# Patient Record
Sex: FEMALE | Race: WHITE | NOT HISPANIC OR LATINO | Employment: OTHER | URBAN - METROPOLITAN AREA
[De-identification: names, ages, dates, MRNs, and addresses within clinical notes are randomized per-mention and may not be internally consistent; named-entity substitution may affect disease eponyms.]

---

## 2021-01-13 ENCOUNTER — HOSPITAL ENCOUNTER (OUTPATIENT)
Dept: GENERAL RADIOLOGY | Facility: HOSPITAL | Age: 61
Discharge: HOME | End: 2021-01-13
Admitting: NURSE PRACTITIONER

## 2021-01-13 ENCOUNTER — TRANSCRIBE ORDERS (OUTPATIENT)
Dept: ADMINISTRATIVE | Facility: HOSPITAL | Age: 61
End: 2021-01-13

## 2021-01-13 DIAGNOSIS — M25.561 RIGHT KNEE PAIN, UNSPECIFIED CHRONICITY: ICD-10-CM

## 2021-01-13 DIAGNOSIS — G89.29 CHRONIC LOW BACK PAIN WITH SCIATICA, SCIATICA LATERALITY UNSPECIFIED, UNSPECIFIED BACK PAIN LATERALITY: ICD-10-CM

## 2021-01-13 DIAGNOSIS — M25.551 HIP PAIN, ACUTE, RIGHT: Primary | ICD-10-CM

## 2021-01-13 DIAGNOSIS — M54.40 CHRONIC LOW BACK PAIN WITH SCIATICA, SCIATICA LATERALITY UNSPECIFIED, UNSPECIFIED BACK PAIN LATERALITY: ICD-10-CM

## 2021-01-13 PROCEDURE — 72114 X-RAY EXAM L-S SPINE BENDING: CPT

## 2021-01-13 PROCEDURE — 73562 X-RAY EXAM OF KNEE 3: CPT

## 2021-01-13 PROCEDURE — 73502 X-RAY EXAM HIP UNI 2-3 VIEWS: CPT

## 2021-01-26 ENCOUNTER — TRANSCRIBE ORDERS (OUTPATIENT)
Dept: ADMINISTRATIVE | Facility: HOSPITAL | Age: 61
End: 2021-01-26

## 2021-01-26 DIAGNOSIS — Z87.891 PERSONAL HISTORY OF TOBACCO USE, PRESENTING HAZARDS TO HEALTH: Primary | ICD-10-CM

## 2021-02-01 ENCOUNTER — TRANSCRIBE ORDERS (OUTPATIENT)
Dept: ADMINISTRATIVE | Facility: HOSPITAL | Age: 61
End: 2021-02-01

## 2021-02-01 DIAGNOSIS — Z12.31 VISIT FOR SCREENING MAMMOGRAM: Primary | ICD-10-CM

## 2021-02-05 ENCOUNTER — APPOINTMENT (OUTPATIENT)
Dept: CT IMAGING | Facility: HOSPITAL | Age: 61
End: 2021-02-05

## 2021-02-17 ENCOUNTER — APPOINTMENT (OUTPATIENT)
Dept: CT IMAGING | Facility: HOSPITAL | Age: 61
End: 2021-02-17

## 2021-02-19 DIAGNOSIS — Z01.812 BLOOD TESTS PRIOR TO TREATMENT OR PROCEDURE: Primary | ICD-10-CM

## 2021-02-22 ENCOUNTER — LAB (OUTPATIENT)
Dept: PULMONOLOGY | Facility: CLINIC | Age: 61
End: 2021-02-22

## 2021-02-22 DIAGNOSIS — Z01.812 BLOOD TESTS PRIOR TO TREATMENT OR PROCEDURE: ICD-10-CM

## 2021-02-22 PROCEDURE — U0004 COV-19 TEST NON-CDC HGH THRU: HCPCS | Performed by: INTERNAL MEDICINE

## 2021-02-22 PROCEDURE — 99211 OFF/OP EST MAY X REQ PHY/QHP: CPT | Performed by: INTERNAL MEDICINE

## 2021-02-23 LAB — SARS-COV-2 RNA RESP QL NAA+PROBE: NOT DETECTED

## 2021-02-24 ENCOUNTER — OFFICE VISIT (OUTPATIENT)
Dept: PULMONOLOGY | Facility: CLINIC | Age: 61
End: 2021-02-24

## 2021-02-24 VITALS
OXYGEN SATURATION: 98 % | HEART RATE: 94 BPM | WEIGHT: 171.6 LBS | HEIGHT: 67 IN | BODY MASS INDEX: 26.93 KG/M2 | SYSTOLIC BLOOD PRESSURE: 120 MMHG | DIASTOLIC BLOOD PRESSURE: 80 MMHG | TEMPERATURE: 97.5 F

## 2021-02-24 DIAGNOSIS — G47.33 OBSTRUCTIVE SLEEP APNEA: ICD-10-CM

## 2021-02-24 DIAGNOSIS — Z72.0 TOBACCO ABUSE: ICD-10-CM

## 2021-02-24 DIAGNOSIS — G47.34 NOCTURNAL HYPOXEMIA: ICD-10-CM

## 2021-02-24 DIAGNOSIS — J44.9 CHRONIC OBSTRUCTIVE PULMONARY DISEASE, UNSPECIFIED COPD TYPE (HCC): Primary | ICD-10-CM

## 2021-02-24 DIAGNOSIS — Z79.899 POLYPHARMACY: ICD-10-CM

## 2021-02-24 PROCEDURE — 99204 OFFICE O/P NEW MOD 45 MIN: CPT | Performed by: INTERNAL MEDICINE

## 2021-02-24 PROCEDURE — 94375 RESPIRATORY FLOW VOLUME LOOP: CPT | Performed by: INTERNAL MEDICINE

## 2021-02-24 PROCEDURE — 94726 PLETHYSMOGRAPHY LUNG VOLUMES: CPT | Performed by: INTERNAL MEDICINE

## 2021-02-24 PROCEDURE — 94618 PULMONARY STRESS TESTING: CPT | Performed by: INTERNAL MEDICINE

## 2021-02-24 PROCEDURE — 94729 DIFFUSING CAPACITY: CPT | Performed by: INTERNAL MEDICINE

## 2021-02-24 RX ORDER — BUDESONIDE AND FORMOTEROL FUMARATE DIHYDRATE 160; 4.5 UG/1; UG/1
2 AEROSOL RESPIRATORY (INHALATION)
COMMUNITY
Start: 2021-01-11 | End: 2021-02-24 | Stop reason: SDUPTHER

## 2021-02-24 RX ORDER — ONDANSETRON 8 MG/1
8 TABLET, ORALLY DISINTEGRATING ORAL EVERY 8 HOURS PRN
COMMUNITY
End: 2021-04-20

## 2021-02-24 RX ORDER — DOXYCYCLINE HYCLATE 100 MG/1
CAPSULE ORAL
COMMUNITY
Start: 2021-01-25 | End: 2021-04-12

## 2021-02-24 RX ORDER — CYCLOBENZAPRINE HCL 10 MG
10 TABLET ORAL 2 TIMES DAILY
COMMUNITY
Start: 2021-01-11 | End: 2022-06-06

## 2021-02-24 RX ORDER — GLIPIZIDE 5 MG/1
5 TABLET, FILM COATED, EXTENDED RELEASE ORAL DAILY
COMMUNITY
Start: 2021-01-12 | End: 2021-04-20

## 2021-02-24 RX ORDER — ALBUTEROL SULFATE 90 UG/1
AEROSOL, METERED RESPIRATORY (INHALATION)
COMMUNITY
Start: 2021-01-11 | End: 2021-04-12 | Stop reason: SDUPTHER

## 2021-02-24 NOTE — PROGRESS NOTES
PULMONARY  NOTE    Chief Complaint     COPD, obstructive sleep apnea, ongoing tobacco abuse    History of Present Illness     60-year-old female referred for evaluation of chronic lung disease    She has a history of tobacco abuse consisting of 1 or more packs of cigarettes per day.  She is currently using Chantix and would like to stop smoking although has not set a smoking cessation date, yet    She has a history of COPD.  Previously followed by pulmonary physicians in [place].  She does not think that she is ever had a screening CT scan of the chest.  She now has an LD CT scheduled for later on this week    She indicates that she has been prescribed Breo, Advair, and Symbicort to be used for her COPD.  She has been using all 3 of them and indicates that her pharmacy has been filling all 3 of them.  She also has albuterol    She has been prescribed supplemental oxygen in the past and was told to use it 24 hours a day.  She has not established a relationship with the DME company in [place], yet    She also has a history of obstructive sleep apnea and and has been prescribed CPAP.  She negates that her equipment is broken and she does not have any functioning CPAP equipment at this time    Patient Active Problem List   Diagnosis   • Stage III, severe, COPD   • Tobacco abuse   • Obstructive sleep apnea   • Nocturnal hypoxemia   • Polypharmacy     Allergies   Allergen Reactions   • Bee Venom Anaphylaxis   • Ciprofloxacin Shortness Of Breath       Current Outpatient Medications:   •  albuterol sulfate HFA (Ventolin HFA) 108 (90 Base) MCG/ACT inhaler, Ventolin HFA 90 mcg/actuation aerosol inhaler  qid two inhalations, Disp: , Rfl:   •  ASPIRIN 81 PO, Take  by mouth Daily., Disp: , Rfl:   •  budesonide-formoterol (Symbicort) 160-4.5 MCG/ACT inhaler, Inhale 2 puffs., Disp: , Rfl:   •  Chantix Starting Month Jeevan 0.5 MG X 11 & 1 MG X 42 tablet, , Disp: , Rfl:   •  cyclobenzaprine (FLEXERIL) 10 MG tablet, Take  by mouth.,  "Disp: , Rfl:   •  doxycycline (VIBRAMYCIN) 100 MG capsule, , Disp: , Rfl:   •  Fluticasone Furoate-Vilanterol (Breo Ellipta) 100-25 MCG/INH inhaler, Inhale 1 puff Daily., Disp: , Rfl:   •  fluticasone-salmeterol (Advair Diskus) 500-50 MCG/DOSE DISKUS, Inhale 1 puff., Disp: , Rfl:   •  glipizide (GLUCOTROL XL) 5 MG ER tablet, , Disp: , Rfl:   •  metFORMIN (GLUCOPHAGE) 500 MG tablet, , Disp: , Rfl:   •  ondansetron ODT (ZOFRAN-ODT) 8 MG disintegrating tablet, Place 8 mg on the tongue Every 8 (Eight) Hours As Needed for Nausea or Vomiting., Disp: , Rfl:   MEDICATION LIST AND ALLERGIES REVIEWED.    No family history on file.  Social History     Tobacco Use   • Smoking status: Current Every Day Smoker     Packs/day: 2.50     Years: 50.00     Pack years: 125.00     Types: Cigarettes   • Smokeless tobacco: Never Used   Substance Use Topics   • Alcohol use: Yes     Frequency: Never     Comment: rarely   • Drug use: Never     Social History     Social History Narrative        Has moved to [place] from [place]    Continues to smoke up to 1 pack of cigarettes per day    Denies regular alcohol use     FAMILY AND SOCIAL HISTORY REVIEWED.    Review of Systems  ALSO REFER TO SCANNED ROS SHEET FROM SAME DATE.    /80   Pulse 94   Temp 97.5 °F (36.4 °C)   Ht 170.2 cm (67\")   Wt 77.8 kg (171 lb 9.6 oz)   LMP  (LMP Unknown)   SpO2 98% Comment: resting, room air  Breastfeeding No   BMI 26.88 kg/m²   Physical Exam  Vitals signs and nursing note reviewed.   Constitutional:       General: She is not in acute distress.     Appearance: She is well-developed. She is not diaphoretic.   HENT:      Head: Normocephalic and atraumatic.   Neck:      Thyroid: No thyromegaly.   Cardiovascular:      Rate and Rhythm: Normal rate and regular rhythm.      Heart sounds: Normal heart sounds. No murmur.   Pulmonary:      Effort: Pulmonary effort is normal.      Breath sounds: No stridor.      Comments: Bilateral expiratory " wheezes  Abdominal:      General: Bowel sounds are normal.      Palpations: Abdomen is soft.   Musculoskeletal: Normal range of motion.      Right lower leg: No edema.      Left lower leg: No edema.   Lymphadenopathy:      Cervical: No cervical adenopathy.      Upper Body:      Right upper body: No supraclavicular or epitrochlear adenopathy.      Left upper body: No supraclavicular or epitrochlear adenopathy.   Skin:     General: Skin is warm and dry.   Neurological:      Mental Status: She is alert and oriented to person, place, and time.   Psychiatric:         Behavior: Behavior normal.         Results     PFTs reveal severe airway obstruction, no restriction, air trapping and a reduced diffusion capacity    Exercise pulse oximetry failed to show exercise-induced desaturation after walking 900 feet on a level surface on room air    Problem List       ICD-10-CM ICD-9-CM   1. Stage III, severe, COPD  J44.9 496   2. Tobacco abuse  Z72.0 305.1   3. Nocturnal hypoxemia  G47.34 327.24   4. Obstructive sleep apnea  G47.33 327.23   5. Polypharmacy  Z79.899 V58.69       Discussion     We reviewed her test results  She has evidence of stage III, severe, chronic obstructive pulmonary disease.  This is most likely COPD related to her long history of tobacco abuse.  We will get an alpha-1 antitrypsin screen    We discussed the need for total smoking abstinence and we discussed smoking cessation strategies.  So far she appears to be tolerating Chantix    I have encouraged her to keep her appointment for her low-dose CT scan of the chest that is scheduled for later on this week    We will schedule a titration sleep study given her history of obstructive sleep apnea and need for new equipment    Oxygen prescription will be dependent upon documentation of need for supplemental oxygen.  She did not qualify based on her exercise study in the office today    We discussed her medical regimen and how she is triple dosing on her  LABA.  I have asked her to discontinue Breo, Advair, and Symbicort.  I gave her samples of Breztri with written instructions, discussion of potential side effects and a spacer.  She will also have albuterol to use on an as-needed basis    I will plan to see her back after the above    Level of service justified based on 45 minutes spent in patient care on this date of service including, but not limited to: preparing to see the patient, obtaining and/or reviewing history, performing medically appropriate examination, ordering tests/medicine/procedures, independently interpreting results, documenting clinical information in EHR, and counseling/education of patient/family/caregiver. (Level 4 45-59 minutes; Level 5 60-74 minutes)    Silas Thakur MD  Note electronically signed    CC: Kanika, January, APRN

## 2021-02-25 DIAGNOSIS — G47.33 OBSTRUCTIVE SLEEP APNEA: Primary | ICD-10-CM

## 2021-02-26 ENCOUNTER — HOSPITAL ENCOUNTER (OUTPATIENT)
Dept: CT IMAGING | Facility: HOSPITAL | Age: 61
Discharge: HOME | End: 2021-02-26
Admitting: NURSE PRACTITIONER

## 2021-02-26 DIAGNOSIS — Z87.891 PERSONAL HISTORY OF TOBACCO USE, PRESENTING HAZARDS TO HEALTH: ICD-10-CM

## 2021-02-26 PROCEDURE — 71271 CT THORAX LUNG CANCER SCR C-: CPT

## 2021-03-05 DIAGNOSIS — G47.33 OSA (OBSTRUCTIVE SLEEP APNEA): Primary | ICD-10-CM

## 2021-04-12 ENCOUNTER — OFFICE VISIT (OUTPATIENT)
Dept: PULMONOLOGY | Facility: CLINIC | Age: 61
End: 2021-04-12

## 2021-04-12 VITALS
HEIGHT: 67 IN | BODY MASS INDEX: 27.35 KG/M2 | DIASTOLIC BLOOD PRESSURE: 80 MMHG | HEART RATE: 116 BPM | TEMPERATURE: 98.2 F | SYSTOLIC BLOOD PRESSURE: 120 MMHG | WEIGHT: 174.25 LBS | RESPIRATION RATE: 22 BRPM | OXYGEN SATURATION: 96 %

## 2021-04-12 DIAGNOSIS — J44.9 COPD MIXED TYPE (HCC): ICD-10-CM

## 2021-04-12 DIAGNOSIS — G47.34 NOCTURNAL HYPOXEMIA: ICD-10-CM

## 2021-04-12 DIAGNOSIS — F17.210 CIGARETTE NICOTINE DEPENDENCE WITHOUT COMPLICATION: ICD-10-CM

## 2021-04-12 DIAGNOSIS — J30.9 ALLERGIC RHINITIS, UNSPECIFIED SEASONALITY, UNSPECIFIED TRIGGER: ICD-10-CM

## 2021-04-12 DIAGNOSIS — G47.33 OBSTRUCTIVE SLEEP APNEA: ICD-10-CM

## 2021-04-12 DIAGNOSIS — Z72.0 TOBACCO ABUSE: Primary | ICD-10-CM

## 2021-04-12 PROCEDURE — G0296 VISIT TO DETERM LDCT ELIG: HCPCS | Performed by: NURSE PRACTITIONER

## 2021-04-12 PROCEDURE — 99214 OFFICE O/P EST MOD 30 MIN: CPT | Performed by: NURSE PRACTITIONER

## 2021-04-12 PROCEDURE — 99406 BEHAV CHNG SMOKING 3-10 MIN: CPT | Performed by: NURSE PRACTITIONER

## 2021-04-12 RX ORDER — FEXOFENADINE HCL 180 MG/1
180 TABLET ORAL DAILY
Qty: 90 TABLET | Refills: 3 | Status: SHIPPED | OUTPATIENT
Start: 2021-04-12 | End: 2021-07-14 | Stop reason: SDUPTHER

## 2021-04-12 RX ORDER — IPRATROPIUM BROMIDE AND ALBUTEROL SULFATE 2.5; .5 MG/3ML; MG/3ML
3 SOLUTION RESPIRATORY (INHALATION)
COMMUNITY
Start: 2021-03-06 | End: 2021-04-12 | Stop reason: SDUPTHER

## 2021-04-12 RX ORDER — IPRATROPIUM BROMIDE AND ALBUTEROL SULFATE 2.5; .5 MG/3ML; MG/3ML
3 SOLUTION RESPIRATORY (INHALATION)
Qty: 360 ML | Refills: 4 | Status: SHIPPED | OUTPATIENT
Start: 2021-04-12 | End: 2021-10-22

## 2021-04-12 RX ORDER — FLUTICASONE PROPIONATE 50 MCG
2 SPRAY, SUSPENSION (ML) NASAL DAILY
Qty: 15.8 ML | Refills: 6 | Status: SHIPPED | OUTPATIENT
Start: 2021-04-12 | End: 2021-07-14 | Stop reason: SDUPTHER

## 2021-04-12 RX ORDER — MELOXICAM 15 MG/1
15 TABLET ORAL DAILY
COMMUNITY
Start: 2021-03-22

## 2021-04-12 RX ORDER — INSULIN GLARGINE 100 [IU]/ML
10 INJECTION, SOLUTION SUBCUTANEOUS NIGHTLY
COMMUNITY
End: 2021-04-20

## 2021-04-12 RX ORDER — ALBUTEROL SULFATE 90 UG/1
2 AEROSOL, METERED RESPIRATORY (INHALATION) EVERY 4 HOURS PRN
Qty: 8 G | Refills: 6 | Status: SHIPPED | OUTPATIENT
Start: 2021-04-12 | End: 2021-07-14 | Stop reason: SDUPTHER

## 2021-04-12 NOTE — PROGRESS NOTES
[facility] Pulmonary Follow up    CHIEF COMPLAINT    COPD    HISTORY OF PRESENT ILLNESS    Kathy Taylor is a 61 y.o.female here today for follow-up of her COPD.  She was last seen in the office in February by Dr. Thakur.    She is currently using Breztri inhaler twice a day.  She denies any difficulties with this inhaler.  She does have her albuterol rescue inhaler to use as needed for shortness of breath.  She also has nebulizers to use as needed.    She does have shortness of breath with any exertion.  She does recover fairly quickly at rest.  She has been on daytime oxygen in the past but at her last appointment she did a 6-minute walk test and did not meet qualifications for oxygen with activity.    She has had a low-dose CT screening since her last appointment and is here today for the results.  She continues to smoke 2 packs a day.  She had been on Chantix and had cut back to 4 to 8 cigarettes a day but ran out of Chantix and did not know who to call for a refill.    She denies fever, chills, sputum production, hemoptysis, night sweats, weight loss, chest pain or palpitations.  She has chronic lower extremity edema but states that this is her baseline.  She denies any calf tenderness.  She does have sinus and allergy symptoms but does not take anything currently.  She denies reflux symptoms.    She has a history of SABRINA and her CPAP is currently broken.  A sleep study was ordered but she never had this scheduled.  She does have daytime somnolence, fatigue and nonrestorative sleep.    She is up-to-date on her current vaccinations.    Patient Active Problem List   Diagnosis   • Stage III, severe, COPD   • Tobacco abuse   • Obstructive sleep apnea   • Nocturnal hypoxemia   • Polypharmacy       Allergies   Allergen Reactions   • Bee Venom Anaphylaxis   • Ciprofloxacin Shortness Of Breath       Current Outpatient Medications:   •  albuterol sulfate HFA (Ventolin HFA) 108 (90 Base) MCG/ACT inhaler, Inhale 2 puffs  Every 4 (Four) Hours As Needed for Wheezing., Disp: 8 g, Rfl: 6  •  ASPIRIN 81 PO, Take  by mouth Daily., Disp: , Rfl:   •  Budeson-Glycopyrrol-Formoterol (Breztri Aerosphere) 160-9-4.8 MCG/ACT aerosol inhaler, Inhale 2 puffs 2 (Two) Times a Day., Disp: 1 each, Rfl: 11  •  cyclobenzaprine (FLEXERIL) 10 MG tablet, Take  by mouth., Disp: , Rfl:   •  glipizide (GLUCOTROL XL) 5 MG ER tablet, Take 5 mg by mouth Daily., Disp: , Rfl:   •  insulin glargine (Lantus) 100 UNIT/ML injection, Inject 10 Units under the skin into the appropriate area as directed Every Night., Disp: , Rfl:   •  ipratropium-albuterol (DUO-NEB) 0.5-2.5 mg/3 ml nebulizer, Take 3 mL by nebulization 4 (Four) Times a Day., Disp: 360 mL, Rfl: 4  •  meloxicam (MOBIC) 15 MG tablet, Take 15 mg by mouth Daily., Disp: , Rfl:   •  metFORMIN (GLUCOPHAGE) 500 MG tablet, Take 500 mg by mouth Daily With Breakfast., Disp: , Rfl:   •  ondansetron ODT (ZOFRAN-ODT) 8 MG disintegrating tablet, Place 8 mg on the tongue Every 8 (Eight) Hours As Needed for Nausea or Vomiting., Disp: , Rfl:   •  fexofenadine (Allegra Allergy) 180 MG tablet, Take 1 tablet by mouth Daily., Disp: 90 tablet, Rfl: 3  •  fluticasone (Flonase) 50 MCG/ACT nasal spray, 2 sprays into the nostril(s) as directed by provider Daily., Disp: 15.8 mL, Rfl: 6  •  varenicline (CHANTIX MILADIS) 0.5 MG X 11 & 1 MG X 42 tablet, Use as directed on package instructions, try to quit smoking after 1 week, Disp: 53 tablet, Rfl: 0  MEDICATION LIST AND ALLERGIES REVIEWED.    Social History     Tobacco Use   • Smoking status: Current Every Day Smoker     Packs/day: 2.50     Years: 50.00     Pack years: 125.00     Types: Cigarettes   • Smokeless tobacco: Never Used   Substance Use Topics   • Alcohol use: Yes     Comment: rarely   • Drug use: Never       FAMILY AND SOCIAL HISTORY REVIEWED.    Review of Systems   Constitutional: Negative for activity change, appetite change, fatigue, fever and unexpected weight change.   HENT:  "Negative for congestion, postnasal drip, rhinorrhea, sinus pressure, sore throat and voice change.    Eyes: Negative for visual disturbance.   Respiratory: Positive for shortness of breath. Negative for cough, chest tightness and wheezing.    Cardiovascular: Negative for chest pain, palpitations and leg swelling.   Gastrointestinal: Negative for abdominal distention, abdominal pain, nausea and vomiting.   Endocrine: Negative for cold intolerance and heat intolerance.   Genitourinary: Negative for difficulty urinating and urgency.   Musculoskeletal: Negative for arthralgias, back pain and neck pain.   Skin: Negative for color change and pallor.   Allergic/Immunologic: Negative for environmental allergies and food allergies.   Neurological: Negative for dizziness, syncope, weakness and light-headedness.   Hematological: Negative for adenopathy. Does not bruise/bleed easily.   Psychiatric/Behavioral: Negative for agitation and behavioral problems.   .    /80 (BP Location: Right arm, Patient Position: Sitting, Cuff Size: Adult)   Pulse 116   Temp 98.2 °F (36.8 °C)   Resp 22   Ht 170.2 cm (67\")   Wt 79 kg (174 lb 4 oz)   LMP  (LMP Unknown)   SpO2 96% Comment: room air at rest  BMI 27.29 kg/m²     Immunization History   Administered Date(s) Administered   • Flu Vaccine Quad PF >18YRS 10/13/2016   • Flu Vaccine Quad PF >36MO 01/11/2021   • Pneumococcal Polysaccharide (PPSV23) 10/13/2016, 01/11/2021       Physical Exam  Vitals and nursing note reviewed.   Constitutional:       Appearance: She is well-developed. She is not diaphoretic.   HENT:      Head: Normocephalic and atraumatic.   Eyes:      Pupils: Pupils are equal, round, and reactive to light.   Neck:      Thyroid: No thyromegaly.   Cardiovascular:      Rate and Rhythm: Normal rate and regular rhythm.      Heart sounds: Normal heart sounds. No murmur heard.   No friction rub. No gallop.    Pulmonary:      Effort: Pulmonary effort is normal. No " respiratory distress.      Breath sounds: Normal breath sounds. No wheezing or rales.   Chest:      Chest wall: No tenderness.   Abdominal:      General: Bowel sounds are normal.      Palpations: Abdomen is soft.      Tenderness: There is no abdominal tenderness.   Musculoskeletal:         General: No swelling. Normal range of motion.      Cervical back: Normal range of motion and neck supple.   Lymphadenopathy:      Cervical: No cervical adenopathy.   Skin:     General: Skin is warm and dry.      Capillary Refill: Capillary refill takes less than 2 seconds.   Neurological:      Mental Status: She is alert and oriented to person, place, and time.   Psychiatric:         Mood and Affect: Mood normal.         Behavior: Behavior normal.           RESULTS    CT Chest Low Dose Cancer Screening WO    Result Date: 2/26/2021  There is no CT evidence of acute intrathoracic abnormality. Mild bronchiectatic changes seen within the central aspect of the lung fields bilaterally. Old healed granulomatous disease seen within the chest with minimal coronary artery calcification identified. There is no pulmonary mass or nodule.  Lung RADS category 1-continue annual low dose CT screening.  D:  02/26/2021 E:  02/26/2021  This report was finalized on 2/26/2021 6:08 PM by Dr. Lida Tadeo MD.      PROBLEM LIST    Problem List Items Addressed This Visit        Pulmonary and Pneumonias    Stage III, severe, COPD    Relevant Medications    fexofenadine (Allegra Allergy) 180 MG tablet    fluticasone (Flonase) 50 MCG/ACT nasal spray    albuterol sulfate HFA (Ventolin HFA) 108 (90 Base) MCG/ACT inhaler    ipratropium-albuterol (DUO-NEB) 0.5-2.5 mg/3 ml nebulizer       Sleep    Obstructive sleep apnea    Nocturnal hypoxemia       Tobacco    Tobacco abuse - Primary    Relevant Medications    varenicline (CHANTIX MILADIS) 0.5 MG X 11 & 1 MG X 42 tablet      Other Visit Diagnoses     Allergic rhinitis, unspecified seasonality, unspecified  trigger        Relevant Medications    meloxicam (MOBIC) 15 MG tablet    fexofenadine (Allegra Allergy) 180 MG tablet    fluticasone (Flonase) 50 MCG/ACT nasal spray    Cigarette nicotine dependence without complication        Relevant Medications    varenicline (CHANTIX MILADIS) 0.5 MG X 11 & 1 MG X 42 tablet    Other Relevant Orders    CT chest low dose wo            DISCUSSION    Ms. Taylor was here for follow-up of her COPD.  She will continue Breztri twice a day for her COPD.  Did encourage her to use her albuterol or DuoNeb's as needed for shortness of breath.    I did give her the number to the sleep center to call and get her sleep study set up.  She does have daytime somnolence, fatigue and nonrestorative sleep.    I will send a refill of Chantix for her.  I did encourage her to completely quit smoking and if she needed refill to call me and I will send an additional refill.  We did discuss smoking cessation in the office for 5 minutes today.    We did review her CT scan that was performed and this revealed no nodule or mass concerning for malignancy.  Her next CT scan is due in February 2022.    I will start her on Allegra and Flonase for her allergic rhinitis.  I did encourage her to do these daily.    She will follow-up in 3 to 4 months with Dr. Thakur or sooner if her symptoms worsen.  She will call with any additional concerns or questions.    Level of service justified based on 35 minutes spent in patient care on this date of service including, but not limited to: preparing to see the patient, obtaining and/or reviewing history, performing medically appropriate examination, ordering tests/medicine/procedures, independently interpreting results, documenting clinical information in EHR, and counseling/education of patient/family/caregiver. (Level 4 30-39 minutes; Level 5 40-54 minutes)      Leidy Callaway, KALYAN  04/12/202115:49 EDT  Electronically signed     Please note that portions of this note were  completed with a voice recognition program. Efforts were made to edit the dictations, but occasionally words are mistranscribed.      CC: Kanika, January, APRN

## 2021-04-20 ENCOUNTER — OFFICE VISIT (OUTPATIENT)
Dept: ENDOCRINOLOGY | Facility: CLINIC | Age: 61
End: 2021-04-20

## 2021-04-20 VITALS
HEIGHT: 67 IN | SYSTOLIC BLOOD PRESSURE: 132 MMHG | WEIGHT: 173.4 LBS | OXYGEN SATURATION: 96 % | BODY MASS INDEX: 27.21 KG/M2 | DIASTOLIC BLOOD PRESSURE: 70 MMHG | HEART RATE: 110 BPM

## 2021-04-20 DIAGNOSIS — Z79.4 TYPE 2 DIABETES MELLITUS WITH HYPERGLYCEMIA, WITH LONG-TERM CURRENT USE OF INSULIN (HCC): Primary | ICD-10-CM

## 2021-04-20 DIAGNOSIS — E11.65 TYPE 2 DIABETES MELLITUS WITH HYPERGLYCEMIA, WITH LONG-TERM CURRENT USE OF INSULIN (HCC): Primary | ICD-10-CM

## 2021-04-20 LAB
EXPIRATION DATE: ABNORMAL
EXPIRATION DATE: ABNORMAL
EXPIRATION DATE: NORMAL
GLUCOSE BLDC GLUCOMTR-MCNC: 419 MG/DL (ref 70–130)
GLUCOSE BLDC GLUCOMTR-MCNC: 465 MG/DL (ref 70–130)
HBA1C MFR BLD: 12.8 %
Lab: ABNORMAL
Lab: ABNORMAL
Lab: NORMAL

## 2021-04-20 PROCEDURE — 99204 OFFICE O/P NEW MOD 45 MIN: CPT | Performed by: INTERNAL MEDICINE

## 2021-04-20 PROCEDURE — 82947 ASSAY GLUCOSE BLOOD QUANT: CPT | Performed by: INTERNAL MEDICINE

## 2021-04-20 PROCEDURE — 83036 HEMOGLOBIN GLYCOSYLATED A1C: CPT | Performed by: INTERNAL MEDICINE

## 2021-04-20 RX ORDER — METFORMIN HYDROCHLORIDE 500 MG/1
500 TABLET, EXTENDED RELEASE ORAL
Qty: 30 TABLET | Refills: 3 | Status: SHIPPED | OUTPATIENT
Start: 2021-04-20 | End: 2021-07-19

## 2021-04-20 RX ORDER — LANCETS 30 GAUGE
EACH MISCELLANEOUS
Qty: 60 EACH | Refills: 3 | Status: SHIPPED | OUTPATIENT
Start: 2021-04-20 | End: 2021-12-08

## 2021-04-20 RX ORDER — BLOOD-GLUCOSE METER
1 KIT MISCELLANEOUS ONCE
Qty: 1 EACH | Refills: 0 | Status: SHIPPED | OUTPATIENT
Start: 2021-04-20 | End: 2021-04-20

## 2021-04-20 NOTE — PROGRESS NOTES
Chief Complaint   Patient presents with   • Establish Care   • Diabetes        New patient who is being seen in consultation regarding diabetes at the request of Kankia, January, APRN     RUDY   Kathy Taylor is a 61 y.o. female who presents for evaluation of diabetes.    Patient has a history of type 2 diabetes.  This was initially diagnosed 10-15 years ago after patient presented for routine labs.  Patient was initially started on metformin and glipizde and lantus. She reports that lantus was later discontinued after improvement in BG. Insulin was restarted 2-3 years ago. Patient is currently taking Lantus 10 units daily, glipizide 15 mg XL daily, Metformin 500 mg twice daily.  Patient believes that glycemic control is poor.  Patient reports most recent A1c was high. Patient reports poor adherence to medications.  Patient reports that she has not taken Lantus in approximately 1 week and as she frequently misses doses of both glipizide and Metformin as well.  She does report some GI upset with Metformin and is unsure if she is taking the extended release or immediate release formulation.    Patient denies any recent hypoglycemia or symptoms concerning for hypoglycemia  patient does not monitor blood glucose  Patient does not follow a diabetic diet. Patient reports that she drinks coffee with sweetened creamer throughout the day, she also drinks regular soda and reports a diet high in starches, specifically potatoes.  Patient does not exercise regularly.     Patient's last dilated eye exam was within the past year.  Patient denies acute visual changes.  Patient denies foot related concerns such as numbness, tingling, or pain.  History of dyslipidemia: No  History of renal dysfunction: No  History of Hypertension: No    Patient noted to be profoundly hyperglycemic at start of visit.  She denies any nausea, vomiting, dizziness or lightheadedness and reports that she feels she is at her baseline health.  She has not  taken any medication for diabetes today.  She had not checked blood glucose today prior to arrival.  Past Medical History:   Diagnosis Date   • Arthritis    • Back ache    • Bronchitis    • Fibromyalgia    • Migraines    • Pneumonia    • Rheumatic fever    • Tooth infection    • Type 2 diabetes mellitus (CMS/HCC)    • Vulvar carcinoma (CMS/HCC)      Past Surgical History:   Procedure Laterality Date   • BREAST LUMPECTOMY     • TONSILLECTOMY     • VULVA BIOPSY     • VULVA SURGERY     • WISDOM TOOTH EXTRACTION        Family History   Problem Relation Age of Onset   • Arthritis Mother    • Diabetes Mother    • Heart disease Mother    • Thyroid disease Mother    • Uterine cancer Mother    • Heart disease Brother    • Diabetes Brother       Social History     Socioeconomic History   • Marital status:      Spouse name: Not on file   • Number of children: Not on file   • Years of education: Not on file   • Highest education level: Not on file   Tobacco Use   • Smoking status: Current Every Day Smoker     Packs/day: 1.00     Years: 50.00     Pack years: 50.00     Types: Cigarettes   • Smokeless tobacco: Never Used   Vaping Use   • Vaping Use: Former   • Substances: Nicotine   • Devices: Pre-filled or refillable cartridge   Substance and Sexual Activity   • Alcohol use: Yes     Comment: rarely   • Drug use: Never   • Sexual activity: Defer      Allergies   Allergen Reactions   • Bee Venom Anaphylaxis   • Ciprofloxacin Shortness Of Breath      Current Outpatient Medications on File Prior to Visit   Medication Sig Dispense Refill   • albuterol sulfate HFA (Ventolin HFA) 108 (90 Base) MCG/ACT inhaler Inhale 2 puffs Every 4 (Four) Hours As Needed for Wheezing. (Patient taking differently: Inhale 2 puffs As Needed for Wheezing.) 8 g 6   • ASPIRIN 81 PO Take 1 tablet by mouth Daily.     • Budeson-Glycopyrrol-Formoterol (Breztri Aerosphere) 160-9-4.8 MCG/ACT aerosol inhaler Inhale 2 puffs 2 (Two) Times a Day. 1 each 11   •  cyclobenzaprine (FLEXERIL) 10 MG tablet Take 10 mg by mouth Daily.     • fexofenadine (Allegra Allergy) 180 MG tablet Take 1 tablet by mouth Daily. 90 tablet 3   • fluticasone (Flonase) 50 MCG/ACT nasal spray 2 sprays into the nostril(s) as directed by provider Daily. 15.8 mL 6   • GLIPIZIDE PO Take 15 mg by mouth Daily.     • ipratropium-albuterol (DUO-NEB) 0.5-2.5 mg/3 ml nebulizer Take 3 mL by nebulization 4 (Four) Times a Day. 360 mL 4   • meloxicam (MOBIC) 15 MG tablet Take 15 mg by mouth Daily.     • varenicline (CHANTIX MILADIS) 0.5 MG X 11 & 1 MG X 42 tablet Use as directed on package instructions, try to quit smoking after 1 week 53 tablet 0   • [DISCONTINUED] Insulin Glargine (LANTUS SOLOSTAR) 100 UNIT/ML injection pen Inject 10 Units under the skin into the appropriate area as directed Every Night.     • [DISCONTINUED] metFORMIN (GLUCOPHAGE) 500 MG tablet Take 500 mg by mouth 2 (Two) Times a Day With Meals.     • [DISCONTINUED] glipizide (GLUCOTROL XL) 5 MG ER tablet Take 5 mg by mouth Daily.     • [DISCONTINUED] insulin glargine (Lantus) 100 UNIT/ML injection Inject 10 Units under the skin into the appropriate area as directed Every Night.     • [DISCONTINUED] ondansetron ODT (ZOFRAN-ODT) 8 MG disintegrating tablet Place 8 mg on the tongue Every 8 (Eight) Hours As Needed for Nausea or Vomiting.       No current facility-administered medications on file prior to visit.        Review of Systems   Constitutional: Negative for fatigue, unexpected weight gain and unexpected weight loss.   HENT: Positive for dental problem. Negative for trouble swallowing and voice change.    Eyes: Negative for pain and visual disturbance.   Respiratory: Positive for cough and shortness of breath.    Cardiovascular: Positive for leg swelling. Negative for palpitations.   Gastrointestinal: Positive for diarrhea. Negative for constipation.   Endocrine: Positive for polydipsia and polyuria.   Musculoskeletal: Positive for  "arthralgias, back pain, myalgias and neck pain.   Skin: Negative for dry skin and rash.   Neurological: Positive for numbness and headache.   Psychiatric/Behavioral: Positive for sleep disturbance. The patient is nervous/anxious.         Vitals:    04/20/21 1431   BP: 132/70   Pulse: 110   SpO2: 96%   Weight: 78.7 kg (173 lb 6.4 oz)   Height: 170.2 cm (67\")   Body mass index is 27.16 kg/m².     Physical Exam  Vitals reviewed.   Constitutional:       General: She is not in acute distress.     Appearance: She is overweight.   HENT:      Head: Normocephalic and atraumatic.      Right Ear: Hearing normal.      Left Ear: Hearing normal.      Nose: Nose normal.   Eyes:      General: Lids are normal.      Conjunctiva/sclera: Conjunctivae normal.   Neck:      Thyroid: No thyromegaly or thyroid tenderness.   Cardiovascular:      Rate and Rhythm: Normal rate and regular rhythm.      Heart sounds: No murmur heard.     Pulmonary:      Effort: Pulmonary effort is normal.      Breath sounds: Normal breath sounds and air entry.   Abdominal:      General: Bowel sounds are normal.      Palpations: Abdomen is soft.      Tenderness: There is no abdominal tenderness.   Lymphadenopathy:      Head:      Right side of head: No submandibular adenopathy.      Left side of head: No submandibular adenopathy.      Cervical: No cervical adenopathy.   Skin:     General: Skin is warm and dry.      Findings: No rash.   Neurological:      General: No focal deficit present.      Mental Status: She is alert.      Deep Tendon Reflexes: Reflexes are normal and symmetric.   Psychiatric:         Mood and Affect: Mood and affect normal.         Behavior: Behavior is cooperative.        Labs/Imaging  Results for SOTERO GIORDANO (MRN [id number]) as of 4/20/2021 17:27   Ref. Range 4/20/2021 14:58 4/20/2021 14:58 4/20/2021 15:49   Glucose Latest Ref Range: 70 - 130 mg/dL 465 (A)  419 (A)   Hemoglobin A1C Latest Units: %  12.8      Assessment and " Plan    Diagnoses and all orders for this visit:    1. Type 2 diabetes mellitus with hyperglycemia, with long-term current use of insulin (CMS/[facility]) (Primary)  -Uncontrolled with hemoglobin A1c 12.8%, patient profoundly hyperglycemic in office  -Patient denied symptoms related to hyperglycemia and is well-appearing, she was administered 15 units of short acting insulin in the office and glucose was rechecked and downtrending prior to departure.  She was instructed to recheck blood glucose upon arrival at home.  If hyperglycemia fails to correct or if she develops nausea, vomiting, dizziness, lightheadedness she is instructed to go to the ER immediately due to possible need for IV hydration and IV insulin.  Discussed risks of profound hyperglycemia, up to including death.  Patient voiced understanding.  Discussed role of hydration and its importance blood glucose is high.  -Lab glycemic control due in part to medication nonadherence  -Patient does report some GI upset with Metformin.  We will plan to change to extended release and reduce dose to 500 mg daily  -Patient to continue glipizide extended release 15 mg daily, discussed that she should take this with food.  Discussed risk of hypoglycemia.  -Discussed that given degree of hyperglycemia, is unlikely to be able to achieve adequate glycemic control without insulin.  Patient to increase Lantus to 15 units daily and was given instructions for titration  -Patient advised to check glucose every morning before eating. If glucose consistently above 200, patient instructed to increase by 2 units every 3 days until morning sugar (before eating) is less than 200. Patient advised not to exceed 40 units daily. If patient experiencing hypoglycemia (glucose <70), patient should stop titration and contact clinic.  -Patient was advised to monitor blood sugar 2-3 times daily.  Patient was instructed to bring glucometer to all future appointments. Patient should contact the  clinic between appointments with hypoglycemia or persistent hyperglycemia.  Discussed signs and symptoms of hypoglycemia as well as hypoglycemia management via the rule of 15's.  Discussed potential for long-term complications with uncontrolled diabetes including nephropathy, neuropathy, retinopathy, increased risk for cardiac disease.  Discussed the role of diet and exercise in the management of diabetes.  Request recent screening labs from patient's PCP, update as needed at next visit  -     POC Glucose, Blood  -     POC Glycosylated Hemoglobin (Hb A1C)  -     glucose monitor monitoring kit; 1 each 1 (One) Time for 1 dose.  Dispense: 1 each; Refill: 0  -     glucose blood test strip; Use as instructed 2-3 times a day  Dispense: 100 each; Refill: 3  -     Lancets misc; For use with glucose monitoring 2-3 times daily  Dispense: 60 each; Refill: 3  -     POC Glucose, Blood  -     Insulin Glargine (LANTUS SOLOSTAR) 100 UNIT/ML injection pen; Start 15 units at bedtime and titrate per instructions, MDD 40 units  Dispense: 12 mL; Refill: 5  -     metFORMIN ER (GLUCOPHAGE-XR) 500 MG 24 hr tablet; Take 1 tablet by mouth Daily With Breakfast.  Dispense: 30 tablet; Refill: 3    Addendum dated 4/26/2021  Received results of prior labs from PCP dated 1/25/2020   Alkaline phosphatase 76  ALT 28  AST 20  eGFR greater than 60  Free T4 1.05  TSH 1.32  Total cholesterol 164  Triglycerides 349  Direct LDL 85.4  Hemoglobin A1c 11.5%  Vitamin D 43.5  Urine albumin 10  Urine creatinine 50 mg/dL  Albumin to creatinine ratio <30  Return in about 3 months (around 7/20/2021) for Next scheduled follow up. The patient was instructed to contact the clinic with any interval questions or concerns.    Sayra Witt MD     Please note that portions of this document were completed using a voice recognition program. Efforts were made to edit the dictations, but occasionally words are mis-transcribed.

## 2021-04-21 ENCOUNTER — TELEPHONE (OUTPATIENT)
Dept: ENDOCRINOLOGY | Facility: CLINIC | Age: 61
End: 2021-04-21

## 2021-04-21 NOTE — TELEPHONE ENCOUNTER
----- Message from Sayra Witt MD sent at 4/20/2021  5:23 PM EDT -----  Could you please request recent screening labs from patient's PCP? Lipids, CMP, urine micro

## 2021-04-21 NOTE — TELEPHONE ENCOUNTER
Fax request for labs, Lipids, CMP and Urine Micro.  Kanika, January, APRN.  Fax:  [phone], Phone:  [phone]

## 2021-04-22 ENCOUNTER — TELEPHONE (OUTPATIENT)
Dept: ENDOCRINOLOGY | Facility: CLINIC | Age: 61
End: 2021-04-22

## 2021-04-22 DIAGNOSIS — E11.65 TYPE 2 DIABETES MELLITUS WITH HYPERGLYCEMIA, WITH LONG-TERM CURRENT USE OF INSULIN (HCC): Primary | ICD-10-CM

## 2021-04-22 DIAGNOSIS — Z79.4 TYPE 2 DIABETES MELLITUS WITH HYPERGLYCEMIA, WITH LONG-TERM CURRENT USE OF INSULIN (HCC): Primary | ICD-10-CM

## 2021-04-22 RX ORDER — GLIPIZIDE 10 MG/1
15 TABLET ORAL DAILY
OUTPATIENT
Start: 2021-04-22

## 2021-04-22 RX ORDER — GLIPIZIDE 5 MG/1
15 TABLET, FILM COATED, EXTENDED RELEASE ORAL DAILY
Qty: 90 TABLET | Refills: 3 | Status: SHIPPED | OUTPATIENT
Start: 2021-04-22 | End: 2021-08-16

## 2021-04-22 NOTE — TELEPHONE ENCOUNTER
PT CALLED REQUESTING A REFILL OF GLIPIZIDE TO BE SENT IN TO [facility] IN [facility] IN [place], [place].

## 2021-04-27 ENCOUNTER — APPOINTMENT (OUTPATIENT)
Dept: MAMMOGRAPHY | Facility: HOSPITAL | Age: 61
End: 2021-04-27

## 2021-05-21 ENCOUNTER — OFFICE VISIT (OUTPATIENT)
Dept: ENDOCRINOLOGY | Facility: CLINIC | Age: 61
End: 2021-05-21

## 2021-05-21 VITALS
SYSTOLIC BLOOD PRESSURE: 134 MMHG | BODY MASS INDEX: 28.16 KG/M2 | HEART RATE: 115 BPM | WEIGHT: 179.4 LBS | HEIGHT: 67 IN | OXYGEN SATURATION: 94 % | DIASTOLIC BLOOD PRESSURE: 68 MMHG

## 2021-05-21 DIAGNOSIS — E11.65 TYPE 2 DIABETES MELLITUS WITH HYPERGLYCEMIA, WITH LONG-TERM CURRENT USE OF INSULIN (HCC): Primary | ICD-10-CM

## 2021-05-21 DIAGNOSIS — Z79.4 TYPE 2 DIABETES MELLITUS WITH HYPERGLYCEMIA, WITH LONG-TERM CURRENT USE OF INSULIN (HCC): Primary | ICD-10-CM

## 2021-05-21 LAB
EXPIRATION DATE: ABNORMAL
EXPIRATION DATE: ABNORMAL
GLUCOSE BLDC GLUCOMTR-MCNC: 445 MG/DL (ref 70–130)
GLUCOSE BLDC GLUCOMTR-MCNC: 557 MG/DL (ref 70–130)
Lab: ABNORMAL
Lab: ABNORMAL

## 2021-05-21 PROCEDURE — 99214 OFFICE O/P EST MOD 30 MIN: CPT | Performed by: INTERNAL MEDICINE

## 2021-05-21 PROCEDURE — 82947 ASSAY GLUCOSE BLOOD QUANT: CPT | Performed by: INTERNAL MEDICINE

## 2021-05-21 RX ORDER — INSULIN ASPART INJECTION 100 [IU]/ML
INJECTION, SOLUTION SUBCUTANEOUS
Qty: 6 ML | Refills: 3 | Status: SHIPPED | OUTPATIENT
Start: 2021-05-21 | End: 2021-08-18 | Stop reason: SDUPTHER

## 2021-05-21 NOTE — PROGRESS NOTES
"Chief Complaint   Patient presents with   • Follow-up   • Diabetes     no meter, pt took last insulin dose 2 days ago.        HPI   Kathy Taylor is a 61 y.o. female had concerns including Follow-up and Diabetes (no meter, pt took last insulin dose 2 days ago.).      Patient profoundly hyperglycemic in office. She reports she feels well, denies nausea, lightheadedness. She reports glucose was 250 this AM and she then had carnation instant breakfast. She reports she is tolerating lower dose of metformin without incident. She has not taken insulin in 3 days. She denies any particular reason and states she just forgot. She does report increased thirst and increased urination.    Current prescribed medications include:  Metformin extended release 500 mg daily  Glipizide extended release 15 mg daily  Lantus 15 units daily    Patient denies missing doses of glipizide or metformin.    The following portions of the patient's history were reviewed and updated as appropriate: allergies, current medications and past social history.    Review of Systems   Gastrointestinal: Negative for abdominal pain, nausea and vomiting.   Endocrine: Positive for polydipsia and polyuria.   Neurological: Negative for light-headedness.      /68   Pulse 115   Ht 170.2 cm (67\")   Wt 81.4 kg (179 lb 6.4 oz)   LMP  (LMP Unknown)   SpO2 94%   BMI 28.10 kg/m²      Physical Exam      Constitutional:  well developed; well nourished  no acute distress  appears older than stated age   ENT/Thyroid: not examined   Eyes: Conjunctiva: clear   Respiratory:  breathing is unlabored  clear to auscultation bilaterally   Cardiovascular:  Tachycardic with regular rhythm   Chest:  Not performed.   Abdomen: soft, non-tender; no masses   : Not performed.   Musculoskeletal: Not performed   Skin: dry and warm   Neuro: mental status, speech normal   Psych: mood and affect are within normal limits       Labs/Imaging  Received results of prior labs from PCP " dated 1/25/2020   Alkaline phosphatase 76  ALT 28  AST 20  eGFR greater than 60  Free T4 1.05  TSH 1.32  Total cholesterol 164  Triglycerides 349  Direct LDL 85.4  Hemoglobin A1c 11.5%  Vitamin D 43.5  Urine albumin 10  Urine creatinine 50 mg/dL  Albumin to creatinine ratio <30    Results for SOTERO GIORDANO (MRN [id number]) as of 5/21/2021 14:27   Ref. Range 5/21/2021 14:24   Glucose Latest Ref Range: 70 - 130 mg/dL 557 (A)   Results for SOTERO GIORDANO (MRN [id number]) as of 5/21/2021 18:02   Ref. Range 5/21/2021 16:55   Glucose Latest Ref Range: 70 - 130 mg/dL 445 (A)       Diagnoses and all orders for this visit:    1. Type 2 diabetes mellitus with hyperglycemia, with long-term current use of insulin (CMS/[facility]) (Primary)  - diabetes is poorly controlled, patient with increased thirst, increased urination  - patient not taking medications as prescribed and eating high carbohydrate diet (has ice cream every day)  - discussed dangers of profound hyperglycemia, up to and including death. Patient given 15 units of short acting insulin in office and glucose was downtrending prior to departure. Patient given correctional scale to use and sample of fiasp. Discussed that should glucose fail to improve with continued correction or if she develops nausea or vomiting, she must go to the ER immediately.  - increase glargine to 20 units daily  - start fiasp 1:50 >200 correction, given written instructions for use  - continue glipizide and metformin  - discussed importance of routine glucose monitoring  -Patient was advised to monitor blood sugar 3 times daily.  Patient was instructed to bring glucometer to all future appointments. Patient should contact the clinic between appointments with hypoglycemia or persistent hyperglycemia.  Discussed signs and symptoms of hypoglycemia as well as hypoglycemia management via the rule of 15's.  Discussed potential for long-term complications with uncontrolled diabetes including  nephropathy, neuropathy, retinopathy, increased risk for cardiac disease.  Discussed the role of diet and exercise in the management of diabetes.        POC Glucose, Blood  -     POC Glucose, Blood    2. Hypertriglyceridemia  - discussed this is likely due to poorly controlled diabetes, working to improve diabetes control, as above.    Return for approximately 8 weeks. The patient was instructed to contact the clinic with any interval questions or concerns.    Sayra Witt MD   Endocrinologist    Please note that portions of this document were completed with a voice recognition program. Efforts were made to edit the dictations, but occasionally words are mis-transcribed.

## 2021-06-03 DIAGNOSIS — J44.9 COPD MIXED TYPE (HCC): Primary | ICD-10-CM

## 2021-06-03 DIAGNOSIS — Z72.0 TOBACCO ABUSE: ICD-10-CM

## 2021-06-04 RX ORDER — VARENICLINE TARTRATE 1 MG/1
1 TABLET, FILM COATED ORAL 2 TIMES DAILY
Qty: 60 TABLET | Refills: 1 | Status: SHIPPED | OUTPATIENT
Start: 2021-06-04 | End: 2021-10-25 | Stop reason: ALTCHOICE

## 2021-06-28 ENCOUNTER — HOSPITAL ENCOUNTER (OUTPATIENT)
Dept: MAMMOGRAPHY | Facility: HOSPITAL | Age: 61
Discharge: HOME | End: 2021-06-28
Admitting: NURSE PRACTITIONER

## 2021-06-28 DIAGNOSIS — Z12.31 VISIT FOR SCREENING MAMMOGRAM: ICD-10-CM

## 2021-06-28 PROCEDURE — 77067 SCR MAMMO BI INCL CAD: CPT | Performed by: RADIOLOGY

## 2021-06-28 PROCEDURE — 77063 BREAST TOMOSYNTHESIS BI: CPT

## 2021-06-28 PROCEDURE — 77067 SCR MAMMO BI INCL CAD: CPT

## 2021-06-28 PROCEDURE — 77063 BREAST TOMOSYNTHESIS BI: CPT | Performed by: RADIOLOGY

## 2021-07-14 ENCOUNTER — OFFICE VISIT (OUTPATIENT)
Dept: PULMONOLOGY | Facility: CLINIC | Age: 61
End: 2021-07-14

## 2021-07-14 VITALS
BODY MASS INDEX: 27.91 KG/M2 | HEART RATE: 118 BPM | SYSTOLIC BLOOD PRESSURE: 142 MMHG | OXYGEN SATURATION: 96 % | HEIGHT: 67 IN | TEMPERATURE: 96.9 F | WEIGHT: 177.8 LBS | DIASTOLIC BLOOD PRESSURE: 80 MMHG

## 2021-07-14 DIAGNOSIS — G47.34 NOCTURNAL HYPOXEMIA: Primary | ICD-10-CM

## 2021-07-14 DIAGNOSIS — J44.9 COPD MIXED TYPE (HCC): ICD-10-CM

## 2021-07-14 DIAGNOSIS — G47.33 OBSTRUCTIVE SLEEP APNEA: ICD-10-CM

## 2021-07-14 DIAGNOSIS — J30.9 ALLERGIC RHINITIS, UNSPECIFIED SEASONALITY, UNSPECIFIED TRIGGER: ICD-10-CM

## 2021-07-14 DIAGNOSIS — Z72.0 TOBACCO ABUSE: ICD-10-CM

## 2021-07-14 PROCEDURE — 99214 OFFICE O/P EST MOD 30 MIN: CPT | Performed by: INTERNAL MEDICINE

## 2021-07-14 RX ORDER — FEXOFENADINE HCL 180 MG/1
180 TABLET ORAL DAILY
Qty: 90 TABLET | Refills: 3 | Status: SHIPPED | OUTPATIENT
Start: 2021-07-14 | End: 2022-12-04

## 2021-07-14 RX ORDER — ALBUTEROL SULFATE 90 UG/1
2 AEROSOL, METERED RESPIRATORY (INHALATION) EVERY 4 HOURS PRN
Qty: 8 G | Refills: 6 | Status: SHIPPED | OUTPATIENT
Start: 2021-07-14 | End: 2022-02-14 | Stop reason: SDUPTHER

## 2021-07-14 RX ORDER — FLUTICASONE PROPIONATE 50 MCG
2 SPRAY, SUSPENSION (ML) NASAL DAILY
Qty: 15.8 ML | Refills: 6 | Status: SHIPPED | OUTPATIENT
Start: 2021-07-14 | End: 2022-12-04

## 2021-07-14 RX ORDER — CEFUROXIME AXETIL 500 MG/1
500 TABLET ORAL 2 TIMES DAILY
Qty: 14 TABLET | Refills: 0 | Status: ON HOLD | OUTPATIENT
Start: 2021-07-14 | End: 2022-01-08

## 2021-07-14 RX ORDER — VARENICLINE TARTRATE 1 MG/1
1 TABLET, FILM COATED ORAL 2 TIMES DAILY
Qty: 60 TABLET | Refills: 4 | Status: SHIPPED | OUTPATIENT
Start: 2021-07-14 | End: 2021-10-25 | Stop reason: ALTCHOICE

## 2021-07-14 NOTE — PROGRESS NOTES
PULMONARY  NOTE    Chief Complaint     Severe COPD, tobacco abuse, SABRINA, nocturnal hypoxemia, perennial rhinitis    History of Present Illness     61-year-old female returns today for follow-up  She was last seen by KALYAN Mendoza on 4/12/2021    She has a long history of tobacco abuse which is ongoing  She is down to 3-8 cigarettes a day and using Chantix  She would like continuation of the Chantix maintenance therapy    Her last LDCT was in February 2020 10 revealed no suspicious lesions    She has had no acute exacerbation of bronchitis since I last saw her    She has a history of SABRINA and her CPAP is broken.  To get new equipment she has to have a new sleep study and we have been try to get that arranged.  Indicates that she has not called to schedule her sleep study because of issues with dog care    Patient Active Problem List   Diagnosis   • Stage III, severe, COPD   • Tobacco abuse   • Obstructive sleep apnea   • Nocturnal hypoxemia   • Polypharmacy     Allergies   Allergen Reactions   • Bee Venom Anaphylaxis   • Ciprofloxacin Shortness Of Breath       Current Outpatient Medications:   •  albuterol sulfate HFA (Ventolin HFA) 108 (90 Base) MCG/ACT inhaler, Inhale 2 puffs Every 4 (Four) Hours As Needed for Wheezing., Disp: 8 g, Rfl: 6  •  ASPIRIN 81 PO, Take 1 tablet by mouth Daily., Disp: , Rfl:   •  Budeson-Glycopyrrol-Formoterol (Breztri Aerosphere) 160-9-4.8 MCG/ACT aerosol inhaler, Inhale 2 puffs 2 (Two) Times a Day., Disp: 1 each, Rfl: 11  •  cyclobenzaprine (FLEXERIL) 10 MG tablet, Take 10 mg by mouth Daily., Disp: , Rfl:   •  fluticasone (Flonase) 50 MCG/ACT nasal spray, 2 sprays into the nostril(s) as directed by provider Daily., Disp: 15.8 mL, Rfl: 6  •  glipizide (GLUCOTROL XL) 5 MG ER tablet, Take 3 tablets by mouth Daily., Disp: 90 tablet, Rfl: 3  •  glucose blood test strip, Use as instructed 2-3 times a day, Disp: 100 each, Rfl: 3  •  Insulin aspart (FIASP FLEXTOUCH) 100 UNIT/ML solution  pen-injector injection pen, For use via correctional scale, MDD 15 units, Disp: 6 mL, Rfl: 3  •  Insulin Glargine (LANTUS SOLOSTAR) 100 UNIT/ML injection pen, 20 units at bedtime and titrate per instructions, MDD 40 units, Disp: 12 mL, Rfl: 5  •  ipratropium-albuterol (DUO-NEB) 0.5-2.5 mg/3 ml nebulizer, Take 3 mL by nebulization 4 (Four) Times a Day., Disp: 360 mL, Rfl: 4  •  Lancets misc, For use with glucose monitoring 2-3 times daily, Disp: 60 each, Rfl: 3  •  meloxicam (MOBIC) 15 MG tablet, Take 15 mg by mouth Daily., Disp: , Rfl:   •  metFORMIN ER (GLUCOPHAGE-XR) 500 MG 24 hr tablet, Take 1 tablet by mouth Daily With Breakfast., Disp: 30 tablet, Rfl: 3  •  varenicline (CHANTIX) 1 MG tablet, Take 1 tablet by mouth 2 (Two) Times a Day., Disp: 60 tablet, Rfl: 1  •  cefuroxime (CEFTIN) 500 MG tablet, Take 1 tablet by mouth 2 (Two) Times a Day., Disp: 14 tablet, Rfl: 0  •  fexofenadine (Allegra Allergy) 180 MG tablet, Take 1 tablet by mouth Daily., Disp: 90 tablet, Rfl: 3  •  varenicline (CHANTIX) 1 MG tablet, Take 1 tablet by mouth 2 (Two) Times a Day., Disp: 60 tablet, Rfl: 4  MEDICATION LIST AND ALLERGIES REVIEWED.    Family History   Problem Relation Age of Onset   • Arthritis Mother    • Diabetes Mother    • Heart disease Mother    • Thyroid disease Mother    • Uterine cancer Mother    • Heart disease Brother    • Diabetes Brother    • Breast cancer Other         MATERNAL GREAT AUNT   • Ovarian cancer Neg Hx      Social History     Tobacco Use   • Smoking status: Current Every Day Smoker     Packs/day: 0.25     Years: 50.00     Pack years: 12.50     Types: Cigarettes   • Smokeless tobacco: Never Used   Vaping Use   • Vaping Use: Former   • Substances: Nicotine   • Devices: Pre-filled or refillable cartridge   Substance Use Topics   • Alcohol use: Yes     Comment: rarely   • Drug use: Never     Social History     Social History Narrative        Has moved to [place] from [place]    Continues to smoke up  "to 1 pack of cigarettes per day    Denies regular alcohol use     FAMILY AND SOCIAL HISTORY REVIEWED.    Review of Systems  ALSO REFER TO SCANNED ROS SHEET FROM SAME DATE.    /80   Pulse 118   Temp 96.9 °F (36.1 °C)   Ht 170.2 cm (67\")   Wt 80.6 kg (177 lb 12.8 oz)   LMP  (LMP Unknown)   SpO2 96% Comment: resting atroom air  BMI 27.85 kg/m²   Physical Exam  Vitals and nursing note reviewed.   Constitutional:       General: She is not in acute distress.     Appearance: She is well-developed. She is not diaphoretic.   HENT:      Head: Normocephalic and atraumatic.   Neck:      Thyroid: No thyromegaly.   Cardiovascular:      Rate and Rhythm: Normal rate and regular rhythm.      Heart sounds: Normal heart sounds. No murmur heard.     Pulmonary:      Effort: Pulmonary effort is normal.      Breath sounds: No stridor.      Comments: Decreased breath sounds bilaterally with scattered late expiratory wheezes  Abdominal:      General: Bowel sounds are normal.      Palpations: Abdomen is soft.   Musculoskeletal:         General: Normal range of motion.   Lymphadenopathy:      Cervical: No cervical adenopathy.      Upper Body:      Right upper body: No supraclavicular or epitrochlear adenopathy.      Left upper body: No supraclavicular or epitrochlear adenopathy.   Skin:     General: Skin is warm and dry.   Neurological:      Mental Status: She is alert and oriented to person, place, and time.   Psychiatric:         Behavior: Behavior normal.         Results     CT scan of the chest from February 2021 reviewed on PACS  No suspicious intrathoracic lesions    Immunization History   Administered Date(s) Administered   • COVID-19 (MODERNA) 04/09/2021   • Flu Vaccine Quad PF >18YRS 10/13/2016   • Flu Vaccine Quad PF >36MO 01/11/2021   • Pneumococcal Polysaccharide (PPSV23) 10/13/2016, 01/11/2021     Problem List       ICD-10-CM ICD-9-CM   1. Nocturnal hypoxemia  G47.34 327.24   2. Obstructive sleep apnea  G47.33 327.23 "   3. Stage III, severe, COPD  J44.9 496   4. Tobacco abuse  Z72.0 305.1   5. Allergic rhinitis, unspecified seasonality, unspecified trigger  J30.9 477.9       Discussion     Unfortunately, she continues to smoke.  We discussed again the need for total smoking abstinence and we discussed smoking cessation strategies  I am going to refill her Chantix maintenance pack    I have encouraged her to schedule her sleep study.    I prescribed a course of Ceftin for acute bronchitis to have on hand    She will remain on Breo is treated with albuterol on an as-needed basis    We will schedule her for an LD CT in February 2022    Moderate level of Medical Decision Making complexity based on 2 or more chronic stable illnesses and prescription drug management.    Silas Thakur MD  Note electronically signed    CC: Kanika January, APRN

## 2021-07-15 ENCOUNTER — TELEPHONE (OUTPATIENT)
Dept: PULMONOLOGY | Facility: CLINIC | Age: 61
End: 2021-07-15

## 2021-07-15 NOTE — TELEPHONE ENCOUNTER
Called patient behave of her Allergy medication it was not covered by her insurance anymore LVM pt did not answer

## 2021-07-16 ENCOUNTER — OFFICE VISIT (OUTPATIENT)
Dept: ENDOCRINOLOGY | Facility: CLINIC | Age: 61
End: 2021-07-16

## 2021-07-16 VITALS
BODY MASS INDEX: 28.66 KG/M2 | WEIGHT: 182.6 LBS | DIASTOLIC BLOOD PRESSURE: 74 MMHG | SYSTOLIC BLOOD PRESSURE: 132 MMHG | HEIGHT: 67 IN | HEART RATE: 114 BPM | OXYGEN SATURATION: 95 %

## 2021-07-16 DIAGNOSIS — E11.65 TYPE 2 DIABETES MELLITUS WITH HYPERGLYCEMIA, WITH LONG-TERM CURRENT USE OF INSULIN (HCC): Primary | ICD-10-CM

## 2021-07-16 DIAGNOSIS — Z79.4 TYPE 2 DIABETES MELLITUS WITH HYPERGLYCEMIA, WITH LONG-TERM CURRENT USE OF INSULIN (HCC): Primary | ICD-10-CM

## 2021-07-16 LAB
EXPIRATION DATE: NORMAL
HBA1C MFR BLD: 11 %
Lab: NORMAL

## 2021-07-16 PROCEDURE — 99213 OFFICE O/P EST LOW 20 MIN: CPT | Performed by: INTERNAL MEDICINE

## 2021-07-16 PROCEDURE — 83036 HEMOGLOBIN GLYCOSYLATED A1C: CPT | Performed by: INTERNAL MEDICINE

## 2021-07-16 NOTE — TELEPHONE ENCOUNTER
Patient was called to let her know her allergy medication, her insurance will not cover OTC medication anymore.

## 2021-07-16 NOTE — PROGRESS NOTES
"Chief Complaint   Patient presents with   • Follow-up   • Diabetes        HPI   Kathy Taylor is a 61 y.o. female had concerns including Follow-up and Diabetes.     Patient denies significant health changes in the interim since her last visit.  She does report some frustration regarding continued hyperglycemia.  Most recent data from glucometer with glucose values ranging 255-503.  Patient does report increased thirst and increased urination which are slightly improved.  She did not titrate up Lantus in the interim between visits she is currently taking Lantus 20 units daily, Fiasp via correctional scale, generally at least 8 units daily, glipizide 15 mg extended release daily and Metformin extended release 500 mg daily.  She denies any GI upset on current dose of Metformin.    The following portions of the patient's history were reviewed and updated as appropriate: allergies, current medications and past social history.    Review of Systems   Gastrointestinal: Negative for abdominal pain, nausea and vomiting.   Endocrine: Positive for polyuria.      /74   Pulse 114   Ht 170.2 cm (67\")   Wt 82.8 kg (182 lb 9.6 oz)   LMP  (LMP Unknown)   SpO2 95%   BMI 28.60 kg/m²      Physical Exam      Constitutional:  well developed; well nourished  no acute distress   ENT/Thyroid: not examined   Eyes: Conjunctiva: clear   Respiratory:  breathing is unlabored  clear to auscultation bilaterally   Cardiovascular:   Tachycardic with regular rhythm   Chest:  Not performed.   Abdomen: soft, non-tender; no masses   : Not performed.   Musculoskeletal: Not performed   Skin: not performed.   Neuro: mental status, speech normal   Psych: mood and affect are within normal limits       Labs/Imaging  Results for KATHY TAYLOR (MRN [id number]) as of 7/16/2021 17:47   Ref. Range 7/16/2021 15:53   Hemoglobin A1C Latest Units: % 11.0       Diagnoses and all orders for this visit:    1. Type 2 diabetes mellitus with " hyperglycemia, with long-term current use of insulin (CMS/[facility]) (Primary)  -Uncontrolled with hemoglobin A1c 11%  -Hypoglycemia noted on glucose log  -Patient to increase Lantus to 25 units daily, she was given instructions to titrate up by 2 units weekly if fasting sugar remains greater than 150  -Patient to start Fiasp 4 units with meals, continue correction 1 for 50 greater than 150  -Patient to continue glipizide 15 mg extended release  -Patient to continue Metformin extended release 500 mg daily  -Patient was advised to monitor blood sugar 3 times daily.  Patient was instructed to bring glucometer to all future appointments. Patient should contact the clinic between appointments with hypoglycemia or persistent hyperglycemia.  Discussed signs and symptoms of hypoglycemia as well as hypoglycemia management via the rule of 15's.  Discussed potential for long-term complications with uncontrolled diabetes including nephropathy, neuropathy, retinopathy, increased risk for cardiac disease.  Discussed the role of diet and exercise in the management of diabetes.  Screening labs up-to-date from January 2021  eGFR greater than 60  Lipid panel up-to-date with LDL 85, triglycerides 349  Urine albumin to creatinine ratio less than 30  -     POC Glycosylated Hemoglobin (Hb A1C)      Return in about 8 weeks (around 9/10/2021). The patient was instructed to contact the clinic with any interval questions or concerns.    Sayra Witt MD   Endocrinologist    Please note that portions of this document were completed with a voice recognition program. Efforts were made to edit the dictations, but occasionally words are mis-transcribed.

## 2021-07-18 DIAGNOSIS — Z79.4 TYPE 2 DIABETES MELLITUS WITH HYPERGLYCEMIA, WITH LONG-TERM CURRENT USE OF INSULIN (HCC): ICD-10-CM

## 2021-07-18 DIAGNOSIS — E11.65 TYPE 2 DIABETES MELLITUS WITH HYPERGLYCEMIA, WITH LONG-TERM CURRENT USE OF INSULIN (HCC): ICD-10-CM

## 2021-07-19 DIAGNOSIS — Z79.4 TYPE 2 DIABETES MELLITUS WITH HYPERGLYCEMIA, WITH LONG-TERM CURRENT USE OF INSULIN (HCC): ICD-10-CM

## 2021-07-19 DIAGNOSIS — E11.65 TYPE 2 DIABETES MELLITUS WITH HYPERGLYCEMIA, WITH LONG-TERM CURRENT USE OF INSULIN (HCC): ICD-10-CM

## 2021-07-19 RX ORDER — METFORMIN HYDROCHLORIDE 500 MG/1
TABLET, EXTENDED RELEASE ORAL
Qty: 30 TABLET | Refills: 3 | Status: SHIPPED | OUTPATIENT
Start: 2021-07-19 | End: 2021-07-19 | Stop reason: SDUPTHER

## 2021-07-19 RX ORDER — METFORMIN HYDROCHLORIDE 500 MG/1
500 TABLET, EXTENDED RELEASE ORAL
Qty: 90 TABLET | Refills: 1 | Status: SHIPPED | OUTPATIENT
Start: 2021-07-19 | End: 2022-02-17 | Stop reason: SDUPTHER

## 2021-07-19 NOTE — TELEPHONE ENCOUNTER
-received a fax from [facility] pharmacy, requesting a 90 day supply of Metformin 500 mg.    LOV:  07/16/21  Future visit:  08/30/21    Rx pending.

## 2021-07-21 ENCOUNTER — TELEPHONE (OUTPATIENT)
Dept: ENDOCRINOLOGY | Facility: CLINIC | Age: 61
End: 2021-07-21

## 2021-07-21 NOTE — TELEPHONE ENCOUNTER
Adarsh called and made mention of statin due to Cardiovascular disease ask to be noted in patient chart.

## 2021-07-23 ENCOUNTER — HOSPITAL ENCOUNTER (OUTPATIENT)
Dept: MAMMOGRAPHY | Facility: HOSPITAL | Age: 61
Discharge: HOME | End: 2021-07-23
Admitting: RADIOLOGY

## 2021-07-23 ENCOUNTER — TRANSCRIBE ORDERS (OUTPATIENT)
Dept: MAMMOGRAPHY | Facility: HOSPITAL | Age: 61
End: 2021-07-23

## 2021-07-23 DIAGNOSIS — R92.8 ABNORMAL MAMMOGRAM: Primary | ICD-10-CM

## 2021-07-23 DIAGNOSIS — R92.8 ABNORMAL MAMMOGRAM: ICD-10-CM

## 2021-07-23 PROCEDURE — G0279 TOMOSYNTHESIS, MAMMO: HCPCS

## 2021-07-23 PROCEDURE — G0279 TOMOSYNTHESIS, MAMMO: HCPCS | Performed by: RADIOLOGY

## 2021-07-23 PROCEDURE — 77065 DX MAMMO INCL CAD UNI: CPT | Performed by: RADIOLOGY

## 2021-07-23 PROCEDURE — 77065 DX MAMMO INCL CAD UNI: CPT

## 2021-08-13 DIAGNOSIS — Z79.4 TYPE 2 DIABETES MELLITUS WITH HYPERGLYCEMIA, WITH LONG-TERM CURRENT USE OF INSULIN (HCC): ICD-10-CM

## 2021-08-13 DIAGNOSIS — E11.65 TYPE 2 DIABETES MELLITUS WITH HYPERGLYCEMIA, WITH LONG-TERM CURRENT USE OF INSULIN (HCC): ICD-10-CM

## 2021-08-16 RX ORDER — GLIPIZIDE 5 MG/1
TABLET, FILM COATED, EXTENDED RELEASE ORAL
Qty: 90 TABLET | Refills: 0 | Status: SHIPPED | OUTPATIENT
Start: 2021-08-16 | End: 2021-08-18 | Stop reason: SDUPTHER

## 2021-08-18 DIAGNOSIS — Z79.4 TYPE 2 DIABETES MELLITUS WITH HYPERGLYCEMIA, WITH LONG-TERM CURRENT USE OF INSULIN (HCC): ICD-10-CM

## 2021-08-18 DIAGNOSIS — E11.65 TYPE 2 DIABETES MELLITUS WITH HYPERGLYCEMIA, WITH LONG-TERM CURRENT USE OF INSULIN (HCC): ICD-10-CM

## 2021-08-19 RX ORDER — INSULIN ASPART INJECTION 100 [IU]/ML
INJECTION, SOLUTION SUBCUTANEOUS
Qty: 6 ML | Refills: 3 | Status: SHIPPED | OUTPATIENT
Start: 2021-08-19 | End: 2021-12-14 | Stop reason: SDUPTHER

## 2021-08-19 RX ORDER — GLIPIZIDE 5 MG/1
15 TABLET, FILM COATED, EXTENDED RELEASE ORAL DAILY
Qty: 90 TABLET | Refills: 3 | Status: SHIPPED | OUTPATIENT
Start: 2021-08-19 | End: 2022-07-05

## 2021-10-22 ENCOUNTER — TELEPHONE (OUTPATIENT)
Dept: PULMONOLOGY | Facility: CLINIC | Age: 61
End: 2021-10-22

## 2021-10-22 DIAGNOSIS — Z72.0 TOBACCO ABUSE: Primary | ICD-10-CM

## 2021-10-22 DIAGNOSIS — J44.9 COPD MIXED TYPE (HCC): ICD-10-CM

## 2021-10-22 RX ORDER — IPRATROPIUM BROMIDE AND ALBUTEROL SULFATE 2.5; .5 MG/3ML; MG/3ML
SOLUTION RESPIRATORY (INHALATION)
Qty: 1080 ML | Refills: 3 | Status: SHIPPED | OUTPATIENT
Start: 2021-10-22 | End: 2022-02-14 | Stop reason: SDUPTHER

## 2021-10-22 RX ORDER — NICOTINE 10 MG
1 CARTRIDGE (EA) INHALATION AS NEEDED
Qty: 168 EACH | Refills: 0 | Status: ON HOLD | OUTPATIENT
Start: 2021-10-22 | End: 2022-01-08

## 2021-10-22 NOTE — TELEPHONE ENCOUNTER
Patient called stating chantix was recalled and she's started smoking more again. She wants to quit and wants to know if there's anything you could recommend.

## 2021-10-25 DIAGNOSIS — Z72.0 TOBACCO ABUSE: ICD-10-CM

## 2021-11-03 RX ORDER — PEN NEEDLE, DIABETIC 32GX 5/32"
NEEDLE, DISPOSABLE MISCELLANEOUS
Qty: 150 EACH | Refills: 3 | Status: SHIPPED | OUTPATIENT
Start: 2021-11-03 | End: 2021-12-14 | Stop reason: SDUPTHER

## 2021-11-03 NOTE — TELEPHONE ENCOUNTER
[facility] NEEDS PRESCRIPTION FOR BD DEMETRIS PEN NEEDLES 4 MILLILITER BY [address] FOR PATIENT

## 2021-12-08 DIAGNOSIS — E11.65 TYPE 2 DIABETES MELLITUS WITH HYPERGLYCEMIA, WITH LONG-TERM CURRENT USE OF INSULIN (HCC): ICD-10-CM

## 2021-12-08 DIAGNOSIS — Z79.4 TYPE 2 DIABETES MELLITUS WITH HYPERGLYCEMIA, WITH LONG-TERM CURRENT USE OF INSULIN (HCC): ICD-10-CM

## 2021-12-09 RX ORDER — LANCETS
EACH MISCELLANEOUS
Qty: 100 EACH | Refills: 3 | Status: SHIPPED | OUTPATIENT
Start: 2021-12-09 | End: 2022-02-18 | Stop reason: SDUPTHER

## 2021-12-14 DIAGNOSIS — E11.65 TYPE 2 DIABETES MELLITUS WITH HYPERGLYCEMIA, WITH LONG-TERM CURRENT USE OF INSULIN (HCC): ICD-10-CM

## 2021-12-14 DIAGNOSIS — Z79.4 TYPE 2 DIABETES MELLITUS WITH HYPERGLYCEMIA, WITH LONG-TERM CURRENT USE OF INSULIN (HCC): ICD-10-CM

## 2021-12-14 RX ORDER — INSULIN ASPART INJECTION 100 [IU]/ML
INJECTION, SOLUTION SUBCUTANEOUS
Qty: 6 ML | Refills: 3 | Status: SHIPPED | OUTPATIENT
Start: 2021-12-14 | End: 2022-02-17

## 2021-12-14 RX ORDER — PEN NEEDLE, DIABETIC 32GX 5/32"
NEEDLE, DISPOSABLE MISCELLANEOUS
Qty: 150 EACH | Refills: 3 | Status: SHIPPED | OUTPATIENT
Start: 2021-12-14 | End: 2022-02-18 | Stop reason: SDUPTHER

## 2022-01-07 ENCOUNTER — APPOINTMENT (OUTPATIENT)
Dept: GENERAL RADIOLOGY | Facility: HOSPITAL | Age: 62
End: 2022-01-07

## 2022-01-07 ENCOUNTER — HOSPITAL ENCOUNTER (INPATIENT)
Facility: HOSPITAL | Age: 62
LOS: 3 days | Discharge: HOME | End: 2022-01-10
Attending: EMERGENCY MEDICINE | Admitting: INTERNAL MEDICINE

## 2022-01-07 DIAGNOSIS — Z91.89 CHRONIC CHEST PAIN WITH HIGH RISK FOR CAD: ICD-10-CM

## 2022-01-07 DIAGNOSIS — J44.1 COPD WITH ACUTE EXACERBATION: ICD-10-CM

## 2022-01-07 DIAGNOSIS — R73.9 ACUTE HYPERGLYCEMIA: ICD-10-CM

## 2022-01-07 DIAGNOSIS — G89.29 CHRONIC CHEST PAIN WITH HIGH RISK FOR CAD: ICD-10-CM

## 2022-01-07 DIAGNOSIS — R07.9 CHRONIC CHEST PAIN WITH HIGH RISK FOR CAD: ICD-10-CM

## 2022-01-07 DIAGNOSIS — U07.1 COVID-19: Primary | ICD-10-CM

## 2022-01-07 DIAGNOSIS — R09.02 HYPOXIA: ICD-10-CM

## 2022-01-07 PROBLEM — E11.9 DM2 (DIABETES MELLITUS, TYPE 2): Status: ACTIVE | Noted: 2022-01-07

## 2022-01-07 LAB
ALBUMIN SERPL-MCNC: 4.3 G/DL (ref 3.5–5.2)
ALBUMIN/GLOB SERPL: 1.3 G/DL
ALP SERPL-CCNC: 97 U/L (ref 39–117)
ALT SERPL W P-5'-P-CCNC: 72 U/L (ref 1–33)
ANION GAP SERPL CALCULATED.3IONS-SCNC: 11 MMOL/L (ref 5–15)
AST SERPL-CCNC: 65 U/L (ref 1–32)
BASOPHILS # BLD AUTO: 0.08 10*3/MM3 (ref 0–0.2)
BASOPHILS NFR BLD AUTO: 1.2 % (ref 0–1.5)
BILIRUB SERPL-MCNC: 0.8 MG/DL (ref 0–1.2)
BUN SERPL-MCNC: 13 MG/DL (ref 6–20)
BUN/CREAT SERPL: 14 (ref 7–25)
CALCIUM SPEC-SCNC: 9.3 MG/DL (ref 8.6–10.5)
CHLORIDE SERPL-SCNC: 93 MMOL/L (ref 98–107)
CO2 SERPL-SCNC: 26 MMOL/L (ref 22–29)
CREAT SERPL-MCNC: 0.93 MG/DL (ref 0.57–1)
CRP SERPL-MCNC: 1.15 MG/DL (ref 0–0.5)
DEPRECATED RDW RBC AUTO: 41.4 FL (ref 37–54)
EOSINOPHIL # BLD AUTO: 0.12 10*3/MM3 (ref 0–0.4)
EOSINOPHIL NFR BLD AUTO: 1.8 % (ref 0.3–6.2)
ERYTHROCYTE [DISTWIDTH] IN BLOOD BY AUTOMATED COUNT: 12.9 % (ref 12.3–15.4)
FLUAV RNA RESP QL NAA+PROBE: NOT DETECTED
FLUBV RNA RESP QL NAA+PROBE: NOT DETECTED
GFR SERPL CREATININE-BSD FRML MDRD: 62 ML/MIN/1.73
GLOBULIN UR ELPH-MCNC: 3.3 GM/DL
GLUCOSE BLDC GLUCOMTR-MCNC: 310 MG/DL (ref 70–130)
GLUCOSE BLDC GLUCOMTR-MCNC: 347 MG/DL (ref 70–130)
GLUCOSE SERPL-MCNC: 322 MG/DL (ref 65–99)
HCT VFR BLD AUTO: 45.1 % (ref 34–46.6)
HGB BLD-MCNC: 15.6 G/DL (ref 12–15.9)
HOLD SPECIMEN: NORMAL
IMM GRANULOCYTES # BLD AUTO: 0.07 10*3/MM3 (ref 0–0.05)
IMM GRANULOCYTES NFR BLD AUTO: 1.1 % (ref 0–0.5)
LYMPHOCYTES # BLD AUTO: 0.75 10*3/MM3 (ref 0.7–3.1)
LYMPHOCYTES NFR BLD AUTO: 11.3 % (ref 19.6–45.3)
MCH RBC QN AUTO: 30.4 PG (ref 26.6–33)
MCHC RBC AUTO-ENTMCNC: 34.6 G/DL (ref 31.5–35.7)
MCV RBC AUTO: 87.9 FL (ref 79–97)
MONOCYTES # BLD AUTO: 0.63 10*3/MM3 (ref 0.1–0.9)
MONOCYTES NFR BLD AUTO: 9.5 % (ref 5–12)
NEUTROPHILS NFR BLD AUTO: 5.01 10*3/MM3 (ref 1.7–7)
NEUTROPHILS NFR BLD AUTO: 75.1 % (ref 42.7–76)
NRBC BLD AUTO-RTO: 0 /100 WBC (ref 0–0.2)
NT-PROBNP SERPL-MCNC: 96.9 PG/ML (ref 0–900)
PLATELET # BLD AUTO: 162 10*3/MM3 (ref 140–450)
PMV BLD AUTO: 9.7 FL (ref 6–12)
POTASSIUM SERPL-SCNC: 4.2 MMOL/L (ref 3.5–5.2)
PROT SERPL-MCNC: 7.6 G/DL (ref 6–8.5)
QT INTERVAL: 356 MS
QTC INTERVAL: 503 MS
RBC # BLD AUTO: 5.13 10*6/MM3 (ref 3.77–5.28)
RSV RNA NPH QL NAA+NON-PROBE: NOT DETECTED
SARS-COV-2 RNA RESP QL NAA+PROBE: DETECTED
SODIUM SERPL-SCNC: 130 MMOL/L (ref 136–145)
TROPONIN T SERPL-MCNC: <0.01 NG/ML (ref 0–0.03)
WBC NRBC COR # BLD: 6.66 10*3/MM3 (ref 3.4–10.8)
WHOLE BLOOD HOLD SPECIMEN: NORMAL
WHOLE BLOOD HOLD SPECIMEN: NORMAL

## 2022-01-07 PROCEDURE — 84484 ASSAY OF TROPONIN QUANT: CPT | Performed by: EMERGENCY MEDICINE

## 2022-01-07 PROCEDURE — 36415 COLL VENOUS BLD VENIPUNCTURE: CPT

## 2022-01-07 PROCEDURE — 87637 SARSCOV2&INF A&B&RSV AMP PRB: CPT | Performed by: EMERGENCY MEDICINE

## 2022-01-07 PROCEDURE — 93005 ELECTROCARDIOGRAM TRACING: CPT

## 2022-01-07 PROCEDURE — 63710000001 INSULIN DETEMIR PER 5 UNITS: Performed by: INTERNAL MEDICINE

## 2022-01-07 PROCEDURE — 80053 COMPREHEN METABOLIC PANEL: CPT | Performed by: EMERGENCY MEDICINE

## 2022-01-07 PROCEDURE — 63710000001 DEXAMETHASONE PER 0.25 MG: Performed by: INTERNAL MEDICINE

## 2022-01-07 PROCEDURE — 94799 UNLISTED PULMONARY SVC/PX: CPT

## 2022-01-07 PROCEDURE — 71045 X-RAY EXAM CHEST 1 VIEW: CPT

## 2022-01-07 PROCEDURE — 82962 GLUCOSE BLOOD TEST: CPT

## 2022-01-07 PROCEDURE — 63710000001 INSULIN LISPRO (HUMAN) PER 5 UNITS: Performed by: INTERNAL MEDICINE

## 2022-01-07 PROCEDURE — 86140 C-REACTIVE PROTEIN: CPT | Performed by: INTERNAL MEDICINE

## 2022-01-07 PROCEDURE — 99223 1ST HOSP IP/OBS HIGH 75: CPT | Performed by: INTERNAL MEDICINE

## 2022-01-07 PROCEDURE — 85025 COMPLETE CBC W/AUTO DIFF WBC: CPT | Performed by: EMERGENCY MEDICINE

## 2022-01-07 PROCEDURE — 94640 AIRWAY INHALATION TREATMENT: CPT

## 2022-01-07 PROCEDURE — 83880 ASSAY OF NATRIURETIC PEPTIDE: CPT | Performed by: EMERGENCY MEDICINE

## 2022-01-07 PROCEDURE — C9803 HOPD COVID-19 SPEC COLLECT: HCPCS | Performed by: EMERGENCY MEDICINE

## 2022-01-07 PROCEDURE — 93005 ELECTROCARDIOGRAM TRACING: CPT | Performed by: EMERGENCY MEDICINE

## 2022-01-07 PROCEDURE — 99284 EMERGENCY DEPT VISIT MOD MDM: CPT

## 2022-01-07 PROCEDURE — 25010000002 ENOXAPARIN PER 10 MG: Performed by: INTERNAL MEDICINE

## 2022-01-07 RX ORDER — MELOXICAM 7.5 MG/1
15 TABLET ORAL DAILY
Status: DISCONTINUED | OUTPATIENT
Start: 2022-01-08 | End: 2022-01-10 | Stop reason: HOSPADM

## 2022-01-07 RX ORDER — NICOTINE POLACRILEX 4 MG
15 LOZENGE BUCCAL
Status: DISCONTINUED | OUTPATIENT
Start: 2022-01-07 | End: 2022-01-10 | Stop reason: HOSPADM

## 2022-01-07 RX ORDER — CETIRIZINE HYDROCHLORIDE 10 MG/1
10 TABLET ORAL DAILY
Status: DISCONTINUED | OUTPATIENT
Start: 2022-01-07 | End: 2022-01-10 | Stop reason: HOSPADM

## 2022-01-07 RX ORDER — DEXTROMETHORPHAN POLISTIREX 30 MG/5ML
60 SUSPENSION ORAL EVERY 12 HOURS SCHEDULED
Status: DISCONTINUED | OUTPATIENT
Start: 2022-01-07 | End: 2022-01-10 | Stop reason: HOSPADM

## 2022-01-07 RX ORDER — ACETAMINOPHEN 160 MG/5ML
650 SOLUTION ORAL EVERY 4 HOURS PRN
Status: DISCONTINUED | OUTPATIENT
Start: 2022-01-07 | End: 2022-01-10 | Stop reason: HOSPADM

## 2022-01-07 RX ORDER — GUAIFENESIN 600 MG/1
1200 TABLET, EXTENDED RELEASE ORAL EVERY 12 HOURS SCHEDULED
Status: DISCONTINUED | OUTPATIENT
Start: 2022-01-07 | End: 2022-01-10 | Stop reason: HOSPADM

## 2022-01-07 RX ORDER — ACETAMINOPHEN 325 MG/1
650 TABLET ORAL EVERY 4 HOURS PRN
Status: DISCONTINUED | OUTPATIENT
Start: 2022-01-07 | End: 2022-01-10 | Stop reason: HOSPADM

## 2022-01-07 RX ORDER — ASPIRIN 81 MG/1
81 TABLET, CHEWABLE ORAL DAILY
Status: DISCONTINUED | OUTPATIENT
Start: 2022-01-08 | End: 2022-01-10 | Stop reason: HOSPADM

## 2022-01-07 RX ORDER — ACETAMINOPHEN 650 MG/1
650 SUPPOSITORY RECTAL EVERY 4 HOURS PRN
Status: DISCONTINUED | OUTPATIENT
Start: 2022-01-07 | End: 2022-01-10 | Stop reason: HOSPADM

## 2022-01-07 RX ORDER — ALBUTEROL SULFATE 90 UG/1
2 AEROSOL, METERED RESPIRATORY (INHALATION)
Status: DISCONTINUED | OUTPATIENT
Start: 2022-01-07 | End: 2022-01-10 | Stop reason: HOSPADM

## 2022-01-07 RX ORDER — DEXAMETHASONE SODIUM PHOSPHATE 4 MG/ML
6 INJECTION, SOLUTION INTRA-ARTICULAR; INTRALESIONAL; INTRAMUSCULAR; INTRAVENOUS; SOFT TISSUE DAILY
Status: DISCONTINUED | OUTPATIENT
Start: 2022-01-07 | End: 2022-01-09

## 2022-01-07 RX ORDER — NICOTINE 21 MG/24HR
1 PATCH, TRANSDERMAL 24 HOURS TRANSDERMAL
Status: DISCONTINUED | OUTPATIENT
Start: 2022-01-07 | End: 2022-01-10 | Stop reason: HOSPADM

## 2022-01-07 RX ORDER — BENZONATATE 100 MG/1
100 CAPSULE ORAL 3 TIMES DAILY PRN
Status: DISCONTINUED | OUTPATIENT
Start: 2022-01-07 | End: 2022-01-10 | Stop reason: HOSPADM

## 2022-01-07 RX ORDER — ALBUTEROL SULFATE 90 UG/1
6 AEROSOL, METERED RESPIRATORY (INHALATION) ONCE
Status: COMPLETED | OUTPATIENT
Start: 2022-01-07 | End: 2022-01-07

## 2022-01-07 RX ORDER — SODIUM CHLORIDE 0.9 % (FLUSH) 0.9 %
10 SYRINGE (ML) INJECTION AS NEEDED
Status: DISCONTINUED | OUTPATIENT
Start: 2022-01-07 | End: 2022-01-10 | Stop reason: HOSPADM

## 2022-01-07 RX ORDER — FLUTICASONE PROPIONATE 50 MCG
2 SPRAY, SUSPENSION (ML) NASAL DAILY
Status: DISCONTINUED | OUTPATIENT
Start: 2022-01-07 | End: 2022-01-10 | Stop reason: HOSPADM

## 2022-01-07 RX ORDER — DEXTROSE MONOHYDRATE 25 G/50ML
25 INJECTION, SOLUTION INTRAVENOUS
Status: DISCONTINUED | OUTPATIENT
Start: 2022-01-07 | End: 2022-01-10 | Stop reason: HOSPADM

## 2022-01-07 RX ADMIN — FLUTICASONE PROPIONATE 2 SPRAY: 50 SPRAY, METERED NASAL at 20:15

## 2022-01-07 RX ADMIN — DEXAMETHASONE 6 MG: 2 TABLET ORAL at 17:47

## 2022-01-07 RX ADMIN — REMDESIVIR 200 MG: 100 INJECTION, POWDER, LYOPHILIZED, FOR SOLUTION INTRAVENOUS at 20:09

## 2022-01-07 RX ADMIN — ALBUTEROL SULFATE 2 PUFF: 90 AEROSOL, METERED RESPIRATORY (INHALATION) at 23:46

## 2022-01-07 RX ADMIN — ALBUTEROL SULFATE 2 PUFF: 90 AEROSOL, METERED RESPIRATORY (INHALATION) at 19:01

## 2022-01-07 RX ADMIN — GUAIFENESIN 1200 MG: 600 TABLET, EXTENDED RELEASE ORAL at 20:15

## 2022-01-07 RX ADMIN — INSULIN DETEMIR 30 UNITS: 100 INJECTION, SOLUTION SUBCUTANEOUS at 20:16

## 2022-01-07 RX ADMIN — ALBUTEROL SULFATE 6 PUFF: 90 AEROSOL, METERED RESPIRATORY (INHALATION) at 14:38

## 2022-01-07 RX ADMIN — INSULIN LISPRO 7 UNITS: 100 INJECTION, SOLUTION INTRAVENOUS; SUBCUTANEOUS at 17:46

## 2022-01-07 RX ADMIN — ACETAMINOPHEN 650 MG: 325 TABLET, FILM COATED ORAL at 20:18

## 2022-01-07 RX ADMIN — SODIUM CHLORIDE, PRESERVATIVE FREE 10 ML: 5 INJECTION INTRAVENOUS at 20:16

## 2022-01-07 RX ADMIN — SODIUM CHLORIDE, PRESERVATIVE FREE 10 ML: 5 INJECTION INTRAVENOUS at 17:48

## 2022-01-07 RX ADMIN — ENOXAPARIN SODIUM 40 MG: 40 INJECTION SUBCUTANEOUS at 17:47

## 2022-01-07 RX ADMIN — Medication 1 PATCH: at 17:53

## 2022-01-07 RX ADMIN — CETIRIZINE HYDROCHLORIDE 10 MG: 10 TABLET, FILM COATED ORAL at 17:47

## 2022-01-07 RX ADMIN — Medication 60 MG: at 20:15

## 2022-01-07 NOTE — ED PROVIDER NOTES
Subjective   61-year-old female who has known COPD who presents with complaint of cough and increased shortness of breath.  The patient reports that her daughter and grandson who both live with her recently were diagnosed positive for COVID-19.  Over the last 2 to 3 days the patient has had increased body aches, fever, chills, cough and increased shortness of breath.  She has a history of COPD secondary to smoking.  She does not wear oxygen at home.  She denies a previous diagnosis of COVID-19.  She has been vaccinated against COVID-19.  Chest abdominal pain.  No reported change in mentation.  No reported change in bowel or urinary function.  No other acute complaints.          Review of Systems   Constitutional: Positive for appetite change, chills, fatigue and fever.   HENT: Negative for congestion, ear pain, postnasal drip, sinus pressure and sore throat.    Eyes: Negative for pain, redness and visual disturbance.   Respiratory: Positive for cough, shortness of breath and wheezing. Negative for chest tightness.    Cardiovascular: Negative for chest pain, palpitations and leg swelling.   Gastrointestinal: Negative for abdominal pain, anal bleeding, blood in stool, diarrhea, nausea and vomiting.   Endocrine: Negative for polydipsia and polyuria.   Genitourinary: Negative for difficulty urinating, dysuria, frequency and urgency.   Musculoskeletal: Negative for arthralgias, back pain and neck pain.   Skin: Negative for pallor and rash.   Allergic/Immunologic: Negative for environmental allergies and immunocompromised state.   Neurological: Negative for dizziness, weakness and headaches.   Hematological: Negative for adenopathy.   Psychiatric/Behavioral: Negative for confusion, self-injury and suicidal ideas. The patient is not nervous/anxious.    All other systems reviewed and are negative.      Past Medical History:   Diagnosis Date   • Arthritis    • Back ache    • Bronchitis    • Fibromyalgia    • Migraines    •  Pneumonia    • Rheumatic fever    • Tooth infection    • Type 2 diabetes mellitus (CMS/HCC)    • Vulvar carcinoma (CMS/HCC)        Allergies   Allergen Reactions   • Bee Venom Anaphylaxis   • Ciprofloxacin Shortness Of Breath       Past Surgical History:   Procedure Laterality Date   • BREAST BIOPSY Left    • BREAST LUMPECTOMY Left 2004   • TONSILLECTOMY     • VULVA BIOPSY     • VULVA SURGERY     • WISDOM TOOTH EXTRACTION         Family History   Problem Relation Age of Onset   • Arthritis Mother    • Diabetes Mother    • Heart disease Mother    • Thyroid disease Mother    • Uterine cancer Mother    • Heart disease Brother    • Diabetes Brother    • Breast cancer Other         MATERNAL GREAT AUNT   • Ovarian cancer Neg Hx        Social History     Socioeconomic History   • Marital status:    Tobacco Use   • Smoking status: Current Every Day Smoker     Packs/day: 0.25     Years: 50.00     Pack years: 12.50     Types: Cigarettes   • Smokeless tobacco: Never Used   Vaping Use   • Vaping Use: Former   • Substances: Nicotine   • Devices: Pre-filled or refillable cartridge   Substance and Sexual Activity   • Alcohol use: Yes     Comment: rarely   • Drug use: Never   • Sexual activity: Defer           Objective   Physical Exam  Vitals and nursing note reviewed.   Constitutional:       General: She is not in acute distress.     Appearance: Normal appearance. She is well-developed. She is not toxic-appearing or diaphoretic.   HENT:      Head: Normocephalic and atraumatic.      Right Ear: External ear normal.      Left Ear: External ear normal.      Nose: Nose normal.   Eyes:      General: Lids are normal.      Pupils: Pupils are equal, round, and reactive to light.   Neck:      Trachea: No tracheal deviation.   Cardiovascular:      Rate and Rhythm: Regular rhythm. Tachycardia present.      Pulses: No decreased pulses.      Heart sounds: Normal heart sounds. No murmur heard.  No friction rub. No gallop.    Pulmonary:       Effort: Tachypnea and accessory muscle usage present. No respiratory distress.      Breath sounds: Examination of the right-middle field reveals wheezing. Examination of the left-middle field reveals wheezing. Examination of the right-lower field reveals wheezing. Examination of the left-lower field reveals wheezing. Wheezing present. No decreased breath sounds, rhonchi or rales.   Abdominal:      General: Bowel sounds are normal.      Palpations: Abdomen is soft.      Tenderness: There is no abdominal tenderness. There is no guarding or rebound.   Musculoskeletal:         General: No deformity. Normal range of motion.      Cervical back: Normal range of motion and neck supple.   Lymphadenopathy:      Cervical: No cervical adenopathy.   Skin:     General: Skin is warm and dry.      Findings: No rash.   Neurological:      Mental Status: She is alert and oriented to person, place, and time.      Cranial Nerves: No cranial nerve deficit.      Sensory: No sensory deficit.   Psychiatric:         Speech: Speech normal.         Behavior: Behavior normal.         Thought Content: Thought content normal.         Judgment: Judgment normal.         Procedures           ED Course                                                 MDM  Number of Diagnoses or Management Options  Acute hyperglycemia: new and requires workup  COPD with acute exacerbation (HCC): new and requires workup  COVID-19: new and requires workup  Hypoxia: new and requires workup  Diagnosis management comments: The patient's labs show elevated blood sugar.    The patient is positive for COVID-19.    Oxygen saturations observed on the 90% on room air.  No previous reported history of oxygen dependence.  The patient does have known COPD and has diffuse wheezing on auscultation.  A breathing treatment has been ordered.    I discussed the patient with the hospitalist, Dr. Payne.  The hospital service will consult on the patient and determine status of  admission.       Amount and/or Complexity of Data Reviewed  Clinical lab tests: ordered and reviewed  Tests in the radiology section of CPT®: ordered and reviewed  Review and summarize past medical records: yes  Independent visualization of images, tracings, or specimens: yes        Final diagnoses:   COVID-19   Hypoxia   COPD with acute exacerbation (HCC)   Acute hyperglycemia       ED Disposition  ED Disposition     ED Disposition Condition Comment    Decision to Admit  Level of Care: Telemetry [5]   Diagnosis: COVID-19 virus infection [[id number]]   Bed Request Comments: Needs COVID isolation   Certification: I Certify That Inpatient Hospital Services Are Medically Necessary For Greater Than 2 Midnights            No follow-up provider specified.       Medication List      No changes were made to your prescriptions during this visit.          Smitha Collado MD  01/07/22 6968

## 2022-01-08 LAB
ALBUMIN SERPL-MCNC: 3.9 G/DL (ref 3.5–5.2)
ALBUMIN/GLOB SERPL: 1.3 G/DL
ALP SERPL-CCNC: 90 U/L (ref 39–117)
ALT SERPL W P-5'-P-CCNC: 62 U/L (ref 1–33)
ANION GAP SERPL CALCULATED.3IONS-SCNC: 10 MMOL/L (ref 5–15)
AST SERPL-CCNC: 43 U/L (ref 1–32)
BASOPHILS # BLD AUTO: 0.01 10*3/MM3 (ref 0–0.2)
BASOPHILS NFR BLD AUTO: 0.2 % (ref 0–1.5)
BILIRUB SERPL-MCNC: 0.4 MG/DL (ref 0–1.2)
BUN SERPL-MCNC: 17 MG/DL (ref 6–20)
BUN/CREAT SERPL: 18.3 (ref 7–25)
CALCIUM SPEC-SCNC: 8.7 MG/DL (ref 8.6–10.5)
CHLORIDE SERPL-SCNC: 94 MMOL/L (ref 98–107)
CO2 SERPL-SCNC: 25 MMOL/L (ref 22–29)
CREAT SERPL-MCNC: 0.93 MG/DL (ref 0.57–1)
D DIMER PPP FEU-MCNC: 0.61 MCGFEU/ML (ref 0–0.56)
D DIMER PPP FEU-MCNC: 0.67 MCGFEU/ML (ref 0–0.56)
DEPRECATED RDW RBC AUTO: 40.4 FL (ref 37–54)
EOSINOPHIL # BLD AUTO: 0 10*3/MM3 (ref 0–0.4)
EOSINOPHIL NFR BLD AUTO: 0 % (ref 0.3–6.2)
ERYTHROCYTE [DISTWIDTH] IN BLOOD BY AUTOMATED COUNT: 12.9 % (ref 12.3–15.4)
GFR SERPL CREATININE-BSD FRML MDRD: 62 ML/MIN/1.73
GLOBULIN UR ELPH-MCNC: 3 GM/DL
GLUCOSE BLDC GLUCOMTR-MCNC: 382 MG/DL (ref 70–130)
GLUCOSE BLDC GLUCOMTR-MCNC: 384 MG/DL (ref 70–130)
GLUCOSE BLDC GLUCOMTR-MCNC: 433 MG/DL (ref 70–130)
GLUCOSE BLDC GLUCOMTR-MCNC: 463 MG/DL (ref 70–130)
GLUCOSE SERPL-MCNC: 420 MG/DL (ref 65–99)
HCT VFR BLD AUTO: 42.1 % (ref 34–46.6)
HGB BLD-MCNC: 14.6 G/DL (ref 12–15.9)
IMM GRANULOCYTES # BLD AUTO: 0.06 10*3/MM3 (ref 0–0.05)
IMM GRANULOCYTES NFR BLD AUTO: 1.4 % (ref 0–0.5)
LYMPHOCYTES # BLD AUTO: 0.73 10*3/MM3 (ref 0.7–3.1)
LYMPHOCYTES NFR BLD AUTO: 16.6 % (ref 19.6–45.3)
MCH RBC QN AUTO: 30.2 PG (ref 26.6–33)
MCHC RBC AUTO-ENTMCNC: 34.7 G/DL (ref 31.5–35.7)
MCV RBC AUTO: 87 FL (ref 79–97)
MONOCYTES # BLD AUTO: 0.28 10*3/MM3 (ref 0.1–0.9)
MONOCYTES NFR BLD AUTO: 6.3 % (ref 5–12)
NEUTROPHILS NFR BLD AUTO: 3.33 10*3/MM3 (ref 1.7–7)
NEUTROPHILS NFR BLD AUTO: 75.5 % (ref 42.7–76)
NRBC BLD AUTO-RTO: 0 /100 WBC (ref 0–0.2)
PLATELET # BLD AUTO: 174 10*3/MM3 (ref 140–450)
PMV BLD AUTO: 10 FL (ref 6–12)
POTASSIUM SERPL-SCNC: 4.6 MMOL/L (ref 3.5–5.2)
PROT SERPL-MCNC: 6.9 G/DL (ref 6–8.5)
RBC # BLD AUTO: 4.84 10*6/MM3 (ref 3.77–5.28)
SODIUM SERPL-SCNC: 129 MMOL/L (ref 136–145)
WBC NRBC COR # BLD: 4.41 10*3/MM3 (ref 3.4–10.8)

## 2022-01-08 PROCEDURE — 85379 FIBRIN DEGRADATION QUANT: CPT | Performed by: INTERNAL MEDICINE

## 2022-01-08 PROCEDURE — 80053 COMPREHEN METABOLIC PANEL: CPT | Performed by: INTERNAL MEDICINE

## 2022-01-08 PROCEDURE — 94799 UNLISTED PULMONARY SVC/PX: CPT

## 2022-01-08 PROCEDURE — 85025 COMPLETE CBC W/AUTO DIFF WBC: CPT | Performed by: INTERNAL MEDICINE

## 2022-01-08 PROCEDURE — 63710000001 INSULIN LISPRO (HUMAN) PER 5 UNITS: Performed by: INTERNAL MEDICINE

## 2022-01-08 PROCEDURE — 63710000001 INSULIN DETEMIR PER 5 UNITS: Performed by: INTERNAL MEDICINE

## 2022-01-08 PROCEDURE — 82962 GLUCOSE BLOOD TEST: CPT

## 2022-01-08 PROCEDURE — XW033E5 INTRODUCTION OF REMDESIVIR ANTI-INFECTIVE INTO PERIPHERAL VEIN, PERCUTANEOUS APPROACH, NEW TECHNOLOGY GROUP 5: ICD-10-PCS | Performed by: INTERNAL MEDICINE

## 2022-01-08 PROCEDURE — 99233 SBSQ HOSP IP/OBS HIGH 50: CPT | Performed by: INTERNAL MEDICINE

## 2022-01-08 PROCEDURE — 25010000002 ENOXAPARIN PER 10 MG: Performed by: INTERNAL MEDICINE

## 2022-01-08 PROCEDURE — 63710000001 INSULIN LISPRO (HUMAN) PER 5 UNITS: Performed by: NURSE PRACTITIONER

## 2022-01-08 PROCEDURE — 94761 N-INVAS EAR/PLS OXIMETRY MLT: CPT

## 2022-01-08 PROCEDURE — 63710000001 DEXAMETHASONE PER 0.25 MG: Performed by: INTERNAL MEDICINE

## 2022-01-08 RX ADMIN — REMDESIVIR 100 MG: 100 INJECTION, POWDER, LYOPHILIZED, FOR SOLUTION INTRAVENOUS at 17:47

## 2022-01-08 RX ADMIN — SODIUM CHLORIDE, PRESERVATIVE FREE 10 ML: 5 INJECTION INTRAVENOUS at 20:02

## 2022-01-08 RX ADMIN — Medication 60 MG: at 20:02

## 2022-01-08 RX ADMIN — INSULIN DETEMIR 30 UNITS: 100 INJECTION, SOLUTION SUBCUTANEOUS at 20:02

## 2022-01-08 RX ADMIN — ALBUTEROL SULFATE 2 PUFF: 90 AEROSOL, METERED RESPIRATORY (INHALATION) at 10:36

## 2022-01-08 RX ADMIN — INSULIN DETEMIR 20 UNITS: 100 INJECTION, SOLUTION SUBCUTANEOUS at 12:21

## 2022-01-08 RX ADMIN — INSULIN LISPRO 25 UNITS: 100 INJECTION, SOLUTION INTRAVENOUS; SUBCUTANEOUS at 17:49

## 2022-01-08 RX ADMIN — Medication 60 MG: at 09:35

## 2022-01-08 RX ADMIN — INSULIN LISPRO 7 UNITS: 100 INJECTION, SOLUTION INTRAVENOUS; SUBCUTANEOUS at 22:45

## 2022-01-08 RX ADMIN — INSULIN LISPRO 25 UNITS: 100 INJECTION, SOLUTION INTRAVENOUS; SUBCUTANEOUS at 12:21

## 2022-01-08 RX ADMIN — INSULIN LISPRO 8 UNITS: 100 INJECTION, SOLUTION INTRAVENOUS; SUBCUTANEOUS at 17:48

## 2022-01-08 RX ADMIN — ENOXAPARIN SODIUM 40 MG: 40 INJECTION SUBCUTANEOUS at 17:48

## 2022-01-08 RX ADMIN — MELOXICAM 15 MG: 7.5 TABLET ORAL at 09:33

## 2022-01-08 RX ADMIN — ALBUTEROL SULFATE 2 PUFF: 90 AEROSOL, METERED RESPIRATORY (INHALATION) at 06:28

## 2022-01-08 RX ADMIN — Medication 1 PATCH: at 09:36

## 2022-01-08 RX ADMIN — DEXAMETHASONE 6 MG: 2 TABLET ORAL at 09:34

## 2022-01-08 RX ADMIN — GUAIFENESIN 1200 MG: 600 TABLET, EXTENDED RELEASE ORAL at 20:02

## 2022-01-08 RX ADMIN — CETIRIZINE HYDROCHLORIDE 10 MG: 10 TABLET, FILM COATED ORAL at 09:34

## 2022-01-08 RX ADMIN — ALBUTEROL SULFATE 2 PUFF: 90 AEROSOL, METERED RESPIRATORY (INHALATION) at 19:52

## 2022-01-08 RX ADMIN — ASPIRIN 81 MG: 81 TABLET, CHEWABLE ORAL at 09:34

## 2022-01-08 RX ADMIN — INSULIN LISPRO 8 UNITS: 100 INJECTION, SOLUTION INTRAVENOUS; SUBCUTANEOUS at 12:21

## 2022-01-08 RX ADMIN — ALBUTEROL SULFATE 2 PUFF: 90 AEROSOL, METERED RESPIRATORY (INHALATION) at 14:22

## 2022-01-08 RX ADMIN — INSULIN LISPRO 9 UNITS: 100 INJECTION, SOLUTION INTRAVENOUS; SUBCUTANEOUS at 09:32

## 2022-01-08 RX ADMIN — FLUTICASONE PROPIONATE 2 SPRAY: 50 SPRAY, METERED NASAL at 09:36

## 2022-01-08 RX ADMIN — GUAIFENESIN 1200 MG: 600 TABLET, EXTENDED RELEASE ORAL at 09:34

## 2022-01-08 RX ADMIN — ALBUTEROL SULFATE 2 PUFF: 90 AEROSOL, METERED RESPIRATORY (INHALATION) at 23:41

## 2022-01-08 NOTE — PROGRESS NOTES
[facility] Medicine Services  PROGRESS NOTE    Patient Name: Kathy Taylor  : 1960  MRN: [id number]    Date of Admission: 2022  Primary Care Physician: Kanika     Subjective   Subjective     CC:  COVID    HPI:  Waxing/waning symptoms of chest tightness.  Currently on RA in chair and feels pretty good.  At home she uses inhaler/nebs routinely for COPD    ROS:  Gen- No fevers, chills  CV- No chest pain, palpitations  GI- No N/V, eating okay, has a little diarrhea        Objective   Objective     Vital Signs:   Temp:  [97.4 °F (36.3 °C)-100.4 °F (38 °C)] 97.4 °F (36.3 °C)  Heart Rate:  [] 89  Resp:  [16-18] 16  BP: (102-161)/(67-80) 102/79  Flow (L/min):  [2] 2     Physical Exam:  With patient's consent, physical exam was conducted via visual telemedicine encounter due to patient's current isolation requirements in the interest of PPE conservation.    Constitutional: No acute distress, awake, alert, nontoxic, up in chair and on RA  HENT: NCAT, MMM, no conjunctival injection  Respiratory: Good effort, nonlabored respirations   Cardiovascular:  tele with NSR  Musculoskeletal: No edema, normal muscle tone and mass for age  Psychiatric: Appropriate affect, good insight and judgement, cooperative  Neurologic: Oriented x 3, movements symmetric BUE and BLE, speech clear and fluent  Skin: No visible rashes, no jaundice seen on exposed skin through window    Results Reviewed:  LAB RESULTS:      Lab 22  0948 22  1225   WBC 4.41 6.66   HEMOGLOBIN 14.6 15.6   HEMATOCRIT 42.1 45.1   PLATELETS 174 162   NEUTROS ABS 3.33 5.01   IMMATURE GRANS (ABS) 0.06* 0.07*   LYMPHS ABS 0.73 0.75   MONOS ABS 0.28 0.63   EOS ABS 0.00 0.12   MCV 87.0 87.9   CRP  --  1.15*         Lab 22  1225   SODIUM 130*   POTASSIUM 4.2   CHLORIDE 93*   CO2 26.0   ANION GAP 11.0   BUN 13   CREATININE 0.93   GLUCOSE 322*   CALCIUM 9.3         Lab 22  1225   TOTAL PROTEIN 7.6    ALBUMIN 4.30   GLOBULIN 3.3   ALT (SGPT) 72*   AST (SGOT) 65*   BILIRUBIN 0.8   ALK PHOS 97         Lab 01/07/22  1225   PROBNP 96.9   TROPONIN T <0.010                 Brief Urine Lab Results     None          Microbiology Results Abnormal     None          XR Chest 1 View    Result Date: 1/8/2022  EXAMINATION: XR CHEST 1 VW- 01/07/2022  INDICATION: SOA triage protocol  COMPARISON: NONE  FINDINGS: Portable chest reveals cardiac and mediastinal silhouettes within normal limits. The lung fields are grossly clear. No focal parenchymal opacification present. No pleural effusion or pneumothorax. The bony structures are unremarkable. Pulmonary vascularity is within normal limits.        Impression: No acute cardiopulmonary disease.  D:  01/07/2022 E:  01/07/2022  This report was finalized on 1/8/2022 10:16 AM by Dr. Lida Tadeo MD.            I have reviewed the medications:  Scheduled Meds:albuterol sulfate HFA, 2 puff, Inhalation, Q4H - RT  aspirin, 81 mg, Oral, Daily  cetirizine, 10 mg, Oral, Daily  dexamethasone, 6 mg, Oral, Daily   Or  dexamethasone, 6 mg, Intravenous, Daily  dextromethorphan polistirex ER, 60 mg, Oral, Q12H  enoxaparin, 40 mg, Subcutaneous, Q24H  fluticasone, 2 spray, Nasal, Daily  guaiFENesin, 1,200 mg, Oral, Q12H  insulin detemir, 20 Units, Subcutaneous, Daily  insulin detemir, 30 Units, Subcutaneous, Nightly  insulin lispro, 0-9 Units, Subcutaneous, TID AC  insulin lispro, 25 Units, Subcutaneous, TID With Meals  meloxicam, 15 mg, Oral, Daily  nicotine, 1 patch, Transdermal, Q24H  remdesivir, 100 mg, Intravenous, Daily With Lunch      Continuous Infusions:Pharmacy Consult - Remdesivir,       PRN Meds:.•  acetaminophen **OR** acetaminophen **OR** acetaminophen  •  benzonatate  •  dextrose  •  dextrose  •  glucagon (human recombinant)  •  Pharmacy Consult - Remdesivir  •  sodium chloride    Assessment/Plan   Assessment & Plan     Active Hospital Problems    Diagnosis  POA   • **COVID-19  virus infection [U07.1]  Yes   • DM2 (diabetes mellitus, type 2) (HCC) [E11.9]  Yes   • COPD with acute exacerbation (HCC) [J44.1]  Yes   • Tobacco abuse [Z72.0]  Yes      Resolved Hospital Problems   No resolved problems to display.        Brief Hospital Course to date:  Kathy Taylor is a 61 y.o. female with COPD and ongoing tobacco use, admitted for COVID    All problemsa re new to me.  Chart reviwed and updated    COVID-19 infection with mild hypoxia  COPD with acute exacerbation  - CXR Wnl, pt on 2L to RA   - day 2 dexamethasone and remdesivir  - check d-dimer though pe suspicion low        DM2 with hyperglycemia severe  - home meds are insulins and orals  - increase basals and add bolus to SSI   -Hold oral medication  -Titrate up as needed with steroids     Tobacco abuse  -Trying to quit  -Nicotine patch     DVT prophylaxis:  Medical DVT prophylaxis orders are present.            Disposition: I expect the patient to be discharged tbd    CODE STATUS:   Code Status and Medical Interventions:   Ordered at: 01/07/22 1506     Level Of Support Discussed With:    Patient     Code Status (Patient has no pulse and is not breathing):    CPR (Attempt to Resuscitate)     Medical Interventions (Patient has pulse or is breathing):    Full Support       Shruti Franz MD  01/08/22

## 2022-01-08 NOTE — PLAN OF CARE
Goal Outcome Evaluation:  Plan of Care Reviewed With: patient        Progress: no change  Outcome Summary: Patient required 2L NC while sleeping this shift. VSS. Received 1st dose of Remdesivir. No acute overnight events.

## 2022-01-09 ENCOUNTER — APPOINTMENT (OUTPATIENT)
Dept: CT IMAGING | Facility: HOSPITAL | Age: 62
End: 2022-01-09

## 2022-01-09 ENCOUNTER — APPOINTMENT (OUTPATIENT)
Dept: GENERAL RADIOLOGY | Facility: HOSPITAL | Age: 62
End: 2022-01-09

## 2022-01-09 LAB
ALBUMIN SERPL-MCNC: 3.9 G/DL (ref 3.5–5.2)
ALBUMIN/GLOB SERPL: 1.3 G/DL
ALP SERPL-CCNC: 85 U/L (ref 39–117)
ALT SERPL W P-5'-P-CCNC: 49 U/L (ref 1–33)
ANION GAP SERPL CALCULATED.3IONS-SCNC: 10 MMOL/L (ref 5–15)
ANION GAP SERPL CALCULATED.3IONS-SCNC: 12 MMOL/L (ref 5–15)
APTT PPP: 30.2 SECONDS (ref 50–95)
AST SERPL-CCNC: 26 U/L (ref 1–32)
BASOPHILS # BLD AUTO: 0.01 10*3/MM3 (ref 0–0.2)
BASOPHILS # BLD AUTO: 0.02 10*3/MM3 (ref 0–0.2)
BASOPHILS NFR BLD AUTO: 0.1 % (ref 0–1.5)
BASOPHILS NFR BLD AUTO: 0.2 % (ref 0–1.5)
BILIRUB SERPL-MCNC: 0.3 MG/DL (ref 0–1.2)
BUN SERPL-MCNC: 20 MG/DL (ref 6–20)
BUN SERPL-MCNC: 22 MG/DL (ref 6–20)
BUN/CREAT SERPL: 22 (ref 7–25)
BUN/CREAT SERPL: 25.3 (ref 7–25)
CALCIUM SPEC-SCNC: 8.8 MG/DL (ref 8.6–10.5)
CALCIUM SPEC-SCNC: 9 MG/DL (ref 8.6–10.5)
CHLORIDE SERPL-SCNC: 95 MMOL/L (ref 98–107)
CHLORIDE SERPL-SCNC: 97 MMOL/L (ref 98–107)
CO2 SERPL-SCNC: 21 MMOL/L (ref 22–29)
CO2 SERPL-SCNC: 26 MMOL/L (ref 22–29)
CREAT SERPL-MCNC: 0.87 MG/DL (ref 0.57–1)
CREAT SERPL-MCNC: 0.91 MG/DL (ref 0.57–1)
CRP SERPL-MCNC: 0.84 MG/DL (ref 0–0.5)
DEPRECATED RDW RBC AUTO: 40.3 FL (ref 37–54)
DEPRECATED RDW RBC AUTO: 42.5 FL (ref 37–54)
EOSINOPHIL # BLD AUTO: 0 10*3/MM3 (ref 0–0.4)
EOSINOPHIL # BLD AUTO: 0 10*3/MM3 (ref 0–0.4)
EOSINOPHIL NFR BLD AUTO: 0 % (ref 0.3–6.2)
EOSINOPHIL NFR BLD AUTO: 0 % (ref 0.3–6.2)
ERYTHROCYTE [DISTWIDTH] IN BLOOD BY AUTOMATED COUNT: 12.9 % (ref 12.3–15.4)
ERYTHROCYTE [DISTWIDTH] IN BLOOD BY AUTOMATED COUNT: 13 % (ref 12.3–15.4)
GFR SERPL CREATININE-BSD FRML MDRD: 63 ML/MIN/1.73
GFR SERPL CREATININE-BSD FRML MDRD: 67 ML/MIN/1.73
GLOBULIN UR ELPH-MCNC: 2.9 GM/DL
GLUCOSE BLDC GLUCOMTR-MCNC: 141 MG/DL (ref 70–130)
GLUCOSE BLDC GLUCOMTR-MCNC: 155 MG/DL (ref 70–130)
GLUCOSE BLDC GLUCOMTR-MCNC: 282 MG/DL (ref 70–130)
GLUCOSE BLDC GLUCOMTR-MCNC: 322 MG/DL (ref 70–130)
GLUCOSE BLDC GLUCOMTR-MCNC: 361 MG/DL (ref 70–130)
GLUCOSE SERPL-MCNC: 188 MG/DL (ref 65–99)
GLUCOSE SERPL-MCNC: 364 MG/DL (ref 65–99)
HBV SURFACE AG SERPL QL IA: NORMAL
HCT VFR BLD AUTO: 40.7 % (ref 34–46.6)
HCT VFR BLD AUTO: 45.1 % (ref 34–46.6)
HCV AB SER DONR QL: NORMAL
HGB BLD-MCNC: 14.3 G/DL (ref 12–15.9)
HGB BLD-MCNC: 15.2 G/DL (ref 12–15.9)
HIV1 P24 AG SER QL: NORMAL
HIV1+2 AB SER QL: NORMAL
IMM GRANULOCYTES # BLD AUTO: 0.11 10*3/MM3 (ref 0–0.05)
IMM GRANULOCYTES # BLD AUTO: 0.13 10*3/MM3 (ref 0–0.05)
IMM GRANULOCYTES NFR BLD AUTO: 1.1 % (ref 0–0.5)
IMM GRANULOCYTES NFR BLD AUTO: 1.3 % (ref 0–0.5)
INR PPP: 0.99 (ref 0.85–1.16)
LYMPHOCYTES # BLD AUTO: 1.16 10*3/MM3 (ref 0.7–3.1)
LYMPHOCYTES # BLD AUTO: 1.71 10*3/MM3 (ref 0.7–3.1)
LYMPHOCYTES NFR BLD AUTO: 12 % (ref 19.6–45.3)
LYMPHOCYTES NFR BLD AUTO: 17.2 % (ref 19.6–45.3)
MAGNESIUM SERPL-MCNC: 1.9 MG/DL (ref 1.6–2.6)
MCH RBC QN AUTO: 30.2 PG (ref 26.6–33)
MCH RBC QN AUTO: 30.4 PG (ref 26.6–33)
MCHC RBC AUTO-ENTMCNC: 33.7 G/DL (ref 31.5–35.7)
MCHC RBC AUTO-ENTMCNC: 35.1 G/DL (ref 31.5–35.7)
MCV RBC AUTO: 86.6 FL (ref 79–97)
MCV RBC AUTO: 89.7 FL (ref 79–97)
MONOCYTES # BLD AUTO: 0.38 10*3/MM3 (ref 0.1–0.9)
MONOCYTES # BLD AUTO: 0.87 10*3/MM3 (ref 0.1–0.9)
MONOCYTES NFR BLD AUTO: 3.9 % (ref 5–12)
MONOCYTES NFR BLD AUTO: 8.8 % (ref 5–12)
NEUTROPHILS NFR BLD AUTO: 7.2 10*3/MM3 (ref 1.7–7)
NEUTROPHILS NFR BLD AUTO: 72.6 % (ref 42.7–76)
NEUTROPHILS NFR BLD AUTO: 8.03 10*3/MM3 (ref 1.7–7)
NEUTROPHILS NFR BLD AUTO: 82.8 % (ref 42.7–76)
NRBC BLD AUTO-RTO: 0 /100 WBC (ref 0–0.2)
NRBC BLD AUTO-RTO: 0 /100 WBC (ref 0–0.2)
NT-PROBNP SERPL-MCNC: 139 PG/ML (ref 0–900)
PLATELET # BLD AUTO: 176 10*3/MM3 (ref 140–450)
PLATELET # BLD AUTO: 180 10*3/MM3 (ref 140–450)
PMV BLD AUTO: 10.2 FL (ref 6–12)
PMV BLD AUTO: 10.3 FL (ref 6–12)
POTASSIUM SERPL-SCNC: 4.7 MMOL/L (ref 3.5–5.2)
POTASSIUM SERPL-SCNC: 4.8 MMOL/L (ref 3.5–5.2)
PROT SERPL-MCNC: 6.8 G/DL (ref 6–8.5)
PROTHROMBIN TIME: 12.8 SECONDS (ref 11.4–14.4)
RBC # BLD AUTO: 4.7 10*6/MM3 (ref 3.77–5.28)
RBC # BLD AUTO: 5.03 10*6/MM3 (ref 3.77–5.28)
SODIUM SERPL-SCNC: 128 MMOL/L (ref 136–145)
SODIUM SERPL-SCNC: 133 MMOL/L (ref 136–145)
TROPONIN T SERPL-MCNC: <0.01 NG/ML (ref 0–0.03)
TROPONIN T SERPL-MCNC: <0.01 NG/ML (ref 0–0.03)
UFH PPP CHRO-ACNC: 0.29 IU/ML (ref 0.3–0.7)
WBC NRBC COR # BLD: 9.7 10*3/MM3 (ref 3.4–10.8)
WBC NRBC COR # BLD: 9.92 10*3/MM3 (ref 3.4–10.8)

## 2022-01-09 PROCEDURE — 82962 GLUCOSE BLOOD TEST: CPT

## 2022-01-09 PROCEDURE — 93010 ELECTROCARDIOGRAM REPORT: CPT | Performed by: INTERNAL MEDICINE

## 2022-01-09 PROCEDURE — 86140 C-REACTIVE PROTEIN: CPT | Performed by: INTERNAL MEDICINE

## 2022-01-09 PROCEDURE — 87899 AGENT NOS ASSAY W/OPTIC: CPT | Performed by: INTERNAL MEDICINE

## 2022-01-09 PROCEDURE — 99232 SBSQ HOSP IP/OBS MODERATE 35: CPT | Performed by: NURSE PRACTITIONER

## 2022-01-09 PROCEDURE — 71275 CT ANGIOGRAPHY CHEST: CPT

## 2022-01-09 PROCEDURE — 63710000001 INSULIN LISPRO (HUMAN) PER 5 UNITS: Performed by: INTERNAL MEDICINE

## 2022-01-09 PROCEDURE — 87340 HEPATITIS B SURFACE AG IA: CPT | Performed by: INTERNAL MEDICINE

## 2022-01-09 PROCEDURE — 84484 ASSAY OF TROPONIN QUANT: CPT | Performed by: INTERNAL MEDICINE

## 2022-01-09 PROCEDURE — 0 IOPAMIDOL PER 1 ML: Performed by: INTERNAL MEDICINE

## 2022-01-09 PROCEDURE — 63710000001 INSULIN DETEMIR PER 5 UNITS: Performed by: INTERNAL MEDICINE

## 2022-01-09 PROCEDURE — 25010000002 HEPARIN (PORCINE) PER 1000 UNITS: Performed by: INTERNAL MEDICINE

## 2022-01-09 PROCEDURE — 25010000002 MORPHINE PER 10 MG: Performed by: INTERNAL MEDICINE

## 2022-01-09 PROCEDURE — 94761 N-INVAS EAR/PLS OXIMETRY MLT: CPT

## 2022-01-09 PROCEDURE — 63710000001 DEXAMETHASONE PER 0.25 MG: Performed by: INTERNAL MEDICINE

## 2022-01-09 PROCEDURE — 83880 ASSAY OF NATRIURETIC PEPTIDE: CPT | Performed by: INTERNAL MEDICINE

## 2022-01-09 PROCEDURE — 85610 PROTHROMBIN TIME: CPT | Performed by: INTERNAL MEDICINE

## 2022-01-09 PROCEDURE — 85025 COMPLETE CBC W/AUTO DIFF WBC: CPT | Performed by: INTERNAL MEDICINE

## 2022-01-09 PROCEDURE — 85520 HEPARIN ASSAY: CPT | Performed by: INTERNAL MEDICINE

## 2022-01-09 PROCEDURE — 25010000002 ENOXAPARIN PER 10 MG: Performed by: INTERNAL MEDICINE

## 2022-01-09 PROCEDURE — 71045 X-RAY EXAM CHEST 1 VIEW: CPT

## 2022-01-09 PROCEDURE — 25010000002 MAGNESIUM SULFATE 2 GM/50ML SOLUTION: Performed by: INTERNAL MEDICINE

## 2022-01-09 PROCEDURE — 93005 ELECTROCARDIOGRAM TRACING: CPT | Performed by: INTERNAL MEDICINE

## 2022-01-09 PROCEDURE — 86803 HEPATITIS C AB TEST: CPT | Performed by: INTERNAL MEDICINE

## 2022-01-09 PROCEDURE — 85730 THROMBOPLASTIN TIME PARTIAL: CPT | Performed by: INTERNAL MEDICINE

## 2022-01-09 PROCEDURE — 94799 UNLISTED PULMONARY SVC/PX: CPT

## 2022-01-09 PROCEDURE — 80053 COMPREHEN METABOLIC PANEL: CPT | Performed by: INTERNAL MEDICINE

## 2022-01-09 PROCEDURE — 83735 ASSAY OF MAGNESIUM: CPT | Performed by: INTERNAL MEDICINE

## 2022-01-09 PROCEDURE — G0432 EIA HIV-1/HIV-2 SCREEN: HCPCS | Performed by: INTERNAL MEDICINE

## 2022-01-09 RX ORDER — NITROGLYCERIN 20 MG/100ML
5-200 INJECTION INTRAVENOUS
Status: DISCONTINUED | OUTPATIENT
Start: 2022-01-09 | End: 2022-01-10

## 2022-01-09 RX ORDER — HEPARIN SODIUM 1000 [USP'U]/ML
30 INJECTION, SOLUTION INTRAVENOUS; SUBCUTANEOUS AS NEEDED
Status: DISCONTINUED | OUTPATIENT
Start: 2022-01-09 | End: 2022-01-09 | Stop reason: DRUGHIGH

## 2022-01-09 RX ORDER — MAGNESIUM SULFATE 1 G/100ML
1 INJECTION INTRAVENOUS AS NEEDED
Status: DISCONTINUED | OUTPATIENT
Start: 2022-01-09 | End: 2022-01-10 | Stop reason: HOSPADM

## 2022-01-09 RX ORDER — HEPARIN SOD,PORCINE/0.9 % NACL 25000/250
12 INTRAVENOUS SOLUTION INTRAVENOUS
Status: DISCONTINUED | OUTPATIENT
Start: 2022-01-09 | End: 2022-01-10

## 2022-01-09 RX ORDER — NITROGLYCERIN 0.4 MG/1
0.4 TABLET SUBLINGUAL
Status: DISCONTINUED | OUTPATIENT
Start: 2022-01-09 | End: 2022-01-10 | Stop reason: HOSPADM

## 2022-01-09 RX ORDER — MORPHINE SULFATE 2 MG/ML
2 INJECTION, SOLUTION INTRAMUSCULAR; INTRAVENOUS ONCE
Status: COMPLETED | OUTPATIENT
Start: 2022-01-09 | End: 2022-01-09

## 2022-01-09 RX ORDER — ASPIRIN 81 MG/1
324 TABLET, CHEWABLE ORAL ONCE
Status: COMPLETED | OUTPATIENT
Start: 2022-01-09 | End: 2022-01-09

## 2022-01-09 RX ORDER — MAGNESIUM SULFATE HEPTAHYDRATE 40 MG/ML
4 INJECTION, SOLUTION INTRAVENOUS AS NEEDED
Status: DISCONTINUED | OUTPATIENT
Start: 2022-01-09 | End: 2022-01-10 | Stop reason: HOSPADM

## 2022-01-09 RX ORDER — HEPARIN SODIUM 1000 [USP'U]/ML
60 INJECTION, SOLUTION INTRAVENOUS; SUBCUTANEOUS AS NEEDED
Status: DISCONTINUED | OUTPATIENT
Start: 2022-01-09 | End: 2022-01-09 | Stop reason: DRUGHIGH

## 2022-01-09 RX ORDER — MAGNESIUM SULFATE HEPTAHYDRATE 40 MG/ML
2 INJECTION, SOLUTION INTRAVENOUS AS NEEDED
Status: DISCONTINUED | OUTPATIENT
Start: 2022-01-09 | End: 2022-01-10 | Stop reason: HOSPADM

## 2022-01-09 RX ADMIN — INSULIN LISPRO 2 UNITS: 100 INJECTION, SOLUTION INTRAVENOUS; SUBCUTANEOUS at 12:10

## 2022-01-09 RX ADMIN — IOPAMIDOL 75 ML: 755 INJECTION, SOLUTION INTRAVENOUS at 21:20

## 2022-01-09 RX ADMIN — INSULIN LISPRO 25 UNITS: 100 INJECTION, SOLUTION INTRAVENOUS; SUBCUTANEOUS at 12:11

## 2022-01-09 RX ADMIN — MORPHINE SULFATE 2 MG: 2 INJECTION, SOLUTION INTRAMUSCULAR; INTRAVENOUS at 20:53

## 2022-01-09 RX ADMIN — MAGNESIUM SULFATE HEPTAHYDRATE 2 G: 2 INJECTION, SOLUTION INTRAVENOUS at 23:25

## 2022-01-09 RX ADMIN — ASPIRIN 81 MG: 81 TABLET, CHEWABLE ORAL at 09:00

## 2022-01-09 RX ADMIN — ENOXAPARIN SODIUM 40 MG: 40 INJECTION SUBCUTANEOUS at 17:06

## 2022-01-09 RX ADMIN — NITROGLYCERIN 0.4 MG: 0.4 TABLET SUBLINGUAL at 20:10

## 2022-01-09 RX ADMIN — REMDESIVIR 100 MG: 100 INJECTION, POWDER, LYOPHILIZED, FOR SOLUTION INTRAVENOUS at 12:10

## 2022-01-09 RX ADMIN — ASPIRIN 81 MG CHEWABLE TABLET 324 MG: 81 TABLET CHEWABLE at 20:09

## 2022-01-09 RX ADMIN — Medication 60 MG: at 09:00

## 2022-01-09 RX ADMIN — FLUTICASONE PROPIONATE 2 SPRAY: 50 SPRAY, METERED NASAL at 09:02

## 2022-01-09 RX ADMIN — CETIRIZINE HYDROCHLORIDE 10 MG: 10 TABLET, FILM COATED ORAL at 09:00

## 2022-01-09 RX ADMIN — GUAIFENESIN 1200 MG: 600 TABLET, EXTENDED RELEASE ORAL at 20:53

## 2022-01-09 RX ADMIN — INSULIN DETEMIR 30 UNITS: 100 INJECTION, SOLUTION SUBCUTANEOUS at 20:54

## 2022-01-09 RX ADMIN — ALBUTEROL SULFATE 2 PUFF: 90 AEROSOL, METERED RESPIRATORY (INHALATION) at 07:42

## 2022-01-09 RX ADMIN — GUAIFENESIN 1200 MG: 600 TABLET, EXTENDED RELEASE ORAL at 09:00

## 2022-01-09 RX ADMIN — NITROGLYCERIN 5 MCG/MIN: 20 INJECTION INTRAVENOUS at 20:51

## 2022-01-09 RX ADMIN — DEXAMETHASONE 6 MG: 2 TABLET ORAL at 09:00

## 2022-01-09 RX ADMIN — ALBUTEROL SULFATE 2 PUFF: 90 AEROSOL, METERED RESPIRATORY (INHALATION) at 15:45

## 2022-01-09 RX ADMIN — NITROGLYCERIN 0.4 MG: 0.4 TABLET SUBLINGUAL at 20:17

## 2022-01-09 RX ADMIN — MELOXICAM 15 MG: 7.5 TABLET ORAL at 09:00

## 2022-01-09 RX ADMIN — INSULIN LISPRO 25 UNITS: 100 INJECTION, SOLUTION INTRAVENOUS; SUBCUTANEOUS at 17:05

## 2022-01-09 RX ADMIN — ALBUTEROL SULFATE 2 PUFF: 90 AEROSOL, METERED RESPIRATORY (INHALATION) at 20:05

## 2022-01-09 RX ADMIN — HEPARIN SODIUM 12 UNITS/KG/HR: 5000 INJECTION INTRAVENOUS; SUBCUTANEOUS at 20:51

## 2022-01-09 RX ADMIN — INSULIN LISPRO 7 UNITS: 100 INJECTION, SOLUTION INTRAVENOUS; SUBCUTANEOUS at 17:06

## 2022-01-09 RX ADMIN — INSULIN DETEMIR 20 UNITS: 100 INJECTION, SOLUTION SUBCUTANEOUS at 09:03

## 2022-01-09 RX ADMIN — ALBUTEROL SULFATE 2 PUFF: 90 AEROSOL, METERED RESPIRATORY (INHALATION) at 11:24

## 2022-01-09 RX ADMIN — Medication 60 MG: at 20:53

## 2022-01-09 RX ADMIN — ALBUTEROL SULFATE 2 PUFF: 90 AEROSOL, METERED RESPIRATORY (INHALATION) at 23:38

## 2022-01-09 RX ADMIN — Medication 1 PATCH: at 08:59

## 2022-01-09 RX ADMIN — INSULIN LISPRO 25 UNITS: 100 INJECTION, SOLUTION INTRAVENOUS; SUBCUTANEOUS at 09:00

## 2022-01-09 NOTE — PLAN OF CARE
Goal Outcome Evaluation:   Patient A&O, on room air, NSR on tele. Blood sugar 460s at beginning of shift. APRN notified, scheduled 30 units of levemir given along with 7 units of Humalog. No complaints throughout the night. Will continue to monitor.

## 2022-01-09 NOTE — PLAN OF CARE
Goal Outcome Evaluation:           Progress: improving  Outcome Summary: VSS, no complaints of pain. AxO, RA, NSR. Plan is to go home tomorrow. Continue to monitor

## 2022-01-09 NOTE — PROGRESS NOTES
[facility] Medicine Services  PROGRESS NOTE    Patient Name: Kathy Taylor  : 1960  MRN: [id number]    Date of Admission: 2022  Primary Care Physician: Kanika     Subjective   Subjective     CC:  COVID    HPI:  Progressing. Feels better overall. Still with cough and tight chest. Dyspnea on exertion but not requiring oxygen. Some dizziness with ambulation    ROS:  Gen- No fevers, chills  CV- No chest pain, palpitations  GI- No N/V,D        Objective   Objective     Vital Signs:   Temp:  [97.3 °F (36.3 °C)-98.7 °F (37.1 °C)] 98 °F (36.7 °C)  Heart Rate:  [73-92] 82  Resp:  [18] 18  BP: (113-141)/(67-91) 130/70     Physical Exam:  With patient's consent, physical exam was conducted via visual telemedicine encounter due to patient's current isolation requirements in the interest of PPE conservation.    Constitutional: No acute distress, awake, alert, nontoxic, up in bed  HENT: NCAT, MMM, no conjunctival injection  Respiratory: Good effort, nonlabored respirations on RA  Cardiovascular:  tele with NSR  Musculoskeletal: No edema, normal muscle tone and mass for age  Psychiatric: Appropriate affect, good insight and judgement, cooperative  Neurologic: Oriented x 3, movements symmetric BUE and BLE, speech clear and fluent  Skin: No visible rashes, no jaundice seen on exposed skin through window    Results Reviewed:  LAB RESULTS:      Lab 22  0449 22  1201 22  0948 22  1225   WBC 9.92  --  4.41 6.66   HEMOGLOBIN 14.3  --  14.6 15.6   HEMATOCRIT 40.7  --  42.1 45.1   PLATELETS 180  --  174 162   NEUTROS ABS 7.20*  --  3.33 5.01   IMMATURE GRANS (ABS) 0.13*  --  0.06* 0.07*   LYMPHS ABS 1.71  --  0.73 0.75   MONOS ABS 0.87  --  0.28 0.63   EOS ABS 0.00  --  0.00 0.12   MCV 86.6  --  87.0 87.9   CRP 0.84*  --   --  1.15*   D DIMER QUANT  --  0.67* 0.61*  --          Lab 22  0449 22  0948 22  1225   SODIUM 133* 129* 130*   POTASSIUM  4.7 4.6 4.2   CHLORIDE 97* 94* 93*   CO2 26.0 25.0 26.0   ANION GAP 10.0 10.0 11.0   BUN 20 17 13   CREATININE 0.91 0.93 0.93   GLUCOSE 188* 420* 322*   CALCIUM 9.0 8.7 9.3         Lab 01/09/22  0449 01/08/22  0948 01/07/22  1225   TOTAL PROTEIN 6.8 6.9 7.6   ALBUMIN 3.90 3.90 4.30   GLOBULIN 2.9 3.0 3.3   ALT (SGPT) 49* 62* 72*   AST (SGOT) 26 43* 65*   BILIRUBIN 0.3 0.4 0.8   ALK PHOS 85 90 97         Lab 01/07/22  1225   PROBNP 96.9   TROPONIN T <0.010                 Brief Urine Lab Results     None          Microbiology Results Abnormal     None          XR Chest 1 View    Result Date: 1/8/2022  EXAMINATION: XR CHEST 1 VW- 01/07/2022  INDICATION: SOA triage protocol  COMPARISON: NONE  FINDINGS: Portable chest reveals cardiac and mediastinal silhouettes within normal limits. The lung fields are grossly clear. No focal parenchymal opacification present. No pleural effusion or pneumothorax. The bony structures are unremarkable. Pulmonary vascularity is within normal limits.        Impression: No acute cardiopulmonary disease.  D:  01/07/2022 E:  01/07/2022  This report was finalized on 1/8/2022 10:16 AM by Dr. Lida Tadeo MD.            I have reviewed the medications:  Scheduled Meds:albuterol sulfate HFA, 2 puff, Inhalation, Q4H - RT  aspirin, 81 mg, Oral, Daily  cetirizine, 10 mg, Oral, Daily  dexamethasone, 6 mg, Oral, Daily   Or  dexamethasone, 6 mg, Intravenous, Daily  dextromethorphan polistirex ER, 60 mg, Oral, Q12H  enoxaparin, 40 mg, Subcutaneous, Q24H  fluticasone, 2 spray, Nasal, Daily  guaiFENesin, 1,200 mg, Oral, Q12H  insulin detemir, 20 Units, Subcutaneous, Daily  insulin detemir, 30 Units, Subcutaneous, Nightly  insulin lispro, 0-9 Units, Subcutaneous, TID AC  insulin lispro, 25 Units, Subcutaneous, TID With Meals  meloxicam, 15 mg, Oral, Daily  nicotine, 1 patch, Transdermal, Q24H  pharmacy consult - MTM, , Does not apply, Daily  remdesivir, 100 mg, Intravenous, Daily With  Lunch      Continuous Infusions:Pharmacy Consult - Remdesivir,       PRN Meds:.•  acetaminophen **OR** acetaminophen **OR** acetaminophen  •  benzonatate  •  dextrose  •  dextrose  •  glucagon (human recombinant)  •  Pharmacy Consult - Remdesivir  •  sodium chloride    Assessment/Plan   Assessment & Plan     Active Hospital Problems    Diagnosis  POA   • **COVID-19 virus infection [U07.1]  Yes   • DM2 (diabetes mellitus, type 2) (HCC) [E11.9]  Yes   • COPD with acute exacerbation (HCC) [J44.1]  Yes   • Tobacco abuse [Z72.0]  Yes      Resolved Hospital Problems   No resolved problems to display.        Brief Hospital Course to date:  Kathy Taylor is a 61 y.o. female with COPD and ongoing tobacco use, admitted for COVID    This patient's problems and plans were partially entered by my partner and updated as appropriate by me 01/09/22.      COVID-19 infection with mild hypoxia  COPD with acute exacerbation  - CXR Wnl, pt on 2L to RA, remains on RA  - day 3 dexamethasone and remdesivir  - follow d-dimer. Mild elevation, but low suspicion for PE. Check in am  - trend COVID labs  - isolate until 1/24  - continue scheduled albuterol, mucinex, delsym     DM2 with hyperglycemia, severe  - home meds are insulins and orals  - added daytime levemir yesterday and increased qhs levemir with improvement with much better control. Continue current regimen  -Hold oral medication  -Titrate up as needed with steroids     Tobacco abuse  -Trying to quit  -Nicotine patch     DVT prophylaxis:  Medical DVT prophylaxis orders are present.            Disposition: I expect the patient to be discharged home 1/10    CODE STATUS:   Code Status and Medical Interventions:   Ordered at: 01/07/22 1506     Level Of Support Discussed With:    Patient     Code Status (Patient has no pulse and is not breathing):    CPR (Attempt to Resuscitate)     Medical Interventions (Patient has pulse or is breathing):    Full Support       Katey Brown,  APRN  01/09/22

## 2022-01-09 NOTE — PLAN OF CARE
Goal Outcome Evaluation:   Pt has had calm day in chair.  Pt has had remdesivir and dexamethasone as ordered.  Pt can get IS 1500.  Pt BS havbe been 433, 384, 382.  Pt has had extra dose of Levemir 30 units at lunch and has now with meals 25 units humalog in addition to SSI.  Diabetec educator consulted for A1c of 11 per protocol.  No complaints.

## 2022-01-10 ENCOUNTER — READMISSION MANAGEMENT (OUTPATIENT)
Dept: CALL CENTER | Facility: HOSPITAL | Age: 62
End: 2022-01-10

## 2022-01-10 ENCOUNTER — APPOINTMENT (OUTPATIENT)
Dept: CARDIOLOGY | Facility: HOSPITAL | Age: 62
End: 2022-01-10

## 2022-01-10 VITALS
HEIGHT: 66 IN | OXYGEN SATURATION: 95 % | RESPIRATION RATE: 20 BRPM | TEMPERATURE: 98 F | WEIGHT: 178 LBS | SYSTOLIC BLOOD PRESSURE: 138 MMHG | HEART RATE: 88 BPM | DIASTOLIC BLOOD PRESSURE: 68 MMHG | BODY MASS INDEX: 28.61 KG/M2

## 2022-01-10 LAB
ALBUMIN SERPL-MCNC: 4 G/DL (ref 3.5–5.2)
ALBUMIN/GLOB SERPL: 1.2 G/DL
ALP SERPL-CCNC: 91 U/L (ref 39–117)
ALT SERPL W P-5'-P-CCNC: 46 U/L (ref 1–33)
ANION GAP SERPL CALCULATED.3IONS-SCNC: 13 MMOL/L (ref 5–15)
AST SERPL-CCNC: 26 U/L (ref 1–32)
BASOPHILS # BLD AUTO: 0.02 10*3/MM3 (ref 0–0.2)
BASOPHILS # BLD AUTO: 0.03 10*3/MM3 (ref 0–0.2)
BASOPHILS NFR BLD AUTO: 0.2 % (ref 0–1.5)
BASOPHILS NFR BLD AUTO: 0.2 % (ref 0–1.5)
BH CV ECHO MEAS - AO MAX PG (FULL): 4.1 MMHG
BH CV ECHO MEAS - AO MAX PG: 8 MMHG
BH CV ECHO MEAS - AO MEAN PG (FULL): 1.8 MMHG
BH CV ECHO MEAS - AO MEAN PG: 3.9 MMHG
BH CV ECHO MEAS - AO ROOT AREA (BSA CORRECTED): 1.3
BH CV ECHO MEAS - AO ROOT AREA: 4.5 CM^2
BH CV ECHO MEAS - AO ROOT DIAM: 2.4 CM
BH CV ECHO MEAS - AO V2 MAX: 137.9 CM/SEC
BH CV ECHO MEAS - AO V2 MEAN: 94.2 CM/SEC
BH CV ECHO MEAS - AO V2 VTI: 25.5 CM
BH CV ECHO MEAS - AVA(I,A): 2 CM^2
BH CV ECHO MEAS - AVA(I,D): 2 CM^2
BH CV ECHO MEAS - AVA(V,A): 1.9 CM^2
BH CV ECHO MEAS - AVA(V,D): 1.9 CM^2
BH CV ECHO MEAS - BSA(HAYCOCK): 2 M^2
BH CV ECHO MEAS - BSA: 1.9 M^2
BH CV ECHO MEAS - BZI_BMI: 29 KILOGRAMS/M^2
BH CV ECHO MEAS - BZI_METRIC_HEIGHT: 167 CM
BH CV ECHO MEAS - BZI_METRIC_WEIGHT: 80.7 KG
BH CV ECHO MEAS - EDV(CUBED): 59.5 ML
BH CV ECHO MEAS - EDV(MOD-SP2): 37.4 ML
BH CV ECHO MEAS - EDV(MOD-SP4): 47.9 ML
BH CV ECHO MEAS - EDV(TEICH): 66 ML
BH CV ECHO MEAS - EF(CUBED): 84.1 %
BH CV ECHO MEAS - EF(MOD-BP): 55 %
BH CV ECHO MEAS - EF(MOD-SP2): 57.2 %
BH CV ECHO MEAS - EF(MOD-SP4): 52.2 %
BH CV ECHO MEAS - EF(TEICH): 77.9 %
BH CV ECHO MEAS - ESV(CUBED): 9.4 ML
BH CV ECHO MEAS - ESV(MOD-SP2): 16 ML
BH CV ECHO MEAS - ESV(MOD-SP4): 22.9 ML
BH CV ECHO MEAS - ESV(TEICH): 14.6 ML
BH CV ECHO MEAS - FS: 45.9 %
BH CV ECHO MEAS - IVS/LVPW: 0.77
BH CV ECHO MEAS - IVSD: 0.78 CM
BH CV ECHO MEAS - LA DIMENSION: 2.4 CM
BH CV ECHO MEAS - LA/AO: 1
BH CV ECHO MEAS - LAD MAJOR: 3.8 CM
BH CV ECHO MEAS - LAT PEAK E' VEL: 8.1 CM/SEC
BH CV ECHO MEAS - LATERAL E/E' RATIO: 9.1
BH CV ECHO MEAS - LV DIASTOLIC VOL/BSA (35-75): 25.2 ML/M^2
BH CV ECHO MEAS - LV IVRT: 0.1 SEC
BH CV ECHO MEAS - LV MASS(C)D: 103.6 GRAMS
BH CV ECHO MEAS - LV MASS(C)DI: 54.6 GRAMS/M^2
BH CV ECHO MEAS - LV MAX PG: 3.9 MMHG
BH CV ECHO MEAS - LV MEAN PG: 2.1 MMHG
BH CV ECHO MEAS - LV SYSTOLIC VOL/BSA (12-30): 12.1 ML/M^2
BH CV ECHO MEAS - LV V1 MAX: 99 CM/SEC
BH CV ECHO MEAS - LV V1 MEAN: 67.1 CM/SEC
BH CV ECHO MEAS - LV V1 VTI: 19.9 CM
BH CV ECHO MEAS - LVIDD: 3.9 CM
BH CV ECHO MEAS - LVIDS: 2.1 CM
BH CV ECHO MEAS - LVLD AP2: 6.9 CM
BH CV ECHO MEAS - LVLD AP4: 6.7 CM
BH CV ECHO MEAS - LVLS AP2: 5.6 CM
BH CV ECHO MEAS - LVLS AP4: 5.7 CM
BH CV ECHO MEAS - LVOT AREA (M): 2.5 CM^2
BH CV ECHO MEAS - LVOT AREA: 2.6 CM^2
BH CV ECHO MEAS - LVOT DIAM: 1.8 CM
BH CV ECHO MEAS - LVPWD: 1 CM
BH CV ECHO MEAS - MED PEAK E' VEL: 8.3 CM/SEC
BH CV ECHO MEAS - MEDIAL E/E' RATIO: 8.9
BH CV ECHO MEAS - MV A MAX VEL: 81.4 CM/SEC
BH CV ECHO MEAS - MV DEC SLOPE: 400.2 CM/SEC^2
BH CV ECHO MEAS - MV DEC TIME: 0.18 SEC
BH CV ECHO MEAS - MV E MAX VEL: 73.3 CM/SEC
BH CV ECHO MEAS - MV E/A: 0.9
BH CV ECHO MEAS - PA ACC TIME: 0.13 SEC
BH CV ECHO MEAS - PA MAX PG: 5.7 MMHG
BH CV ECHO MEAS - PA PR(ACCEL): 20.4 MMHG
BH CV ECHO MEAS - PA V2 MAX: 119.4 CM/SEC
BH CV ECHO MEAS - SI(AO): 60.2 ML/M^2
BH CV ECHO MEAS - SI(CUBED): 26.4 ML/M^2
BH CV ECHO MEAS - SI(LVOT): 27 ML/M^2
BH CV ECHO MEAS - SI(MOD-SP2): 11.3 ML/M^2
BH CV ECHO MEAS - SI(MOD-SP4): 13.2 ML/M^2
BH CV ECHO MEAS - SI(TEICH): 27.1 ML/M^2
BH CV ECHO MEAS - SV(AO): 114.3 ML
BH CV ECHO MEAS - SV(CUBED): 50 ML
BH CV ECHO MEAS - SV(LVOT): 51.3 ML
BH CV ECHO MEAS - SV(MOD-SP2): 21.4 ML
BH CV ECHO MEAS - SV(MOD-SP4): 25 ML
BH CV ECHO MEAS - SV(TEICH): 51.4 ML
BH CV ECHO MEAS - TAPSE (>1.6): 2.1 CM
BH CV ECHO MEASUREMENTS AVERAGE E/E' RATIO: 8.94
BH CV VAS BP LEFT ARM: NORMAL MMHG
BH CV XLRA - RV BASE: 2.5 CM
BH CV XLRA - RV LENGTH: 7.5 CM
BH CV XLRA - RV MID: 2 CM
BH CV XLRA - TDI S': 13.5 CM/SEC
BILIRUB SERPL-MCNC: 0.4 MG/DL (ref 0–1.2)
BUN SERPL-MCNC: 19 MG/DL (ref 6–20)
BUN/CREAT SERPL: 20.9 (ref 7–25)
CALCIUM SPEC-SCNC: 8.9 MG/DL (ref 8.6–10.5)
CHLORIDE SERPL-SCNC: 98 MMOL/L (ref 98–107)
CO2 SERPL-SCNC: 21 MMOL/L (ref 22–29)
CREAT SERPL-MCNC: 0.91 MG/DL (ref 0.57–1)
CRP SERPL-MCNC: <0.3 MG/DL (ref 0–0.5)
D DIMER PPP FEU-MCNC: 0.42 MCGFEU/ML (ref 0–0.56)
DEPRECATED RDW RBC AUTO: 39.7 FL (ref 37–54)
DEPRECATED RDW RBC AUTO: 41.4 FL (ref 37–54)
EOSINOPHIL # BLD AUTO: 0 10*3/MM3 (ref 0–0.4)
EOSINOPHIL # BLD AUTO: 0.01 10*3/MM3 (ref 0–0.4)
EOSINOPHIL NFR BLD AUTO: 0 % (ref 0.3–6.2)
EOSINOPHIL NFR BLD AUTO: 0.1 % (ref 0.3–6.2)
ERYTHROCYTE [DISTWIDTH] IN BLOOD BY AUTOMATED COUNT: 12.9 % (ref 12.3–15.4)
ERYTHROCYTE [DISTWIDTH] IN BLOOD BY AUTOMATED COUNT: 13 % (ref 12.3–15.4)
GFR SERPL CREATININE-BSD FRML MDRD: 63 ML/MIN/1.73
GLOBULIN UR ELPH-MCNC: 3.4 GM/DL
GLUCOSE BLDC GLUCOMTR-MCNC: 182 MG/DL (ref 70–130)
GLUCOSE BLDC GLUCOMTR-MCNC: 278 MG/DL (ref 70–130)
GLUCOSE SERPL-MCNC: 229 MG/DL (ref 65–99)
HCT VFR BLD AUTO: 44.1 % (ref 34–46.6)
HCT VFR BLD AUTO: 44.5 % (ref 34–46.6)
HGB BLD-MCNC: 15.3 G/DL (ref 12–15.9)
HGB BLD-MCNC: 15.5 G/DL (ref 12–15.9)
IMM GRANULOCYTES # BLD AUTO: 0.13 10*3/MM3 (ref 0–0.05)
IMM GRANULOCYTES # BLD AUTO: 0.14 10*3/MM3 (ref 0–0.05)
IMM GRANULOCYTES NFR BLD AUTO: 1.1 % (ref 0–0.5)
IMM GRANULOCYTES NFR BLD AUTO: 1.2 % (ref 0–0.5)
IVRT: 99 MSEC
LEFT ATRIUM VOLUME INDEX: 13.1 ML/M^2
LEFT ATRIUM VOLUME: 24.8 ML
LYMPHOCYTES # BLD AUTO: 2.18 10*3/MM3 (ref 0.7–3.1)
LYMPHOCYTES # BLD AUTO: 2.29 10*3/MM3 (ref 0.7–3.1)
LYMPHOCYTES NFR BLD AUTO: 18.6 % (ref 19.6–45.3)
LYMPHOCYTES NFR BLD AUTO: 19.4 % (ref 19.6–45.3)
MAGNESIUM SERPL-MCNC: 2.4 MG/DL (ref 1.6–2.6)
MAXIMAL PREDICTED HEART RATE: 161 BPM
MCH RBC QN AUTO: 30 PG (ref 26.6–33)
MCH RBC QN AUTO: 30.2 PG (ref 26.6–33)
MCHC RBC AUTO-ENTMCNC: 34.4 G/DL (ref 31.5–35.7)
MCHC RBC AUTO-ENTMCNC: 35.1 G/DL (ref 31.5–35.7)
MCV RBC AUTO: 85.5 FL (ref 79–97)
MCV RBC AUTO: 87.8 FL (ref 79–97)
MONOCYTES # BLD AUTO: 0.63 10*3/MM3 (ref 0.1–0.9)
MONOCYTES # BLD AUTO: 0.73 10*3/MM3 (ref 0.1–0.9)
MONOCYTES NFR BLD AUTO: 5.6 % (ref 5–12)
MONOCYTES NFR BLD AUTO: 5.9 % (ref 5–12)
NEUTROPHILS NFR BLD AUTO: 73.5 % (ref 42.7–76)
NEUTROPHILS NFR BLD AUTO: 74.2 % (ref 42.7–76)
NEUTROPHILS NFR BLD AUTO: 8.25 10*3/MM3 (ref 1.7–7)
NEUTROPHILS NFR BLD AUTO: 9.15 10*3/MM3 (ref 1.7–7)
NRBC BLD AUTO-RTO: 0 /100 WBC (ref 0–0.2)
NRBC BLD AUTO-RTO: 0 /100 WBC (ref 0–0.2)
PLATELET # BLD AUTO: 190 10*3/MM3 (ref 140–450)
PLATELET # BLD AUTO: 218 10*3/MM3 (ref 140–450)
PMV BLD AUTO: 10.3 FL (ref 6–12)
PMV BLD AUTO: 10.8 FL (ref 6–12)
POTASSIUM SERPL-SCNC: 4 MMOL/L (ref 3.5–5.2)
PROT SERPL-MCNC: 7.4 G/DL (ref 6–8.5)
QT INTERVAL: 436 MS
QT INTERVAL: 484 MS
QTC INTERVAL: 518 MS
QTC INTERVAL: 540 MS
RBC # BLD AUTO: 5.07 10*6/MM3 (ref 3.77–5.28)
RBC # BLD AUTO: 5.16 10*6/MM3 (ref 3.77–5.28)
SODIUM SERPL-SCNC: 132 MMOL/L (ref 136–145)
STRESS TARGET HR: 137 BPM
TROPONIN T SERPL-MCNC: <0.01 NG/ML (ref 0–0.03)
TROPONIN T SERPL-MCNC: <0.01 NG/ML (ref 0–0.03)
UFH PPP CHRO-ACNC: 0.65 IU/ML (ref 0.3–0.7)
WBC NRBC COR # BLD: 11.22 10*3/MM3 (ref 3.4–10.8)
WBC NRBC COR # BLD: 12.34 10*3/MM3 (ref 3.4–10.8)

## 2022-01-10 PROCEDURE — 93005 ELECTROCARDIOGRAM TRACING: CPT | Performed by: INTERNAL MEDICINE

## 2022-01-10 PROCEDURE — 99222 1ST HOSP IP/OBS MODERATE 55: CPT | Performed by: INTERNAL MEDICINE

## 2022-01-10 PROCEDURE — 63710000001 INSULIN LISPRO (HUMAN) PER 5 UNITS: Performed by: INTERNAL MEDICINE

## 2022-01-10 PROCEDURE — 94799 UNLISTED PULMONARY SVC/PX: CPT

## 2022-01-10 PROCEDURE — 99239 HOSP IP/OBS DSCHRG MGMT >30: CPT | Performed by: NURSE PRACTITIONER

## 2022-01-10 PROCEDURE — 85379 FIBRIN DEGRADATION QUANT: CPT | Performed by: INTERNAL MEDICINE

## 2022-01-10 PROCEDURE — 63710000001 DEXAMETHASONE PER 0.25 MG: Performed by: INTERNAL MEDICINE

## 2022-01-10 PROCEDURE — 86140 C-REACTIVE PROTEIN: CPT | Performed by: INTERNAL MEDICINE

## 2022-01-10 PROCEDURE — 93306 TTE W/DOPPLER COMPLETE: CPT | Performed by: INTERNAL MEDICINE

## 2022-01-10 PROCEDURE — 85025 COMPLETE CBC W/AUTO DIFF WBC: CPT | Performed by: INTERNAL MEDICINE

## 2022-01-10 PROCEDURE — 85520 HEPARIN ASSAY: CPT

## 2022-01-10 PROCEDURE — 63710000001 INSULIN DETEMIR PER 5 UNITS: Performed by: INTERNAL MEDICINE

## 2022-01-10 PROCEDURE — 84484 ASSAY OF TROPONIN QUANT: CPT | Performed by: INTERNAL MEDICINE

## 2022-01-10 PROCEDURE — 82962 GLUCOSE BLOOD TEST: CPT

## 2022-01-10 PROCEDURE — 93010 ELECTROCARDIOGRAM REPORT: CPT | Performed by: INTERNAL MEDICINE

## 2022-01-10 PROCEDURE — 83735 ASSAY OF MAGNESIUM: CPT | Performed by: INTERNAL MEDICINE

## 2022-01-10 PROCEDURE — 80053 COMPREHEN METABOLIC PANEL: CPT | Performed by: INTERNAL MEDICINE

## 2022-01-10 PROCEDURE — 93306 TTE W/DOPPLER COMPLETE: CPT

## 2022-01-10 PROCEDURE — 94761 N-INVAS EAR/PLS OXIMETRY MLT: CPT

## 2022-01-10 RX ORDER — DEXAMETHASONE 6 MG/1
6 TABLET ORAL DAILY
Qty: 6 TABLET | Refills: 0 | Status: SHIPPED | OUTPATIENT
Start: 2022-01-11 | End: 2022-01-17

## 2022-01-10 RX ORDER — PANTOPRAZOLE SODIUM 40 MG/1
40 TABLET, DELAYED RELEASE ORAL
Status: DISCONTINUED | OUTPATIENT
Start: 2022-01-10 | End: 2022-01-10 | Stop reason: HOSPADM

## 2022-01-10 RX ORDER — ISOSORBIDE MONONITRATE 30 MG/1
30 TABLET, EXTENDED RELEASE ORAL
Status: DISCONTINUED | OUTPATIENT
Start: 2022-01-10 | End: 2022-01-10 | Stop reason: HOSPADM

## 2022-01-10 RX ORDER — ROSUVASTATIN CALCIUM 20 MG/1
20 TABLET, COATED ORAL DAILY
Qty: 30 TABLET | Refills: 11 | Status: SHIPPED | OUTPATIENT
Start: 2022-01-10 | End: 2022-08-19 | Stop reason: SDUPTHER

## 2022-01-10 RX ORDER — ISOSORBIDE MONONITRATE 30 MG/1
30 TABLET, EXTENDED RELEASE ORAL
Qty: 30 TABLET | Refills: 6 | Status: SHIPPED | OUTPATIENT
Start: 2022-01-11 | End: 2022-11-07

## 2022-01-10 RX ADMIN — REMDESIVIR 100 MG: 100 INJECTION, POWDER, LYOPHILIZED, FOR SOLUTION INTRAVENOUS at 12:24

## 2022-01-10 RX ADMIN — DEXAMETHASONE 6 MG: 2 TABLET ORAL at 08:10

## 2022-01-10 RX ADMIN — PANTOPRAZOLE SODIUM 40 MG: 40 TABLET, DELAYED RELEASE ORAL at 08:10

## 2022-01-10 RX ADMIN — CETIRIZINE HYDROCHLORIDE 10 MG: 10 TABLET, FILM COATED ORAL at 08:11

## 2022-01-10 RX ADMIN — INSULIN DETEMIR 20 UNITS: 100 INJECTION, SOLUTION SUBCUTANEOUS at 08:10

## 2022-01-10 RX ADMIN — ALBUTEROL SULFATE 2 PUFF: 90 AEROSOL, METERED RESPIRATORY (INHALATION) at 08:05

## 2022-01-10 RX ADMIN — MELOXICAM 15 MG: 7.5 TABLET ORAL at 08:11

## 2022-01-10 RX ADMIN — ISOSORBIDE MONONITRATE 30 MG: 30 TABLET, EXTENDED RELEASE ORAL at 12:23

## 2022-01-10 RX ADMIN — Medication 60 MG: at 08:11

## 2022-01-10 RX ADMIN — ALBUTEROL SULFATE 2 PUFF: 90 AEROSOL, METERED RESPIRATORY (INHALATION) at 02:38

## 2022-01-10 RX ADMIN — GUAIFENESIN 1200 MG: 600 TABLET, EXTENDED RELEASE ORAL at 08:10

## 2022-01-10 RX ADMIN — FLUTICASONE PROPIONATE 2 SPRAY: 50 SPRAY, METERED NASAL at 08:12

## 2022-01-10 RX ADMIN — Medication 1 PATCH: at 08:12

## 2022-01-10 RX ADMIN — ALBUTEROL SULFATE 2 PUFF: 90 AEROSOL, METERED RESPIRATORY (INHALATION) at 15:42

## 2022-01-10 RX ADMIN — ASPIRIN 81 MG: 81 TABLET, CHEWABLE ORAL at 08:11

## 2022-01-10 RX ADMIN — INSULIN LISPRO 25 UNITS: 100 INJECTION, SOLUTION INTRAVENOUS; SUBCUTANEOUS at 08:11

## 2022-01-10 RX ADMIN — INSULIN LISPRO 6 UNITS: 100 INJECTION, SOLUTION INTRAVENOUS; SUBCUTANEOUS at 12:24

## 2022-01-10 RX ADMIN — INSULIN LISPRO 2 UNITS: 100 INJECTION, SOLUTION INTRAVENOUS; SUBCUTANEOUS at 08:11

## 2022-01-10 RX ADMIN — INSULIN LISPRO 25 UNITS: 100 INJECTION, SOLUTION INTRAVENOUS; SUBCUTANEOUS at 12:23

## 2022-01-10 NOTE — SIGNIFICANT NOTE
Patient with chest pain in center of chest overnight. EKG reading st elevation in inferior leads but does not appear so on my personal read.  Confirm with Dr. Camarillo and he agrees.  Aspirin, heparin drip, cta to rule out PE.  Cards eval in am if necessary.

## 2022-01-10 NOTE — DISCHARGE SUMMARY
[facility] Medicine Services  DISCHARGE SUMMARY    Patient Name: Kathy Taylor  : 1960  MRN: [id number]    Date of Admission: 2022 12:13 PM  Date of Discharge:  1/10/2022  Primary Care Physician: Laurence Boudreaux APRN    Consults     Date and Time Order Name Status Description    1/10/2022  6:51 AM Inpatient Cardiology Consult      2022  8:39 PM Inpatient Cardiology Consult            Hospital Course     Presenting Problem:   COVID-19 virus infection [U07.1]    Active Hospital Problems    Diagnosis  POA   • **COVID-19 virus infection [U07.1]  Yes   • DM2 (diabetes mellitus, type 2) (HCC) [E11.9]  Yes   • COPD with acute exacerbation (HCC) [J44.1]  Yes   • Tobacco abuse [Z72.0]  Yes      Resolved Hospital Problems   No resolved problems to display.          Hospital Course:  Kathy Taylor is a 61 y.o. female with COPD and ongoing tobacco use, admitted for COVID       COVID-19 infection with mild hypoxia  COPD with acute exacerbation  - CXR Wnl, pt on 2L to RA, remains on RA  - completed 5 days remdesivir and will complete total 10 days steroids  - Ddimer continued to trend down, but due to RRT overnight with CP, patient underwent CTA chest that was negative for PE   - trend COVID labs; stable   - isolate until   - continue scheduled albuterol, mucinex, delsym     DM2 with hyperglycemia, severe  - home meds are insulins and orals  - added daytime levemir yesterday and increased qhs levemir with improvement with much better control. Continue current regimen  -Hold oral medication  -Titrate up as needed with steroids     Chest pain   --trending troponins, negative   --no acute ischemic changes on EKG  --started on NTG and Heparin drip overnight for CP  --previous stress test in late , normal   --ECHO EF 55%   --Cardiology consultation, started patient on Imdur and Crestor daily, will continue ASA as well. Follow up in 1 month with outpatient stress test      Tobacco  abuse  -Trying to quit  -Nicotine patch    CP and respiratory status are stable. Patient is medically stable for discharge, will be discharged home today.     Discharge Follow Up Recommendations for outpatient labs/diagnostics:   PCP 1 week   Dr Estrella 1 month     Day of Discharge     HPI:   Had CP overnight, started at rest in chair, substernal without any radiation, constant aching.  Has experienced this a few times in the last year without seeking medical care. Pain is currently 1/10 on Heparin and NTG drip. Denies any respiratory symptoms. Tolerating diet. Eager to go home when able.      Review of Systems  All other systems negative     Vital Signs:   Temp:  [97.3 °F (36.3 °C)-98.1 °F (36.7 °C)] 98 °F (36.7 °C)  Heart Rate:  [] 88  Resp:  [16-20] 20  BP: (107-168)/() 138/68      Physical Exam:  Constitutional: No acute distress, awake, alert  HENT: NCAT, mucous membranes moist  Respiratory: Clear to auscultation bilaterally, respiratory effort normal   Cardiovascular: RRR, no murmurs, rubs, or gallops  Gastrointestinal: Positive bowel sounds, soft, nontender, nondistended  Musculoskeletal: No bilateral ankle edema  Psychiatric: Appropriate affect, cooperative  Neurologic: Oriented x 3, strength symmetric in all extremities, Cranial Nerves grossly intact to confrontation, speech clear  Skin: No rashes     Pertinent  and/or Most Recent Results     LAB RESULTS:      Lab 01/10/22  0328 01/10/22  0110 01/09/22  2046 01/09/22 2012 01/09/22  0449 01/08/22  1201 01/08/22  0948 01/07/22  1225 01/07/22  1225   WBC 11.22* 12.34*  --  9.70 9.92  --  4.41   < > 6.66   HEMOGLOBIN 15.3 15.5  --  15.2 14.3  --  14.6   < > 15.6   HEMATOCRIT 44.5 44.1  --  45.1 40.7  --  42.1   < > 45.1   PLATELETS 190 218  --  176 180  --  174   < > 162   NEUTROS ABS 8.25* 9.15*  --  8.03* 7.20*  --  3.33   < > 5.01   IMMATURE GRANS (ABS) 0.13* 0.14*  --  0.11* 0.13*  --  0.06*   < > 0.07*   LYMPHS ABS 2.18 2.29  --  1.16 1.71   --  0.73   < > 0.75   MONOS ABS 0.63 0.73  --  0.38 0.87  --  0.28   < > 0.63   EOS ABS 0.01 0.00  --  0.00 0.00  --  0.00   < > 0.12   MCV 87.8 85.5  --  89.7 86.6  --  87.0   < > 87.9   CRP <0.30  --   --   --  0.84*  --   --   --  1.15*   PROTIME  --   --  12.8  --   --   --   --   --   --    APTT  --   --  30.2*  --   --   --   --   --   --    HEPARIN ANTI-XA 0.65  --  0.29*  --   --   --   --   --   --    D DIMER QUANT 0.42  --   --   --   --  0.67* 0.61*  --   --     < > = values in this interval not displayed.         Lab 01/10/22  0328 01/09/22  2012 01/09/22  0449 01/08/22  0948 01/07/22  1225   SODIUM 132* 128* 133* 129* 130*   POTASSIUM 4.0 4.8 4.7 4.6 4.2   CHLORIDE 98 95* 97* 94* 93*   CO2 21.0* 21.0* 26.0 25.0 26.0   ANION GAP 13.0 12.0 10.0 10.0 11.0   BUN 19 22* 20 17 13   CREATININE 0.91 0.87 0.91 0.93 0.93   GLUCOSE 229* 364* 188* 420* 322*   CALCIUM 8.9 8.8 9.0 8.7 9.3   MAGNESIUM 2.4 1.9  --   --   --          Lab 01/10/22  0328 01/09/22  0449 01/08/22  0948 01/07/22  1225   TOTAL PROTEIN 7.4 6.8 6.9 7.6   ALBUMIN 4.00 3.90 3.90 4.30   GLOBULIN 3.4 2.9 3.0 3.3   ALT (SGPT) 46* 49* 62* 72*   AST (SGOT) 26 26 43* 65*   BILIRUBIN 0.4 0.3 0.4 0.8   ALK PHOS 91 85 90 97         Lab 01/10/22  0328 01/10/22  0110 01/09/22 2046 01/09/22 2012 01/09/22  0449 01/07/22  1225   PROBNP  --   --   --  139.0  --  96.9   TROPONIN T <0.010 <0.010  --  <0.010 <0.010 <0.010   PROTIME  --   --  12.8  --   --   --    INR  --   --  0.99  --   --   --                  Brief Urine Lab Results     None        Microbiology Results (last 10 days)     Procedure Component Value - Date/Time    COVID PRE-OP / PRE-PROCEDURE SCREENING ORDER (NO ISOLATION) - Swab, Nasopharynx [[id number]]  (Abnormal) Collected: 01/07/22 1222    Lab Status: Final result Specimen: Swab from Nasopharynx Updated: 01/07/22 1335    Narrative:      The following orders were created for panel order COVID PRE-OP / PRE-PROCEDURE SCREENING ORDER (NO  ISOLATION) - Swab, Nasopharynx.  Procedure                               Abnormality         Status                     ---------                               -----------         ------                     COVID-19, FLU A/B, RSV P...[[id number]]  Abnormal            Final result                 Please view results for these tests on the individual orders.    COVID-19, FLU A/B, RSV PCR - Swab, Nasopharynx [[id number]]  (Abnormal) Collected: 01/07/22 1222    Lab Status: Final result Specimen: Swab from Nasopharynx Updated: 01/07/22 1335     COVID19 Detected     Influenza A PCR Not Detected     Influenza B PCR Not Detected     RSV, PCR Not Detected          Adult Transthoracic Echo Complete W/ Cont if Necessary Per Protocol    Result Date: 1/10/2022  · Calculated left ventricular EF = 55% · All left ventricular wall segments contract normally. · No significant valvular abnormalities · No pericardial effusion.      CT Angiogram Chest With & Without Contrast    Result Date: 1/9/2022  CT ANGIOGRAM CHEST W WO CONTRAST INDICATION: Chest pain, positive d-dimer, hypoxemia, Covid 19 TECHNIQUE: CT angiogram of the chest with with IV contrast. 3-D reconstructions were obtained and reviewed. Please note that the order states without and with IV contrast but only postcontrast imaging was obtained. Radiation dose reduction techniques included automated exposure control or exposure modulation based on body size. Count of known CT and cardiac nuc med studies performed in previous 12 months: 1. COMPARISON: None available. FINDINGS: Adequate opacification of the pulmonary arteries with no filling defects. Normal size thoracic aorta Heart size is normal. No pericardial effusion. No pleural effusion. No pneumothorax. Atelectatic changes in the left base. No confluent infiltrate. No acute upper abdominal process. No acute osseous abnormality.     1. Negative for pulmonary embolus.. 2. No acute pulmonary process. Signer Name: DION PIÑA MD   Signed: 1/9/2022 9:36 PM  Workstation Name: [email or web address]  [facility]    XR Chest 1 View    Result Date: 1/9/2022  CR Chest 1 Vw INDICATION: Chest pain. Pneumonia. COMPARISON:  1/7/2022 FINDINGS: Portable AP view(s) of the chest. Support lines and tubes are unchanged. The heart and mediastinal contours are normal. The lungs are clear. No pneumothorax or pleural effusion.     No acute cardiopulmonary findings. Signer Name: Unruly Rodriguez MD  Signed: 1/9/2022 8:34 PM  Workstation Name: [email or web address]  [facility]    XR Chest 1 View    Result Date: 1/8/2022  EXAMINATION: XR CHEST 1 VW- 01/07/2022  INDICATION: SOA triage protocol  COMPARISON: NONE  FINDINGS: Portable chest reveals cardiac and mediastinal silhouettes within normal limits. The lung fields are grossly clear. No focal parenchymal opacification present. No pleural effusion or pneumothorax. The bony structures are unremarkable. Pulmonary vascularity is within normal limits.        No acute cardiopulmonary disease.  D:  01/07/2022 E:  01/07/2022  This report was finalized on 1/8/2022 10:16 AM by Dr. Lida Tadeo MD.                Results for orders placed during the hospital encounter of 01/07/22    Adult Transthoracic Echo Complete W/ Cont if Necessary Per Protocol    Interpretation Summary  · Calculated left ventricular EF = 55%  · All left ventricular wall segments contract normally.  · No significant valvular abnormalities  · No pericardial effusion.        Discharge Details        Discharge Medications      New Medications      Instructions Start Date   dexamethasone 6 MG tablet  Commonly known as: DECADRON   6 mg, Oral, Daily   Start Date: January 11, 2022     isosorbide mononitrate 30 MG 24 hr tablet  Commonly known as: IMDUR   30 mg, Oral, Every 24 Hours Scheduled   Start Date: January 11, 2022     rosuvastatin 20 MG tablet  Commonly known as: CRESTOR   20 mg, Oral, Daily         Continue These  Medications      Instructions Start Date   Accu-Chek Softclix Lancets lancets   USE ONE LANCET WITH EACH BLOOD GLUCOSE TEST - TEST 2 TO 3 TIMES DAILY      albuterol sulfate  (90 Base) MCG/ACT inhaler  Commonly known as: Ventolin HFA   2 puffs, Inhalation, Every 4 Hours PRN      ASPIRIN 81 PO   1 tablet, Oral, Daily      BD Pen Needle Nessa U/F 32G X 4 MM misc  Generic drug: Insulin Pen Needle   Use as directed 5 times daily      Budeson-Glycopyrrol-Formoterol 160-9-4.8 MCG/ACT aerosol inhaler  Commonly known as: Breztri Aerosphere   2 puffs, Inhalation, 2 Times Daily      cyclobenzaprine 10 MG tablet  Commonly known as: FLEXERIL   10 mg, Oral, 2 Times Daily      fexofenadine 180 MG tablet  Commonly known as: Allegra Allergy   180 mg, Oral, Daily      fluticasone 50 MCG/ACT nasal spray  Commonly known as: Flonase   2 sprays, Nasal, Daily      glipizide 5 MG ER tablet  Commonly known as: GLUCOTROL XL   15 mg, Oral, Daily      glucose blood test strip   Use as instructed 2-3 times a day      Insulin aspart 100 UNIT/ML solution pen-injector injection pen  Commonly known as: FIASP FLEXTOUCH   For use via correctional scale, MDD 15 units      Insulin Glargine 100 UNIT/ML injection pen  Commonly known as: LANTUS SOLOSTAR   20 units at bedtime and titrate per instructions, MDD 40 units      ipratropium-albuterol 0.5-2.5 mg/3 ml nebulizer  Commonly known as: DUO-NEB   INHALE CONTENT OF ONE VIAL (3 ML) FOUR TIMES A DAY      meloxicam 15 MG tablet  Commonly known as: MOBIC   15 mg, Oral, Daily, Take with food.      metFORMIN  MG 24 hr tablet  Commonly known as: GLUCOPHAGE-XR   500 mg, Oral, Every Morning Before Breakfast             Allergies   Allergen Reactions   • Bee Venom Anaphylaxis   • Ciprofloxacin Shortness Of Breath         Discharge Disposition:  Home or Self Care    Diet:  Hospital:  Diet Order   Procedures   • Diet Regular; Consistent Carbohydrate          CODE STATUS:    Code Status and Medical  Interventions:   Ordered at: 01/07/22 1506     Level Of Support Discussed With:    Patient     Code Status (Patient has no pulse and is not breathing):    CPR (Attempt to Resuscitate)     Medical Interventions (Patient has pulse or is breathing):    Full Support       Future Appointments   Date Time Provider Department Center   1/27/2022  1:30 PM [facility] FOLLOW-UP [facility]   2/10/2022  9:00 AM Leidy Callaway APRN [facility]   2/17/2022  2:30 PM Sayra Witt MD [facility]                 KALYAN Dunbar  01/10/22      Time Spent on Discharge:  I spent  45  minutes on this discharge activity which included: face-to-face encounter with the patient, reviewing the data in the system, coordination of the care with the nursing staff as well as consultants, documentation, and entering orders.

## 2022-01-10 NOTE — PLAN OF CARE
Goal Outcome Evaluation:  Plan of Care Reviewed With: patient        Progress: no change  Patient c/o midsternal chest pain described at throbbing non radiating. Rated 4/10. Rapid response called. Nitroglycerin gtt, heparin gtt started per order. CT completed per order. Patient currently with no c/o pain. Vitals stable.

## 2022-01-10 NOTE — CONSULTS
Due to positive covid dx patient was offered education via telehealth, patient consents to education via phone.   Discussed and taught patient about type 2 diabetes self-management, risk factors, and importance of blood glucose control to reduce complications. Target blood glucose readings and A1c goals per ADA were reviewed. No recent a1c to review.. Signs, symptoms, and treatment of hyperglycemia and hypoglycemia were discussed. Lifestyle changes such as physical activity with MD approval and healthy eating were encouraged. Stressed the importance of strict blood sugar control after surgery to prevent complications such as infection and to promote healing of incision. Encouraged pt to monitor blood sugar at home 3+  times per day and to call PCP if blood sugar is trending venice. Encouraged to keep record of blood glucose readings to take to follow up appointment with PCP. Encouraged patient to reach out to doctor after d/c should glucose readings not be within range. Thank you for this referral.

## 2022-01-10 NOTE — PROGRESS NOTES
[facility] Medicine Services  PROGRESS NOTE    Patient Name: Kathy Taylor  : 1960  MRN: [id number]    Date of Admission: 2022  Primary Care Physician: Kanika     Subjective   Subjective     CC:  COVID    HPI:    Had CP overnight, started at rest in chair, substernal without any radiation, constant aching.  Has experienced this a few times in the last year without seeking medical care. Pain is currently 1/10 on Heparin and NTG drip. Denies any respiratory symptoms. Tolerating diet. Eager to go home when able.        ROS:  Gen- No fevers, chills  CV- No chest pain, palpitations  Resp- No cough, dyspnea  GI- No N/V/D, abd pain       Objective   Objective     Vital Signs:   Temp:  [97.3 °F (36.3 °C)-98.1 °F (36.7 °C)] 98 °F (36.7 °C)  Heart Rate:  [] 81  Resp:  [16-20] 20  BP: (107-168)/() 138/68     Physical Exam:  Constitutional: No acute distress, awake, alert  HENT: NCAT, mucous membranes moist  Respiratory: Clear to auscultation bilaterally, respiratory effort normal   Cardiovascular: RRR, no murmurs, rubs, or gallops  Gastrointestinal: Positive bowel sounds, soft, nontender, nondistended  Musculoskeletal: No bilateral ankle edema  Psychiatric: Appropriate affect, cooperative  Neurologic: Oriented x 3, strength symmetric in all extremities, Cranial Nerves grossly intact to confrontation, speech clear  Skin: No rashes     Results Reviewed:  LAB RESULTS:      Lab 01/10/22  0328 01/10/22  0110 22  2046 22  0449 22  1201 22  0948 22  1225 22  1225   WBC 11.22* 12.34*  --  9.70 9.92  --  4.41   < > 6.66   HEMOGLOBIN 15.3 15.5  --  15.2 14.3  --  14.6   < > 15.6   HEMATOCRIT 44.5 44.1  --  45.1 40.7  --  42.1   < > 45.1   PLATELETS 190 218  --  176 180  --  174   < > 162   NEUTROS ABS 8.25* 9.15*  --  8.03* 7.20*  --  3.33   < > 5.01   IMMATURE GRANS (ABS) 0.13* 0.14*  --  0.11* 0.13*  --  0.06*   < >  0.07*   LYMPHS ABS 2.18 2.29  --  1.16 1.71  --  0.73   < > 0.75   MONOS ABS 0.63 0.73  --  0.38 0.87  --  0.28   < > 0.63   EOS ABS 0.01 0.00  --  0.00 0.00  --  0.00   < > 0.12   MCV 87.8 85.5  --  89.7 86.6  --  87.0   < > 87.9   CRP <0.30  --   --   --  0.84*  --   --   --  1.15*   PROTIME  --   --  12.8  --   --   --   --   --   --    APTT  --   --  30.2*  --   --   --   --   --   --    HEPARIN ANTI-XA 0.65  --  0.29*  --   --   --   --   --   --    D DIMER QUANT 0.42  --   --   --   --  0.67* 0.61*  --   --     < > = values in this interval not displayed.         Lab 01/10/22  0328 01/09/22  2012 01/09/22  0449 01/08/22  0948 01/07/22  1225   SODIUM 132* 128* 133* 129* 130*   POTASSIUM 4.0 4.8 4.7 4.6 4.2   CHLORIDE 98 95* 97* 94* 93*   CO2 21.0* 21.0* 26.0 25.0 26.0   ANION GAP 13.0 12.0 10.0 10.0 11.0   BUN 19 22* 20 17 13   CREATININE 0.91 0.87 0.91 0.93 0.93   GLUCOSE 229* 364* 188* 420* 322*   CALCIUM 8.9 8.8 9.0 8.7 9.3   MAGNESIUM 2.4 1.9  --   --   --          Lab 01/10/22  0328 01/09/22  0449 01/08/22  0948 01/07/22  1225   TOTAL PROTEIN 7.4 6.8 6.9 7.6   ALBUMIN 4.00 3.90 3.90 4.30   GLOBULIN 3.4 2.9 3.0 3.3   ALT (SGPT) 46* 49* 62* 72*   AST (SGOT) 26 26 43* 65*   BILIRUBIN 0.4 0.3 0.4 0.8   ALK PHOS 91 85 90 97         Lab 01/10/22  0328 01/10/22  0110 01/09/22 2046 01/09/22 2012 01/09/22 0449 01/07/22  1225   PROBNP  --   --   --  139.0  --  96.9   TROPONIN T <0.010 <0.010  --  <0.010 <0.010 <0.010   PROTIME  --   --  12.8  --   --   --    INR  --   --  0.99  --   --   --                  Brief Urine Lab Results     None          Microbiology Results Abnormal     None          CT Angiogram Chest With & Without Contrast    Result Date: 1/9/2022  CT ANGIOGRAM CHEST W WO CONTRAST INDICATION: Chest pain, positive d-dimer, hypoxemia, Covid 19 TECHNIQUE: CT angiogram of the chest with with IV contrast. 3-D reconstructions were obtained and reviewed. Please note that the order states without and with  IV contrast but only postcontrast imaging was obtained. Radiation dose reduction techniques included automated exposure control or exposure modulation based on body size. Count of known CT and cardiac nuc med studies performed in previous 12 months: 1. COMPARISON: None available. FINDINGS: Adequate opacification of the pulmonary arteries with no filling defects. Normal size thoracic aorta Heart size is normal. No pericardial effusion. No pleural effusion. No pneumothorax. Atelectatic changes in the left base. No confluent infiltrate. No acute upper abdominal process. No acute osseous abnormality.     Impression: 1. Negative for pulmonary embolus.. 2. No acute pulmonary process. Signer Name: DION PIÑA MD  Signed: 1/9/2022 9:36 PM  Workstation Name: [facility]  [facility]    XR Chest 1 View    Result Date: 1/9/2022  CR Chest 1 Vw INDICATION: Chest pain. Pneumonia. COMPARISON:  1/7/2022 FINDINGS: Portable AP view(s) of the chest. Support lines and tubes are unchanged. The heart and mediastinal contours are normal. The lungs are clear. No pneumothorax or pleural effusion.     Impression: No acute cardiopulmonary findings. Signer Name: Unruly Rodriguez MD  Signed: 1/9/2022 8:34 PM  Workstation Name: [email or web address]  [facility]          I have reviewed the medications:  Scheduled Meds:albuterol sulfate HFA, 2 puff, Inhalation, Q4H - RT  aspirin, 81 mg, Oral, Daily  cetirizine, 10 mg, Oral, Daily  dexamethasone, 6 mg, Oral, Daily  dextromethorphan polistirex ER, 60 mg, Oral, Q12H  fluticasone, 2 spray, Nasal, Daily  guaiFENesin, 1,200 mg, Oral, Q12H  insulin detemir, 20 Units, Subcutaneous, Daily  insulin detemir, 30 Units, Subcutaneous, Nightly  insulin lispro, 0-9 Units, Subcutaneous, TID AC  insulin lispro, 25 Units, Subcutaneous, TID With Meals  isosorbide mononitrate, 30 mg, Oral, Q24H  meloxicam, 15 mg, Oral, Daily  nicotine, 1 patch, Transdermal, Q24H  pantoprazole, 40  mg, Oral, Q AM  pharmacy consult - MTM, , Does not apply, Daily  remdesivir, 100 mg, Intravenous, Daily With Lunch      Continuous Infusions:Pharmacy Consult - Remdesivir,       PRN Meds:.•  acetaminophen **OR** acetaminophen **OR** acetaminophen  •  benzonatate  •  dextrose  •  dextrose  •  glucagon (human recombinant)  •  magnesium sulfate **OR** magnesium sulfate in D5W 1g/100mL (PREMIX) **OR** magnesium sulfate  •  nitroglycerin  •  Pharmacy Consult - Remdesivir  •  sodium chloride    Assessment/Plan   Assessment & Plan     Active Hospital Problems    Diagnosis  POA   • **COVID-19 virus infection [U07.1]  Yes   • DM2 (diabetes mellitus, type 2) (HCC) [E11.9]  Yes   • COPD with acute exacerbation (HCC) [J44.1]  Yes   • Tobacco abuse [Z72.0]  Yes      Resolved Hospital Problems   No resolved problems to display.        Brief Hospital Course to date:  Kathy Taylor is a 61 y.o. female with COPD and ongoing tobacco use, admitted for COVID    This patient's problems and plans were partially entered by my partner and updated as appropriate by me 01/10/22.      COVID-19 infection with mild hypoxia  COPD with acute exacerbation  - CXR Wnl, pt on 2L to RA, remains on RA  - day 4 dexamethasone and remdesivir  - Ddimer continued to trend down, but due to RRT overnight with CP, patient underwent CTA chest that was negative for PE   - trend COVID labs  - isolate until 1/24  - continue scheduled albuterol, mucinex, delsym     DM2 with hyperglycemia, severe  - home meds are insulins and orals  - added daytime levemir yesterday and increased qhs levemir with improvement with much better control. Continue current regimen  -Hold oral medication  -Titrate up as needed with steroids    Chest pain   --trending troponins, negative   --no acute ischemic changes on EKG  --started on NTG and Heparin drip overnight for CP  --previous stress test in late 2020, normal   --ECHO pending   --Cardiology consultation, consider inpatient vs  outpatient ischemic workup?     Tobacco abuse  -Trying to quit  -Nicotine patch     DVT prophylaxis:  No DVT prophylaxis order currently exists.            Disposition: I expect the patient to be discharged home as soon as ECHO is read and no significant abnormality seen     CODE STATUS:   Code Status and Medical Interventions:   Ordered at: 01/07/22 1506     Level Of Support Discussed With:    Patient     Code Status (Patient has no pulse and is not breathing):    CPR (Attempt to Resuscitate)     Medical Interventions (Patient has pulse or is breathing):    Full Support       Rosa Maria Damian, APRN  01/10/22

## 2022-01-10 NOTE — CONSULTS
[facility] at [facility]   Consult Note    Referring Provider:   Kanika January, APRN   Cardiologist: Cardiologist in [facility]     Identification: 61-year-old  female originally from [place] moved to [place] on October 20. Currently living with daughter off of [address].     Reason for Consultation: Chest pain    Problem list:  1. Chest pain  1. Normal echo and stress test per patient report 2020  2. Reported retrosternal chest pain in the context of Covid pneumonia on 1/10/2022  2. Rheumatic fever - hospitalized as child  3. Family history of heart disease  4. Stage III COPD  5. Diabetes mellitus - onset 2012  6. Current tobacco abuse - >50 years   7. HLD  8. 1/7/2021 [facility] admission for COVID-19  9. Fibromyalgia  10. Tooth infection  11. Migraines   12. Surgical  1. Breast lumpectomy - 04  2. Tubal abdominal ligation  3. Vulvar surgery  4. [place] tooth extraction      Patient Care Team:  Kanika January, APRN as PCP - General (Nurse Practitioner)    Past Medical History:   Diagnosis Date   • Arthritis    • Back ache    • Bronchitis    • COPD (chronic obstructive pulmonary disease) (HCC)    • Fibromyalgia    • Migraines    • Pneumonia    • Rheumatic fever    • Tooth infection    • Type 2 diabetes mellitus (HCC)    • Vulvar carcinoma (HCC)        Past Surgical History:   Procedure Laterality Date   • BREAST BIOPSY Left    • BREAST LUMPECTOMY Left 2004   • TONSILLECTOMY     • TUBAL ABDOMINAL LIGATION     • VULVA BIOPSY     • VULVA SURGERY     • WISDOM TOOTH EXTRACTION           Allergies   Allergen Reactions   • Bee Venom Anaphylaxis   • Ciprofloxacin Shortness Of Breath           Current Facility-Administered Medications:   •  acetaminophen (TYLENOL) tablet 650 mg, 650 mg, Oral, Q4H PRN, 650 mg at 01/07/22 2018 **OR** acetaminophen (TYLENOL) 160 MG/5ML solution 650 mg, 650 mg, Oral, Q4H PRN **OR** acetaminophen (TYLENOL) suppository 650 mg, 650 mg, Rectal, Q4H PRN, Roxana Payne,  MD  •  albuterol sulfate HFA (PROVENTIL HFA;VENTOLIN HFA;PROAIR HFA) inhaler 2 puff, 2 puff, Inhalation, Q4H - RT, Roxana Payne MD, 2 puff at 01/10/22 0805  •  aspirin chewable tablet 81 mg, 81 mg, Oral, Daily, Roxana Payne MD, 81 mg at 01/10/22 0811  •  benzonatate (TESSALON) capsule 100 mg, 100 mg, Oral, TID PRN, Rxoana Payne MD  •  cetirizine (zyrTEC) tablet 10 mg, 10 mg, Oral, Daily, Roxana Payne MD, 10 mg at 01/10/22 0811  •  dexamethasone (DECADRON) tablet 6 mg, 6 mg, Oral, Daily, 6 mg at 01/10/22 0810 **OR** [DISCONTINUED] dexamethasone (DECADRON) injection 6 mg, 6 mg, Intravenous, Daily, Roxana Payne MD  •  dextromethorphan polistirex ER (DELSYM) 30 MG/5ML oral suspension 60 mg, 60 mg, Oral, Q12H, Roxana Payne MD, 60 mg at 01/10/22 0811  •  dextrose (D50W) (25 g/50 mL) IV injection 25 g, 25 g, Intravenous, Q15 Min PRN, Roxana Payne MD  •  dextrose (GLUTOSE) oral gel 15 g, 15 g, Oral, Q15 Min PRN, Roxana Payne MD  •  fluticasone (FLONASE) 50 MCG/ACT nasal spray 2 spray, 2 spray, Nasal, Daily, Roxana Payne MD, 2 spray at 01/10/22 0812  •  glucagon (human recombinant) (GLUCAGEN DIAGNOSTIC) injection 1 mg, 1 mg, Subcutaneous, Q15 Min PRN, Roxana Payne MD  •  guaiFENesin (MUCINEX) 12 hr tablet 1,200 mg, 1,200 mg, Oral, Q12H, Roxana Payne MD, 1,200 mg at 01/10/22 0810  •  heparin 31962 units/250 mL (100 units/mL) in 0.9% NaCl infusion, 12 Units/kg/hr, Intravenous, Titrated, John, Ernesto, DO, Last Rate: 9.68 mL/hr at 01/09/22 2051, 12 Units/kg/hr at 01/09/22 2051  •  insulin detemir (LEVEMIR) injection 20 Units, 20 Units, Subcutaneous, Daily, MiniShruti MD, 20 Units at 01/10/22 0810  •  insulin detemir (LEVEMIR) injection 30 Units, 30 Units, Subcutaneous, Nightly, Roxana Payne MD, 30 Units at 01/09/22 2054  •  insulin lispro (humaLOG) injection 0-9 Units, 0-9 Units, Subcutaneous, TID AC, Roxana aPyne MD, 2 Units at 01/10/22 0811  •  insulin lispro (humaLOG) injection 25 Units, 25  Units, Subcutaneous, TID With Meals, Shruti Franz MD, 25 Units at 01/10/22 0811  •  magnesium sulfate 4 gram infusion - Mg less than or equal to 1mg/dL, 4 g, Intravenous, PRN **OR** magnesium sulfate 3 gram infusion (1gm x 3) - Mg 1.1 - 1.5 mg/dL, 1 g, Intravenous, PRN **OR** Magnesium Sulfate 2 gram infusion- Mg 1.6 - 1.9 mg/dL, 2 g, Intravenous, PRN, Nigel Liao MD, Last Rate: 25 mL/hr at 01/09/22 2325, 2 g at 01/09/22 2325  •  meloxicam (MOBIC) tablet 15 mg, 15 mg, Oral, Daily, Roxana Payne MD, 15 mg at 01/10/22 0811  •  nicotine (NICODERM CQ) 21 MG/24HR patch 1 patch, 1 patch, Transdermal, Q24H, Roxana Payne MD, 1 patch at 01/10/22 0812  •  nitroglycerin (NITROSTAT) SL tablet 0.4 mg, 0.4 mg, Sublingual, Q5 Min PRN, Ernesto Lopez DO, 0.4 mg at 01/09/22 2017  •  nitroglycerin (TRIDIL) 200 mcg/ml infusion, 5-200 mcg/min, Intravenous, Titrated, Ernesto Lopez DO, Last Rate: 1.5 mL/hr at 01/10/22 0818, 5 mcg/min at 01/10/22 0818  •  pantoprazole (PROTONIX) EC tablet 40 mg, 40 mg, Oral, Q AM, Nigel Liao MD, 40 mg at 01/10/22 0810  •  Pharmacy Consult - MTM, , Does not apply, Daily, Deo Wynn, PharmD  •  Pharmacy Consult - Remdesivir, , Does not apply, Continuous PRN, Roxana Payne MD  •  Pharmacy to Dose Heparin, , Does not apply, Continuous PRN, Ernesto Lopez DO  •  [COMPLETED] remdesivir 200 mg in sodium chloride 0.9 % 290 mL IVPB (powder vial), 200 mg, Intravenous, Q24H, 200 mg at 01/07/22 2009 **FOLLOWED BY** remdesivir 100 mg in sodium chloride 0.9 % 270 mL IVPB (powder vial), 100 mg, Intravenous, Daily With Lunch, Roxana Payne MD, Last Rate: 100 mL/hr at 01/08/22 1747, 100 mg at 01/09/22 1210  •  sodium chloride 0.9 % flush 10 mL, 10 mL, Intravenous, PRN, Smitha Collado MD, 10 mL at 01/08/22 2002    heparin, 12 Units/kg/hr, Last Rate: 12 Units/kg/hr (01/09/22 2051)  nitroglycerin, 5-200 mcg/min, Last Rate: 5 mcg/min (01/10/22 0818)  Pharmacy Consult - Remdesivir,    Pharmacy to Dose Heparin,         Medications Prior to Admission   Medication Sig Dispense Refill Last Dose   • Accu-Chek Softclix Lancets lancets USE ONE LANCET WITH EACH BLOOD GLUCOSE TEST - TEST 2 TO 3 TIMES DAILY 100 each 3    • albuterol sulfate HFA (Ventolin HFA) 108 (90 Base) MCG/ACT inhaler Inhale 2 puffs Every 4 (Four) Hours As Needed for Wheezing. 8 g 6    • ASPIRIN 81 PO Take 1 tablet by mouth Daily.      • Budeson-Glycopyrrol-Formoterol (Breztri Aerosphere) 160-9-4.8 MCG/ACT aerosol inhaler Inhale 2 puffs 2 (Two) Times a Day. 1 each 11    • cyclobenzaprine (FLEXERIL) 10 MG tablet Take 10 mg by mouth Daily.      • fexofenadine (Allegra Allergy) 180 MG tablet Take 1 tablet by mouth Daily. 90 tablet 3    • fluticasone (Flonase) 50 MCG/ACT nasal spray 2 sprays into the nostril(s) as directed by provider Daily. 15.8 mL 6    • glipizide (GLUCOTROL XL) 5 MG ER tablet Take 3 tablets by mouth Daily. 90 tablet 3    • glucose blood test strip Use as instructed 2-3 times a day 100 each 3    • Insulin aspart (FIASP FLEXTOUCH) 100 UNIT/ML solution pen-injector injection pen For use via correctional scale, MDD 15 units 6 mL 3    • Insulin Glargine (LANTUS SOLOSTAR) 100 UNIT/ML injection pen 20 units at bedtime and titrate per instructions, MDD 40 units 12 mL 5    • Insulin Pen Needle (BD Pen Needle Nessa U/F) 32G X 4 MM misc Use as directed 5 times daily 150 each 3    • ipratropium-albuterol (DUO-NEB) 0.5-2.5 mg/3 ml nebulizer INHALE CONTENT OF ONE VIAL (3 ML) FOUR TIMES A DAY 1080 mL 3    • meloxicam (MOBIC) 15 MG tablet Take 15 mg by mouth Daily.      • metFORMIN ER (GLUCOPHAGE-XR) 500 MG 24 hr tablet Take 1 tablet by mouth Every Morning Before Breakfast. 90 tablet 1          Subjective .   History of present illness:    Estefania Taylor is a 61-year-old woman with above past medical history who presented to [facility] on 1/7/2022 complaining of cough and increased shortness of breath.  Upon ER evaluation,  "the patient reported that her daughter and grandson who both currently live with her were diagnosed positive with COVID-19 within the last week.  Upon presentation, the patient reported that over the last 2 to 3 days she has appreciated increased body aches, fever, chills, cough, and increased shortness of breath.  Patient has a known history of COPD secondary to longstanding smoking.  However she denies utilization of oxygen therapy while at home.  Patient reports that she has been vaccinated against COVID-19.    Upon ER evaluation, the patient was requiring 2 L of O2 via nasal cannula for proper maintenance of oxygen saturations >94%.    Cardiology was consulted due to the development of chest pain in the center of her chest in the p.m. of 1/9/2022/a.m. of 1/10/2022.  EKG at that time did not show any new significant abnormalities.  EKG shows a right bundle branch block and this has been present throughout admission    Troponin x2 WNL.  No evidence of acute PE on CT angiogram.    Today, the patient reports that she developed this chest pain while lying in bed in the  last night.  She states that she has appreciated this chest pain outside the context of her Covid pneumonia.  She states that this chest pain can occur randomly, intermittently, and can be associated with exertion with relief with rest.  She describes this chest pain is retrosternal, intermittently radiating, and concomitant with shortness of breath she states that when this chest pain occurs she appreciates increased shortness of breath, and \"hot\" flashes.  Patient states that she frequently gets this chest pain while walking her large dog. She also appreciates concomitant shortness of breath upon this exertion as well.  Today, the patient states that this is a longstanding issue.  She is also a longstanding smoker she states that she was previously seen by a cardiologist down in [place] due to her chest pain and history of rheumatic fever.  Patient " "states that she believes that she had a stress test prior to establishing residence here in [place] in 10/2020.  Per her report, the patient denied any abnormalities on previous echocardiogram and stress test.  Patient denies any previous left heart catheterization nor stenting.  She denies any significant cardiomyopathy including systolic congestive heart failure, diastolic congestive heart failure, coronary artery disease, and valvulopathy.      Social History     Socioeconomic History   • Marital status:    Tobacco Use   • Smoking status: Current Every Day Smoker     Packs/day: 1.00     Years: 50.00     Pack years: 50.00     Types: Cigarettes   • Smokeless tobacco: Never Used   Vaping Use   • Vaping Use: Former   • Substances: Nicotine   • Devices: Pre-filled or refillable cartridge   Substance and Sexual Activity   • Alcohol use: Yes     Comment: rarely   • Drug use: Never   • Sexual activity: Defer     Family History   Problem Relation Age of Onset   • Arthritis Mother    • Diabetes Mother    • Heart disease Mother    • Thyroid disease Mother    • Uterine cancer Mother    • Heart disease Brother    • Diabetes Brother    • Breast cancer Other         MATERNAL GREAT AUNT   • Ovarian cancer Neg Hx          Review of Systems:  Review of Systems   Cardiovascular: Positive for chest pain and dyspnea on exertion.   Respiratory: Positive for shortness of breath.    All other systems reviewed and are negative.       Objective   Vitals:  /68 (BP Location: Right arm, Patient Position: Lying)   Pulse 81   Temp 97.3 °F (36.3 °C) (Oral)   Resp 18   Ht 167.6 cm (66\")   Wt 80.7 kg (178 lb)   LMP  (LMP Unknown)   SpO2 93%   BMI 28.73 kg/m²      Intake/Output Summary (Last 24 hours) at 1/10/2022 1035  Last data filed at 1/10/2022 0115  Gross per 24 hour   Intake 1185 ml   Output 1100 ml   Net 85 ml       Constitutional:       Appearance: Not in distress.   Pulmonary:      Effort: Pulmonary effort is normal. "      Breath sounds: No rales.   Cardiovascular:      Regular rhythm.      Murmurs: There is no murmur.   Pulses:     Intact distal pulses.   Edema:     Peripheral edema absent.   Neurological:      General: No focal deficit present.      Mental Status: Alert.              Results Review:  I reviewed the patient's new clinical results.  Results from last 7 days   Lab Units 01/10/22  0328   WBC 10*3/mm3 11.22*   HEMOGLOBIN g/dL 15.3   HEMATOCRIT % 44.5   PLATELETS 10*3/mm3 190     Results from last 7 days   Lab Units 01/10/22  0328   SODIUM mmol/L 132*   POTASSIUM mmol/L 4.0   CHLORIDE mmol/L 98   CO2 mmol/L 21.0*   BUN mg/dL 19   CREATININE mg/dL 0.91   CALCIUM mg/dL 8.9   BILIRUBIN mg/dL 0.4   ALK PHOS U/L 91   ALT (SGPT) U/L 46*   AST (SGOT) U/L 26   GLUCOSE mg/dL 229*     Results from last 7 days   Lab Units 01/10/22  0328   SODIUM mmol/L 132*   POTASSIUM mmol/L 4.0   CHLORIDE mmol/L 98   CO2 mmol/L 21.0*   BUN mg/dL 19   CREATININE mg/dL 0.91   GLUCOSE mg/dL 229*   CALCIUM mg/dL 8.9     Results from last 7 days   Lab Units 01/09/22  2046   INR  0.99     Lab Results   Lab Value Date/Time    TROPONINT <0.010 01/10/2022 0328    TROPONINT <0.010 01/10/2022 0110    TROPONINT <0.010 01/09/2022 2012    TROPONINT <0.010 01/09/2022 0449    TROPONINT <0.010 01/07/2022 1225             Results from last 7 days   Lab Units 01/09/22 2012   PROBNP pg/mL 139.0           Tele:  NSR    EKG:   Normal sinus rhythm  Right bundle branch block with right axis deviation  Abnormal ECG      Patient previous EKG when patient was complaining of chest pain.  No acute abnormalities appreciated.    Echo:  · Calculated left ventricular EF = 55%  · All left ventricular wall segments contract normally.  · No significant valvular abnormalities  · No pericardial effusion.         Assessment/Plan     1. Chest Pain  1. Normal troponin X2  2. No acute Changes on EKG, right bundle branch block  3. Echocardiogram shows EF 55% with no significant  valvular abnormalities or pericardial effusion Will call [facility] and request for records.  4. Will have the medication remain on aspirin 81 mg daily, add Imdur 30 mg daily for relief of her chest pain, add Crestor 20 mg daily given her diabetic status  5. Her chest pain could be consistent with stable angina, recent evaluation per her report in 2020 without evidence of ischemia.  We will repeat an outpatient stress test after she is cleared COVID-19 precautions.  We will schedule after 1/24  6.  follow-up with Dr. Estrella in 1 month  2. Covid Pneumonia  1. Treatment per medicine team, to be discharged home today after remdesivir    Discussed plan of care with Dr. Liao and patient    Silas Palacios JAMIL for Dr. Jono Estrella MD    I,Jono Estrella M.D., personally performed the services described in this documentation as scribed by the above named individual in my presence, and it is both accurate and complete.  1/10/2022  15:53 EST

## 2022-01-10 NOTE — CONSULTS
"  Referring Provider: MD Keshawn  Reason for Consultation: AoCRF    Subjective .   Education: NN spoke with pt via telephone.  Pt alert and able to answer questions appropriately.  Pt on RA currently, no home O2 use.  Pt reports the ability to ambulate 0.25-0.5 blacks at baseline before experiencing SOB.  Pt states use of rescue medication 4  times daily, relief of SOB within ~10-15 mins.  Patient is up to date on COVID vaccine but not flu and PNA vaccines.  Tobacco cessation encouraged. Resources, NRT products and techniques reviewed.  Pt reports some issues at this time with medications or transportation for appointments.  She reports that these issues are generally with her memory, NN has made a calendar to help the patient remember when to call for transportation and when appointments are.  Pt reports some previous hx of formal COPD teaching, no understanding of action plan, or RI.  Stop light report, NN contact information, instructions for accessing [employer] and list of educational videos given to RN.  \"A Patient's Guide to COPD\" booklet left with RN. 1800QUITNOW reference sheet discussed and given to RN.  COPD education completed in the form of explanation, handouts, and videos.  No new concerns or questions voiced at this time.  NN will continue to follow as needed.     Age: 61 y.o.  Sex: female  Smoker Status: current, ~50 pack years  Pulmonologist: HAI Thakur MD  FEV1 (PFT): 51% (2/24/2021)  Home O2: RA    Objective     SpO2 SpO2: 93 % (01/10/22 0805)  Device Device (Oxygen Therapy): room air (01/10/22 0805)  Flow Flow (L/min): 2 (01/08/22 0625)  Incentive Spirometer    IS Predicted Level (mL)     Number of Repetitions     Level Incentive Spirometer (mL)    Patient Tolerance     Inhaler Treatment Status Respiratory Treatment Status (Inhaler): given (01/10/22 0805)  Treatment Route Respiratory Treatment Status (Inhaler): given (01/10/22 0805)      Home Medications:  Medications Prior to Admission   Medication " Sig Dispense Refill Last Dose   • Accu-Chek Softclix Lancets lancets USE ONE LANCET WITH EACH BLOOD GLUCOSE TEST - TEST 2 TO 3 TIMES DAILY 100 each 3    • albuterol sulfate HFA (Ventolin HFA) 108 (90 Base) MCG/ACT inhaler Inhale 2 puffs Every 4 (Four) Hours As Needed for Wheezing. 8 g 6    • ASPIRIN 81 PO Take 1 tablet by mouth Daily.      • Budeson-Glycopyrrol-Formoterol (Breztri Aerosphere) 160-9-4.8 MCG/ACT aerosol inhaler Inhale 2 puffs 2 (Two) Times a Day. 1 each 11    • cyclobenzaprine (FLEXERIL) 10 MG tablet Take 10 mg by mouth Daily.      • fexofenadine (Allegra Allergy) 180 MG tablet Take 1 tablet by mouth Daily. 90 tablet 3    • fluticasone (Flonase) 50 MCG/ACT nasal spray 2 sprays into the nostril(s) as directed by provider Daily. 15.8 mL 6    • glipizide (GLUCOTROL XL) 5 MG ER tablet Take 3 tablets by mouth Daily. 90 tablet 3    • glucose blood test strip Use as instructed 2-3 times a day 100 each 3    • Insulin aspart (FIASP FLEXTOUCH) 100 UNIT/ML solution pen-injector injection pen For use via correctional scale, MDD 15 units 6 mL 3    • Insulin Glargine (LANTUS SOLOSTAR) 100 UNIT/ML injection pen 20 units at bedtime and titrate per instructions, MDD 40 units 12 mL 5    • Insulin Pen Needle (BD Pen Needle Nessa U/F) 32G X 4 MM misc Use as directed 5 times daily 150 each 3    • ipratropium-albuterol (DUO-NEB) 0.5-2.5 mg/3 ml nebulizer INHALE CONTENT OF ONE VIAL (3 ML) FOUR TIMES A DAY 1080 mL 3    • meloxicam (MOBIC) 15 MG tablet Take 15 mg by mouth Daily.      • metFORMIN ER (GLUCOPHAGE-XR) 500 MG 24 hr tablet Take 1 tablet by mouth Every Morning Before Breakfast. 90 tablet 1        Discussion: Per current GOLD Standards, please consider: LAMA/LABA/ICS in place (Breztri), Outpatient PFT, NRT at [place], Rehab as appropriate    Patient has been scheduled for a hospital follow up with [facility] for 2/10/2022 @ 9 am with KALYAN Iglesias.    Discussed with primary  RN    Nadege Masters, RN

## 2022-01-10 NOTE — DISCHARGE INSTR - APPOINTMENTS
When you follow up with Leidy at the pulmonary office on Feb 10 @ 9 am, please ask her about new orders for your sleep study and information on getting that scheduled.  Thank you.

## 2022-01-10 NOTE — CASE MANAGEMENT/SOCIAL WORK
Case Management Discharge Note      Final Note: Patient has orders for discharge.  Attempted to call patient room with no answer.  Per converstaion with patient today, no discharge needs, has Pulse Oximeter.  Patient plan is to discharge home today via car with family to transport.    Provided Post Acute Provider List?: N/A  N/A Provider List Comment: in [place]    Selected Continued Care - Admitted Since 1/7/2022     Destination    No services have been selected for the patient.              Durable Medical Equipment    No services have been selected for the patient.              Dialysis/Infusion    No services have been selected for the patient.              Home Medical Care    No services have been selected for the patient.              Therapy    No services have been selected for the patient.              Community Resources    No services have been selected for the patient.              Community & DME    No services have been selected for the patient.                       Final Discharge Disposition Code: 01 - home or self-care

## 2022-01-11 ENCOUNTER — READMISSION MANAGEMENT (OUTPATIENT)
Dept: CALL CENTER | Facility: HOSPITAL | Age: 62
End: 2022-01-11

## 2022-01-11 NOTE — OUTREACH NOTE
Prep Survey      Responses   [facility] facility patient discharged from? [place]   Is LACE score < 7 ? No   Emergency Room discharge w/ pulse ox? No   Eligibility Readm Mgmt   Discharge diagnosis Covid 19    Does the patient have one of the following disease processes/diagnoses(primary or secondary)? COVID-19   Does the patient have Home health ordered? No   Is there a DME ordered? No   Prep survey completed? Yes          Rosa Maria Cruz RN

## 2022-01-11 NOTE — OUTREACH NOTE
COVID-19 Week 1 Survey      Responses   [facility] patient discharged from? [place]   Does the patient have one of the following disease processes/diagnoses(primary or secondary)? COVID-19   COVID-19 underlying condition? None   Call Number Call 1   Week 1 Call successful? No   Discharge diagnosis Covid 19           Eladia Yee RN

## 2022-01-12 ENCOUNTER — READMISSION MANAGEMENT (OUTPATIENT)
Dept: CALL CENTER | Facility: HOSPITAL | Age: 62
End: 2022-01-12

## 2022-01-12 NOTE — OUTREACH NOTE
COVID-19 Week 1 Survey      Responses   [facility] patient discharged from? [place]   Does the patient have one of the following disease processes/diagnoses(primary or secondary)? COVID-19   COVID-19 underlying condition? None   Call Number Call 2   Week 1 Call successful? No   Discharge diagnosis Covid 19           Cheli Lam RN

## 2022-01-13 ENCOUNTER — READMISSION MANAGEMENT (OUTPATIENT)
Dept: CALL CENTER | Facility: HOSPITAL | Age: 62
End: 2022-01-13

## 2022-01-13 NOTE — OUTREACH NOTE
COVID-19 Week 1 Survey      Responses   [facility] patient discharged from? [place]   Does the patient have one of the following disease processes/diagnoses(primary or secondary)? COVID-19   COVID-19 underlying condition? None   Call Number Call 3   Week 1 Call successful? No   Discharge diagnosis Covid 19           Nadiya Naylor RN

## 2022-01-27 ENCOUNTER — APPOINTMENT (OUTPATIENT)
Dept: MAMMOGRAPHY | Facility: HOSPITAL | Age: 62
End: 2022-01-27

## 2022-02-14 ENCOUNTER — OFFICE VISIT (OUTPATIENT)
Dept: PULMONOLOGY | Facility: CLINIC | Age: 62
End: 2022-02-14

## 2022-02-14 VITALS
WEIGHT: 186.4 LBS | TEMPERATURE: 97.5 F | HEIGHT: 66 IN | OXYGEN SATURATION: 99 % | DIASTOLIC BLOOD PRESSURE: 86 MMHG | BODY MASS INDEX: 29.96 KG/M2 | HEART RATE: 123 BPM | SYSTOLIC BLOOD PRESSURE: 126 MMHG

## 2022-02-14 DIAGNOSIS — Z72.0 TOBACCO ABUSE: ICD-10-CM

## 2022-02-14 DIAGNOSIS — G47.33 OBSTRUCTIVE SLEEP APNEA: ICD-10-CM

## 2022-02-14 DIAGNOSIS — G47.33 OSA (OBSTRUCTIVE SLEEP APNEA): Primary | ICD-10-CM

## 2022-02-14 DIAGNOSIS — J44.1 COPD WITH ACUTE EXACERBATION: ICD-10-CM

## 2022-02-14 DIAGNOSIS — J44.9 COPD MIXED TYPE: ICD-10-CM

## 2022-02-14 DIAGNOSIS — G47.34 NOCTURNAL HYPOXEMIA: ICD-10-CM

## 2022-02-14 PROCEDURE — 99215 OFFICE O/P EST HI 40 MIN: CPT | Performed by: NURSE PRACTITIONER

## 2022-02-14 RX ORDER — ALBUTEROL SULFATE 90 UG/1
2 AEROSOL, METERED RESPIRATORY (INHALATION) EVERY 4 HOURS PRN
Qty: 8 G | Refills: 6 | Status: SHIPPED | OUTPATIENT
Start: 2022-02-14 | End: 2022-06-06 | Stop reason: SDUPTHER

## 2022-02-14 RX ORDER — AZITHROMYCIN 250 MG/1
TABLET, FILM COATED ORAL
Qty: 6 TABLET | Refills: 0 | Status: SHIPPED | OUTPATIENT
Start: 2022-02-14 | End: 2022-03-31

## 2022-02-14 RX ORDER — IPRATROPIUM BROMIDE AND ALBUTEROL SULFATE 2.5; .5 MG/3ML; MG/3ML
3 SOLUTION RESPIRATORY (INHALATION)
Qty: 1080 ML | Refills: 3 | Status: SHIPPED | OUTPATIENT
Start: 2022-02-14 | End: 2022-06-06 | Stop reason: SDUPTHER

## 2022-02-14 RX ORDER — BUDESONIDE, GLYCOPYRROLATE, AND FORMOTEROL FUMARATE 160; 9; 4.8 UG/1; UG/1; UG/1
2 AEROSOL, METERED RESPIRATORY (INHALATION) 2 TIMES DAILY
Qty: 1 EACH | Refills: 11 | Status: SHIPPED | OUTPATIENT
Start: 2022-02-14 | End: 2022-06-06 | Stop reason: SDUPTHER

## 2022-02-14 RX ORDER — PREDNISONE 20 MG/1
20 TABLET ORAL DAILY
Qty: 5 TABLET | Refills: 0 | Status: SHIPPED | OUTPATIENT
Start: 2022-02-14 | End: 2022-03-31

## 2022-02-14 RX ORDER — NICOTINE 10 MG
1 CARTRIDGE (EA) INHALATION AS NEEDED
Qty: 168 EACH | Refills: 3 | Status: SHIPPED | OUTPATIENT
Start: 2022-02-14 | End: 2022-02-17

## 2022-02-14 NOTE — PROGRESS NOTES
[facility] Pulmonary Follow up    CHIEF COMPLAINT    dyspnea    HISTORY OF PRESENT ILLNESS    Kathy Taylor is a 61 y.o.female here today for a hospital follow-up.  She was last seen in the office by Dr. Thakur in July.  She was admitted to [facility] on 1/7 to 1/10.  She was admitted for the COVID-19 infection, she completed 5 days of remdesivir and 10 days of steroids.  She had a CTA that was negative for PE.  She was on quarantine until 1/24.  She had difficulty with hyperglycemia while hospitalized.  She was also started on new medications and will follow up with cardiology.  She has a stress test scheduled for later this week.    She states she is slowly recovering back to her normal.  She does continue to have some shortness of breath with exertion.    She uses her Breztri inhaler twice a day.  She also uses her albuterol as needed for shortness of breath.  She also has DuoNebs to use as needed as well.    She needs a new sleep study before she can get a new CPAP machine.  She does have daytime somnolence, fatigue and nonrestorative sleep and has not used her CPAP in over a year.    She continues to smoke no more than 1 pack/day.  She has a 50-pack-year history.  She has tried to cut back but has been unsuccessful.  She did do well on Chantix but there was a recall and she started smoking again.    She is up-to-date on her current vaccinations.    Patient Active Problem List   Diagnosis   • COPD with acute exacerbation (HCC)   • Tobacco abuse   • Obstructive sleep apnea   • Nocturnal hypoxemia   • Polypharmacy   • COVID-19 virus infection   • DM2 (diabetes mellitus, type 2) (Formerly [facility])       Allergies   Allergen Reactions   • Bee Venom Anaphylaxis   • Ciprofloxacin Shortness Of Breath       Current Outpatient Medications:   •  Accu-Chek Softclix Lancets lancets, USE ONE LANCET WITH EACH BLOOD GLUCOSE TEST - TEST 2 TO 3 TIMES DAILY, Disp: 100 each, Rfl: 3  •  albuterol sulfate HFA (Ventolin HFA) 108  (90 Base) MCG/ACT inhaler, Inhale 2 puffs Every 4 (Four) Hours As Needed for Wheezing., Disp: 8 g, Rfl: 6  •  ASPIRIN 81 PO, Take 1 tablet by mouth Daily., Disp: , Rfl:   •  Budeson-Glycopyrrol-Formoterol (Breztri Aerosphere) 160-9-4.8 MCG/ACT aerosol inhaler, Inhale 2 puffs 2 (Two) Times a Day., Disp: 1 each, Rfl: 11  •  cyclobenzaprine (FLEXERIL) 10 MG tablet, Take 10 mg by mouth 2 (Two) Times a Day., Disp: , Rfl:   •  fexofenadine (Allegra Allergy) 180 MG tablet, Take 1 tablet by mouth Daily., Disp: 90 tablet, Rfl: 3  •  fluticasone (Flonase) 50 MCG/ACT nasal spray, 2 sprays into the nostril(s) as directed by provider Daily., Disp: 15.8 mL, Rfl: 6  •  glipizide (GLUCOTROL XL) 5 MG ER tablet, Take 3 tablets by mouth Daily., Disp: 90 tablet, Rfl: 3  •  glucose blood test strip, Use as instructed 2-3 times a day, Disp: 100 each, Rfl: 3  •  Insulin aspart (FIASP FLEXTOUCH) 100 UNIT/ML solution pen-injector injection pen, For use via correctional scale, MDD 15 units, Disp: 6 mL, Rfl: 3  •  Insulin Glargine (LANTUS SOLOSTAR) 100 UNIT/ML injection pen, 20 units at bedtime and titrate per instructions, MDD 40 units, Disp: 12 mL, Rfl: 5  •  Insulin Pen Needle (BD Pen Needle Nessa U/F) 32G X 4 MM misc, Use as directed 5 times daily, Disp: 150 each, Rfl: 3  •  ipratropium-albuterol (DUO-NEB) 0.5-2.5 mg/3 ml nebulizer, Take 3 mL by nebulization 4 (Four) Times a Day., Disp: 1080 mL, Rfl: 3  •  isosorbide mononitrate (IMDUR) 30 MG 24 hr tablet, Take 1 tablet by mouth Daily., Disp: 30 tablet, Rfl: 6  •  meloxicam (MOBIC) 15 MG tablet, Take 15 mg by mouth Daily. Take with food., Disp: , Rfl:   •  metFORMIN ER (GLUCOPHAGE-XR) 500 MG 24 hr tablet, Take 1 tablet by mouth Every Morning Before Breakfast., Disp: 90 tablet, Rfl: 1  •  rosuvastatin (CRESTOR) 20 MG tablet, Take 1 tablet by mouth Daily., Disp: 30 tablet, Rfl: 11  •  azithromycin (ZITHROMAX) 250 MG tablet, Take 2 by mouth today then 1 daily for 4 days, Disp: 6 tablet, Rfl:  "0  •  nicotine (Nicotrol) 10 MG inhaler, Inhale 1 puff As Needed for Smoking Cessation., Disp: 168 each, Rfl: 3  •  predniSONE (DELTASONE) 20 MG tablet, Take 1 tablet by mouth Daily., Disp: 5 tablet, Rfl: 0  MEDICATION LIST AND ALLERGIES REVIEWED.    Social History     Tobacco Use   • Smoking status: Current Every Day Smoker     Packs/day: 1.00     Years: 50.00     Pack years: 50.00     Types: Cigarettes   • Smokeless tobacco: Never Used   Vaping Use   • Vaping Use: Former   • Substances: Nicotine   • Devices: Pre-filled or refillable cartridge   Substance Use Topics   • Alcohol use: Yes     Alcohol/week: 0.0 standard drinks     Comment: rarely   • Drug use: Never       FAMILY AND SOCIAL HISTORY REVIEWED.    Review of Systems   Constitutional: Negative for activity change, appetite change, fatigue, fever and unexpected weight change.   HENT: Negative for congestion, postnasal drip, rhinorrhea, sinus pressure, sore throat and voice change.    Eyes: Negative for visual disturbance.   Respiratory: Positive for cough and shortness of breath. Negative for chest tightness and wheezing.    Cardiovascular: Negative for chest pain, palpitations and leg swelling.   Gastrointestinal: Negative for abdominal distention, abdominal pain, nausea and vomiting.   Endocrine: Negative for cold intolerance and heat intolerance.   Genitourinary: Negative for difficulty urinating and urgency.   Musculoskeletal: Negative for arthralgias, back pain and neck pain.   Skin: Negative for color change and pallor.   Allergic/Immunologic: Negative for environmental allergies and food allergies.   Neurological: Negative for dizziness, syncope, weakness and light-headedness.   Hematological: Negative for adenopathy. Does not bruise/bleed easily.   Psychiatric/Behavioral: Negative for agitation and behavioral problems.   .    /86   Pulse (!) 123   Temp 97.5 °F (36.4 °C)   Ht 167.6 cm (65.98\")   Wt 84.6 kg (186 lb 6.4 oz)   LMP  (LMP " Unknown)   SpO2 99%   BMI 30.10 kg/m²     Immunization History   Administered Date(s) Administered   • COVID-19 (MODERNA) 1st, 2nd, 3rd Dose Only 04/09/2021   • Flu Vaccine Quad PF >18YRS 10/13/2016   • Flu Vaccine Quad PF >36MO 01/11/2021   • Pneumococcal Polysaccharide (PPSV23) 10/13/2016, 01/11/2021       Physical Exam  Vitals and nursing note reviewed.   Constitutional:       Appearance: She is well-developed. She is not diaphoretic.   HENT:      Head: Normocephalic and atraumatic.   Eyes:      Pupils: Pupils are equal, round, and reactive to light.   Neck:      Thyroid: No thyromegaly.   Cardiovascular:      Rate and Rhythm: Normal rate and regular rhythm.      Heart sounds: Normal heart sounds. No murmur heard.  No friction rub. No gallop.    Pulmonary:      Effort: Pulmonary effort is normal. No respiratory distress.      Breath sounds: Normal breath sounds. No wheezing or rales.   Chest:      Chest wall: No tenderness.   Abdominal:      General: Bowel sounds are normal.      Palpations: Abdomen is soft.      Tenderness: There is no abdominal tenderness.   Musculoskeletal:         General: No swelling. Normal range of motion.      Cervical back: Normal range of motion and neck supple.   Lymphadenopathy:      Cervical: No cervical adenopathy.   Skin:     General: Skin is warm and dry.      Capillary Refill: Capillary refill takes less than 2 seconds.   Neurological:      Mental Status: She is alert and oriented to person, place, and time.   Psychiatric:         Mood and Affect: Mood normal.         Behavior: Behavior normal.           RESULTS    CT Angiogram Chest With & Without Contrast    Result Date: 1/9/2022  1. Negative for pulmonary embolus.. 2. No acute pulmonary process. Signer Name: DION PIÑA MD  Signed: 1/9/2022 9:36 PM  Workstation Name: [facility]  [facility]    XR Chest 1 View    Result Date: 1/9/2022  No acute cardiopulmonary findings. Signer Name: Unruly Rodriguez MD   Signed: 1/9/2022 8:34 PM  Workstation Name: [id number]  [facility] [place]    XR Chest 1 View    Result Date: 1/8/2022  No acute cardiopulmonary disease.  D:  01/07/2022 E:  01/07/2022  This report was finalized on 1/8/2022 10:16 AM by Dr. Lida Tadeo MD.      PROBLEM LIST    Problem List Items Addressed This Visit        Pulmonary and Pneumonias    COPD with acute exacerbation (HCC)    Relevant Medications    Budeson-Glycopyrrol-Formoterol (Breztri Aerosphere) 160-9-4.8 MCG/ACT aerosol inhaler    albuterol sulfate HFA (Ventolin HFA) 108 (90 Base) MCG/ACT inhaler    azithromycin (ZITHROMAX) 250 MG tablet    predniSONE (DELTASONE) 20 MG tablet    ipratropium-albuterol (DUO-NEB) 0.5-2.5 mg/3 ml nebulizer       Sleep    Obstructive sleep apnea    Nocturnal hypoxemia       Tobacco    Tobacco abuse    Relevant Medications    nicotine (Nicotrol) 10 MG inhaler      Other Visit Diagnoses     SABRINA (obstructive sleep apnea)    -  Primary    Relevant Orders    Polysomnography 4 or More Parameters    Stage III, severe, COPD        Relevant Medications    Budeson-Glycopyrrol-Formoterol (Breztri Aerosphere) 160-9-4.8 MCG/ACT aerosol inhaler    albuterol sulfate HFA (Ventolin HFA) 108 (90 Base) MCG/ACT inhaler    predniSONE (DELTASONE) 20 MG tablet    ipratropium-albuterol (DUO-NEB) 0.5-2.5 mg/3 ml nebulizer            DISCUSSION    Ms. Taylor was here for a hospital follow-up.  She seems to be doing better from a pulmonary standpoint.  She will remain on Breztri 2 puffs twice a day.    She does feel that she is having a COPD exacerbation we will send in some antibiotics and a small prednisone taper for her to complete.  I did encourage her to monitor her blood glucose closely.    She is agreeable to have a sleep study performed.  She does have daytime somnolence, fatigue and nonrestorative sleep.    I will call in the Nicotrol inhaler for her.  We will send a PA if needed for this.    We did discuss  smoking cessation in the office for 3 minutes.  She does not have a stop date planned currently.    She will follow-up in 3 months or sooner if her symptoms worsen.  She will call with any additional concerns or questions.    Level of service justified based on 43 minutes spent in patient care on this date of service including, but not limited to: preparing to see the patient, obtaining and/or reviewing history, performing medically appropriate examination, ordering tests/medicine/procedures, independently interpreting results, documenting clinical information in EHR, and counseling/education of patient/family/caregiver. (Level 4 30-39 minutes; Level 5 40-54 minutes)      Leidy Callaway, APRN  02/14/202214:17 EST  Electronically signed     Please note that portions of this note were completed with a voice recognition program.        CC: Kanika, January, APRN

## 2022-02-16 ENCOUNTER — TELEPHONE (OUTPATIENT)
Dept: PULMONOLOGY | Facility: CLINIC | Age: 62
End: 2022-02-16

## 2022-02-16 DIAGNOSIS — Z72.0 TOBACCO ABUSE: Primary | ICD-10-CM

## 2022-02-16 NOTE — TELEPHONE ENCOUNTER
Per fax Rx Nicotrol inhaler is not covered through pt's insurance. Chantix starting vamshi is covered. Please send to [facility].

## 2022-02-17 ENCOUNTER — OFFICE VISIT (OUTPATIENT)
Dept: ENDOCRINOLOGY | Facility: CLINIC | Age: 62
End: 2022-02-17

## 2022-02-17 ENCOUNTER — HOSPITAL ENCOUNTER (OUTPATIENT)
Dept: CARDIOLOGY | Facility: HOSPITAL | Age: 62
Discharge: HOME | End: 2022-02-17
Admitting: INTERNAL MEDICINE

## 2022-02-17 VITALS
SYSTOLIC BLOOD PRESSURE: 138 MMHG | HEART RATE: 70 BPM | BODY MASS INDEX: 29.73 KG/M2 | OXYGEN SATURATION: 96 % | DIASTOLIC BLOOD PRESSURE: 84 MMHG | HEIGHT: 66 IN | WEIGHT: 185 LBS

## 2022-02-17 VITALS
HEART RATE: 99 BPM | SYSTOLIC BLOOD PRESSURE: 136 MMHG | BODY MASS INDEX: 29.89 KG/M2 | HEIGHT: 66 IN | WEIGHT: 186 LBS | DIASTOLIC BLOOD PRESSURE: 76 MMHG

## 2022-02-17 DIAGNOSIS — Z79.4 TYPE 2 DIABETES MELLITUS WITH HYPERGLYCEMIA, WITH LONG-TERM CURRENT USE OF INSULIN: Primary | ICD-10-CM

## 2022-02-17 DIAGNOSIS — Z91.89 CHRONIC CHEST PAIN WITH HIGH RISK FOR CAD: ICD-10-CM

## 2022-02-17 DIAGNOSIS — G89.29 CHRONIC CHEST PAIN WITH HIGH RISK FOR CAD: ICD-10-CM

## 2022-02-17 DIAGNOSIS — E78.5 HYPERLIPIDEMIA, UNSPECIFIED HYPERLIPIDEMIA TYPE: ICD-10-CM

## 2022-02-17 DIAGNOSIS — E11.65 TYPE 2 DIABETES MELLITUS WITH HYPERGLYCEMIA, WITH LONG-TERM CURRENT USE OF INSULIN: Primary | ICD-10-CM

## 2022-02-17 DIAGNOSIS — R07.9 CHRONIC CHEST PAIN WITH HIGH RISK FOR CAD: ICD-10-CM

## 2022-02-17 LAB
BH CV REST NUCLEAR ISOTOPE DOSE: 9.4 MCI
BH CV STRESS BP STAGE 1: NORMAL
BH CV STRESS BP STAGE 4: NORMAL
BH CV STRESS COMMENTS STAGE 1: NORMAL
BH CV STRESS DOSE REGADENOSON STAGE 1: 0.4
BH CV STRESS DURATION MIN STAGE 1: 1
BH CV STRESS DURATION MIN STAGE 2: 1
BH CV STRESS DURATION MIN STAGE 3: 1
BH CV STRESS DURATION MIN STAGE 4: 1
BH CV STRESS DURATION SEC STAGE 2: 0
BH CV STRESS HR STAGE 1: 90
BH CV STRESS HR STAGE 2: 102
BH CV STRESS HR STAGE 3: 93
BH CV STRESS HR STAGE 4: 110
BH CV STRESS NUCLEAR ISOTOPE DOSE: 32.6 MCI
BH CV STRESS O2 STAGE 1: 92
BH CV STRESS O2 STAGE 2: 97
BH CV STRESS O2 STAGE 3: 98
BH CV STRESS O2 STAGE 4: 98
BH CV STRESS PROTOCOL 1: NORMAL
BH CV STRESS RECOVERY BP: NORMAL MMHG
BH CV STRESS RECOVERY HR: 109 BPM
BH CV STRESS RECOVERY O2: 99 %
BH CV STRESS STAGE 1: 1
BH CV STRESS STAGE 2: 2
BH CV STRESS STAGE 3: 3
BH CV STRESS STAGE 4: 4
EXPIRATION DATE: ABNORMAL
EXPIRATION DATE: NORMAL
GLUCOSE BLDC GLUCOMTR-MCNC: 313 MG/DL (ref 70–130)
HBA1C MFR BLD: 11.3 %
LV EF NUC BP: 57 %
Lab: ABNORMAL
Lab: NORMAL
MAXIMAL PREDICTED HEART RATE: 161 BPM
PERCENT MAX PREDICTED HR: 72.05 %
STRESS BASELINE BP: NORMAL MMHG
STRESS BASELINE HR: 95 BPM
STRESS O2 SAT REST: 95 %
STRESS PERCENT HR: 85 %
STRESS POST ESTIMATED WORKLOAD: 1 METS
STRESS POST EXERCISE DUR MIN: 4 MIN
STRESS POST EXERCISE DUR SEC: 0 SEC
STRESS POST O2 SAT PEAK: 98 %
STRESS POST PEAK BP: NORMAL MMHG
STRESS POST PEAK HR: 116 BPM
STRESS TARGET HR: 137 BPM

## 2022-02-17 PROCEDURE — 93017 CV STRESS TEST TRACING ONLY: CPT

## 2022-02-17 PROCEDURE — 78452 HT MUSCLE IMAGE SPECT MULT: CPT

## 2022-02-17 PROCEDURE — 99214 OFFICE O/P EST MOD 30 MIN: CPT | Performed by: INTERNAL MEDICINE

## 2022-02-17 PROCEDURE — A9500 TC99M SESTAMIBI: HCPCS | Performed by: INTERNAL MEDICINE

## 2022-02-17 PROCEDURE — 83036 HEMOGLOBIN GLYCOSYLATED A1C: CPT | Performed by: INTERNAL MEDICINE

## 2022-02-17 PROCEDURE — 82947 ASSAY GLUCOSE BLOOD QUANT: CPT | Performed by: INTERNAL MEDICINE

## 2022-02-17 PROCEDURE — 25010000002 REGADENOSON 0.4 MG/5ML SOLUTION: Performed by: INTERNAL MEDICINE

## 2022-02-17 PROCEDURE — 93018 CV STRESS TEST I&R ONLY: CPT | Performed by: INTERNAL MEDICINE

## 2022-02-17 PROCEDURE — 0 TECHNETIUM SESTAMIBI: Performed by: INTERNAL MEDICINE

## 2022-02-17 PROCEDURE — 3046F HEMOGLOBIN A1C LEVEL >9.0%: CPT | Performed by: INTERNAL MEDICINE

## 2022-02-17 PROCEDURE — 78452 HT MUSCLE IMAGE SPECT MULT: CPT | Performed by: INTERNAL MEDICINE

## 2022-02-17 RX ORDER — METFORMIN HYDROCHLORIDE 500 MG/1
500 TABLET, EXTENDED RELEASE ORAL
Qty: 90 TABLET | Refills: 1 | Status: SHIPPED | OUTPATIENT
Start: 2022-02-17 | End: 2022-10-05

## 2022-02-17 RX ORDER — INSULIN ASPART INJECTION 100 [IU]/ML
INJECTION, SOLUTION SUBCUTANEOUS
Qty: 24 ML | Refills: 5 | Status: SHIPPED | OUTPATIENT
Start: 2022-02-17

## 2022-02-17 RX ADMIN — TECHNETIUM TC 99M SESTAMIBI 1 DOSE: 1 INJECTION INTRAVENOUS at 12:48

## 2022-02-17 RX ADMIN — REGADENOSON 0.4 MG: 0.08 INJECTION, SOLUTION INTRAVENOUS at 12:46

## 2022-02-17 RX ADMIN — TECHNETIUM TC 99M SESTAMIBI 1 DOSE: 1 INJECTION INTRAVENOUS at 11:05

## 2022-02-17 NOTE — PROGRESS NOTES
"Chief Complaint   Patient presents with   • Diabetes        HPI   Kathy Taylor is a 61 y.o. female had concerns including Diabetes.      Patient presents for follow-up of diabetes, last seen in July 2021.  She reports that she has had COVID since she was last seen in this office and has been on steroids on multiple occasions and is supposed to restart steroids from her pulmonologist but has not picked up the prescription from pharmacy.  Patient has increased her insulin doses in the interim since she was last seen.  Patient did undergo a stress test earlier today.    Patient reports current diabetes medications include the following  Lantus 30 units daily  Fiasp total of 15 units 3-4 times daily with meals  Glipizide 15 mg extended release daily  Metformin extended release 500 mg daily    Patient is taking rosuvastatin 20 mg daily for hyperlipidemia.  She denies myalgias or abdominal pain.    The following portions of the patient's history were reviewed and updated as appropriate: allergies, current medications and past social history.    Review of Systems   Respiratory: Positive for shortness of breath.    Gastrointestinal: Negative for abdominal pain, nausea and vomiting.   Endocrine: Positive for polydipsia and polyuria.   Musculoskeletal: Positive for myalgias.      /84 (BP Location: Right arm, Patient Position: Sitting, Cuff Size: Adult)   Pulse 70   Ht 167.6 cm (66\")   Wt 83.9 kg (185 lb)   LMP  (LMP Unknown)   SpO2 96%   BMI 29.86 kg/m²      Physical Exam      Constitutional:  well developed; well nourished  no acute distress  obese - Body mass index is 29.86 kg/m².   ENT/Thyroid: not examined   Eyes: Conjunctiva: clear   Respiratory:  breathing is unlabored  clear to auscultation bilaterally   Cardiovascular:  regular rate and rhythm   Chest:  Not performed.   Abdomen: soft, non-tender; no masses   : Not performed.   Musculoskeletal: Not performed   Skin: dry and warm   Neuro: mental " status, speech normal   Psych: mood and affect are within normal limits       Labs/Imaging  Results for SOTERO GIORDANO (MRN [id number]) as of 2/17/2022 14:44   Ref. Range 2/17/2022 14:34 2/17/2022 14:36   Glucose Latest Ref Range: 70 - 130 mg/dL 313 (A)    Hemoglobin A1C Latest Units: %  11.3       Diagnoses and all orders for this visit:    1. Type 2 diabetes mellitus with hyperglycemia, with long-term current use of insulin (HCC) (Primary)  Uncontrolled with hemoglobin A1c 11.3%, hyperglycemic during office visit.  Patient reports hyperglycemia at home, no home glycemic data available for review  Plan to increase patient's total daily dose of insulin by approximately 15%  Patient to increase Lantus to 50 units daily  Patient to increase Fiasp to 18 units with meals plus correction 2 per 50 greater than 150  Patient to continue glipizide 15 mg extended release daily  Patient to continue Metformin extended release 500 mg daily  Patient was advised to monitor blood sugar 3 times daily.  Patient was instructed to bring glucometer to all future appointments. Patient should contact the clinic between appointments with hypoglycemia or persistent hyperglycemia.  Discussed signs and symptoms of hypoglycemia as well as hypoglycemia management via the rule of 15's.  Discussed potential for long-term complications with uncontrolled diabetes including nephropathy, neuropathy, retinopathy, increased risk for cardiac disease.  Discussed the role of diet and exercise in the management of diabetes.  CMP up-to-date from January 2022, EGFR normal  Update lipids, urine micro next visit  Discussed short-term interval follow-up for titration of medications, schedule follow-up in 6 weeks  -     POC Glycosylated Hemoglobin (Hb A1C)  -     POC Glucose, Blood  -     Insulin Glargine (LANTUS SOLOSTAR) 100 UNIT/ML injection pen; Inject 50 Units under the skin into the appropriate area as directed Daily.  Dispense: 15 mL; Refill: 5    2.  Hyperlipidemia, unspecified hyperlipidemia type  Update lipids next visit  Continue rosuvastatin 20 mg daily    Other orders  -     Insulin aspart (FIASP FLEXTOUCH) 100 UNIT/ML solution pen-injector injection pen; For use at mealtimes and per correctional scale, MDD 80 units  Dispense: 24 mL; Refill: 5      Return in about 6 weeks (around 3/31/2022) for Next scheduled follow up. The patient was instructed to contact the clinic with any interval questions or concerns.    Sayra Witt MD   Endocrinologist    Dictated Utilizing Dragon Dictation

## 2022-02-18 ENCOUNTER — TELEPHONE (OUTPATIENT)
Dept: ENDOCRINOLOGY | Facility: CLINIC | Age: 62
End: 2022-02-18

## 2022-02-18 DIAGNOSIS — E11.65 TYPE 2 DIABETES MELLITUS WITH HYPERGLYCEMIA, WITH LONG-TERM CURRENT USE OF INSULIN: ICD-10-CM

## 2022-02-18 DIAGNOSIS — Z79.4 TYPE 2 DIABETES MELLITUS WITH HYPERGLYCEMIA, WITH LONG-TERM CURRENT USE OF INSULIN: ICD-10-CM

## 2022-02-18 RX ORDER — PEN NEEDLE, DIABETIC 32GX 5/32"
NEEDLE, DISPOSABLE MISCELLANEOUS
Qty: 150 EACH | Refills: 3 | Status: SHIPPED | OUTPATIENT
Start: 2022-02-18 | End: 2022-03-31 | Stop reason: SDUPTHER

## 2022-02-18 RX ORDER — LANCETS
EACH MISCELLANEOUS
Qty: 100 EACH | Refills: 3 | Status: SHIPPED | OUTPATIENT
Start: 2022-02-18 | End: 2023-03-16

## 2022-02-18 NOTE — TELEPHONE ENCOUNTER
PT CALLED REQUESTING LANCETS, PEN NEEDLES, AND TEST STRIPS TO BE SENT IN TO [facility]. PLEASE AND THANK YOU

## 2022-02-25 ENCOUNTER — TELEPHONE (OUTPATIENT)
Dept: ENDOCRINOLOGY | Facility: CLINIC | Age: 62
End: 2022-02-25

## 2022-03-31 ENCOUNTER — LAB (OUTPATIENT)
Dept: LAB | Facility: HOSPITAL | Age: 62
End: 2022-03-31

## 2022-03-31 ENCOUNTER — OFFICE VISIT (OUTPATIENT)
Dept: ENDOCRINOLOGY | Facility: CLINIC | Age: 62
End: 2022-03-31

## 2022-03-31 VITALS
HEART RATE: 110 BPM | BODY MASS INDEX: 29.57 KG/M2 | SYSTOLIC BLOOD PRESSURE: 134 MMHG | OXYGEN SATURATION: 95 % | WEIGHT: 184 LBS | DIASTOLIC BLOOD PRESSURE: 80 MMHG | HEIGHT: 66 IN

## 2022-03-31 DIAGNOSIS — E78.5 HYPERLIPIDEMIA, UNSPECIFIED HYPERLIPIDEMIA TYPE: ICD-10-CM

## 2022-03-31 DIAGNOSIS — Z79.4 TYPE 2 DIABETES MELLITUS WITH HYPERGLYCEMIA, WITH LONG-TERM CURRENT USE OF INSULIN: Primary | ICD-10-CM

## 2022-03-31 DIAGNOSIS — E11.65 TYPE 2 DIABETES MELLITUS WITH HYPERGLYCEMIA, WITH LONG-TERM CURRENT USE OF INSULIN: Primary | ICD-10-CM

## 2022-03-31 LAB
EXPIRATION DATE: ABNORMAL
GLUCOSE BLDC GLUCOMTR-MCNC: 279 MG/DL (ref 70–130)
Lab: ABNORMAL

## 2022-03-31 PROCEDURE — 82947 ASSAY GLUCOSE BLOOD QUANT: CPT | Performed by: INTERNAL MEDICINE

## 2022-03-31 PROCEDURE — 99214 OFFICE O/P EST MOD 30 MIN: CPT | Performed by: INTERNAL MEDICINE

## 2022-03-31 RX ORDER — PEN NEEDLE, DIABETIC 32GX 5/32"
NEEDLE, DISPOSABLE MISCELLANEOUS
Qty: 150 EACH | Refills: 3 | Status: SHIPPED | OUTPATIENT
Start: 2022-03-31

## 2022-03-31 NOTE — PROGRESS NOTES
"Chief Complaint   Patient presents with   • Diabetes        HPI   Kathy Taylor is a 61 y.o. female had concerns including Diabetes.      Patient reports no significant health changes in the interim since her last visit.  She does report that she has been taking medications regularly.  Of note, when discussing diet it was revealed that patient routinely eats cereal for breakfast and her goes to snack is fruit which she admits she is not always good at moderating.    Current diabetes medications include the following:  Lantus 50 units daily  Fiasp 18 units with meals plus correction 250 greater than 150, patient estimates she has between 6 and 14 units of correction at each mealtime, generally only eating twice daily  Glipizide 15 mg extended release daily  Metformin extended release 500 mg daily    Patient continues on rosuvastatin 20 mg daily for hyperlipidemia, denies myalgias or abdominal pain.    The following portions of the patient's history were reviewed and updated as appropriate: allergies, current medications and past social history.    Review of Systems   Gastrointestinal: Negative for abdominal pain, nausea and vomiting.   Endocrine: Negative for polydipsia and polyuria.      /80 (BP Location: Left arm, Patient Position: Sitting, Cuff Size: Adult)   Pulse 110   Ht 167.6 cm (66\")   Wt 83.5 kg (184 lb)   LMP  (LMP Unknown)   SpO2 95%   BMI 29.70 kg/m²      Physical Exam      Constitutional:  well developed; well nourished  no acute distress  appears stated age   ENT/Thyroid: not examined   Eyes: Conjunctiva: clear   Respiratory:  breathing is unlabored  clear to auscultation bilaterally   Cardiovascular:  regular rate and rhythm   Chest:  Not performed.   Abdomen: soft, non-tender; no masses   : Not performed.   Musculoskeletal: Not performed   Skin: dry and warm   Neuro: normal without focal findings and mental status, speech normal   Psych: mood and affect are within normal limits "       Labs/Imaging   Latest Reference Range & Units 03/31/22 14:30   Glucose 70 - 130 mg/dL 279 !   !: Data is abnormal    Diagnoses and all orders for this visit:    1. Type 2 diabetes mellitus with hyperglycemia, with long-term current use of insulin (HCC) (Primary)  -Uncontrolled with most recent hemoglobin A1c 11.3, too soon to repeat today  -Review of home glucose data reveals hyperglycemia, patient hyperglycemic during office visit  -Increase Lantus to 60 units daily  -Increase Fiasp to 22 units with meals plus correction 3 for 50 greater than 150  -Continue Metformin 500 mg extended release daily  -Continue glipizide 15 mg extended release daily  -Reviewed with patient the importance of moderation of carbohydrates.  Discussed that it would be difficult to control blood sugar if she is eating foods high in carbohydrates such as cereal.  Patient was advised to monitor blood sugar 3 times daily.  Patient was instructed to bring glucometer to all future appointments. Patient should contact the clinic between appointments with hypoglycemia or persistent hyperglycemia.  Discussed signs and symptoms of hypoglycemia as well as hypoglycemia management via the rule of 15's.  Discussed potential for long-term complications with uncontrolled diabetes including nephropathy, neuropathy, retinopathy, increased risk for cardiac disease.  Discussed the role of diet and exercise in the management of diabetes.  She reports she is unable to give a urine sample today, check lipid panel today  CMP up-to-date from January 2022  -     POC Glucose, Blood  -     Lipid Panel  -     glucose blood test strip; Use as instructed 2-3 times a day  Dispense: 100 each; Refill: 3    2. Hyperlipidemia, unspecified hyperlipidemia type  -Update lipid panel today  -Continue rosuvastatin 20 mg daily     Other orders  -     Insulin Pen Needle (BD Pen Needle Nessa U/F) 32G X 4 MM misc; Use as directed 5 times daily  Dispense: 150 each; Refill: 3          Return in about 3 months (around 6/30/2022) for Next scheduled follow up. The patient was instructed to contact the clinic with any interval questions or concerns.    Sayra Witt MD   Endocrinologist    Dictated Utilizing Dragon Dictation

## 2022-04-01 LAB
CHOLEST SERPL-MCNC: 119 MG/DL (ref 100–199)
HDLC SERPL-MCNC: 66 MG/DL
LDLC SERPL CALC-MCNC: 33 MG/DL (ref 0–99)
TRIGL SERPL-MCNC: 110 MG/DL (ref 0–149)
VLDLC SERPL CALC-MCNC: 20 MG/DL (ref 5–40)

## 2022-05-12 DIAGNOSIS — Z01.812 BLOOD TESTS PRIOR TO TREATMENT OR PROCEDURE: Primary | ICD-10-CM

## 2022-05-25 DIAGNOSIS — Z01.812 BLOOD TESTS PRIOR TO TREATMENT OR PROCEDURE: Primary | ICD-10-CM

## 2022-06-03 ENCOUNTER — CLINICAL SUPPORT NO REQUIREMENTS (OUTPATIENT)
Dept: PULMONOLOGY | Facility: CLINIC | Age: 62
End: 2022-06-03

## 2022-06-03 DIAGNOSIS — Z01.812 BLOOD TESTS PRIOR TO TREATMENT OR PROCEDURE: ICD-10-CM

## 2022-06-03 PROCEDURE — 99211 OFF/OP EST MAY X REQ PHY/QHP: CPT | Performed by: NURSE PRACTITIONER

## 2022-06-03 PROCEDURE — U0004 COV-19 TEST NON-CDC HGH THRU: HCPCS | Performed by: NURSE PRACTITIONER

## 2022-06-04 LAB — SARS-COV-2 RNA NOSE QL NAA+PROBE: NOT DETECTED

## 2022-06-06 ENCOUNTER — OFFICE VISIT (OUTPATIENT)
Dept: PULMONOLOGY | Facility: CLINIC | Age: 62
End: 2022-06-06

## 2022-06-06 VITALS
SYSTOLIC BLOOD PRESSURE: 124 MMHG | BODY MASS INDEX: 29.63 KG/M2 | HEART RATE: 100 BPM | DIASTOLIC BLOOD PRESSURE: 78 MMHG | TEMPERATURE: 97.8 F | HEIGHT: 66 IN | WEIGHT: 184.4 LBS | OXYGEN SATURATION: 97 %

## 2022-06-06 DIAGNOSIS — G47.33 OSA (OBSTRUCTIVE SLEEP APNEA): Primary | ICD-10-CM

## 2022-06-06 DIAGNOSIS — J44.9 COPD MIXED TYPE: ICD-10-CM

## 2022-06-06 DIAGNOSIS — F17.210 CIGARETTE NICOTINE DEPENDENCE WITHOUT COMPLICATION: ICD-10-CM

## 2022-06-06 DIAGNOSIS — J44.1 COPD WITH ACUTE EXACERBATION: ICD-10-CM

## 2022-06-06 PROCEDURE — 99214 OFFICE O/P EST MOD 30 MIN: CPT | Performed by: NURSE PRACTITIONER

## 2022-06-06 PROCEDURE — 94375 RESPIRATORY FLOW VOLUME LOOP: CPT | Performed by: NURSE PRACTITIONER

## 2022-06-06 PROCEDURE — 94726 PLETHYSMOGRAPHY LUNG VOLUMES: CPT | Performed by: NURSE PRACTITIONER

## 2022-06-06 PROCEDURE — 94729 DIFFUSING CAPACITY: CPT | Performed by: NURSE PRACTITIONER

## 2022-06-06 RX ORDER — BUDESONIDE, GLYCOPYRROLATE, AND FORMOTEROL FUMARATE 160; 9; 4.8 UG/1; UG/1; UG/1
2 AEROSOL, METERED RESPIRATORY (INHALATION) 2 TIMES DAILY
Qty: 1 EACH | Refills: 11 | Status: SHIPPED | OUTPATIENT
Start: 2022-06-06 | End: 2022-12-04

## 2022-06-06 RX ORDER — ALBUTEROL SULFATE 90 UG/1
2 AEROSOL, METERED RESPIRATORY (INHALATION) EVERY 4 HOURS PRN
Qty: 8 G | Refills: 6 | Status: SHIPPED | OUTPATIENT
Start: 2022-06-06 | End: 2023-03-16

## 2022-06-06 RX ORDER — IPRATROPIUM BROMIDE AND ALBUTEROL SULFATE 2.5; .5 MG/3ML; MG/3ML
3 SOLUTION RESPIRATORY (INHALATION)
Qty: 1080 ML | Refills: 3 | Status: SHIPPED | OUTPATIENT
Start: 2022-06-06 | End: 2023-02-24 | Stop reason: SDUPTHER

## 2022-06-06 NOTE — PROGRESS NOTES
[facility] Pulmonary Follow up    CHIEF COMPLAINT    dyspnea    HISTORY OF PRESENT ILLNESS    Kathy Taylor is a 62 y.o.female here today for follow-up.  She was last seen in the office by me in February.  She denies any rest or illnesses or exacerbations since her last appointment.    I had treated her with some antibiotics at her last appointment and she states that her symptoms did clear up shortly after the antibiotics were completed.    She continues to use her Breztri twice a day.  She has been using her DuoNeb's about every 6 hours.  She does continue to be short of breath with activity.    She had to have a sleep study performed due to being recertify for her CPAP but has yet to have the sleep study performed.  She does continue to have daytime somnolence, fatigue and nonrestorative sleep.    She continues to smoke a pack per day.  She has a 50-pack-year history.  She unfortunately cannot afford the Chantix or the Nicotrol inhaler.  She is due for a low-dose CT screening in January 2023.    She denies fever, chills, sputum production, hemoptysis, night sweats, weight loss, chest pain or palpitations.  She denies any lower extremity edema or calf tenderness.    She is up to date on her vaccinations.    Patient Active Problem List   Diagnosis   • COPD with acute exacerbation (HCC)   • Cigarette nicotine dependence without complication   • Obstructive sleep apnea   • Nocturnal hypoxemia   • Polypharmacy   • COVID-19 virus infection   • DM2 (diabetes mellitus, type 2) (HCC)       Allergies   Allergen Reactions   • Bee Venom Anaphylaxis   • Ciprofloxacin Shortness Of Breath       Current Outpatient Medications:   •  Accu-Chek Softclix Lancets lancets, USE ONE LANCET WITH EACH BLOOD GLUCOSE TEST - TEST 2 TO 3 TIMES DAILY, Disp: 100 each, Rfl: 3  •  albuterol sulfate HFA (Ventolin HFA) 108 (90 Base) MCG/ACT inhaler, Inhale 2 puffs Every 4 (Four) Hours As Needed for Wheezing., Disp: 8 g, Rfl: 6  •  ASPIRIN 81 PO,  Take 1 tablet by mouth Daily., Disp: , Rfl:   •  Budeson-Glycopyrrol-Formoterol (Breztri Aerosphere) 160-9-4.8 MCG/ACT aerosol inhaler, Inhale 2 puffs 2 (Two) Times a Day., Disp: 1 each, Rfl: 11  •  fexofenadine (Allegra Allergy) 180 MG tablet, Take 1 tablet by mouth Daily., Disp: 90 tablet, Rfl: 3  •  fluticasone (Flonase) 50 MCG/ACT nasal spray, 2 sprays into the nostril(s) as directed by provider Daily., Disp: 15.8 mL, Rfl: 6  •  glipizide (GLUCOTROL XL) 5 MG ER tablet, Take 3 tablets by mouth Daily., Disp: 90 tablet, Rfl: 3  •  glucose blood test strip, Use as instructed 2-3 times a day, Disp: 100 each, Rfl: 3  •  Insulin aspart (FIASP FLEXTOUCH) 100 UNIT/ML solution pen-injector injection pen, For use at mealtimes and per correctional scale, MDD 80 units, Disp: 24 mL, Rfl: 5  •  Insulin Glargine (LANTUS SOLOSTAR) 100 UNIT/ML injection pen, Inject 50 Units under the skin into the appropriate area as directed Daily., Disp: 15 mL, Rfl: 5  •  Insulin Pen Needle (BD Pen Needle Nessa U/F) 32G X 4 MM misc, Use as directed 5 times daily, Disp: 150 each, Rfl: 3  •  ipratropium-albuterol (DUO-NEB) 0.5-2.5 mg/3 ml nebulizer, Take 3 mL by nebulization 4 (Four) Times a Day., Disp: 1080 mL, Rfl: 3  •  isosorbide mononitrate (IMDUR) 30 MG 24 hr tablet, Take 1 tablet by mouth Daily., Disp: 30 tablet, Rfl: 6  •  meloxicam (MOBIC) 15 MG tablet, Take 15 mg by mouth Daily. Take with food., Disp: , Rfl:   •  metFORMIN ER (GLUCOPHAGE-XR) 500 MG 24 hr tablet, Take 1 tablet by mouth Every Morning Before Breakfast., Disp: 90 tablet, Rfl: 1  •  rosuvastatin (CRESTOR) 20 MG tablet, Take 1 tablet by mouth Daily., Disp: 30 tablet, Rfl: 11  MEDICATION LIST AND ALLERGIES REVIEWED.    Social History     Tobacco Use   • Smoking status: Current Every Day Smoker     Packs/day: 1.00     Years: 50.00     Pack years: 50.00     Types: Cigarettes   • Smokeless tobacco: Never Used   Vaping Use   • Vaping Use: Former   • Substances: Nicotine   •  "Devices: Pre-filled or refillable cartridge   Substance Use Topics   • Alcohol use: Yes     Alcohol/week: 0.0 standard drinks     Comment: rarely   • Drug use: Never       FAMILY AND SOCIAL HISTORY REVIEWED.    Review of Systems   Constitutional: Negative for activity change, appetite change, fatigue, fever and unexpected weight change.   HENT: Negative for congestion, postnasal drip, rhinorrhea, sinus pressure, sore throat and voice change.    Eyes: Negative for visual disturbance.   Respiratory: Negative for cough, chest tightness, shortness of breath and wheezing.    Cardiovascular: Negative for chest pain, palpitations and leg swelling.   Gastrointestinal: Negative for abdominal distention, abdominal pain, nausea and vomiting.   Endocrine: Negative for cold intolerance and heat intolerance.   Genitourinary: Negative for difficulty urinating and urgency.   Musculoskeletal: Negative for arthralgias, back pain and neck pain.   Skin: Negative for color change and pallor.   Allergic/Immunologic: Negative for environmental allergies and food allergies.   Neurological: Negative for dizziness, syncope, weakness and light-headedness.   Hematological: Negative for adenopathy. Does not bruise/bleed easily.   Psychiatric/Behavioral: Negative for agitation and behavioral problems.   .    /78   Pulse 100   Temp 97.8 °F (36.6 °C)   Ht 167.6 cm (66\")   Wt 83.6 kg (184 lb 6.4 oz)   LMP  (LMP Unknown)   SpO2 97% Comment: resting, room air  Breastfeeding No   BMI 29.76 kg/m²     Immunization History   Administered Date(s) Administered   • COVID-19 (MODERNA) 1st, 2nd, 3rd Dose Only 04/09/2021, 05/07/2021, 04/02/2022   • Flu Vaccine Quad PF >36MO 01/11/2021   • Fluzone Split Quad (Multi-dose) 10/13/2016   • Influenza Quad Vaccine (Inpatient) 10/13/2016   • Pneumococcal Polysaccharide (PPSV23) 10/13/2016, 01/11/2021       Physical Exam  Vitals and nursing note reviewed.   Constitutional:       Appearance: She is " well-developed. She is not diaphoretic.   HENT:      Head: Normocephalic and atraumatic.   Eyes:      Pupils: Pupils are equal, round, and reactive to light.   Neck:      Thyroid: No thyromegaly.   Cardiovascular:      Rate and Rhythm: Normal rate and regular rhythm.      Heart sounds: Normal heart sounds. No murmur heard.    No friction rub. No gallop.   Pulmonary:      Effort: Pulmonary effort is normal. No respiratory distress.      Breath sounds: Normal breath sounds. No wheezing or rales.   Chest:      Chest wall: No tenderness.   Abdominal:      General: Bowel sounds are normal.      Palpations: Abdomen is soft.      Tenderness: There is no abdominal tenderness.   Musculoskeletal:         General: No swelling. Normal range of motion.      Cervical back: Normal range of motion and neck supple.   Lymphadenopathy:      Cervical: No cervical adenopathy.   Skin:     General: Skin is warm and dry.      Capillary Refill: Capillary refill takes less than 2 seconds.   Neurological:      Mental Status: She is alert and oriented to person, place, and time.   Psychiatric:         Mood and Affect: Mood normal.         Behavior: Behavior normal.           RESULTS    PFTS in the office today, read by me, FVC 2.91 79% predicted, FEV1 1.25 44% predicted, FEV1/FVC 43% predicted, TLC 6.16 113% predicted, DLCO 57% predicted, severe obstruction, no restriction and reduced DLCO.    PROBLEM LIST    Problem List Items Addressed This Visit        Pulmonary and Pneumonias    COPD with acute exacerbation (HCC)    Relevant Medications    albuterol sulfate HFA (Ventolin HFA) 108 (90 Base) MCG/ACT inhaler    Budeson-Glycopyrrol-Formoterol (Breztri Aerosphere) 160-9-4.8 MCG/ACT aerosol inhaler    ipratropium-albuterol (DUO-NEB) 0.5-2.5 mg/3 ml nebulizer       Tobacco    Cigarette nicotine dependence without complication      Other Visit Diagnoses     SABRINA (obstructive sleep apnea)    -  Primary    Relevant Orders    Home Sleep Study    Stage  III, severe, COPD        Relevant Medications    albuterol sulfate HFA (Ventolin HFA) 108 (90 Base) MCG/ACT inhaler    Budeson-Glycopyrrol-Formoterol (Breztri Aerosphere) 160-9-4.8 MCG/ACT aerosol inhaler    ipratropium-albuterol (DUO-NEB) 0.5-2.5 mg/3 ml nebulizer    Other Relevant Orders    Pulmonary Function Test (Completed)            DISCUSSION    Ms. Taylor was here for follow-up of her COPD.  She will continue Breztri 2 puffs twice a day.  She will also continue her DuoNeb's as needed for shortness of breath.  I did send in refills for her nebulizers and inhalers today in the office.    Based on her daytime somnolence, fatigue and nonrestorative sleep and history of SABRINA I am going to order a home sleep study.  She is interested in starting CPAP therapy.      We discussed smoking cessation in the office for 3 minutes.  She does not have a stop date planned.  She states unfortunately she cannot afford any medication aids at this time.  Her next CT scan will be due in January 2023 based on her 50-pack-year history and current smoking.    She will follow-up with Dr. Thakur in 3 to 4 months months or sooner if her symptoms worsen.  I did advise her to call with any additional concerns or questions.    Level of service justified based on 36 minutes spent in patient care on this date of service including, but not limited to: preparing to see the patient, obtaining and/or reviewing history, performing medically appropriate examination, ordering tests/medicine/procedures, independently interpreting results, documenting clinical information in EHR, and counseling/education of patient/family/caregiver. (Level 4 30-39 minutes; Level 5 40-54 minutes)      KALYAN Major  06/06/202215:17 EDT  Electronically signed     Please note that portions of this note were completed with a voice recognition program.        CC: Kanika January, APRN

## 2022-06-30 ENCOUNTER — OFFICE VISIT (OUTPATIENT)
Dept: CARDIOLOGY | Facility: CLINIC | Age: 62
End: 2022-06-30

## 2022-06-30 VITALS
BODY MASS INDEX: 29.63 KG/M2 | WEIGHT: 184.4 LBS | DIASTOLIC BLOOD PRESSURE: 80 MMHG | HEART RATE: 100 BPM | OXYGEN SATURATION: 99 % | SYSTOLIC BLOOD PRESSURE: 140 MMHG | HEIGHT: 66 IN

## 2022-06-30 DIAGNOSIS — I10 PRIMARY HYPERTENSION: Primary | ICD-10-CM

## 2022-06-30 PROCEDURE — 99214 OFFICE O/P EST MOD 30 MIN: CPT | Performed by: INTERNAL MEDICINE

## 2022-06-30 RX ORDER — HYDROCHLOROTHIAZIDE 25 MG/1
25 TABLET ORAL DAILY
Qty: 30 TABLET | Refills: 11 | Status: SHIPPED | OUTPATIENT
Start: 2022-06-30 | End: 2022-07-25

## 2022-06-30 NOTE — PROGRESS NOTES
[facility] Cardiology  Follow Up Visit  Kathy Taylor  1960    VISIT DATE:  06/30/22    PCP:   Kanika, January, APRN  [address] [address]          CC:  No chief complaint on file.      Problem List:  1. Chest pain  1. Normal echo and stress test per patient report 2020  2. Reported retrosternal chest pain in the context of Covid pneumonia on 1/10/2022  2. Rheumatic fever - hospitalized as child  3. Family history of heart disease  4. Stage III COPD  5. Diabetes mellitus - onset 2012  6. Current tobacco abuse - >50 years   7. HLD  8. 1/7/2021 [facility] admission for COVID-19  9. Fibromyalgia  10. Tooth infection  11. Migraines   12. Surgical  1. Breast lumpectomy - 04  2. Tubal abdominal ligation  3. Vulvar surgery  4. [place] tooth extraction    Cardiac testing:    Echo January 2022  · Calculated left ventricular EF = 55%  · All left ventricular wall segments contract normally.  · No significant valvular abnormalities  · No pericardial effusion.    Stress test February 2022  · Myocardial perfusion imaging indicates a normal myocardial perfusion study with no evidence of ischemia.  · Left ventricular ejection fraction is normal. (Calculated EF = 57%).      History of Present Illness:  Kathy Taylor  Is a 62 y.o. female with pertinent cardiac history detailed above.  Patient seen inpatient in January when she had COVID-pneumonia.  She had complained of chest pain.  She ruled out for ACS.  Subsequent outpatient testing showed no evidence of ischemia.  She still has intermittent CP, described as throbbing.  Some last 30 minutes.  Not worsened with exertion.  She does get dyspnea on exertion but she has known severe COPD.  She also describes intermittnet foot swelling her echo and stress test showed normal LV function and no other significant abnormalities.  Also today noted to be hypertensive, does not carry this diagnosis, currently not on any medications for this.      Patient  Active Problem List    Diagnosis Date Noted   • COVID-19 virus infection 01/07/2022   • DM2 (diabetes mellitus, type 2) ([facility]) 01/07/2022   • COPD with acute exacerbation ([facility]) 02/24/2021   • Cigarette nicotine dependence without complication 02/24/2021   • Obstructive sleep apnea 02/24/2021   • Nocturnal hypoxemia 02/24/2021   • Polypharmacy 02/24/2021       Allergies   Allergen Reactions   • Bee Venom Anaphylaxis   • Ciprofloxacin Shortness Of Breath       Social History     Socioeconomic History   • Marital status:    Tobacco Use   • Smoking status: Current Every Day Smoker     Packs/day: 1.00     Years: 50.00     Pack years: 50.00     Types: Cigarettes   • Smokeless tobacco: Never Used   Vaping Use   • Vaping Use: Former   • Substances: Nicotine   • Devices: Pre-filled or refillable cartridge   Substance and Sexual Activity   • Alcohol use: Yes     Alcohol/week: 0.0 standard drinks     Comment: rarely   • Drug use: Never   • Sexual activity: Defer       Family History   Problem Relation Age of Onset   • Arthritis Mother    • Diabetes Mother    • Heart disease Mother    • Thyroid disease Mother    • Uterine cancer Mother    • Cancer Mother    • Heart disease Brother    • Diabetes Brother    • Breast cancer Other         MATERNAL GREAT AUNT   • Cancer Maternal Grandmother    • Ovarian cancer Neg Hx        Current Medications:    Current Outpatient Medications:   •  Accu-Chek Softclix Lancets lancets, USE ONE LANCET WITH EACH BLOOD GLUCOSE TEST - TEST 2 TO 3 TIMES DAILY, Disp: 100 each, Rfl: 3  •  albuterol sulfate HFA (Ventolin HFA) 108 (90 Base) MCG/ACT inhaler, Inhale 2 puffs Every 4 (Four) Hours As Needed for Wheezing., Disp: 8 g, Rfl: 6  •  ASPIRIN 81 PO, Take 1 tablet by mouth Daily., Disp: , Rfl:   •  Budeson-Glycopyrrol-Formoterol (Breztri Aerosphere) 160-9-4.8 MCG/ACT aerosol inhaler, Inhale 2 puffs 2 (Two) Times a Day., Disp: 1 each, Rfl: 11  •  fexofenadine (Allegra Allergy) 180 MG tablet, Take 1  tablet by mouth Daily., Disp: 90 tablet, Rfl: 3  •  fluticasone (Flonase) 50 MCG/ACT nasal spray, 2 sprays into the nostril(s) as directed by provider Daily., Disp: 15.8 mL, Rfl: 6  •  glipizide (GLUCOTROL XL) 5 MG ER tablet, Take 3 tablets by mouth Daily., Disp: 90 tablet, Rfl: 3  •  glucose blood test strip, Use as instructed 2-3 times a day, Disp: 100 each, Rfl: 3  •  Insulin aspart (FIASP FLEXTOUCH) 100 UNIT/ML solution pen-injector injection pen, For use at mealtimes and per correctional scale, MDD 80 units, Disp: 24 mL, Rfl: 5  •  Insulin Glargine (LANTUS SOLOSTAR) 100 UNIT/ML injection pen, Inject 50 Units under the skin into the appropriate area as directed Daily., Disp: 15 mL, Rfl: 5  •  Insulin Pen Needle (BD Pen Needle Nessa U/F) 32G X 4 MM misc, Use as directed 5 times daily, Disp: 150 each, Rfl: 3  •  ipratropium-albuterol (DUO-NEB) 0.5-2.5 mg/3 ml nebulizer, Take 3 mL by nebulization 4 (Four) Times a Day., Disp: 1080 mL, Rfl: 3  •  isosorbide mononitrate (IMDUR) 30 MG 24 hr tablet, Take 1 tablet by mouth Daily., Disp: 30 tablet, Rfl: 6  •  meloxicam (MOBIC) 15 MG tablet, Take 15 mg by mouth Daily. Take with food., Disp: , Rfl:   •  metFORMIN ER (GLUCOPHAGE-XR) 500 MG 24 hr tablet, Take 1 tablet by mouth Every Morning Before Breakfast., Disp: 90 tablet, Rfl: 1  •  rosuvastatin (CRESTOR) 20 MG tablet, Take 1 tablet by mouth Daily., Disp: 30 tablet, Rfl: 11     Review of Systems   Cardiovascular: Positive for chest pain, dyspnea on exertion and leg swelling. Negative for orthopnea and palpitations.   Respiratory: Positive for cough and wheezing.        There were no vitals filed for this visit.    Physical Exam  Constitutional:       Appearance: Normal appearance.   Cardiovascular:      Rate and Rhythm: Regular rhythm. Tachycardia present.      Heart sounds: Normal heart sounds.   Pulmonary:      Effort: Pulmonary effort is normal.      Breath sounds: Wheezing present.   Abdominal:      Palpations: Abdomen  is soft.   Musculoskeletal:      Right lower leg: No edema.      Left lower leg: No edema.   Neurological:      General: No focal deficit present.      Mental Status: She is alert.         Diagnostic Data:  Procedures  Lab Results   Component Value Date    CHLPL 119 03/31/2022    TRIG 110 03/31/2022    HDL 66 03/31/2022     Lab Results   Component Value Date    GLUCOSE 229 (H) 01/10/2022    BUN 19 01/10/2022    CREATININE 0.91 01/10/2022     (L) 01/10/2022    K 4.0 01/10/2022    CL 98 01/10/2022    CO2 21.0 (L) 01/10/2022     Lab Results   Component Value Date    HGBA1C 11.3 02/17/2022     Lab Results   Component Value Date    WBC 11.22 (H) 01/10/2022    HGB 15.3 01/10/2022    HCT 44.5 01/10/2022     01/10/2022       Assessment:  No diagnosis found.    Plan:      1.  Chest pain.    -has severe COPD which produced dyspnea  -With recent normal stress test not recommending additional testing currently, continue risk factor reduction  -Continue aspirin    2.  Previous COVID-19 infection    3.  Severe COPD on PFTs  June 2022  -follows with pulmonary    4.  Diabetes//HLD  -LDL well controlled at 33, continue Crestor 20 mg  -A1c elevated 11.3  -follows with endocrine    5.  HTN  -adding HCTZ 25mg daily  -check f/u labs in 1 week due to prior hyponatremia    Follow-up 4 months      Jono Estrella MD [facility]

## 2022-07-02 DIAGNOSIS — Z79.4 TYPE 2 DIABETES MELLITUS WITH HYPERGLYCEMIA, WITH LONG-TERM CURRENT USE OF INSULIN: ICD-10-CM

## 2022-07-02 DIAGNOSIS — E11.65 TYPE 2 DIABETES MELLITUS WITH HYPERGLYCEMIA, WITH LONG-TERM CURRENT USE OF INSULIN: ICD-10-CM

## 2022-07-05 RX ORDER — GLIPIZIDE 5 MG/1
15 TABLET, FILM COATED, EXTENDED RELEASE ORAL DAILY
Qty: 90 TABLET | Refills: 0 | Status: SHIPPED | OUTPATIENT
Start: 2022-07-05 | End: 2022-08-29

## 2022-07-05 NOTE — TELEPHONE ENCOUNTER
Last seen on 3-31-22 and next appt is not scheduled. No answer and no message left to schedule appt.

## 2022-07-16 NOTE — H&P
[facility] Medicine Services  HISTORY AND PHYSICAL    Patient Name: Kathy Taylor  : 1960  MRN: [id number]  Primary Care Physician: Kanika   Date of admission: 2022      Subjective   Subjective     Chief Complaint:  Shortness of breath    HPI:  Kathy Taylor is a 61 y.o. female with stage III COPD, DM2 and ongoing tobacco use who presented to the ED with 2 days of cough, fever and worsening shortness of breath.  She reports two family members at home are positive for COVID-19.  She did not have relief with her home inhaler.  In the ED, she tested positive for COVID-19 and was requiring 2L O2 to maintain oxygen saturations >94%.  Admission was requested for further management.      COVID Details:    Symptoms:    [] NONE [x] Fever [x]  Cough [x] Shortness of breath [] Change in taste/smell      Review of Systems   Gen- + fevers, chills  CV- Some heaviness in her chest, no palpitations  Resp- + cough, dyspnea  GI- No N/V/D, abd pain    All other systems reviewed and are negative.     Personal History     Past Medical History:   Diagnosis Date   • Arthritis    • Back ache    • Bronchitis    • COPD (chronic obstructive pulmonary disease) (HCC)    • Fibromyalgia    • Migraines    • Pneumonia    • Rheumatic fever    • Tooth infection    • Type 2 diabetes mellitus (HCC)    • Vulvar carcinoma (HCC)        Past Surgical History:   Procedure Laterality Date   • BREAST BIOPSY Left    • BREAST LUMPECTOMY Left    • TONSILLECTOMY     • TUBAL ABDOMINAL LIGATION     • VULVA BIOPSY     • VULVA SURGERY     • WISDOM TOOTH EXTRACTION         Family History:  family history includes Arthritis in her mother; Breast cancer in an other family member; Diabetes in her brother and mother; Heart disease in her brother and mother; Thyroid disease in her mother; Uterine cancer in her mother. Otherwise pertinent FHx was reviewed and unremarkable.     Social History:  reports that she has  "REPOTS SOB AND LOW BACK PAIN. PRIVATE ROOM. EVEN AND NON LABORED RESPIRATIONS.  AIRWAY CLEAR.  PULSES REGULAR.  < 3" CAPILLARY REFILL. SKIN WDI.  MAEW.  NON DISTENDED ABDOMEN. ALERT, ORIENTED AND AMBULATORY. NAD AT THIS TIME.  CALL LIGHT IN REACH.  " been smoking cigarettes. She has a 50.00 pack-year smoking history. She has never used smokeless tobacco. She reports current alcohol use. She reports that she does not use drugs.  Social History     Social History Narrative        Has moved to [place] from [place]    Continues to smoke up to 1 pack of cigarettes per day    Denies regular alcohol use       Medications:  Available home medication information reviewed.  (Not in a hospital admission)      Allergies   Allergen Reactions   • Bee Venom Anaphylaxis   • Ciprofloxacin Shortness Of Breath       Objective   Objective     Vital Signs:   Temp:  [98 °F (36.7 °C)] 98 °F (36.7 °C)  Heart Rate:  [102-116] 113  Resp:  [18] 18  BP: (120-136)/(68-74) 136/68  Flow (L/min):  [2] 2       Physical Exam   Constitutional: Awake, alert, ill appearing, laying in bed  Eyes: Sclerae anicteric, no conjunctival injection  HENT: NCAT, mucous membranes moist  Neck: Supple, trachea midline  Respiratory: Severely diminished breath sounds bilaterally with occasional faint wheeze, nonlabored respirations on 2L O2  Cardiovascular: RRR, no murmurs, rubs, or gallops  Gastrointestinal: Positive bowel sounds, soft, nontender, nondistended  Musculoskeletal: No bilateral ankle edema, no clubbing or cyanosis to extremities  Psychiatric: Appropriate affect, cooperative  Neurologic: Cranial Nerves grossly intact to confrontation, speech clear  Skin: No rashes on exposed surfaces    Result Review:  I have personally reviewed the results from the time of this admission to 1/7/2022 15:07 EST and agree with these findings:  [x]  Laboratory  []  Microbiology  [x]  Radiology  []  EKG/Telemetry   []  Cardiology/Vascular   []  Pathology  []  Old records  []  Other:  Most notable findings include:     LAB RESULTS:      Lab 01/07/22  1225   WBC 6.66   HEMOGLOBIN 15.6   HEMATOCRIT 45.1   PLATELETS 162   NEUTROS ABS 5.01   IMMATURE GRANS (ABS) 0.07*   LYMPHS ABS 0.75   MONOS ABS 0.63   EOS ABS 0.12    MCV 87.9         Lab 01/07/22  1225   SODIUM 130*   POTASSIUM 4.2   CHLORIDE 93*   CO2 26.0   ANION GAP 11.0   BUN 13   CREATININE 0.93   GLUCOSE 322*   CALCIUM 9.3         Lab 01/07/22  1225   TOTAL PROTEIN 7.6   ALBUMIN 4.30   GLOBULIN 3.3   ALT (SGPT) 72*   AST (SGOT) 65*   BILIRUBIN 0.8   ALK PHOS 97         Lab 01/07/22  1225   PROBNP 96.9   TROPONIN T <0.010                     Microbiology Results (last 10 days)     Procedure Component Value - Date/Time    COVID PRE-OP / PRE-PROCEDURE SCREENING ORDER (NO ISOLATION) - Swab, Nasopharynx [[id number]]  (Abnormal) Collected: 01/07/22 1222    Lab Status: Final result Specimen: Swab from Nasopharynx Updated: 01/07/22 1335    Narrative:      The following orders were created for panel order COVID PRE-OP / PRE-PROCEDURE SCREENING ORDER (NO ISOLATION) - Swab, Nasopharynx.  Procedure                               Abnormality         Status                     ---------                               -----------         ------                     COVID-19, FLU A/B, RSV P...[[id number]]  Abnormal            Final result                 Please view results for these tests on the individual orders.    COVID-19, FLU A/B, RSV PCR - Swab, Nasopharynx [[id number]]  (Abnormal) Collected: 01/07/22 1222    Lab Status: Final result Specimen: Swab from Nasopharynx Updated: 01/07/22 1335     COVID19 Detected     Influenza A PCR Not Detected     Influenza B PCR Not Detected     RSV, PCR Not Detected          XR Chest 1 View    Result Date: 1/7/2022  EXAMINATION: XR CHEST 1 VW- 01/07/2022  INDICATION: SOA triage protocol  COMPARISON: NONE  FINDINGS: Portable chest reveals cardiac and mediastinal silhouettes within normal limits. The lung fields are grossly clear. No focal parenchymal opacification present. No pleural effusion or pneumothorax. The bony structures are unremarkable. Pulmonary vascularity is within normal limits.        Impression: No acute cardiopulmonary disease.  D:   01/07/2022 E:  01/07/2022             Assessment/Plan   Assessment & Plan     Active Hospital Problems    Diagnosis  POA   • **COVID-19 virus infection [U07.1]  Yes   • DM2 (diabetes mellitus, type 2) (HCC) [E11.9]  Yes   • COPD with acute exacerbation (HCC) [J44.1]  Yes   • Tobacco abuse [Z72.0]  Yes     COVID-19 infection with mild hypoxia  COPD with acute exacerbation  -Start dexamethasone and remdesivir  -Scheduled and PRN antitussive medication  -Scheduled albuterol inhaler  -Flutter valve    DM2 with hyperglycemia  -Levemir 30 units QHS plus SSI  -Hold oral medication  -Titrate up as needed with steroids    Tobacco abuse  -Trying to quit  -Nicotine patch    DVT prophylaxis:  Lovenox      CODE STATUS:    Code Status and Medical Interventions:   Ordered at: 01/07/22 1506     Level Of Support Discussed With:    Patient     Code Status (Patient has no pulse and is not breathing):    CPR (Attempt to Resuscitate)     Medical Interventions (Patient has pulse or is breathing):    Full Support       Admission Status:  I believe this patient meets INPATIENT status due to COVID-19 with hypoxia qualifying for treatment with remdesivir and high risk for further deterioration given COPD and DM2.  I feel patient’s risk for adverse outcomes and need for care warrant INPATIENT evaluation and I predict the patient’s care encounter to likely last beyond 2 midnights.    Roxana Payne MD  01/07/22

## 2022-07-18 ENCOUNTER — TELEPHONE (OUTPATIENT)
Dept: CARDIOLOGY | Facility: CLINIC | Age: 62
End: 2022-07-18

## 2022-07-18 NOTE — TELEPHONE ENCOUNTER
Patient's daughter called. She left VM that her mom was passed out on the couch. When she began talking, her words were slurred, tongue was heavy and she was not able to make any sense.   She finally called the paramedics to come and evaluate.  Patient's BP was 88/60. Blood sugar was >400.  Paramedics did not take patient to hospital. Instructed daughter to give patient more fluids today. Hold HCTZ this week. Monitor BP and call office at end of week to give BP readings. Take BP PRN for episodes or any headaches, dizzy spells or any symptoms that she may have that are out of the normal for her.  She verbalized understanding.

## 2022-07-25 ENCOUNTER — TELEPHONE (OUTPATIENT)
Dept: CARDIOLOGY | Facility: CLINIC | Age: 62
End: 2022-07-25

## 2022-07-25 NOTE — TELEPHONE ENCOUNTER
Vital signs look pretty stable.  Does she have any pain in the legs with walking or just weeks?      If she has pain or cramping with ambulation we can order her an MACK to screen for peripheral vascular disease.

## 2022-07-25 NOTE — TELEPHONE ENCOUNTER
Patient called to provide updates on BP and symptoms since low BP episode with change in mental status:    7/18/22 6:55pm HA, weak legs-120/78     7/19/22 2:45am HA with weak legs 122/66 P:97 O2 sat 95%  3:01am HA with weak legs 127/69 P:93    7/20/22 135/73 P: 99 O2 sat 95%    7/21/22 143/82 P: 86 O2 sat 97% Chest pain but did not take isosorbide, but then took med and chest pain subsided.      No more further episodes of slumping over since stopping HCTZ.

## 2022-07-27 NOTE — TELEPHONE ENCOUNTER
Unable to reach patient again today. Able to leave [facility] today to return call.

## 2022-08-19 RX ORDER — ROSUVASTATIN CALCIUM 20 MG/1
20 TABLET, COATED ORAL DAILY
Qty: 30 TABLET | Refills: 11 | Status: SHIPPED | OUTPATIENT
Start: 2022-08-19

## 2022-08-29 DIAGNOSIS — Z79.4 TYPE 2 DIABETES MELLITUS WITH HYPERGLYCEMIA, WITH LONG-TERM CURRENT USE OF INSULIN: ICD-10-CM

## 2022-08-29 DIAGNOSIS — E11.65 TYPE 2 DIABETES MELLITUS WITH HYPERGLYCEMIA, WITH LONG-TERM CURRENT USE OF INSULIN: ICD-10-CM

## 2022-08-29 RX ORDER — GLIPIZIDE 5 MG/1
TABLET, FILM COATED, EXTENDED RELEASE ORAL
Qty: 90 TABLET | Refills: 0 | Status: SHIPPED | OUTPATIENT
Start: 2022-08-29 | End: 2022-10-05

## 2022-08-29 NOTE — TELEPHONE ENCOUNTER
Last seen on 3-31-22 and no appt is scheduled. Detailed message to call and schedule appt for more reills

## 2022-09-20 ENCOUNTER — OFFICE VISIT (OUTPATIENT)
Dept: PULMONOLOGY | Facility: CLINIC | Age: 62
End: 2022-09-20

## 2022-09-20 VITALS
TEMPERATURE: 99.6 F | SYSTOLIC BLOOD PRESSURE: 134 MMHG | HEIGHT: 66 IN | HEART RATE: 103 BPM | OXYGEN SATURATION: 97 % | WEIGHT: 180.6 LBS | DIASTOLIC BLOOD PRESSURE: 78 MMHG | BODY MASS INDEX: 29.02 KG/M2

## 2022-09-20 DIAGNOSIS — G47.33 OSA (OBSTRUCTIVE SLEEP APNEA): ICD-10-CM

## 2022-09-20 DIAGNOSIS — G47.33 OBSTRUCTIVE SLEEP APNEA: ICD-10-CM

## 2022-09-20 DIAGNOSIS — J44.1 COPD WITH ACUTE EXACERBATION: ICD-10-CM

## 2022-09-20 DIAGNOSIS — U07.1 COVID-19 VIRUS INFECTION: ICD-10-CM

## 2022-09-20 DIAGNOSIS — G47.34 NOCTURNAL HYPOXEMIA: ICD-10-CM

## 2022-09-20 DIAGNOSIS — F17.210 CIGARETTE NICOTINE DEPENDENCE WITHOUT COMPLICATION: ICD-10-CM

## 2022-09-20 DIAGNOSIS — J44.9 COPD MIXED TYPE: Primary | ICD-10-CM

## 2022-09-20 PROCEDURE — 99214 OFFICE O/P EST MOD 30 MIN: CPT | Performed by: INTERNAL MEDICINE

## 2022-09-20 RX ORDER — TIZANIDINE 4 MG/1
TABLET ORAL
COMMUNITY
Start: 2022-09-12 | End: 2022-12-04

## 2022-09-20 RX ORDER — TIOTROPIUM BROMIDE AND OLODATEROL 3.124; 2.736 UG/1; UG/1
2 SPRAY, METERED RESPIRATORY (INHALATION)
Qty: 1 EACH | Refills: 11 | Status: SHIPPED | OUTPATIENT
Start: 2022-09-20 | End: 2022-12-04

## 2022-09-20 RX ORDER — PREDNISONE 10 MG/1
TABLET ORAL
Qty: 31 TABLET | Refills: 0 | Status: SHIPPED | OUTPATIENT
Start: 2022-09-20 | End: 2022-10-17 | Stop reason: SDUPTHER

## 2022-09-20 RX ORDER — HYDROCHLOROTHIAZIDE 25 MG/1
TABLET ORAL
COMMUNITY
End: 2022-12-04

## 2022-09-20 RX ORDER — DOXYCYCLINE HYCLATE 100 MG/1
100 CAPSULE ORAL 2 TIMES DAILY
Qty: 20 CAPSULE | Refills: 0 | Status: SHIPPED | OUTPATIENT
Start: 2022-09-20 | End: 2022-12-04

## 2022-09-20 NOTE — PROGRESS NOTES
PULMONARY  NOTE    Chief Complaint     Severe COPD, tobacco abuse, SABRINA, nocturnal hypoxemia, perennial rhinitis    History of Present Illness     62-year-old female returns today for follow-up  I last saw her in July 2021 and she was last seen by KALYAN Robles earlier this spring    She has a history of tobacco abuse which is ongoing  She had been on Chantix in the past and that seemed to help  Unfortunately, insurance is not covering the cost of that anymore    Last CT scan of the chest was in January 2022  This revealed no suspicious intrathoracic lesions    She has had severe obstructive airways disease on prior spirometry  She had been on Breo but that is apparently not being well covered by insurance    She has had increasing dyspnea, wheezing, cough, and congestion  She has not been on a recent antibiotic or steroid    She has a history of obstructive sleep apnea  Her equipment is broken  She continues to have symptoms of SABRINA including nonrestorative sleep, daytime hypersomnolence, and fatigue    Patient Active Problem List   Diagnosis   • COPD with acute exacerbation ([facility])   • Cigarette nicotine dependence without complication   • Obstructive sleep apnea   • Nocturnal hypoxemia   • Polypharmacy   • COVID-19 virus infection   • DM2 (diabetes mellitus, type 2) ([facility])     Allergies   Allergen Reactions   • Bee Venom Anaphylaxis   • Ciprofloxacin Shortness Of Breath       Current Outpatient Medications:   •  Accu-Chek Softclix Lancets lancets, USE ONE LANCET WITH EACH BLOOD GLUCOSE TEST - TEST 2 TO 3 TIMES DAILY, Disp: 100 each, Rfl: 3  •  albuterol sulfate HFA (Ventolin HFA) 108 (90 Base) MCG/ACT inhaler, Inhale 2 puffs Every 4 (Four) Hours As Needed for Wheezing., Disp: 8 g, Rfl: 6  •  ASPIRIN 81 PO, Take 1 tablet by mouth Daily., Disp: , Rfl:   •  glipizide (GLUCOTROL XL) 5 MG ER tablet, Take 3 tablets by mouth once daily, Disp: 90 tablet, Rfl: 0  •  glucose blood test strip, Use as instructed 2-3  times a day, Disp: 100 each, Rfl: 3  •  Insulin aspart (FIASP FLEXTOUCH) 100 UNIT/ML solution pen-injector injection pen, For use at mealtimes and per correctional scale, MDD 80 units, Disp: 24 mL, Rfl: 5  •  Insulin Glargine (LANTUS SOLOSTAR) 100 UNIT/ML injection pen, Inject 50 Units under the skin into the appropriate area as directed Daily., Disp: 15 mL, Rfl: 5  •  Insulin Pen Needle (BD Pen Needle Nessa U/F) 32G X 4 MM misc, Use as directed 5 times daily, Disp: 150 each, Rfl: 3  •  ipratropium-albuterol (DUO-NEB) 0.5-2.5 mg/3 ml nebulizer, Take 3 mL by nebulization 4 (Four) Times a Day., Disp: 1080 mL, Rfl: 3  •  isosorbide mononitrate (IMDUR) 30 MG 24 hr tablet, Take 1 tablet by mouth Daily., Disp: 30 tablet, Rfl: 6  •  meloxicam (MOBIC) 15 MG tablet, Take 15 mg by mouth Daily. Take with food., Disp: , Rfl:   •  metFORMIN ER (GLUCOPHAGE-XR) 500 MG 24 hr tablet, Take 1 tablet by mouth Every Morning Before Breakfast., Disp: 90 tablet, Rfl: 1  •  rosuvastatin (CRESTOR) 20 MG tablet, Take 1 tablet by mouth Daily., Disp: 30 tablet, Rfl: 11  •  Budeson-Glycopyrrol-Formoterol (Breztri Aerosphere) 160-9-4.8 MCG/ACT aerosol inhaler, Inhale 2 puffs 2 (Two) Times a Day., Disp: 1 each, Rfl: 11  •  doxycycline (VIBRAMYCIN) 100 MG capsule, Take 1 capsule by mouth 2 (Two) Times a Day., Disp: 20 capsule, Rfl: 0  •  fexofenadine (Allegra Allergy) 180 MG tablet, Take 1 tablet by mouth Daily., Disp: 90 tablet, Rfl: 3  •  fluticasone (Flonase) 50 MCG/ACT nasal spray, 2 sprays into the nostril(s) as directed by provider Daily., Disp: 15.8 mL, Rfl: 6  •  hydroCHLOROthiazide (HYDRODIURIL) 25 MG tablet, hydrochlorothiazide 25 mg tablet, Disp: , Rfl:   •  predniSONE (DELTASONE) 10 MG tablet, Take 4 tabs daily x 3 days, then take 3 tabs daily x 3 days, then take 2 tabs daily x 3 days, then take 1 tab daily x 3 days, Disp: 31 tablet, Rfl: 0  •  tiotropium bromide-olodaterol (Stiolto Respimat) 2.5-2.5 MCG/ACT aerosol solution inhaler,  "Inhale 2 puffs Daily., Disp: 1 each, Rfl: 11  •  tiZANidine (ZANAFLEX) 4 MG tablet, , Disp: , Rfl:   MEDICATION LIST AND ALLERGIES REVIEWED.    Family History   Problem Relation Age of Onset   • Arthritis Mother    • Diabetes Mother    • Heart disease Mother    • Thyroid disease Mother    • Uterine cancer Mother    • Cancer Mother    • Heart disease Brother    • Diabetes Brother    • Breast cancer Other         MATERNAL GREAT AUNT   • Cancer Maternal Grandmother    • Ovarian cancer Neg Hx      Social History     Tobacco Use   • Smoking status: Current Every Day Smoker     Packs/day: 1.00     Years: 50.00     Pack years: 50.00     Types: Cigarettes   • Smokeless tobacco: Never Used   Vaping Use   • Vaping Use: Former   • Substances: Nicotine   • Devices: Pre-filled or refillable cartridge   Substance Use Topics   • Alcohol use: Yes     Comment: Only 2 or 3 times a year.   • Drug use: Never     Social History     Social History Narrative        Has moved to [place] from [place]    Continues to smoke up to 1 pack of cigarettes per day    Denies regular alcohol use     FAMILY AND SOCIAL HISTORY REVIEWED.    Review of Systems  ALSO REFER TO SCANNED ROS SHEET FROM SAME DATE.    /78 (BP Location: Left arm, Patient Position: Sitting, Cuff Size: Adult)   Pulse 103   Temp 99.6 °F (37.6 °C) (Temporal)   Ht 167.6 cm (66\")   Wt 81.9 kg (180 lb 9.6 oz)   LMP  (LMP Unknown)   SpO2 97%   BMI 29.15 kg/m²   Physical Exam  Vitals and nursing note reviewed.   Constitutional:       General: She is not in acute distress.     Appearance: She is well-developed. She is not diaphoretic.   HENT:      Head: Normocephalic and atraumatic.   Neck:      Thyroid: No thyromegaly.   Cardiovascular:      Rate and Rhythm: Normal rate and regular rhythm.      Heart sounds: Normal heart sounds. No murmur heard.  Pulmonary:      Effort: Pulmonary effort is normal.      Breath sounds: No stridor.      Comments: Bilateral wheezes " without respiratory distress  Chest:   Breasts:      Right: No supraclavicular adenopathy.      Left: No supraclavicular adenopathy.       Lymphadenopathy:      Cervical: No cervical adenopathy.      Upper Body:      Right upper body: No supraclavicular or epitrochlear adenopathy.      Left upper body: No supraclavicular or epitrochlear adenopathy.   Skin:     General: Skin is warm and dry.   Neurological:      Mental Status: She is alert and oriented to person, place, and time.   Psychiatric:         Behavior: Behavior normal.         Results     CT scan of the chest from January 9, 2020 reviewed in PACS  No suspicious intrathoracic lesions    Immunization History   Administered Date(s) Administered   • COVID-19 (MODERNA) 1st, 2nd, 3rd Dose Only 04/09/2021, 05/07/2021, 04/02/2022   • Flu Vaccine Quad PF >36MO 01/11/2021   • Fluzone Quad >6mos (Multi-dose) 10/13/2016   • Influenza Quad Vaccine (Inpatient) 10/13/2016   • Pneumococcal Polysaccharide (PPSV23) 10/13/2016, 01/11/2021     Problem List       ICD-10-CM ICD-9-CM   1. COPD mixed type (HCC)  J44.9 496   2. SABRINA (obstructive sleep apnea)  G47.33 327.23   3. Cigarette nicotine dependence without complication  F17.210 305.1   4. COPD with acute exacerbation (HCC)  J44.1 491.21   5. COVID-19 virus infection  U07.1 079.89   6. Nocturnal hypoxemia  G47.34 327.24   7. Obstructive sleep apnea  G47.33 327.23       Discussion     She has severe, stage III, chronic obstructive pulmonary disease  We discussed the need for total smoking abstinence again today  She is going to try and get back on Chantix and nicotine replacement therapy    Last CT scan of the chest was negative  I recommended a LDCT in January 2023    Insurance apparently is not covering Breo  Looking at our system it appears that Stiolto may be better covered  I gave her samples of Stiolto with written instructions and a prescription    I have recommended a titration sleep study  She has a history of  obstructive sleep apnea and continues to have symptoms of daytime hypersomnolence, fatigue, and nonrestorative sleep    A plan to see her back in 4 months with a repeat CT scan of the chest in January 2023    Moderate level of Medical Decision Making complexity based on 2 or more chronic stable illnesses and prescription drug management.    Silas Thakur MD  Note electronically signed    CC: Kanika January, APRN

## 2022-09-21 DIAGNOSIS — Z87.891 PERSONAL HISTORY OF NICOTINE DEPENDENCE: Primary | ICD-10-CM

## 2022-09-30 ENCOUNTER — TELEPHONE (OUTPATIENT)
Dept: PULMONOLOGY | Facility: CLINIC | Age: 62
End: 2022-09-30

## 2022-09-30 NOTE — TELEPHONE ENCOUNTER
[facility] for pt to call back to give below message per Leidy.  No alternative, keep taking doxy, and she should be almost complete with her pred.  See how many pills she is on and she may be able to stop the prednisone.  Her sugars will get better with the lower dose.         ----- Message from KALYAN Major sent at 9/30/2022  8:16 AM EDT -----  No alternative, keep taking doxy, and she should be almost complete with her pred.  See how many pills she is on and she may be able to stop the prednisone.  Her sugars will get better with the lower dose.    ----- Message -----  From: Jacob Boyce MA  Sent: 9/29/2022   3:00 PM EDT  To: KALYAN Major    Pt called requesting an alternative to doxy and prednisone for sinus/lung infection, pt stated her sugar keeps rising due to prednisone.

## 2022-10-04 DIAGNOSIS — Z79.4 TYPE 2 DIABETES MELLITUS WITH HYPERGLYCEMIA, WITH LONG-TERM CURRENT USE OF INSULIN: ICD-10-CM

## 2022-10-04 DIAGNOSIS — E11.65 TYPE 2 DIABETES MELLITUS WITH HYPERGLYCEMIA, WITH LONG-TERM CURRENT USE OF INSULIN: ICD-10-CM

## 2022-10-05 RX ORDER — METFORMIN HYDROCHLORIDE 500 MG/1
TABLET, EXTENDED RELEASE ORAL
Qty: 90 TABLET | Refills: 1 | Status: SHIPPED | OUTPATIENT
Start: 2022-10-05 | End: 2023-02-01

## 2022-10-05 RX ORDER — GLIPIZIDE 5 MG/1
TABLET, FILM COATED, EXTENDED RELEASE ORAL
Qty: 90 TABLET | Refills: 1 | Status: SHIPPED | OUTPATIENT
Start: 2022-10-05 | End: 2022-12-12

## 2022-10-05 NOTE — TELEPHONE ENCOUNTER
Last seen on 3-31-22 and next appt is 1-25-23  
diffuse crackles and ronchi in the lower and middle lobes bilaterally - no wheezing apprecaited

## 2022-10-16 ENCOUNTER — APPOINTMENT (OUTPATIENT)
Dept: GENERAL RADIOLOGY | Facility: HOSPITAL | Age: 62
End: 2022-10-16

## 2022-10-16 ENCOUNTER — HOSPITAL ENCOUNTER (EMERGENCY)
Facility: HOSPITAL | Age: 62
Discharge: HOME | End: 2022-10-17
Attending: STUDENT IN AN ORGANIZED HEALTH CARE EDUCATION/TRAINING PROGRAM | Admitting: STUDENT IN AN ORGANIZED HEALTH CARE EDUCATION/TRAINING PROGRAM

## 2022-10-16 DIAGNOSIS — J44.1 COPD WITH ACUTE EXACERBATION: Primary | ICD-10-CM

## 2022-10-16 DIAGNOSIS — E11.9 TYPE 2 DIABETES MELLITUS WITHOUT COMPLICATION, UNSPECIFIED WHETHER LONG TERM INSULIN USE: ICD-10-CM

## 2022-10-16 DIAGNOSIS — B34.8 INFECTION DUE TO PARAINFLUENZA VIRUS 4: ICD-10-CM

## 2022-10-16 LAB
ALBUMIN SERPL-MCNC: 4.2 G/DL (ref 3.5–5.2)
ALBUMIN/GLOB SERPL: 1.3 G/DL
ALP SERPL-CCNC: 87 U/L (ref 39–117)
ALT SERPL W P-5'-P-CCNC: 30 U/L (ref 1–33)
ANION GAP SERPL CALCULATED.3IONS-SCNC: 11 MMOL/L (ref 5–15)
AST SERPL-CCNC: 26 U/L (ref 1–32)
BASOPHILS # BLD AUTO: 0.07 10*3/MM3 (ref 0–0.2)
BASOPHILS NFR BLD AUTO: 0.8 % (ref 0–1.5)
BILIRUB SERPL-MCNC: 0.4 MG/DL (ref 0–1.2)
BUN SERPL-MCNC: 15 MG/DL (ref 8–23)
BUN/CREAT SERPL: 14.3 (ref 7–25)
CALCIUM SPEC-SCNC: 9.3 MG/DL (ref 8.6–10.5)
CHLORIDE SERPL-SCNC: 103 MMOL/L (ref 98–107)
CO2 SERPL-SCNC: 25 MMOL/L (ref 22–29)
CREAT SERPL-MCNC: 1.05 MG/DL (ref 0.57–1)
DEPRECATED RDW RBC AUTO: 43.3 FL (ref 37–54)
EGFRCR SERPLBLD CKD-EPI 2021: 61 ML/MIN/1.73
EOSINOPHIL # BLD AUTO: 0.26 10*3/MM3 (ref 0–0.4)
EOSINOPHIL NFR BLD AUTO: 3.1 % (ref 0.3–6.2)
ERYTHROCYTE [DISTWIDTH] IN BLOOD BY AUTOMATED COUNT: 13.5 % (ref 12.3–15.4)
FLUAV SUBTYP SPEC NAA+PROBE: NOT DETECTED
FLUBV RNA ISLT QL NAA+PROBE: NOT DETECTED
GLOBULIN UR ELPH-MCNC: 3.2 GM/DL
GLUCOSE SERPL-MCNC: 184 MG/DL (ref 65–99)
HCT VFR BLD AUTO: 42.4 % (ref 34–46.6)
HGB BLD-MCNC: 14.7 G/DL (ref 12–15.9)
HOLD SPECIMEN: NORMAL
HOLD SPECIMEN: NORMAL
IMM GRANULOCYTES # BLD AUTO: 0.08 10*3/MM3 (ref 0–0.05)
IMM GRANULOCYTES NFR BLD AUTO: 1 % (ref 0–0.5)
LYMPHOCYTES # BLD AUTO: 1.64 10*3/MM3 (ref 0.7–3.1)
LYMPHOCYTES NFR BLD AUTO: 19.5 % (ref 19.6–45.3)
MCH RBC QN AUTO: 30.6 PG (ref 26.6–33)
MCHC RBC AUTO-ENTMCNC: 34.7 G/DL (ref 31.5–35.7)
MCV RBC AUTO: 88.3 FL (ref 79–97)
MONOCYTES # BLD AUTO: 0.61 10*3/MM3 (ref 0.1–0.9)
MONOCYTES NFR BLD AUTO: 7.3 % (ref 5–12)
NEUTROPHILS NFR BLD AUTO: 5.75 10*3/MM3 (ref 1.7–7)
NEUTROPHILS NFR BLD AUTO: 68.3 % (ref 42.7–76)
NRBC BLD AUTO-RTO: 0 /100 WBC (ref 0–0.2)
NT-PROBNP SERPL-MCNC: 56.4 PG/ML (ref 0–900)
PLATELET # BLD AUTO: 222 10*3/MM3 (ref 140–450)
PMV BLD AUTO: 9.9 FL (ref 6–12)
POTASSIUM SERPL-SCNC: 4.5 MMOL/L (ref 3.5–5.2)
PROT SERPL-MCNC: 7.4 G/DL (ref 6–8.5)
RBC # BLD AUTO: 4.8 10*6/MM3 (ref 3.77–5.28)
SARS-COV-2 RNA PNL SPEC NAA+PROBE: NOT DETECTED
SODIUM SERPL-SCNC: 139 MMOL/L (ref 136–145)
TROPONIN T SERPL-MCNC: <0.01 NG/ML (ref 0–0.03)
WBC NRBC COR # BLD: 8.41 10*3/MM3 (ref 3.4–10.8)
WHOLE BLOOD HOLD COAG: NORMAL
WHOLE BLOOD HOLD SPECIMEN: NORMAL

## 2022-10-16 PROCEDURE — 87636 SARSCOV2 & INF A&B AMP PRB: CPT

## 2022-10-16 PROCEDURE — 85025 COMPLETE CBC W/AUTO DIFF WBC: CPT

## 2022-10-16 PROCEDURE — 99284 EMERGENCY DEPT VISIT MOD MDM: CPT

## 2022-10-16 PROCEDURE — 36415 COLL VENOUS BLD VENIPUNCTURE: CPT

## 2022-10-16 PROCEDURE — 83880 ASSAY OF NATRIURETIC PEPTIDE: CPT

## 2022-10-16 PROCEDURE — 84484 ASSAY OF TROPONIN QUANT: CPT

## 2022-10-16 PROCEDURE — 71045 X-RAY EXAM CHEST 1 VIEW: CPT

## 2022-10-16 PROCEDURE — 93005 ELECTROCARDIOGRAM TRACING: CPT

## 2022-10-16 PROCEDURE — 80053 COMPREHEN METABOLIC PANEL: CPT

## 2022-10-16 PROCEDURE — 93005 ELECTROCARDIOGRAM TRACING: CPT | Performed by: STUDENT IN AN ORGANIZED HEALTH CARE EDUCATION/TRAINING PROGRAM

## 2022-10-16 RX ORDER — SODIUM CHLORIDE 0.9 % (FLUSH) 0.9 %
10 SYRINGE (ML) INJECTION AS NEEDED
Status: DISCONTINUED | OUTPATIENT
Start: 2022-10-16 | End: 2022-10-17 | Stop reason: HOSPADM

## 2022-10-17 VITALS
SYSTOLIC BLOOD PRESSURE: 150 MMHG | OXYGEN SATURATION: 93 % | BODY MASS INDEX: 28.93 KG/M2 | RESPIRATION RATE: 17 BRPM | HEART RATE: 110 BPM | DIASTOLIC BLOOD PRESSURE: 77 MMHG | WEIGHT: 180 LBS | HEIGHT: 66 IN | TEMPERATURE: 98 F

## 2022-10-17 LAB
B PARAPERT DNA SPEC QL NAA+PROBE: NOT DETECTED
B PERT DNA SPEC QL NAA+PROBE: NOT DETECTED
C PNEUM DNA NPH QL NAA+NON-PROBE: NOT DETECTED
FLUAV SUBTYP SPEC NAA+PROBE: NOT DETECTED
FLUBV RNA ISLT QL NAA+PROBE: NOT DETECTED
HADV DNA SPEC NAA+PROBE: NOT DETECTED
HCOV 229E RNA SPEC QL NAA+PROBE: NOT DETECTED
HCOV HKU1 RNA SPEC QL NAA+PROBE: NOT DETECTED
HCOV NL63 RNA SPEC QL NAA+PROBE: NOT DETECTED
HCOV OC43 RNA SPEC QL NAA+PROBE: NOT DETECTED
HMPV RNA NPH QL NAA+NON-PROBE: NOT DETECTED
HOLD SPECIMEN: NORMAL
HPIV1 RNA ISLT QL NAA+PROBE: NOT DETECTED
HPIV2 RNA SPEC QL NAA+PROBE: NOT DETECTED
HPIV3 RNA NPH QL NAA+PROBE: NOT DETECTED
HPIV4 P GENE NPH QL NAA+PROBE: DETECTED
M PNEUMO IGG SER IA-ACNC: NOT DETECTED
RHINOVIRUS RNA SPEC NAA+PROBE: NOT DETECTED
RSV RNA NPH QL NAA+NON-PROBE: NOT DETECTED
SARS-COV-2 RNA NPH QL NAA+NON-PROBE: NOT DETECTED

## 2022-10-17 PROCEDURE — 25010000002 DEXAMETHASONE SODIUM PHOSPHATE 100 MG/10ML SOLUTION: Performed by: STUDENT IN AN ORGANIZED HEALTH CARE EDUCATION/TRAINING PROGRAM

## 2022-10-17 PROCEDURE — 25010000002 MAGNESIUM SULFATE IN D5W 1G/100ML (PREMIX) 1-5 GM/100ML-% SOLUTION: Performed by: STUDENT IN AN ORGANIZED HEALTH CARE EDUCATION/TRAINING PROGRAM

## 2022-10-17 PROCEDURE — 0202U NFCT DS 22 TRGT SARS-COV-2: CPT | Performed by: STUDENT IN AN ORGANIZED HEALTH CARE EDUCATION/TRAINING PROGRAM

## 2022-10-17 PROCEDURE — 94799 UNLISTED PULMONARY SVC/PX: CPT

## 2022-10-17 PROCEDURE — 96365 THER/PROPH/DIAG IV INF INIT: CPT

## 2022-10-17 PROCEDURE — 25010000002 AZITHROMYCIN PER 500 MG: Performed by: STUDENT IN AN ORGANIZED HEALTH CARE EDUCATION/TRAINING PROGRAM

## 2022-10-17 PROCEDURE — 96375 TX/PRO/DX INJ NEW DRUG ADDON: CPT

## 2022-10-17 PROCEDURE — 94640 AIRWAY INHALATION TREATMENT: CPT

## 2022-10-17 RX ORDER — MAGNESIUM SULFATE 1 G/100ML
1 INJECTION INTRAVENOUS ONCE
Status: COMPLETED | OUTPATIENT
Start: 2022-10-17 | End: 2022-10-17

## 2022-10-17 RX ORDER — AZITHROMYCIN 500 MG/1
500 TABLET, FILM COATED ORAL DAILY
Qty: 5 TABLET | Refills: 0 | Status: SHIPPED | OUTPATIENT
Start: 2022-10-17 | End: 2022-10-22

## 2022-10-17 RX ORDER — PREDNISONE 10 MG/1
TABLET ORAL
Qty: 31 TABLET | Refills: 0 | Status: SHIPPED | OUTPATIENT
Start: 2022-10-17 | End: 2022-12-04

## 2022-10-17 RX ORDER — IPRATROPIUM BROMIDE AND ALBUTEROL SULFATE 2.5; .5 MG/3ML; MG/3ML
3 SOLUTION RESPIRATORY (INHALATION) ONCE
Status: COMPLETED | OUTPATIENT
Start: 2022-10-17 | End: 2022-10-17

## 2022-10-17 RX ADMIN — MAGNESIUM SULFATE HEPTAHYDRATE 1 G: 1 INJECTION, SOLUTION INTRAVENOUS at 00:22

## 2022-10-17 RX ADMIN — DEXAMETHASONE SODIUM PHOSPHATE 10 MG: 10 INJECTION, SOLUTION INTRAMUSCULAR; INTRAVENOUS at 00:40

## 2022-10-17 RX ADMIN — IPRATROPIUM BROMIDE AND ALBUTEROL SULFATE 3 ML: .5; 2.5 SOLUTION RESPIRATORY (INHALATION) at 00:44

## 2022-10-17 RX ADMIN — AZITHROMYCIN 500 MG: 500 INJECTION, POWDER, LYOPHILIZED, FOR SOLUTION INTRAVENOUS at 00:52

## 2022-10-17 NOTE — DISCHARGE INSTRUCTIONS
Treat URI symptoms with Tylenol and/or ibuprofen.  Use the provided corticosteroid and azithromycin to help with your COPD and sinus infection.  Continue to monitor your oxygen closely and if you feel like you are getting worse in any way may require hospitalization please return to the ED immediately.

## 2022-10-18 ENCOUNTER — TELEPHONE (OUTPATIENT)
Dept: OTHER | Facility: HOSPITAL | Age: 62
End: 2022-10-18

## 2022-10-18 LAB
QT INTERVAL: 374 MS
QTC INTERVAL: 506 MS

## 2022-10-18 NOTE — ED PROVIDER NOTES
EMERGENCY DEPARTMENT ENCOUNTER    Pt Name: Kathy Taylor  MRN: [id number]  Pt :   1960  Room Number:    Date of encounter:  10/16/2022  PCP: Provider, No Known  ED Provider: Nigel Zhou MD    Historian: Patient      HPI:  Chief Complaint: Sinus congestion and pain, cough, shortness of breath        Context: Kathy Taylor is a 62-year-old woman with severe COPD who follows with Dr. Thakur who presents for evaluation of 4 days of worsening cough, congestion, and shortness of breath.  She says she thinks she may have a sinus infection she been having severe congestion and pressure in her right face.  This is consistent with prior sinus infections.  She has also had a worsening cough and is feeling more short of breath.  She has not appreciated any fevers or systemic symptoms.  She knows of no sick contacts.  She denies actual pain.  No other complaints at this time.    PAST MEDICAL HISTORY  Past Medical History:   Diagnosis Date   • Arthritis    • Back ache    • Bronchiectasis (HCC) ?   • Bronchitis    • Chronic bronchitis (HCC) ?   • COPD (chronic obstructive pulmonary disease) (HCC)    • Fibromyalgia    • Migraines    • Pneumonia    • Rheumatic fever    • Sleep apnea, obstructive ?   • Tooth infection    • Type 2 diabetes mellitus (HCC)    • Vulvar carcinoma (HCC)          PAST SURGICAL HISTORY  Past Surgical History:   Procedure Laterality Date   • BREAST BIOPSY Left    • BREAST LUMPECTOMY Left    • TONSILLECTOMY     • TUBAL ABDOMINAL LIGATION     • VULVA BIOPSY     • VULVA SURGERY     • WISDOM TOOTH EXTRACTION           FAMILY HISTORY  Family History   Problem Relation Age of Onset   • Arthritis Mother    • Diabetes Mother    • Heart disease Mother    • Thyroid disease Mother    • Uterine cancer Mother    • Cancer Mother    • Heart disease Brother    • Diabetes Brother    • Breast cancer Other         MATERNAL GREAT AUNT   • Cancer Maternal Grandmother    • Ovarian cancer Neg Hx           SOCIAL HISTORY  Social History     Socioeconomic History   • Marital status:    Tobacco Use   • Smoking status: Every Day     Packs/day: 1.00     Years: 50.00     Pack years: 50.00     Types: Cigarettes   • Smokeless tobacco: Never   Vaping Use   • Vaping Use: Former   • Substances: Nicotine   • Devices: Pre-filled or refillable cartridge   Substance and Sexual Activity   • Alcohol use: Yes     Comment: Only 2 or 3 times a year.   • Drug use: Never   • Sexual activity: Not Currently     Partners: Male     Birth control/protection: Tubal ligation         ALLERGIES  Bee venom and Ciprofloxacin        REVIEW OF SYSTEMS  Review of Systems       All systems reviewed and negative except for those discussed in HPI.       PHYSICAL EXAM    I have reviewed the triage vital signs and nursing notes.    ED Triage Vitals   Temp Heart Rate Resp BP SpO2   10/16/22 2043 10/16/22 2043 10/16/22 2041 10/16/22 2043 10/16/22 2043   98 °F (36.7 °C) 112 18 121/77 98 %      Temp src Heart Rate Source Patient Position BP Location FiO2 (%)   10/16/22 2041 10/16/22 2041 10/16/22 2041 10/16/22 2041 --   Oral Monitor Sitting Right arm        Physical Exam  GENERAL:   Appears ill but in no acute distress  HENT: Nares patent.  Fullness and percussion tenderness over the right maxillary sinus  EYES: No scleral icterus.  CV: Borderline tachycardia without appreciated murmurs rubs or gallops  RESPIRATORY: End expiratory wheezes without appreciated focal consolidation or rails   ABDOMEN: Soft, nontender  MUSCULOSKELETAL: No deformities.   NEURO: Alert, moves all extremities, follows commands.  SKIN: Warm, dry, no rash visualized.        LAB RESULTS  No results found for this or any previous visit (from the past 24 hour(s)).    If labs were ordered, I independently reviewed the results.        RADIOLOGY  No Radiology Exams Resulted Within Past 24 Hours    I ordered and reviewed the above noted radiographic studies.      I viewed images  of chest x-ray which does not reveal any acute pneumonia, pneumothorax, or other abnormalities that I can appreciate.  See radiologist's dictation for official interpretation.        PROCEDURES    Procedures    ECG 12 Lead   Final Result   Test Reason : SOA Protocol   Blood Pressure :   */*   mmHG   Vent. Rate : 110 BPM     Atrial Rate : 110 BPM      P-R Int : 144 ms          QRS Dur : 110 ms       QT Int : 374 ms       P-R-T Axes :  73 124  24 degrees      QTc Int : 506 ms      ** Critical Test Result: Long QTc   Sinus tachycardia   Incomplete right bundle branch block   Possible Right ventricular hypertrophy   Abnormal ECG   When compared with ECG of 10-KASHIF-2022 07:42,   No significant change was found   Confirmed by SOLIS AYALA ([id number]) on 10/18/2022 8:48:13 AM      Referred By:            Confirmed By: SOLIS AYALA          MEDICATIONS GIVEN IN ER    Medications   ipratropium-albuterol (DUO-NEB) nebulizer solution 3 mL (3 mL Nebulization Given 10/17/22 0044)   magnesium sulfate in D5W 1g/100mL (PREMIX) (0 g Intravenous Stopped 10/17/22 0038)   dexamethasone (DECADRON) IVPB 10 mg (0 mg Intravenous Stopped 10/17/22 0054)   AZITHROMYCIN 500 MG/250 ML 0.9% NS IVPB (vial-mate) (0 mg Intravenous Stopped 10/17/22 0207)         PROGRESS, DATA ANALYSIS, CONSULTS, AND MEDICAL DECISION MAKING    All labs have been independently reviewed by me.  All radiology studies have been reviewed by me and the radiologist dictating the report.   EKG's have been independently viewed and interpreted by me.            ED Course as of 10/18/22 1042   Sun Oct 16, 2022   [id number] In summary this a very nice 62-year-old woman with severe COPD who follows with Dr. Thakur who presents for evaluation of 4 days of worsening cough, congestion, and shortness of breath.  She says she thinks she may have a sinus infection she been having severe congestion and pressure in her right face.  This is consistent with prior sinus infections.  She  has also had a worsening cough and is feeling more short of breath.  She has not appreciated any fevers or systemic symptoms.  She knows of no sick contacts.  She denies actual pain.  No other complaints at this time. [CC]      ED Course User Index  [CC] Nigel Zhou MD       She arrived awake and alert oxygen sats are good she does have mild tachycardia which appears chronic when compared with prior visits.  Mild QTC prolongation on ECG.  She says she prefers to avoid hospital admission if possible.  Treating for COPD exacerbation and acute sinusitis with magnesium, duo nebs, IV dexamethasone and azithromycin.  Chest x-ray is clear.  CBC reassuring and not actionable.  No elevation in proBNP.  She has hyperglycemia consistent with her known diabetes she says in the past she has been able to tolerate oral prednisone and keep her blood sugar under control during COPD exacerbations.  Viral panel is positive for parainfluenza which I assume is what has triggered her COPD exacerbation.  She has no elevation in troponin.  Upon reevaluation she continues to sat well and is well-appearing says she feels much better.  We discussed her high risk status and mild tachycardia she still prefers discharge which I think is appropriate feel like she is close to her baseline.  Given prescriptions for azithromycin and corticosteroids.  Counseled return precautions verbally expressed understanding of these.      AS OF 10:42 EDT VITALS:    BP - 150/77  HR - 110  TEMP - 98 °F (36.7 °C) (Oral)  O2 SATS - 93%                  DIAGNOSIS  Final diagnoses:   COPD with acute exacerbation (HCC)   Infection due to parainfluenza virus 4   Type 2 diabetes mellitus without complication, unspecified whether long term insulin use (HCC)         DISPOSITION  DISCHARGE    Patient discharged in stable condition.    Reviewed implications of results, diagnosis, meds, responsibility to follow up, warning signs and symptoms of possible worsening,  potential complications and reasons to return to ER.    Patient/Family voiced understanding of above instructions.    Discussed plan for discharge, as there is no emergent indication for admission.  Pt/family is agreeable and understands need for follow up and possible repeat testing.  Pt/family is aware that discharge does not mean that nothing is wrong but that it indicates no emergency is currently present that requires admission and they must continue care with follow-up as given below or with a physician of their choice.     FOLLOW-UP  PATIENT CONNECTION - [place] [address]  [phone]  Call   If you need to establish with a primary doctor         Medication List      New Prescriptions    azithromycin 500 MG tablet  Commonly known as: ZITHROMAX  Take 1 tablet by mouth Daily for 5 days.           Where to Get Your Medications      These medications were sent to [facility] [address] [place] [address] - [phone]  - [phone] [place] [address]    Phone: [phone]   · azithromycin 500 MG tablet  · predniSONE 10 MG tablet                    Nigel Zhou MD  10/18/22 [id number]

## 2022-10-18 NOTE — TELEPHONE ENCOUNTER
COPD Nurse Navigator attempted to call patient in regard to Emergency Department visit 10/17/2022.  There was no answer at the time of the call.

## 2022-11-07 RX ORDER — ISOSORBIDE MONONITRATE 30 MG/1
TABLET, EXTENDED RELEASE ORAL
Qty: 90 TABLET | Refills: 0 | Status: SHIPPED | OUTPATIENT
Start: 2022-11-07 | End: 2022-12-04

## 2022-12-04 ENCOUNTER — APPOINTMENT (OUTPATIENT)
Dept: GENERAL RADIOLOGY | Facility: HOSPITAL | Age: 62
End: 2022-12-04

## 2022-12-04 ENCOUNTER — HOSPITAL ENCOUNTER (EMERGENCY)
Facility: HOSPITAL | Age: 62
Discharge: HOME | End: 2022-12-04
Attending: EMERGENCY MEDICINE | Admitting: EMERGENCY MEDICINE

## 2022-12-04 ENCOUNTER — APPOINTMENT (OUTPATIENT)
Dept: CT IMAGING | Facility: HOSPITAL | Age: 62
End: 2022-12-04

## 2022-12-04 VITALS
OXYGEN SATURATION: 97 % | DIASTOLIC BLOOD PRESSURE: 71 MMHG | RESPIRATION RATE: 22 BRPM | SYSTOLIC BLOOD PRESSURE: 131 MMHG | BODY MASS INDEX: 28.12 KG/M2 | HEART RATE: 94 BPM | TEMPERATURE: 97.6 F | WEIGHT: 175 LBS | HEIGHT: 66 IN

## 2022-12-04 DIAGNOSIS — J44.1 ACUTE EXACERBATION OF CHRONIC OBSTRUCTIVE PULMONARY DISEASE (COPD): Primary | ICD-10-CM

## 2022-12-04 DIAGNOSIS — Z86.39 HISTORY OF DIABETES MELLITUS: ICD-10-CM

## 2022-12-04 DIAGNOSIS — F17.200 SMOKER: ICD-10-CM

## 2022-12-04 DIAGNOSIS — R06.02 SHORTNESS OF BREATH: ICD-10-CM

## 2022-12-04 LAB
ALBUMIN SERPL-MCNC: 4.3 G/DL (ref 3.5–5.2)
ALBUMIN/GLOB SERPL: 1.4 G/DL
ALP SERPL-CCNC: 89 U/L (ref 39–117)
ALT SERPL W P-5'-P-CCNC: 34 U/L (ref 1–33)
ANION GAP SERPL CALCULATED.3IONS-SCNC: 12 MMOL/L (ref 5–15)
AST SERPL-CCNC: 38 U/L (ref 1–32)
BASOPHILS # BLD AUTO: 0.07 10*3/MM3 (ref 0–0.2)
BASOPHILS NFR BLD AUTO: 0.7 % (ref 0–1.5)
BILIRUB SERPL-MCNC: 0.4 MG/DL (ref 0–1.2)
BUN SERPL-MCNC: 13 MG/DL (ref 8–23)
BUN/CREAT SERPL: 14.1 (ref 7–25)
CALCIUM SPEC-SCNC: 9.4 MG/DL (ref 8.6–10.5)
CHLORIDE SERPL-SCNC: 98 MMOL/L (ref 98–107)
CO2 SERPL-SCNC: 26 MMOL/L (ref 22–29)
CREAT SERPL-MCNC: 0.92 MG/DL (ref 0.57–1)
DEPRECATED RDW RBC AUTO: 40.5 FL (ref 37–54)
EGFRCR SERPLBLD CKD-EPI 2021: 71.4 ML/MIN/1.73
EOSINOPHIL # BLD AUTO: 0.23 10*3/MM3 (ref 0–0.4)
EOSINOPHIL NFR BLD AUTO: 2.4 % (ref 0.3–6.2)
ERYTHROCYTE [DISTWIDTH] IN BLOOD BY AUTOMATED COUNT: 12.4 % (ref 12.3–15.4)
FLUAV RNA RESP QL NAA+PROBE: NOT DETECTED
FLUBV RNA RESP QL NAA+PROBE: NOT DETECTED
GLOBULIN UR ELPH-MCNC: 3.1 GM/DL
GLUCOSE SERPL-MCNC: 336 MG/DL (ref 65–99)
HCT VFR BLD AUTO: 42.3 % (ref 34–46.6)
HGB BLD-MCNC: 14.4 G/DL (ref 12–15.9)
HOLD SPECIMEN: NORMAL
IMM GRANULOCYTES # BLD AUTO: 0.03 10*3/MM3 (ref 0–0.05)
IMM GRANULOCYTES NFR BLD AUTO: 0.3 % (ref 0–0.5)
LYMPHOCYTES # BLD AUTO: 2.01 10*3/MM3 (ref 0.7–3.1)
LYMPHOCYTES NFR BLD AUTO: 21.1 % (ref 19.6–45.3)
MCH RBC QN AUTO: 30.1 PG (ref 26.6–33)
MCHC RBC AUTO-ENTMCNC: 34 G/DL (ref 31.5–35.7)
MCV RBC AUTO: 88.5 FL (ref 79–97)
MONOCYTES # BLD AUTO: 0.52 10*3/MM3 (ref 0.1–0.9)
MONOCYTES NFR BLD AUTO: 5.5 % (ref 5–12)
NEUTROPHILS NFR BLD AUTO: 6.68 10*3/MM3 (ref 1.7–7)
NEUTROPHILS NFR BLD AUTO: 70 % (ref 42.7–76)
NRBC BLD AUTO-RTO: 0 /100 WBC (ref 0–0.2)
NT-PROBNP SERPL-MCNC: 61.4 PG/ML (ref 0–900)
PLATELET # BLD AUTO: 201 10*3/MM3 (ref 140–450)
PMV BLD AUTO: 10 FL (ref 6–12)
POTASSIUM SERPL-SCNC: 4.5 MMOL/L (ref 3.5–5.2)
PROT SERPL-MCNC: 7.4 G/DL (ref 6–8.5)
QT INTERVAL: 398 MS
QTC INTERVAL: 523 MS
RBC # BLD AUTO: 4.78 10*6/MM3 (ref 3.77–5.28)
SARS-COV-2 RNA RESP QL NAA+PROBE: NOT DETECTED
SODIUM SERPL-SCNC: 136 MMOL/L (ref 136–145)
TROPONIN T SERPL-MCNC: <0.01 NG/ML (ref 0–0.03)
WBC NRBC COR # BLD: 9.54 10*3/MM3 (ref 3.4–10.8)
WHOLE BLOOD HOLD COAG: NORMAL
WHOLE BLOOD HOLD SPECIMEN: NORMAL

## 2022-12-04 PROCEDURE — 99284 EMERGENCY DEPT VISIT MOD MDM: CPT

## 2022-12-04 PROCEDURE — 83880 ASSAY OF NATRIURETIC PEPTIDE: CPT | Performed by: EMERGENCY MEDICINE

## 2022-12-04 PROCEDURE — 84484 ASSAY OF TROPONIN QUANT: CPT | Performed by: EMERGENCY MEDICINE

## 2022-12-04 PROCEDURE — 87636 SARSCOV2 & INF A&B AMP PRB: CPT | Performed by: EMERGENCY MEDICINE

## 2022-12-04 PROCEDURE — 0 IOPAMIDOL PER 1 ML: Performed by: EMERGENCY MEDICINE

## 2022-12-04 PROCEDURE — 94640 AIRWAY INHALATION TREATMENT: CPT

## 2022-12-04 PROCEDURE — 94799 UNLISTED PULMONARY SVC/PX: CPT

## 2022-12-04 PROCEDURE — 93005 ELECTROCARDIOGRAM TRACING: CPT | Performed by: EMERGENCY MEDICINE

## 2022-12-04 PROCEDURE — 85025 COMPLETE CBC W/AUTO DIFF WBC: CPT | Performed by: EMERGENCY MEDICINE

## 2022-12-04 PROCEDURE — 71275 CT ANGIOGRAPHY CHEST: CPT

## 2022-12-04 PROCEDURE — 80053 COMPREHEN METABOLIC PANEL: CPT | Performed by: EMERGENCY MEDICINE

## 2022-12-04 RX ORDER — SODIUM CHLORIDE 0.9 % (FLUSH) 0.9 %
10 SYRINGE (ML) INJECTION AS NEEDED
Status: DISCONTINUED | OUTPATIENT
Start: 2022-12-04 | End: 2022-12-05 | Stop reason: HOSPADM

## 2022-12-04 RX ORDER — DOXYCYCLINE 100 MG/1
100 CAPSULE ORAL 2 TIMES DAILY
Qty: 10 CAPSULE | Refills: 0 | Status: SHIPPED | OUTPATIENT
Start: 2022-12-04 | End: 2022-12-09

## 2022-12-04 RX ORDER — IPRATROPIUM BROMIDE AND ALBUTEROL SULFATE 2.5; .5 MG/3ML; MG/3ML
3 SOLUTION RESPIRATORY (INHALATION) ONCE
Status: COMPLETED | OUTPATIENT
Start: 2022-12-04 | End: 2022-12-04

## 2022-12-04 RX ORDER — PREDNISONE 20 MG/1
20 TABLET ORAL DAILY
Qty: 3 TABLET | Refills: 0 | Status: SHIPPED | OUTPATIENT
Start: 2022-12-04 | End: 2023-02-07

## 2022-12-04 RX ADMIN — IPRATROPIUM BROMIDE AND ALBUTEROL SULFATE 3 ML: .5; 2.5 SOLUTION RESPIRATORY (INHALATION) at 18:51

## 2022-12-04 RX ADMIN — IOPAMIDOL 61 ML: 755 INJECTION, SOLUTION INTRAVENOUS at 19:38

## 2022-12-04 NOTE — ED PROVIDER NOTES
[place]    EMERGENCY DEPARTMENT ENCOUNTER      Pt Name: Kathy Taylor  MRN: [id number]  YOB: 1960  Date of evaluation: 12/4/2022  Provider: Shayne Simental MD    CHIEF COMPLAINT       Chief Complaint   Patient presents with   • Shortness of Breath         HISTORY OF PRESENT ILLNESS  (Location/Symptom, Timing/Onset, Context/Setting, Quality, Duration, Modifying Factors, Severity.)   Kathy Taylor is a 62 y.o. female who presents to the emergency department with moderate severity shortness of breath is worse with exertion along with wheezing and cough productive of yellow sputum without any associated chest pain, fever, or chills over the course the past 3 days that she says is consistent with her usual COPD exacerbation but symptoms not improved with use of her nebulizer at home.  She does continue to smoke.      Nursing notes were reviewed.    REVIEW OF SYSTEMS    (2-9 systems for level 4, 10 or more for level 5)   ROS:  General:  No fevers, no chills, no weakness  Cardiovascular:  No chest pain, no palpitations  Respiratory: Shortness of breath, wheezing  Gastrointestinal:  No pain, no nausea, no vomiting, no diarrhea  Musculoskeletal:  No muscle pain, no joint pain  Skin:  No rash  Neurologic:  No speech problems, no headache, no extremity numbness, no extremity tingling, no extremity weakness  Psychiatric:  No anxiety  Genitourinary:  No dysuria, no hematuria    Except as noted above the remainder of the review of systems was reviewed and negative.       PAST MEDICAL HISTORY     Past Medical History:   Diagnosis Date   • Arthritis    • Back ache    • Bronchiectasis (HCC) ?   • Bronchitis    • Chronic bronchitis (HCC) ?   • COPD (chronic obstructive pulmonary disease) (HCC)    • Fibromyalgia    • Migraines    • Pneumonia    • Rheumatic fever    • Sleep apnea, obstructive ?   • Tooth infection    • Type 2 diabetes mellitus (HCC)    • Vulvar carcinoma (HCC)          SURGICAL HISTORY       Past  Surgical History:   Procedure Laterality Date   • BREAST BIOPSY Left    • BREAST LUMPECTOMY Left 2004   • TONSILLECTOMY     • TUBAL ABDOMINAL LIGATION     • VULVA BIOPSY     • VULVA SURGERY     • WISDOM TOOTH EXTRACTION           CURRENT MEDICATIONS       Current Facility-Administered Medications:   •  sodium chloride 0.9 % flush 10 mL, 10 mL, Intravenous, PRN, Shayne Simental MD    Current Outpatient Medications:   •  Accu-Chek Softclix Lancets lancets, USE ONE LANCET WITH EACH BLOOD GLUCOSE TEST - TEST 2 TO 3 TIMES DAILY, Disp: 100 each, Rfl: 3  •  albuterol sulfate HFA (Ventolin HFA) 108 (90 Base) MCG/ACT inhaler, Inhale 2 puffs Every 4 (Four) Hours As Needed for Wheezing., Disp: 8 g, Rfl: 6  •  ASPIRIN 81 PO, Take 1 tablet by mouth Daily., Disp: , Rfl:   •  glipizide (GLUCOTROL XL) 5 MG ER tablet, Take 3 tablets by mouth once daily, Disp: 90 tablet, Rfl: 1  •  glucose blood test strip, Use as instructed 2-3 times a day, Disp: 100 each, Rfl: 3  •  Insulin aspart (FIASP FLEXTOUCH) 100 UNIT/ML solution pen-injector injection pen, For use at mealtimes and per correctional scale, MDD 80 units, Disp: 24 mL, Rfl: 5  •  Insulin Glargine (LANTUS SOLOSTAR) 100 UNIT/ML injection pen, Inject 50 Units under the skin into the appropriate area as directed Daily., Disp: 15 mL, Rfl: 5  •  Insulin Pen Needle (BD Pen Needle Nessa U/F) 32G X 4 MM misc, Use as directed 5 times daily, Disp: 150 each, Rfl: 3  •  ipratropium-albuterol (DUO-NEB) 0.5-2.5 mg/3 ml nebulizer, Take 3 mL by nebulization 4 (Four) Times a Day., Disp: 1080 mL, Rfl: 3  •  meloxicam (MOBIC) 15 MG tablet, Take 15 mg by mouth Daily. Take with food., Disp: , Rfl:   •  metFORMIN ER (GLUCOPHAGE-XR) 500 MG 24 hr tablet, TAKE 1 TABLET BY MOUTH IN THE MORNING BEFORE BREAKFAST, Disp: 90 tablet, Rfl: 1  •  rosuvastatin (CRESTOR) 20 MG tablet, Take 1 tablet by mouth Daily., Disp: 30 tablet, Rfl: 11  •  doxycycline (MONODOX) 100 MG capsule, Take 1 capsule by mouth 2 (Two)  Times a Day for 5 days., Disp: 10 capsule, Rfl: 0  •  predniSONE (DELTASONE) 20 MG tablet, Take 1 tablet by mouth Daily., Disp: 3 tablet, Rfl: 0    ALLERGIES     Bee venom and Ciprofloxacin    FAMILY HISTORY       Family History   Problem Relation Age of Onset   • Arthritis Mother    • Diabetes Mother    • Heart disease Mother    • Thyroid disease Mother    • Uterine cancer Mother    • Cancer Mother    • Heart disease Brother    • Diabetes Brother    • Breast cancer Other         MATERNAL GREAT AUNT   • Cancer Maternal Grandmother    • Ovarian cancer Neg Hx           SOCIAL HISTORY       Social History     Socioeconomic History   • Marital status:    Tobacco Use   • Smoking status: Every Day     Packs/day: 1.00     Years: 50.00     Pack years: 50.00     Types: Cigarettes   • Smokeless tobacco: Never   Vaping Use   • Vaping Use: Former   • Substances: Nicotine   • Devices: Pre-filled or refillable cartridge   Substance and Sexual Activity   • Alcohol use: Yes     Comment: Only 2 or 3 times a year.   • Drug use: Never   • Sexual activity: Not Currently     Partners: Male     Birth control/protection: Tubal ligation         PHYSICAL EXAM    (up to 7 for level 4, 8 or more for level 5)     Vitals:    12/04/22 2000 12/04/22 2030 12/04/22 2100 12/04/22 2130   BP: 124/67 118/69 117/71 131/71   BP Location:       Patient Position:       Pulse: 98 97 96 94   Resp:       Temp:       TempSrc:       SpO2: 96% 98% 94% 97%   Weight:       Height:           Physical Exam  General: Awake, alert, no acute distress.  HEENT: Conjunctivae normal.  Neck: Trachea midline.  Cardiac: Heart regular rate, rhythm, no murmurs, rubs, or gallops  Lungs: Mild diffuse expiratory wheezes.  No focal finding.  Chest wall: There is no tenderness to palpation over the chest wall or over ribs  Abdomen: Abdomen is soft, nontender, nondistended. There are no firm or pulsatile masses, no rebound rigidity or guarding.   Musculoskeletal: No  deformity.  Neuro: Alert and oriented x 4.  Dermatology: Skin is warm and dry  Psych: Mentation is grossly normal, cognition is grossly normal. Affect is appropriate.        DIAGNOSTIC RESULTS     EKG: All EKGs are interpreted by the Emergency Department Physician who either signs or Co-signs this chart in the absence of a cardiologist.    ECG 12 Lead ED Triage Standing Order; SOA   Preliminary Result   Test Reason : ED Triage Standing Order~   Blood Pressure :   */*   mmHG   Vent. Rate : 104 BPM     Atrial Rate : 104 BPM      P-R Int : 146 ms          QRS Dur : 120 ms       QT Int : 398 ms       P-R-T Axes :  95 110  40 degrees      QTc Int : 523 ms      ** Suspect arm lead reversal, interpretation assumes no reversal   Sinus tachycardia   Right bundle branch block   Left posterior fascicular block   ** Bifascicular block **   Abnormal ECG   When compared with ECG of 16-OCT-2022 20:53,   No significant change was found      Referred By: EDMD           Confirmed By:           RADIOLOGY:   Non-plain film images such as CT, Ultrasound and MRI are read by the radiologist. Plain radiographic images are visualized and preliminarily interpreted by the emergency physician with the below findings:      [x] Radiologist's Report Reviewed:  CT Angiogram Chest   Final Result   1.  No definite findings of acute pulmonary embolism.   2.  Suspected mild atelectasis in the lower lungs.   3.  Calcific atherosclerosis of the coronary arteries and aorta.       This report was finalized on 12/4/2022 8:19 PM by Clement Luna MD.                ED BEDSIDE ULTRASOUND:   Performed by ED Physician - none    LABS:    I have reviewed and interpreted all of the currently available lab results from this visit (if applicable):  Results for orders placed or performed during the hospital encounter of 12/04/22   COVID-19 and FLU A/B PCR - Swab, Nasopharynx    Specimen: Nasopharynx; Swab   Result Value Ref Range    COVID19 Not Detected Not Detected  - Ref. Range    Influenza A PCR Not Detected Not Detected    Influenza B PCR Not Detected Not Detected   Comprehensive Metabolic Panel    Specimen: Blood   Result Value Ref Range    Glucose 336 (H) 65 - 99 mg/dL    BUN 13 8 - 23 mg/dL    Creatinine 0.92 0.57 - 1.00 mg/dL    Sodium 136 136 - 145 mmol/L    Potassium 4.5 3.5 - 5.2 mmol/L    Chloride 98 98 - 107 mmol/L    CO2 26.0 22.0 - 29.0 mmol/L    Calcium 9.4 8.6 - 10.5 mg/dL    Total Protein 7.4 6.0 - 8.5 g/dL    Albumin 4.30 3.50 - 5.20 g/dL    ALT (SGPT) 34 (H) 1 - 33 U/L    AST (SGOT) 38 (H) 1 - 32 U/L    Alkaline Phosphatase 89 39 - 117 U/L    Total Bilirubin 0.4 0.0 - 1.2 mg/dL    Globulin 3.1 gm/dL    A/G Ratio 1.4 g/dL    BUN/Creatinine Ratio 14.1 7.0 - 25.0    Anion Gap 12.0 5.0 - 15.0 mmol/L    eGFR 71.4 >60.0 mL/min/1.73   BNP    Specimen: Blood   Result Value Ref Range    proBNP 61.4 0.0 - 900.0 pg/mL   Troponin    Specimen: Blood   Result Value Ref Range    Troponin T <0.010 0.000 - 0.030 ng/mL   CBC Auto Differential    Specimen: Blood   Result Value Ref Range    WBC 9.54 3.40 - 10.80 10*3/mm3    RBC 4.78 3.77 - 5.28 10*6/mm3    Hemoglobin 14.4 12.0 - 15.9 g/dL    Hematocrit 42.3 34.0 - 46.6 %    MCV 88.5 79.0 - 97.0 fL    MCH 30.1 26.6 - 33.0 pg    MCHC 34.0 31.5 - 35.7 g/dL    RDW 12.4 12.3 - 15.4 %    RDW-SD 40.5 37.0 - 54.0 fl    MPV 10.0 6.0 - 12.0 fL    Platelets 201 140 - 450 10*3/mm3    Neutrophil % 70.0 42.7 - 76.0 %    Lymphocyte % 21.1 19.6 - 45.3 %    Monocyte % 5.5 5.0 - 12.0 %    Eosinophil % 2.4 0.3 - 6.2 %    Basophil % 0.7 0.0 - 1.5 %    Immature Grans % 0.3 0.0 - 0.5 %    Neutrophils, Absolute 6.68 1.70 - 7.00 10*3/mm3    Lymphocytes, Absolute 2.01 0.70 - 3.10 10*3/mm3    Monocytes, Absolute 0.52 0.10 - 0.90 10*3/mm3    Eosinophils, Absolute 0.23 0.00 - 0.40 10*3/mm3    Basophils, Absolute 0.07 0.00 - 0.20 10*3/mm3    Immature Grans, Absolute 0.03 0.00 - 0.05 10*3/mm3    nRBC 0.0 0.0 - 0.2 /100 WBC   ECG 12 Lead ED Triage Standing  Order; SOA   Result Value Ref Range    QT Interval 398 ms    QTC Interval 523 ms   Green Top (Gel)   Result Value Ref Range    Extra Tube Hold for add-ons.    Lavender Top   Result Value Ref Range    Extra Tube hold for add-on    Gold Top - SST   Result Value Ref Range    Extra Tube Hold for add-ons.    Light Blue Top   Result Value Ref Range    Extra Tube Hold for add-ons.         All other labs were within normal range or not returned as of this dictation.      EMERGENCY DEPARTMENT COURSE and DIFFERENTIAL DIAGNOSIS/MDM:   Vitals:    Vitals:    12/04/22 2000 12/04/22 2030 12/04/22 2100 12/04/22 2130   BP: 124/67 118/69 117/71 131/71   BP Location:       Patient Position:       Pulse: 98 97 96 94   Resp:       Temp:       TempSrc:       SpO2: 96% 98% 94% 97%   Weight:       Height:                Presentation most consistent with acute exacerbation of COPD.  Patient in no respiratory distress and requiring no supplemental oxygen.  CTA demonstrates no evidence of PE, pneumonia, pneumothorax, or other acute thoracic process.  Labs demonstrate no evidence of myocardial injury, CHF, or significant anemia.  Patient significantly improved with neb in the ED.    I had a discussion with the patient/family regarding diagnosis, diagnostic results, treatment plan, and medications.  The patient/family indicated understanding of these instructions.  I spent adequate time at the bedside preceding discharge necessary to personally discuss the aftercare instructions, giving patient education, providing explanations of the results of our evaluations/findings, and my decision making to assure that the patient/family understand the plan of care.  Time was allotted to answer questions at that time and throughout the ED course.  Emphasis was placed on timely follow-up after discharge.  I also discussed the potential for the development of an acute emergent condition requiring further evaluation, admission, or even surgical intervention.  I discussed that we found nothing during the visit today indicating the need for further workup, admission, or the presence of an unstable medical condition.  I encouraged the patient to return to the emergency department immediately for ANY concerns, worsening, new complaints, or if symptoms persist and unable to seek follow-up in a timely fashion.  The patient/family expressed understanding and agreement with this plan.  The patient will follow-up with their PCP in 1-2 days for reevaluation.       MEDICATIONS ADMINISTERED IN ED:  Medications   sodium chloride 0.9 % flush 10 mL (has no administration in time range)   ipratropium-albuterol (DUO-NEB) nebulizer solution 3 mL (3 mL Nebulization Given 12/4/22 1851)   iopamidol (ISOVUE-370) 76 % injection 100 mL (61 mL Intravenous Given 12/4/22 1938)       PROCEDURES:  Procedures    CRITICAL CARE TIME    Total Critical Care time was 0 minutes, excluding separately reportable procedures.   There was a high probability of clinically significant/life threatening deterioration in the patient's condition which required my urgent intervention.      FINAL IMPRESSION      1. Acute exacerbation of chronic obstructive pulmonary disease (COPD) (HCC)    2. Shortness of breath    3. Smoker    4. History of diabetes mellitus          DISPOSITION/PLAN     ED Disposition     ED Disposition   Discharge    Condition   Stable    Comment   --             PATIENT REFERRED TO:  Provider, No Known  [facility] [id number]    Schedule an appointment as soon as possible for a visit in 2 days      [facility] Emergency Department  [facility] [facility] [address] [phone]    If symptoms worsen    Provider, No Known  [facility] [address]    Schedule an appointment as soon as possible for a visit in 2 days      [facility] Emergency Department  [facility]  [address] [phone]    If symptoms worsen      DISCHARGE MEDICATIONS:     Medication List      START taking these medications    doxycycline 100 MG capsule  Commonly known as: MONODOX  Take 1 capsule by mouth 2 (Two) Times a Day for 5 days.     predniSONE 20 MG tablet  Commonly known as: DELTASONE  Take 1 tablet by mouth Daily.        CONTINUE taking these medications    Accu-Chek Softclix Lancets lancets  USE ONE LANCET WITH EACH BLOOD GLUCOSE TEST - TEST 2 TO 3 TIMES DAILY     albuterol sulfate  (90 Base) MCG/ACT inhaler  Commonly known as: Ventolin HFA  Inhale 2 puffs Every 4 (Four) Hours As Needed for Wheezing.     ASPIRIN 81 PO     BD Pen Needle Nessa U/F 32G X 4 MM misc  Generic drug: Insulin Pen Needle  Use as directed 5 times daily     glipizide 5 MG ER tablet  Commonly known as: GLUCOTROL XL  Take 3 tablets by mouth once daily     glucose blood test strip  Use as instructed 2-3 times a day     Insulin Aspart (w/Niacinamide) 100 UNIT/ML solution pen-injector  For use at mealtimes and per correctional scale, MDD 80 units     Insulin Glargine 100 UNIT/ML injection pen  Commonly known as: LANTUS SOLOSTAR  Inject 50 Units under the skin into the appropriate area as directed Daily.     ipratropium-albuterol 0.5-2.5 mg/3 ml nebulizer  Commonly known as: DUO-NEB  Take 3 mL by nebulization 4 (Four) Times a Day.     meloxicam 15 MG tablet  Commonly known as: MOBIC     metFORMIN  MG 24 hr tablet  Commonly known as: GLUCOPHAGE-XR  TAKE 1 TABLET BY MOUTH IN THE MORNING BEFORE BREAKFAST     rosuvastatin 20 MG tablet  Commonly known as: CRESTOR  Take 1 tablet by mouth Daily.           Where to Get Your Medications      These medications were sent to [facility] [address] [place] [address] - [address] [place] [address] - [phone]  - [phone] [place] [address]    Phone: [phone]   · doxycycline 100 MG capsule  · predniSONE 20 MG tablet             Comment: Please note  this report has been produced using speech recognition software.      Shayne Simental MD  Attending Emergency Physician               Shayne Simental MD  12/04/22 3625

## 2022-12-05 ENCOUNTER — TELEPHONE (OUTPATIENT)
Dept: OTHER | Facility: HOSPITAL | Age: 62
End: 2022-12-05

## 2022-12-05 NOTE — TELEPHONE ENCOUNTER
COPD Nurse Navigator attempted to call patient in regard to Emergency Department visit 12/4/2022.  There was no answer at the time of the call.

## 2022-12-10 DIAGNOSIS — E11.65 TYPE 2 DIABETES MELLITUS WITH HYPERGLYCEMIA, WITH LONG-TERM CURRENT USE OF INSULIN: ICD-10-CM

## 2022-12-10 DIAGNOSIS — Z79.4 TYPE 2 DIABETES MELLITUS WITH HYPERGLYCEMIA, WITH LONG-TERM CURRENT USE OF INSULIN: ICD-10-CM

## 2022-12-12 RX ORDER — GLIPIZIDE 5 MG/1
15 TABLET, FILM COATED, EXTENDED RELEASE ORAL DAILY
Qty: 30 TABLET | Refills: 1 | Status: SHIPPED | OUTPATIENT
Start: 2022-12-12 | End: 2023-01-23

## 2023-01-10 ENCOUNTER — HOSPITAL ENCOUNTER (OUTPATIENT)
Dept: CT IMAGING | Facility: HOSPITAL | Age: 63
Discharge: HOME | End: 2023-01-10
Admitting: INTERNAL MEDICINE
Payer: MEDICARE

## 2023-01-10 DIAGNOSIS — Z87.891 PERSONAL HISTORY OF NICOTINE DEPENDENCE: ICD-10-CM

## 2023-01-10 PROCEDURE — 71271 CT THORAX LUNG CANCER SCR C-: CPT

## 2023-01-21 DIAGNOSIS — E11.65 TYPE 2 DIABETES MELLITUS WITH HYPERGLYCEMIA, WITH LONG-TERM CURRENT USE OF INSULIN: ICD-10-CM

## 2023-01-21 DIAGNOSIS — Z79.4 TYPE 2 DIABETES MELLITUS WITH HYPERGLYCEMIA, WITH LONG-TERM CURRENT USE OF INSULIN: ICD-10-CM

## 2023-01-24 RX ORDER — GLIPIZIDE 5 MG/1
TABLET, FILM COATED, EXTENDED RELEASE ORAL
Qty: 90 TABLET | Refills: 0 | Status: SHIPPED | OUTPATIENT
Start: 2023-01-24 | End: 2023-02-01

## 2023-02-01 DIAGNOSIS — Z79.4 TYPE 2 DIABETES MELLITUS WITH HYPERGLYCEMIA, WITH LONG-TERM CURRENT USE OF INSULIN: ICD-10-CM

## 2023-02-01 DIAGNOSIS — E11.65 TYPE 2 DIABETES MELLITUS WITH HYPERGLYCEMIA, WITH LONG-TERM CURRENT USE OF INSULIN: ICD-10-CM

## 2023-02-01 RX ORDER — METFORMIN HYDROCHLORIDE 500 MG/1
TABLET, EXTENDED RELEASE ORAL
Qty: 90 TABLET | Refills: 0 | Status: SHIPPED | OUTPATIENT
Start: 2023-02-01

## 2023-02-01 RX ORDER — GLIPIZIDE 5 MG/1
TABLET, FILM COATED, EXTENDED RELEASE ORAL
Qty: 90 TABLET | Refills: 0 | Status: SHIPPED | OUTPATIENT
Start: 2023-02-01 | End: 2023-03-21

## 2023-02-01 RX ORDER — INSULIN GLARGINE 100 [IU]/ML
INJECTION, SOLUTION SUBCUTANEOUS
Qty: 15 ML | Refills: 0 | Status: SHIPPED | OUTPATIENT
Start: 2023-02-01

## 2023-02-01 NOTE — TELEPHONE ENCOUNTER
Patient has not been seen since 3/22, follow up in Late June is not appropriate, please aid in scheduling sooner appointment, ok to OB any day <20. 30 day supply sent

## 2023-02-07 ENCOUNTER — OFFICE VISIT (OUTPATIENT)
Dept: CARDIOLOGY | Facility: CLINIC | Age: 63
End: 2023-02-07
Payer: MEDICARE

## 2023-02-07 ENCOUNTER — OFFICE VISIT (OUTPATIENT)
Dept: PULMONOLOGY | Facility: CLINIC | Age: 63
End: 2023-02-07
Payer: MEDICARE

## 2023-02-07 VITALS
WEIGHT: 179 LBS | BODY MASS INDEX: 28.77 KG/M2 | TEMPERATURE: 97.8 F | DIASTOLIC BLOOD PRESSURE: 80 MMHG | HEART RATE: 100 BPM | OXYGEN SATURATION: 98 % | SYSTOLIC BLOOD PRESSURE: 128 MMHG | HEIGHT: 66 IN

## 2023-02-07 VITALS
OXYGEN SATURATION: 96 % | DIASTOLIC BLOOD PRESSURE: 78 MMHG | HEART RATE: 96 BPM | WEIGHT: 178 LBS | HEIGHT: 66 IN | SYSTOLIC BLOOD PRESSURE: 128 MMHG | BODY MASS INDEX: 28.61 KG/M2

## 2023-02-07 DIAGNOSIS — G45.3 AMAUROSIS FUGAX: ICD-10-CM

## 2023-02-07 DIAGNOSIS — G47.33 OBSTRUCTIVE SLEEP APNEA: ICD-10-CM

## 2023-02-07 DIAGNOSIS — G47.34 NOCTURNAL HYPOXEMIA: ICD-10-CM

## 2023-02-07 DIAGNOSIS — J44.9 COPD MIXED TYPE: Primary | ICD-10-CM

## 2023-02-07 DIAGNOSIS — H53.9 VISION CHANGES: Primary | ICD-10-CM

## 2023-02-07 DIAGNOSIS — Z72.0 TOBACCO ABUSE: ICD-10-CM

## 2023-02-07 PROCEDURE — 99214 OFFICE O/P EST MOD 30 MIN: CPT | Performed by: INTERNAL MEDICINE

## 2023-02-07 RX ORDER — TIOTROPIUM BROMIDE AND OLODATEROL 3.124; 2.736 UG/1; UG/1
SPRAY, METERED RESPIRATORY (INHALATION)
COMMUNITY

## 2023-02-07 RX ORDER — DOXYCYCLINE HYCLATE 100 MG/1
100 CAPSULE ORAL 2 TIMES DAILY
COMMUNITY

## 2023-02-07 RX ORDER — DOXYCYCLINE HYCLATE 100 MG/1
100 CAPSULE ORAL 2 TIMES DAILY
Qty: 20 CAPSULE | Refills: 0 | Status: SHIPPED | OUTPATIENT
Start: 2023-02-07 | End: 2023-02-07

## 2023-02-07 RX ORDER — PREDNISONE 10 MG/1
TABLET ORAL
Qty: 31 TABLET | Refills: 0 | Status: SHIPPED | OUTPATIENT
Start: 2023-02-07 | End: 2023-03-24 | Stop reason: SDUPTHER

## 2023-02-07 NOTE — PROGRESS NOTES
[facility] Cardiology  Follow Up Visit  Kathy Taylor  1960    VISIT DATE:  02/07/23    PCP:   Provider, No Known  [facility] [address]          CC:  Primary hypertension      Problem List:  1. Chest pain  1. Normal echo and stress test per patient report 2020  2. Rheumatic fever - hospitalized as child  3. Family history of heart disease  4. Stage III COPD  5. Diabetes mellitus - onset 2012  6. Current tobacco abuse - >50 years   7. HLD  8. 1/7/2021 [facility] admission for COVID-19  9. Fibromyalgia  10. Tooth infection  11. Migraines   12. Surgical  1. Breast lumpectomy - 04  2. Tubal abdominal ligation  3. Vulvar surgery  4. [place] tooth extraction     Cardiac testing:     Echo January 2022  • Calculated left ventricular EF = 55%  • All left ventricular wall segments contract normally.  • No significant valvular abnormalities  • No pericardial effusion.     Stress test February 2022  • Myocardial perfusion imaging indicates a normal myocardial perfusion study with no evidence of ischemia.  • Left ventricular ejection fraction is normal. (Calculated EF = 57%).         History of Present Illness:  Kathy Taylor  Is a 62 y.o. female with pertinent cardiac history detailed above.  Patient following up for chest pain.  Stress test last year was negative for ischemia.  Was hypertensive at last visit and HCTZ was added but this actually caused symptomatic hypotension so it was discontinued.  Today her blood pressure is controlled off of medication.  She has just come from the pulmonary office and is being prescribed prednisone and doxycycline for COPD exacerbation.  She has increased cough and wheezing.  She is still smoking a pack to a pack and half a day.  Chest pain is currently improved.  Following up with endocrinology later this spring.  Last A1c remains significantly elevated.  Additionally she is complained of intermittent spotty vision in her right eye.    Patient Active Problem  List    Diagnosis Date Noted   • Stage III (Severe) COPD 02/07/2023   • Tobacco abuse 02/07/2023   • COVID-19 virus infection 01/07/2022   • DM2 (diabetes mellitus, type 2) ([facility]) 01/07/2022   • Obstructive sleep apnea 02/24/2021   • Nocturnal hypoxemia 02/24/2021   • Polypharmacy 02/24/2021       Allergies   Allergen Reactions   • Bee Venom Anaphylaxis   • Ciprofloxacin Shortness Of Breath       Social History     Socioeconomic History   • Marital status:    Tobacco Use   • Smoking status: Every Day     Packs/day: 1.00     Years: 50.00     Pack years: 50.00     Types: Cigarettes   • Smokeless tobacco: Never   Vaping Use   • Vaping Use: Former   • Substances: Nicotine   • Devices: Pre-filled or refillable cartridge   Substance and Sexual Activity   • Alcohol use: Not Currently     Comment: Only 2 or 3 times a year.   • Drug use: Never   • Sexual activity: Not Currently     Partners: Male     Birth control/protection: Tubal ligation       Family History   Problem Relation Age of Onset   • Arthritis Mother    • Diabetes Mother    • Heart disease Mother    • Thyroid disease Mother    • Uterine cancer Mother    • Cancer Mother    • Heart disease Brother    • Diabetes Brother    • Breast cancer Other         MATERNAL GREAT AUNT   • Cancer Maternal Grandmother    • Heart attack Maternal Grandmother    • Ovarian cancer Neg Hx        Current Medications:    Current Outpatient Medications:   •  Accu-Chek Softclix Lancets lancets, USE ONE LANCET WITH EACH BLOOD GLUCOSE TEST - TEST 2 TO 3 TIMES DAILY, Disp: 100 each, Rfl: 3  •  albuterol sulfate HFA (Ventolin HFA) 108 (90 Base) MCG/ACT inhaler, Inhale 2 puffs Every 4 (Four) Hours As Needed for Wheezing., Disp: 8 g, Rfl: 6  •  ASPIRIN 81 PO, Take 1 tablet by mouth Daily., Disp: , Rfl:   •  doxycycline (VIBRAMYCIN) 100 MG capsule, Take 100 mg by mouth 2 (Two) Times a Day., Disp: , Rfl:   •  glipizide (GLUCOTROL XL) 5 MG ER tablet, TAKE 3 TABLETS BY MOUTH ONCE DAILY .  "APPOINTMENT REQUIRED FOR FUTURE REFILLS, Disp: 90 tablet, Rfl: 0  •  glucose blood test strip, Use as instructed 2-3 times a day, Disp: 100 each, Rfl: 3  •  Insulin aspart (FIASP FLEXTOUCH) 100 UNIT/ML solution pen-injector injection pen, For use at mealtimes and per correctional scale, MDD 80 units, Disp: 24 mL, Rfl: 5  •  Insulin Pen Needle (BD Pen Needle Nessa U/F) 32G X 4 MM misc, Use as directed 5 times daily, Disp: 150 each, Rfl: 3  •  ipratropium-albuterol (DUO-NEB) 0.5-2.5 mg/3 ml nebulizer, Take 3 mL by nebulization 4 (Four) Times a Day., Disp: 1080 mL, Rfl: 3  •  Lantus SoloStar 100 UNIT/ML injection pen, INJECT 50 UNITS SUBCUTANEOUSLY ONCE DAILY AS DIRECTED, Disp: 15 mL, Rfl: 0  •  metFORMIN ER (GLUCOPHAGE-XR) 500 MG 24 hr tablet, TAKE 1 TABLET BY MOUTH IN THE MORNING BEFORE BREAKFAST, Disp: 90 tablet, Rfl: 0  •  predniSONE (DELTASONE) 10 MG tablet, Take 3 tabs daily x 4 days, then take 2 tabs daily x 4 days, then take 1 tab daily x 4 days, Disp: 31 tablet, Rfl: 0  •  rosuvastatin (CRESTOR) 20 MG tablet, Take 1 tablet by mouth Daily., Disp: 30 tablet, Rfl: 11  •  tiotropium bromide-olodaterol (Stiolto Respimat) 2.5-2.5 MCG/ACT aerosol solution inhaler, Inhale Daily., Disp: , Rfl:   •  meloxicam (MOBIC) 15 MG tablet, Take 15 mg by mouth Daily. Take with food., Disp: , Rfl:      Review of Systems   Eyes: Positive for vision loss in right eye and visual disturbance.   Cardiovascular: Positive for dyspnea on exertion. Negative for chest pain.   Respiratory: Positive for cough, shortness of breath and wheezing.        Vitals:    02/07/23 1527   BP: 128/78   BP Location: Left arm   Patient Position: Sitting   Pulse: 96   SpO2: 96%   Weight: 80.7 kg (178 lb)   Height: 167.6 cm (66\")       Physical Exam  Constitutional:       Appearance: Normal appearance.   Cardiovascular:      Rate and Rhythm: Normal rate and regular rhythm.      Heart sounds: Normal heart sounds.   Pulmonary:      Effort: Pulmonary effort is " normal.      Breath sounds: Wheezing present.   Musculoskeletal:      Right lower leg: No edema.      Left lower leg: No edema.   Neurological:      General: No focal deficit present.      Mental Status: She is alert.         Diagnostic Data:  Procedures  Lab Results   Component Value Date    CHLPL 119 03/31/2022    TRIG 110 03/31/2022    HDL 66 03/31/2022     Lab Results   Component Value Date    GLUCOSE 336 (H) 12/04/2022    BUN 13 12/04/2022    CREATININE 0.92 12/04/2022     12/04/2022    K 4.5 12/04/2022    CL 98 12/04/2022    CO2 26.0 12/04/2022     Lab Results   Component Value Date    HGBA1C 11.3 02/17/2022     Lab Results   Component Value Date    WBC 9.54 12/04/2022    HGB 14.4 12/04/2022    HCT 42.3 12/04/2022     12/04/2022       Assessment:   Diagnosis Plan   1. Vision changes  Duplex Carotid Ultrasound CAR      2. Amaurosis fugax  Duplex Carotid Ultrasound CAR          Plan:    1.  Chest pain-  currently resolved.  Stress test 2022 negative for ischemia   -Continue aspirin given coronary artery calcification     2.  Severe COPD on PFTs  June 2022  -follows with pulmonary, just came from there and being treated for recurrent COPD exacerbation.     3.  Diabetes//HLD  -LDL well controlled at 33, continue Crestor 20 mg  -A1c elevated 11.3  -follows with endocrine next follow-up this spring     4.  HTN  -controlled off medication currenlty    5.  Tobacco abuse  Encourage cessation and discussed benefits.  Tried to link ongoing COPD to her ongoing smoking    ACP discussion was declined by the patient. Patient does not have an advance directive, declines further assistance.      Jono Estrella MD [facility]

## 2023-02-07 NOTE — PROGRESS NOTES
PULMONARY  NOTE    Chief Complaint     Stage III COPD, tobacco abuse, SABRINA, nocturnal hypoxemia, perennial rhinitis    History of Present Illness     62-year-old female returns today for follow-up  I last saw her 9/20/2022    She has a history of tobacco abuse which is ongoing  At this point she does not have a smoking cessation date set, yet    Last LDCT was done a couple of weeks ago  Results are as noted below    She has stage III, severe, chronic obstructive pulmonary disease on prior spirometry  She has been on Stiolto with albuterol  Insurance coverage has been poor for all the inhalers    She has a history of obstructive sleep apnea  She needs a repeat sleep study to get new equipment  She had been set up to go to a sleep study at [facility] but she could not go and canceled it  They are trying to arrange for her to go to the [facility] for a sleep study    For the last 3 to 4 days she has had increasing cough, dyspnea, wheezing, and green sputum production    Patient Active Problem List   Diagnosis   • Obstructive sleep apnea   • Nocturnal hypoxemia   • Polypharmacy   • COVID-19 virus infection   • DM2 (diabetes mellitus, type 2) ([facility])   • Stage III (Severe) COPD   • Tobacco abuse     Allergies   Allergen Reactions   • Bee Venom Anaphylaxis   • Ciprofloxacin Shortness Of Breath       Current Outpatient Medications:   •  Accu-Chek Softclix Lancets lancets, USE ONE LANCET WITH EACH BLOOD GLUCOSE TEST - TEST 2 TO 3 TIMES DAILY, Disp: 100 each, Rfl: 3  •  albuterol sulfate HFA (Ventolin HFA) 108 (90 Base) MCG/ACT inhaler, Inhale 2 puffs Every 4 (Four) Hours As Needed for Wheezing., Disp: 8 g, Rfl: 6  •  ASPIRIN 81 PO, Take 1 tablet by mouth Daily., Disp: , Rfl:   •  glipizide (GLUCOTROL XL) 5 MG ER tablet, TAKE 3 TABLETS BY MOUTH ONCE DAILY . APPOINTMENT REQUIRED FOR FUTURE REFILLS, Disp: 90 tablet, Rfl: 0  •  glucose blood test strip, Use as instructed 2-3 times a day, Disp: 100 each, Rfl: 3  •  Insulin  aspart (FIASP FLEXTOUCH) 100 UNIT/ML solution pen-injector injection pen, For use at mealtimes and per correctional scale, MDD 80 units, Disp: 24 mL, Rfl: 5  •  Insulin Pen Needle (BD Pen Needle Nessa U/F) 32G X 4 MM misc, Use as directed 5 times daily, Disp: 150 each, Rfl: 3  •  ipratropium-albuterol (DUO-NEB) 0.5-2.5 mg/3 ml nebulizer, Take 3 mL by nebulization 4 (Four) Times a Day., Disp: 1080 mL, Rfl: 3  •  Lantus SoloStar 100 UNIT/ML injection pen, INJECT 50 UNITS SUBCUTANEOUSLY ONCE DAILY AS DIRECTED, Disp: 15 mL, Rfl: 0  •  meloxicam (MOBIC) 15 MG tablet, Take 15 mg by mouth Daily. Take with food., Disp: , Rfl:   •  metFORMIN ER (GLUCOPHAGE-XR) 500 MG 24 hr tablet, TAKE 1 TABLET BY MOUTH IN THE MORNING BEFORE BREAKFAST, Disp: 90 tablet, Rfl: 0  •  rosuvastatin (CRESTOR) 20 MG tablet, Take 1 tablet by mouth Daily., Disp: 30 tablet, Rfl: 11  MEDICATION LIST AND ALLERGIES REVIEWED.    Family History   Problem Relation Age of Onset   • Arthritis Mother    • Diabetes Mother    • Heart disease Mother    • Thyroid disease Mother    • Uterine cancer Mother    • Cancer Mother    • Heart disease Brother    • Diabetes Brother    • Breast cancer Other         MATERNAL GREAT AUNT   • Cancer Maternal Grandmother    • Ovarian cancer Neg Hx      Social History     Tobacco Use   • Smoking status: Every Day     Packs/day: 1.00     Years: 50.00     Pack years: 50.00     Types: Cigarettes   • Smokeless tobacco: Never   Vaping Use   • Vaping Use: Former   • Substances: Nicotine   • Devices: Pre-filled or refillable cartridge   Substance Use Topics   • Alcohol use: Yes     Comment: Only 2 or 3 times a year.   • Drug use: Never     Social History     Social History Narrative        Has moved to [place] from [place]    Continues to smoke up to 1 pack of cigarettes per day    Denies regular alcohol use     FAMILY AND SOCIAL HISTORY REVIEWED.    Review of Systems  ALSO REFER TO SCANNED ROS SHEET FROM SAME DATE.    /80    "Pulse 100   Temp 97.8 °F (36.6 °C)   Ht 167.6 cm (66\")   Wt 81.2 kg (179 lb)   LMP  (LMP Unknown)   SpO2 98% Comment: resting, room air  BMI 28.89 kg/m²   Physical Exam  Vitals and nursing note reviewed.   Constitutional:       General: She is not in acute distress.     Appearance: She is well-developed. She is not diaphoretic.   HENT:      Head: Normocephalic and atraumatic.   Neck:      Thyroid: No thyromegaly.   Cardiovascular:      Rate and Rhythm: Normal rate and regular rhythm.      Heart sounds: Normal heart sounds. No murmur heard.  Pulmonary:      Effort: Pulmonary effort is normal.      Breath sounds: No stridor.      Comments: Bilateral wheezes  Lymphadenopathy:      Cervical: No cervical adenopathy.      Upper Body:      Right upper body: No supraclavicular or epitrochlear adenopathy.      Left upper body: No supraclavicular or epitrochlear adenopathy.   Skin:     General: Skin is warm and dry.   Neurological:      Mental Status: She is alert and oriented to person, place, and time.   Psychiatric:         Behavior: Behavior normal.         Results     Low-dose CT scan of chest from 1/20/2023 reviewed in PACS  No suspicious intrathoracic findings    Immunization History   Administered Date(s) Administered   • COVID-19 (MODERNA) 1st, 2nd, 3rd Dose Only 04/09/2021, 05/07/2021, 04/02/2022   • Flu Vaccine Quad PF >36MO 01/11/2021   • Fluzone Quad >6mos (Multi-dose) 10/13/2016   • Influenza Quad Vaccine (Inpatient) 10/13/2016   • Pneumococcal Polysaccharide (PPSV23) 10/13/2016, 01/11/2021     Problem List       ICD-10-CM ICD-9-CM   1. Stage III (Severe) COPD  J44.9 496   2. Tobacco abuse  Z72.0 305.1   3. Obstructive sleep apnea  G47.33 327.23   4. Nocturnal hypoxemia  G47.34 327.24       Discussion     We reviewed her chest CT  At the very least, I recommend a repeat LDCT in January 2024    We again discussed the need for smoking cessation we discussed smoking cessation strategies    I made it clear " that as long as she continues to smoke she can expect recurrent and progressive symptoms with worsening lung function    She appears to have another acute exacerbation of COPD  I am going to put her on a course of tapering steroids and antibiotics  We will just give her moderate dose steroids but I did warn her it may adversely affect her blood sugars    She is good to remain on the same bronchodilators  I gave her additional samples of Stiolto from the office    Hopefully, she can get set up for another sleep study    I will plan to see her back in 6 months or earlier if there are any problems in the meantime    Moderate level of Medical Decision Making complexity based on 2 or more chronic stable illnesses and an independent review of test results and/or prescription drug management.    Silas Thakur MD  Note electronically signed    CC: Provider, No Known

## 2023-02-24 ENCOUNTER — TELEPHONE (OUTPATIENT)
Dept: PULMONOLOGY | Facility: CLINIC | Age: 63
End: 2023-02-24
Payer: MEDICARE

## 2023-02-24 DIAGNOSIS — J44.9 COPD MIXED TYPE: ICD-10-CM

## 2023-02-24 RX ORDER — IPRATROPIUM BROMIDE AND ALBUTEROL SULFATE 2.5; .5 MG/3ML; MG/3ML
3 SOLUTION RESPIRATORY (INHALATION)
Qty: 1080 ML | Refills: 3 | Status: SHIPPED | OUTPATIENT
Start: 2023-02-24

## 2023-02-24 NOTE — TELEPHONE ENCOUNTER
Pt called in stating her nebulizer medication was on back order and wanted to know if we could send it to a different pharmacy.explain to pt she should call around to different pharmacy and see if its in stock and we can try to send it to a DEM.

## 2023-02-28 ENCOUNTER — HOSPITAL ENCOUNTER (OUTPATIENT)
Dept: CARDIOLOGY | Facility: HOSPITAL | Age: 63
Discharge: HOME | End: 2023-02-28
Admitting: INTERNAL MEDICINE
Payer: MEDICARE

## 2023-02-28 VITALS — WEIGHT: 178 LBS | HEIGHT: 66 IN | BODY MASS INDEX: 28.61 KG/M2

## 2023-02-28 DIAGNOSIS — H53.9 VISION CHANGES: ICD-10-CM

## 2023-02-28 DIAGNOSIS — G45.3 AMAUROSIS FUGAX: ICD-10-CM

## 2023-02-28 LAB
BH CV XLRA MEAS LEFT DIST CCA EDV: 27.7 CM/SEC
BH CV XLRA MEAS LEFT DIST CCA PSV: 93.4 CM/SEC
BH CV XLRA MEAS LEFT DIST ICA EDV: 25 CM/SEC
BH CV XLRA MEAS LEFT DIST ICA PSV: 67.2 CM/SEC
BH CV XLRA MEAS LEFT ICA/CCA RATIO: 0.8
BH CV XLRA MEAS LEFT MID CCA EDV: 25.2 CM/SEC
BH CV XLRA MEAS LEFT MID CCA PSV: 114.5 CM/SEC
BH CV XLRA MEAS LEFT MID ICA EDV: 22.2 CM/SEC
BH CV XLRA MEAS LEFT MID ICA PSV: 89.7 CM/SEC
BH CV XLRA MEAS LEFT PROX CCA EDV: 21.1 CM/SEC
BH CV XLRA MEAS LEFT PROX CCA PSV: 120.6 CM/SEC
BH CV XLRA MEAS LEFT PROX ECA EDV: 14.6 CM/SEC
BH CV XLRA MEAS LEFT PROX ECA PSV: 109.1 CM/SEC
BH CV XLRA MEAS LEFT PROX ICA EDV: 27.7 CM/SEC
BH CV XLRA MEAS LEFT PROX ICA PSV: 91.4 CM/SEC
BH CV XLRA MEAS LEFT PROX SCLA EDV: 1.87 CM/SEC
BH CV XLRA MEAS LEFT PROX SCLA PSV: 141.8 CM/SEC
BH CV XLRA MEAS LEFT VERTEBRAL A EDV: 16.3 CM/SEC
BH CV XLRA MEAS LEFT VERTEBRAL A PSV: 42.6 CM/SEC
BH CV XLRA MEAS RIGHT DIST CCA EDV: 23.3 CM/SEC
BH CV XLRA MEAS RIGHT DIST CCA PSV: 100.9 CM/SEC
BH CV XLRA MEAS RIGHT DIST ICA EDV: 30.6 CM/SEC
BH CV XLRA MEAS RIGHT DIST ICA PSV: 75.6 CM/SEC
BH CV XLRA MEAS RIGHT ICA/CCA RATIO: 0.85
BH CV XLRA MEAS RIGHT MID CCA EDV: 19.2 CM/SEC
BH CV XLRA MEAS RIGHT MID CCA PSV: 105.3 CM/SEC
BH CV XLRA MEAS RIGHT MID ICA EDV: 32 CM/SEC
BH CV XLRA MEAS RIGHT MID ICA PSV: 89.7 CM/SEC
BH CV XLRA MEAS RIGHT PROX CCA EDV: 15.9 CM/SEC
BH CV XLRA MEAS RIGHT PROX CCA PSV: 81.7 CM/SEC
BH CV XLRA MEAS RIGHT PROX ECA EDV: 12.5 CM/SEC
BH CV XLRA MEAS RIGHT PROX ECA PSV: 149.9 CM/SEC
BH CV XLRA MEAS RIGHT PROX ICA EDV: 23.3 CM/SEC
BH CV XLRA MEAS RIGHT PROX ICA PSV: 79 CM/SEC
BH CV XLRA MEAS RIGHT PROX SCLA EDV: 0 CM/SEC
BH CV XLRA MEAS RIGHT PROX SCLA PSV: 181.5 CM/SEC
BH CV XLRA MEAS RIGHT VERTEBRAL A EDV: 19.2 CM/SEC
BH CV XLRA MEAS RIGHT VERTEBRAL A PSV: 64.2 CM/SEC
LEFT ARM BP: NORMAL MMHG
MAXIMAL PREDICTED HEART RATE: 160 BPM
RIGHT ARM BP: NORMAL MMHG
STRESS TARGET HR: 136 BPM

## 2023-02-28 PROCEDURE — 93880 EXTRACRANIAL BILAT STUDY: CPT | Performed by: INTERNAL MEDICINE

## 2023-02-28 PROCEDURE — 93880 EXTRACRANIAL BILAT STUDY: CPT

## 2023-03-01 ENCOUNTER — TELEPHONE (OUTPATIENT)
Dept: CARDIOLOGY | Facility: CLINIC | Age: 63
End: 2023-03-01
Payer: MEDICARE

## 2023-03-01 RX ORDER — ISOSORBIDE MONONITRATE 30 MG/1
TABLET, EXTENDED RELEASE ORAL
Qty: 9 TABLET | Refills: 0 | OUTPATIENT
Start: 2023-03-01

## 2023-03-01 NOTE — TELEPHONE ENCOUNTER
----- Message from Jono Estrella MD sent at 2/28/2023  5:12 PM EST -----  Patient's carotid artery ultrasound shows mild plaque buildup.  Would recommend she just continue her aspirin and Crestor.  No other changes needed at this time

## 2023-03-01 NOTE — TELEPHONE ENCOUNTER
Called pt to see if she was taking Imdur since it was discontinued on her med list. Pt did not answer, LVM for return call.

## 2023-03-02 ENCOUNTER — OFFICE VISIT (OUTPATIENT)
Dept: ENDOCRINOLOGY | Facility: CLINIC | Age: 63
End: 2023-03-02
Payer: MEDICARE

## 2023-03-02 VITALS
SYSTOLIC BLOOD PRESSURE: 126 MMHG | HEIGHT: 66 IN | OXYGEN SATURATION: 96 % | HEART RATE: 100 BPM | BODY MASS INDEX: 28.93 KG/M2 | DIASTOLIC BLOOD PRESSURE: 78 MMHG | WEIGHT: 180 LBS

## 2023-03-02 DIAGNOSIS — E11.65 TYPE 2 DIABETES MELLITUS WITH HYPERGLYCEMIA, WITH LONG-TERM CURRENT USE OF INSULIN: Primary | ICD-10-CM

## 2023-03-02 DIAGNOSIS — E78.5 HYPERLIPIDEMIA, UNSPECIFIED HYPERLIPIDEMIA TYPE: ICD-10-CM

## 2023-03-02 DIAGNOSIS — Z79.4 TYPE 2 DIABETES MELLITUS WITH HYPERGLYCEMIA, WITH LONG-TERM CURRENT USE OF INSULIN: Primary | ICD-10-CM

## 2023-03-02 LAB
EXPIRATION DATE: ABNORMAL
EXPIRATION DATE: NORMAL
GLUCOSE BLDC GLUCOMTR-MCNC: 162 MG/DL (ref 70–130)
HBA1C MFR BLD: 9.7 %
Lab: ABNORMAL
Lab: NORMAL

## 2023-03-02 PROCEDURE — 82947 ASSAY GLUCOSE BLOOD QUANT: CPT | Performed by: INTERNAL MEDICINE

## 2023-03-02 PROCEDURE — 3046F HEMOGLOBIN A1C LEVEL >9.0%: CPT | Performed by: INTERNAL MEDICINE

## 2023-03-02 PROCEDURE — 36415 COLL VENOUS BLD VENIPUNCTURE: CPT | Performed by: INTERNAL MEDICINE

## 2023-03-02 PROCEDURE — 83036 HEMOGLOBIN GLYCOSYLATED A1C: CPT | Performed by: INTERNAL MEDICINE

## 2023-03-02 PROCEDURE — 99214 OFFICE O/P EST MOD 30 MIN: CPT | Performed by: INTERNAL MEDICINE

## 2023-03-02 RX ORDER — ISOSORBIDE DINITRATE 30 MG/1
30 TABLET ORAL DAILY
COMMUNITY
End: 2023-03-07

## 2023-03-02 RX ORDER — TIZANIDINE 4 MG/1
1 TABLET ORAL 3 TIMES DAILY
COMMUNITY
Start: 2023-02-24

## 2023-03-02 NOTE — PROGRESS NOTES
"Chief Complaint   Patient presents with   • Diabetes        HPI   Kathy Taylor is a 62 y.o. female had concerns including Diabetes.      Patient reports she had had COVID and lung infection since last visit.  Patient does report that she self reduced her doses of insulin since last visit.  She is taking only Lantus and correctional Fiasp.    Current diabetes medications include the following.  Glipizide 15 mg extended release daily  Lantus 25 units daily  Metformin extended release 500 mg daily  Fiasp per correction scale 3 per 50 greater than 150, patient estimates using 3 to 12 units at each mealtime.  She generally eating twice daily.    Glucometer data reviewed   7-day average 260  14-day average 274  30-day average 273  Lowest recent value 117    Patient continues on rosuvastatin 20 mg daily for hyperlipidemia.    The following portions of the patient's history were reviewed and updated as appropriate: allergies, current medications and past social history.    Review of Systems   Gastrointestinal: Negative for abdominal pain, nausea and vomiting.   Endocrine: Negative for polydipsia and polyuria.   Musculoskeletal: Negative for myalgias.        /78 (BP Location: Left arm, Patient Position: Sitting, Cuff Size: Adult)   Pulse 100   Ht 167.6 cm (66\")   Wt 81.6 kg (180 lb)   LMP  (LMP Unknown)   SpO2 96%   BMI 29.05 kg/m²      Physical Exam      Constitutional:  well developed; well nourished  no acute distress  appears stated age   ENT/Thyroid: not examined   Eyes: Conjunctiva: clear   Respiratory:  breathing is unlabored  clear to auscultation bilaterally   Cardiovascular:  regular rate and rhythm   Chest:  Not performed.   Abdomen: Not performed.   : Not performed.   Musculoskeletal: Not performed   Skin: not performed.   Neuro: mental status, speech normal   Psych: mood and affect are within normal limits       Labs/Imaging   Latest Reference Range & Units 03/02/23 14:42   Glucose 70 - 130 mg/dL " 162 !   Hemoglobin A1C % 9.7   !: Data is abnormal    Diagnoses and all orders for this visit:    1. Type 2 diabetes mellitus with hyperglycemia, with long-term current use of insulin (HCC) (Primary)  -     POC Glucose, Blood  -     POC Glycosylated Hemoglobin (Hb A1C)  -     Microalbumin / Creatinine Urine Ratio - Urine, Clean Catch; Future  Uncontrolled with hemoglobin A1c 9.7%  Home glucose data reveals hyperglycemia  Patient not taking insulin as instructed last visit, discussed need for scheduled mealtime insulin as correctional only will not be sufficient to achieve adequate glycemic control.  Patient to continue Lantus 25 units daily  Patient to continue glipizide 15 mg extended release daily  Patient to continue metformin extended release 500 mg daily  Patient to start Fiasp 5 units with each meal, continue correction but changed to 1 unit per 50 greater than 150  Patient was advised to monitor blood sugar 3 times daily.  Patient was instructed to bring glucometer to all future appointments. Patient should contact the clinic between appointments with hypoglycemia or persistent hyperglycemia.  Discussed signs and symptoms of hypoglycemia as well as hypoglycemia management via the rule of 15's.  Discussed potential for long-term complications with uncontrolled diabetes including nephropathy, neuropathy, retinopathy, increased risk for cardiac disease.  Discussed the role of diet and exercise in the management of diabetes.  CMP is up-to-date from December 2022, EGFR 71.4  Lipid panel due, ordered  Urine microalbumin, ordered    2. Hyperlipidemia, unspecified hyperlipidemia type  -     Lipid Panel; Future  Update lipid panel today, patient will continue on rosuvastatin         Return in about 4 months (around 7/2/2023) for Next scheduled follow up. The patient was instructed to contact the clinic with any interval questions or concerns.    Sayra Witt MD   Endocrinologist    Dictated Utilizing Dragon  Dictation

## 2023-03-03 LAB
ALBUMIN/CREAT UR: 9 MG/G CREAT (ref 0–29)
CHOLEST SERPL-MCNC: 116 MG/DL (ref 100–199)
CREAT UR-MCNC: 205.9 MG/DL
HDLC SERPL-MCNC: 46 MG/DL
LDLC SERPL CALC-MCNC: 47 MG/DL (ref 0–99)
MICROALBUMIN UR-MCNC: 17.9 UG/ML
TRIGL SERPL-MCNC: 129 MG/DL (ref 0–149)
VLDLC SERPL CALC-MCNC: 23 MG/DL (ref 5–40)

## 2023-03-07 RX ORDER — ISOSORBIDE MONONITRATE 30 MG/1
TABLET, EXTENDED RELEASE ORAL
Qty: 90 TABLET | Refills: 3 | Status: SHIPPED | OUTPATIENT
Start: 2023-03-07

## 2023-03-16 ENCOUNTER — TELEPHONE (OUTPATIENT)
Dept: CARDIOLOGY | Facility: CLINIC | Age: 63
End: 2023-03-16
Payer: MEDICARE

## 2023-03-16 ENCOUNTER — TELEPHONE (OUTPATIENT)
Dept: ENDOCRINOLOGY | Facility: CLINIC | Age: 63
End: 2023-03-16
Payer: MEDICARE

## 2023-03-16 DIAGNOSIS — J44.9 COPD MIXED TYPE: ICD-10-CM

## 2023-03-16 DIAGNOSIS — Z79.4 TYPE 2 DIABETES MELLITUS WITH HYPERGLYCEMIA, WITH LONG-TERM CURRENT USE OF INSULIN: ICD-10-CM

## 2023-03-16 DIAGNOSIS — E11.65 TYPE 2 DIABETES MELLITUS WITH HYPERGLYCEMIA, WITH LONG-TERM CURRENT USE OF INSULIN: ICD-10-CM

## 2023-03-16 RX ORDER — HYDROCODONE BITARTRATE AND ACETAMINOPHEN 5; 325 MG/1; MG/1
1 TABLET ORAL
COMMUNITY

## 2023-03-16 RX ORDER — ALBUTEROL SULFATE 90 UG/1
AEROSOL, METERED RESPIRATORY (INHALATION)
Qty: 18 G | Refills: 5 | Status: SHIPPED | OUTPATIENT
Start: 2023-03-16

## 2023-03-16 RX ORDER — LANCETS
EACH MISCELLANEOUS
Qty: 300 EACH | Refills: 1 | Status: SHIPPED | OUTPATIENT
Start: 2023-03-16

## 2023-03-16 NOTE — TELEPHONE ENCOUNTER
PT CALLED TO LET US KNOW SHE WAS PRESCRIBED HYDROCHORTISONE 5/325 TABLETS BID. FYI    SHE WAS PRESCRIBED RX BY HER PAIN MANAGEMENT PROVIDER.

## 2023-03-17 NOTE — TELEPHONE ENCOUNTER
Please update patient's medication list, as appropriate.  Of note, I would verify what medication was added, I suspect she may be referencing hydrocodone based on the 5/325.

## 2023-03-20 NOTE — TELEPHONE ENCOUNTER
Pt called back returning Krystle call. Pt is aware Krystle is gone for the day. It would be tomorrow before Krystle would get back with her. Please see pervious messages

## 2023-03-21 DIAGNOSIS — E11.65 TYPE 2 DIABETES MELLITUS WITH HYPERGLYCEMIA, WITH LONG-TERM CURRENT USE OF INSULIN: ICD-10-CM

## 2023-03-21 DIAGNOSIS — Z79.4 TYPE 2 DIABETES MELLITUS WITH HYPERGLYCEMIA, WITH LONG-TERM CURRENT USE OF INSULIN: ICD-10-CM

## 2023-03-21 RX ORDER — GLIPIZIDE 5 MG/1
15 TABLET, FILM COATED, EXTENDED RELEASE ORAL DAILY
Qty: 90 TABLET | Refills: 5 | Status: SHIPPED | OUTPATIENT
Start: 2023-03-21 | End: 2024-03-20

## 2023-03-21 RX ORDER — GLIPIZIDE 5 MG/1
TABLET, FILM COATED, EXTENDED RELEASE ORAL
Qty: 18 TABLET | Refills: 0 | OUTPATIENT
Start: 2023-03-21

## 2023-03-24 ENCOUNTER — TELEPHONE (OUTPATIENT)
Dept: PULMONOLOGY | Facility: CLINIC | Age: 63
End: 2023-03-24
Payer: MEDICARE

## 2023-03-24 DIAGNOSIS — J44.9 COPD MIXED TYPE: Primary | ICD-10-CM

## 2023-03-24 RX ORDER — DOXYCYCLINE HYCLATE 100 MG
100 TABLET ORAL 2 TIMES DAILY
Qty: 20 TABLET | Refills: 0 | Status: SHIPPED | OUTPATIENT
Start: 2023-03-24

## 2023-03-24 RX ORDER — PREDNISONE 10 MG/1
TABLET ORAL
Qty: 31 TABLET | Refills: 0 | Status: SHIPPED | OUTPATIENT
Start: 2023-03-24

## 2023-03-24 NOTE — TELEPHONE ENCOUNTER
Patient called stating she continues to have productive cough with yellow sputum. Was treated for this at last office visit and was told by Dr. Thakur to call office back if symptoms persisted for additional treatment. -- Please advise.

## 2023-04-25 ENCOUNTER — APPOINTMENT (OUTPATIENT)
Dept: GENERAL RADIOLOGY | Facility: HOSPITAL | Age: 63
End: 2023-04-25
Payer: MEDICARE

## 2023-04-25 ENCOUNTER — HOSPITAL ENCOUNTER (EMERGENCY)
Facility: HOSPITAL | Age: 63
Discharge: HOME | End: 2023-04-26
Attending: EMERGENCY MEDICINE
Payer: MEDICARE

## 2023-04-25 DIAGNOSIS — R06.02 SHORTNESS OF BREATH: Primary | ICD-10-CM

## 2023-04-25 DIAGNOSIS — J44.1 COPD WITH ACUTE EXACERBATION: ICD-10-CM

## 2023-04-25 LAB
ALBUMIN SERPL-MCNC: 4.2 G/DL (ref 3.5–5.2)
ALBUMIN/GLOB SERPL: 1.4 G/DL
ALP SERPL-CCNC: 89 U/L (ref 39–117)
ALT SERPL W P-5'-P-CCNC: 28 U/L (ref 1–33)
ANION GAP SERPL CALCULATED.3IONS-SCNC: 10 MMOL/L (ref 5–15)
AST SERPL-CCNC: 26 U/L (ref 1–32)
BASOPHILS # BLD AUTO: 0.07 10*3/MM3 (ref 0–0.2)
BASOPHILS NFR BLD AUTO: 0.9 % (ref 0–1.5)
BILIRUB SERPL-MCNC: 0.5 MG/DL (ref 0–1.2)
BUN SERPL-MCNC: 16 MG/DL (ref 8–23)
BUN/CREAT SERPL: 17 (ref 7–25)
CALCIUM SPEC-SCNC: 9.7 MG/DL (ref 8.6–10.5)
CHLORIDE SERPL-SCNC: 98 MMOL/L (ref 98–107)
CO2 SERPL-SCNC: 28 MMOL/L (ref 22–29)
CREAT SERPL-MCNC: 0.94 MG/DL (ref 0.57–1)
D DIMER PPP FEU-MCNC: 0.58 MCGFEU/ML (ref 0–0.61)
DEPRECATED RDW RBC AUTO: 39.5 FL (ref 37–54)
EGFRCR SERPLBLD CKD-EPI 2021: 69.2 ML/MIN/1.73
EOSINOPHIL # BLD AUTO: 0.29 10*3/MM3 (ref 0–0.4)
EOSINOPHIL NFR BLD AUTO: 3.6 % (ref 0.3–6.2)
ERYTHROCYTE [DISTWIDTH] IN BLOOD BY AUTOMATED COUNT: 12.5 % (ref 12.3–15.4)
FLUAV RNA RESP QL NAA+PROBE: NOT DETECTED
FLUBV RNA RESP QL NAA+PROBE: NOT DETECTED
GLOBULIN UR ELPH-MCNC: 3.1 GM/DL
GLUCOSE SERPL-MCNC: 72 MG/DL (ref 65–99)
HCT VFR BLD AUTO: 42.1 % (ref 34–46.6)
HGB BLD-MCNC: 14.5 G/DL (ref 12–15.9)
HOLD SPECIMEN: NORMAL
HOLD SPECIMEN: NORMAL
IMM GRANULOCYTES # BLD AUTO: 0.04 10*3/MM3 (ref 0–0.05)
IMM GRANULOCYTES NFR BLD AUTO: 0.5 % (ref 0–0.5)
LYMPHOCYTES # BLD AUTO: 2.32 10*3/MM3 (ref 0.7–3.1)
LYMPHOCYTES NFR BLD AUTO: 29.1 % (ref 19.6–45.3)
MCH RBC QN AUTO: 30 PG (ref 26.6–33)
MCHC RBC AUTO-ENTMCNC: 34.4 G/DL (ref 31.5–35.7)
MCV RBC AUTO: 87 FL (ref 79–97)
MONOCYTES # BLD AUTO: 0.75 10*3/MM3 (ref 0.1–0.9)
MONOCYTES NFR BLD AUTO: 9.4 % (ref 5–12)
NEUTROPHILS NFR BLD AUTO: 4.5 10*3/MM3 (ref 1.7–7)
NEUTROPHILS NFR BLD AUTO: 56.5 % (ref 42.7–76)
NRBC BLD AUTO-RTO: 0 /100 WBC (ref 0–0.2)
PLATELET # BLD AUTO: 208 10*3/MM3 (ref 140–450)
PMV BLD AUTO: 9.5 FL (ref 6–12)
POTASSIUM SERPL-SCNC: 4.2 MMOL/L (ref 3.5–5.2)
PROT SERPL-MCNC: 7.3 G/DL (ref 6–8.5)
RBC # BLD AUTO: 4.84 10*6/MM3 (ref 3.77–5.28)
SARS-COV-2 RNA RESP QL NAA+PROBE: NOT DETECTED
SODIUM SERPL-SCNC: 136 MMOL/L (ref 136–145)
TROPONIN T SERPL HS-MCNC: 14 NG/L
WBC NRBC COR # BLD: 7.97 10*3/MM3 (ref 3.4–10.8)
WHOLE BLOOD HOLD COAG: NORMAL
WHOLE BLOOD HOLD SPECIMEN: NORMAL

## 2023-04-25 PROCEDURE — 96374 THER/PROPH/DIAG INJ IV PUSH: CPT

## 2023-04-25 PROCEDURE — 85025 COMPLETE CBC W/AUTO DIFF WBC: CPT | Performed by: EMERGENCY MEDICINE

## 2023-04-25 PROCEDURE — 94640 AIRWAY INHALATION TREATMENT: CPT

## 2023-04-25 PROCEDURE — 84484 ASSAY OF TROPONIN QUANT: CPT | Performed by: EMERGENCY MEDICINE

## 2023-04-25 PROCEDURE — 25010000002 METHYLPREDNISOLONE PER 40 MG: Performed by: EMERGENCY MEDICINE

## 2023-04-25 PROCEDURE — 71045 X-RAY EXAM CHEST 1 VIEW: CPT

## 2023-04-25 PROCEDURE — 93005 ELECTROCARDIOGRAM TRACING: CPT | Performed by: EMERGENCY MEDICINE

## 2023-04-25 PROCEDURE — 99283 EMERGENCY DEPT VISIT LOW MDM: CPT

## 2023-04-25 PROCEDURE — 80053 COMPREHEN METABOLIC PANEL: CPT | Performed by: EMERGENCY MEDICINE

## 2023-04-25 PROCEDURE — 85379 FIBRIN DEGRADATION QUANT: CPT | Performed by: EMERGENCY MEDICINE

## 2023-04-25 PROCEDURE — 87636 SARSCOV2 & INF A&B AMP PRB: CPT | Performed by: EMERGENCY MEDICINE

## 2023-04-25 RX ORDER — METHYLPREDNISOLONE SODIUM SUCCINATE 40 MG/ML
120 INJECTION, POWDER, LYOPHILIZED, FOR SOLUTION INTRAMUSCULAR; INTRAVENOUS ONCE
Status: COMPLETED | OUTPATIENT
Start: 2023-04-25 | End: 2023-04-25

## 2023-04-25 RX ORDER — IPRATROPIUM BROMIDE AND ALBUTEROL SULFATE 2.5; .5 MG/3ML; MG/3ML
3 SOLUTION RESPIRATORY (INHALATION) ONCE
Status: COMPLETED | OUTPATIENT
Start: 2023-04-25 | End: 2023-04-25

## 2023-04-25 RX ADMIN — METHYLPREDNISOLONE SODIUM SUCCINATE 120 MG: 40 INJECTION INTRAMUSCULAR; INTRAVENOUS at 22:26

## 2023-04-25 RX ADMIN — IPRATROPIUM BROMIDE AND ALBUTEROL SULFATE 3 ML: 2.5; .5 SOLUTION RESPIRATORY (INHALATION) at 22:15

## 2023-04-25 NOTE — ED PROVIDER NOTES
Subjective   History of Present Illness  Patient is a pleasant 63-year-old female with history of COPD who presents today with 1 month of cough, sore throat, and congestion and 1 week of worsening shortness of breath.  She states that she has had 2 rounds of antibiotics and attempted to take a prednisone prescription but after 2 days had to stop due to hyperglycemia.  Despite these treatments her shortness of breath has been getting worse prompting her visit to the emergency department.  She admits to an extensive, 50-year smoking history, COPD.  Denies fever, chills.  Admits to some tightness of breathing in her chest but denies chest pain.  Denies abdominal pain denies vomiting, diarrhea, or other acute complaints.        Review of Systems   All other systems reviewed and are negative.      Past Medical History:   Diagnosis Date   • Arthritis    • Asthma ?    May have been earlier   • Back ache    • Bronchiectasis ?   • Bronchitis    • Chronic bronchitis ?   • COPD (chronic obstructive pulmonary disease)    • Fibromyalgia    • Migraines    • Pneumonia    • Rheumatic fever    • Sleep apnea, obstructive ?   • Tooth infection    • Type 2 diabetes mellitus    • Vulvar carcinoma        Allergies   Allergen Reactions   • Bee Venom Anaphylaxis   • Ciprofloxacin Shortness Of Breath       Past Surgical History:   Procedure Laterality Date   • BREAST BIOPSY Left    • BREAST LUMPECTOMY Left 2004   • TONSILLECTOMY     • TUBAL ABDOMINAL LIGATION     • VULVA BIOPSY     • VULVA SURGERY     • WISDOM TOOTH EXTRACTION         Family History   Problem Relation Age of Onset   • Arthritis Mother    • Diabetes Mother    • Heart disease Mother    • Thyroid disease Mother    • Uterine cancer Mother    • Cancer Mother    • Heart disease Brother    • Diabetes Brother    • Breast cancer Other         MATERNAL GREAT AUNT   • Cancer Maternal Grandmother    • Heart attack Maternal Grandmother    • Ovarian cancer Neg Hx        Social History      Socioeconomic History   • Marital status:    Tobacco Use   • Smoking status: Every Day     Packs/day: 1.00     Years: 50.00     Pack years: 50.00     Types: Cigarettes   • Smokeless tobacco: Never   Vaping Use   • Vaping Use: Former   • Substances: Nicotine   • Devices: Pre-filled or refillable cartridge   Substance and Sexual Activity   • Alcohol use: Not Currently     Comment: Only 2 or 3 times a year.   • Drug use: Never   • Sexual activity: Not Currently     Partners: Male     Birth control/protection: Tubal ligation           Objective   Physical Exam  Vitals and nursing note reviewed.   Constitutional:       General: She is not in acute distress.     Appearance: Normal appearance.   HENT:      Head: Normocephalic and atraumatic.   Eyes:      Pupils: Pupils are equal, round, and reactive to light.   Neck:      Thyroid: No thyromegaly.      Vascular: No JVD.   Cardiovascular:      Rate and Rhythm: Normal rate and regular rhythm.      Heart sounds: Normal heart sounds. No murmur heard.    No friction rub. No gallop.   Pulmonary:      Effort: No respiratory distress.      Breath sounds: Wheezing present. No rales.      Comments: Mildly increased work of breathing.  Mild wheezes and mildly diminished air movement bilaterally.  Abdominal:      Palpations: Abdomen is soft.      Tenderness: There is no abdominal tenderness.   Musculoskeletal:         General: Normal range of motion.      Cervical back: Normal range of motion and neck supple.   Lymphadenopathy:      Cervical: No cervical adenopathy.   Skin:     General: Skin is warm and dry.   Neurological:      Mental Status: She is alert and oriented to person, place, and time.   Psychiatric:         Behavior: Behavior normal.         Thought Content: Thought content normal.         Procedures           ED Course  ED Course as of 04/26/23 0506   Tue Apr 25, 2023   2110 XR Chest 1 View [CP]      ED Course User Index  [CP] Surinder Burns DO      Recent Results  (from the past 24 hour(s))   CBC Auto Differential    Collection Time: 04/25/23  8:32 PM    Specimen: Blood   Result Value Ref Range    WBC 7.97 3.40 - 10.80 10*3/mm3    RBC 4.84 3.77 - 5.28 10*6/mm3    Hemoglobin 14.5 12.0 - 15.9 g/dL    Hematocrit 42.1 34.0 - 46.6 %    MCV 87.0 79.0 - 97.0 fL    MCH 30.0 26.6 - 33.0 pg    MCHC 34.4 31.5 - 35.7 g/dL    RDW 12.5 12.3 - 15.4 %    RDW-SD 39.5 37.0 - 54.0 fl    MPV 9.5 6.0 - 12.0 fL    Platelets 208 140 - 450 10*3/mm3    Neutrophil % 56.5 42.7 - 76.0 %    Lymphocyte % 29.1 19.6 - 45.3 %    Monocyte % 9.4 5.0 - 12.0 %    Eosinophil % 3.6 0.3 - 6.2 %    Basophil % 0.9 0.0 - 1.5 %    Immature Grans % 0.5 0.0 - 0.5 %    Neutrophils, Absolute 4.50 1.70 - 7.00 10*3/mm3    Lymphocytes, Absolute 2.32 0.70 - 3.10 10*3/mm3    Monocytes, Absolute 0.75 0.10 - 0.90 10*3/mm3    Eosinophils, Absolute 0.29 0.00 - 0.40 10*3/mm3    Basophils, Absolute 0.07 0.00 - 0.20 10*3/mm3    Immature Grans, Absolute 0.04 0.00 - 0.05 10*3/mm3    nRBC 0.0 0.0 - 0.2 /100 WBC   Comprehensive Metabolic Panel    Collection Time: 04/25/23  8:32 PM    Specimen: Blood   Result Value Ref Range    Glucose 72 65 - 99 mg/dL    BUN 16 8 - 23 mg/dL    Creatinine 0.94 0.57 - 1.00 mg/dL    Sodium 136 136 - 145 mmol/L    Potassium 4.2 3.5 - 5.2 mmol/L    Chloride 98 98 - 107 mmol/L    CO2 28.0 22.0 - 29.0 mmol/L    Calcium 9.7 8.6 - 10.5 mg/dL    Total Protein 7.3 6.0 - 8.5 g/dL    Albumin 4.2 3.5 - 5.2 g/dL    ALT (SGPT) 28 1 - 33 U/L    AST (SGOT) 26 1 - 32 U/L    Alkaline Phosphatase 89 39 - 117 U/L    Total Bilirubin 0.5 0.0 - 1.2 mg/dL    Globulin 3.1 gm/dL    A/G Ratio 1.4 g/dL    BUN/Creatinine Ratio 17.0 7.0 - 25.0    Anion Gap 10.0 5.0 - 15.0 mmol/L    eGFR 69.2 >60.0 mL/min/1.73   Green Top (Gel)    Collection Time: 04/25/23  8:32 PM   Result Value Ref Range    Extra Tube Hold for add-ons.    Lavender Top    Collection Time: 04/25/23  8:32 PM   Result Value Ref Range    Extra Tube hold for add-on    Gold  Top - SST    Collection Time: 04/25/23  8:32 PM   Result Value Ref Range    Extra Tube Hold for add-ons.    Gray Top    Collection Time: 04/25/23  8:32 PM   Result Value Ref Range    Extra Tube Hold for add-ons.    Light Blue Top    Collection Time: 04/25/23  8:32 PM   Result Value Ref Range    Extra Tube Hold for add-ons.    COVID-19 and FLU A/B PCR - Swab, Nasopharynx    Collection Time: 04/25/23  8:32 PM    Specimen: Nasopharynx; Swab   Result Value Ref Range    COVID19 Not Detected Not Detected - Ref. Range    Influenza A PCR Not Detected Not Detected    Influenza B PCR Not Detected Not Detected   D-dimer, Quantitative    Collection Time: 04/25/23  8:32 PM    Specimen: Blood   Result Value Ref Range    D-Dimer, Quantitative 0.58 0.00 - 0.61 MCGFEU/mL   High Sensitivity Troponin T    Collection Time: 04/25/23  8:32 PM    Specimen: Blood   Result Value Ref Range    HS Troponin T 14 (H) <10 ng/L   High Sensitivity Troponin T 2Hr    Collection Time: 04/26/23 12:01 AM    Specimen: Blood   Result Value Ref Range    HS Troponin T 14 (H) <10 ng/L    Troponin T Delta 0 >=-4 - <+4 ng/L   ECG 12 Lead Other; Chest pain    Collection Time: 04/26/23 12:07 AM   Result Value Ref Range    QT Interval 450 ms    QTC Interval 556 ms     Note: In addition to lab results from this visit, the labs listed above may include labs taken at another facility or during a different encounter within the last 24 hours. Please correlate lab times with ED admission and discharge times for further clarification of the services performed during this visit.    CT Angiogram Chest   Final Result      1. No evidence of pulmonary embolism.   2. No evidence of thoracic aortic aneurysm or dissection.   3. No evidence of pneumonia, pleural or pericardial effusions.   4. Moderate coronary artery calcifications.   5. Mild emphysema.      Electronically signed by:  Ruddy Boss D.O.     4/25/2023 11:18 PM Mountain Time      XR Chest 1 View   Final Result    Impression:   No acute cardiopulmonary findings.            Electronically Signed: Kofi Galeas     4/25/2023 8:36 PM EDT     Workstation ID: [id number]        Vitals:    04/25/23 2215 04/26/23 0134 04/26/23 0137 04/26/23 0139   BP:  144/73     BP Location:       Patient Position:       Pulse:   104    Resp: 18   18   Temp:       TempSrc:       SpO2:   96%    Weight:       Height:         Medications   ipratropium-albuterol (DUO-NEB) nebulizer solution 3 mL (3 mL Nebulization Given 4/25/23 2215)   methylPREDNISolone sodium succinate (SOLU-Medrol) injection 120 mg (120 mg Intravenous Given 4/25/23 2226)   iopamidol (ISOVUE-370) 76 % injection 100 mL (69 mL Intravenous Given 4/26/23 0035)     ECG/EMG Results (last 24 hours)     Procedure Component Value Units Date/Time    ECG 12 Lead Other; Chest pain [[id number]] Collected: 04/26/23 0007     Updated: 04/26/23 0014     QT Interval 450 ms      QTC Interval 556 ms     Narrative:      Test Reason : Other~  Blood Pressure :   */*   mmHG  Vent. Rate :  92 BPM     Atrial Rate :  92 BPM     P-R Int : 158 ms          QRS Dur : 126 ms      QT Int : 450 ms       P-R-T Axes :  67 106  78 degrees     QTc Int : 556 ms    Normal sinus rhythm  Right bundle branch block  Abnormal ECG  When compared with ECG of 04-DEC-2022 18:41,  No significant change was found    Referred By: EDMD           Confirmed By:         ECG 12 Lead Other; Chest pain   Preliminary Result   Test Reason : Other~   Blood Pressure :   */*   mmHG   Vent. Rate :  92 BPM     Atrial Rate :  92 BPM      P-R Int : 158 ms          QRS Dur : 126 ms       QT Int : 450 ms       P-R-T Axes :  67 106  78 degrees      QTc Int : 556 ms      Normal sinus rhythm   Right bundle branch block   Abnormal ECG   When compared with ECG of 04-DEC-2022 18:41,   No significant change was found      Referred By: EDMD           Confirmed By:                                                Medical Decision Making  Patient responded  relatively well to treatment in the emergency department.  Some wheezing does persist but she is able to ambulate in the hallway maintains O2 saturations of 93% or above.  She was slightly tachycardic with ambulation with a heart rate going up to 112 with extensive ambulation.  With this CTA was ordered which revealed no pneumonia, no PE, and no other acute pathology in the lungs.  Given patient's improved symptoms and reassuring work-up outpatient treatment trial including steroid is most appropriate.  Patient understands that that she will need to check her blood glucose more frequently and may need to adjust her insulin dosing to account for the expected hyperglycemia when taking steroid.  She is understanding of and appreciative of the plan.  She understands to have a low threshold to return to the emergency department if symptoms persist, worsen, or other concerns arise.    COPD with acute exacerbation: complicated acute illness or injury  Shortness of breath: complicated acute illness or injury  Amount and/or Complexity of Data Reviewed  Labs: ordered. Decision-making details documented in ED Course.  Radiology: ordered and independent interpretation performed. Decision-making details documented in ED Course.  ECG/medicine tests: ordered and independent interpretation performed. Decision-making details documented in ED Course.      Risk  Prescription drug management.          Final diagnoses:   Shortness of breath   COPD with acute exacerbation       ED Disposition  ED Disposition     ED Disposition   Discharge    Condition   Stable    Comment   --           DISCHARGE    Patient discharged in stable condition.    Reviewed implications of results, diagnosis, meds, responsibility to follow up, warning signs and symptoms of possible worsening, potential complications and reasons to return to ER.    Patient/Family voiced understanding of above instructions.    Discussed plan for discharge, as there is no emergent  indication for admission.  Pt/family is agreeable and understands need for follow up and possible repeat testing.  Pt/family is aware that discharge does not mean that nothing is wrong but that it indicates no emergency is currently present that requires admission and they must continue care with follow-up as given below or with a physician of their choice.     FOLLOW-UP  PATIENT CONNECTION - [place] [address]  [phone]  Schedule an appointment as soon as possible for a visit       [facility] Emergency Department  [address] [address] [phone]    If symptoms worsen         Medication List      New Prescriptions    azithromycin 250 MG tablet  Commonly known as: ZITHROMAX  Take 2 tablets the first day, then 1 tablet daily for 4 days.     methylPREDNISolone 4 MG dose pack  Commonly known as: MEDROL  Take as directed on package instructions.        Changed    * albuterol sulfate  (90 Base) MCG/ACT inhaler  Commonly known as: PROVENTIL HFA;VENTOLIN HFA;PROAIR HFA  INHALE 2 PUFFS BY MOUTH EVERY 4 HOURS AS NEEDED FOR WHEEZING  What changed: Another medication with the same name was added. Make sure you understand how and when to take each.     * albuterol sulfate  (90 Base) MCG/ACT inhaler  Commonly known as: PROVENTIL HFA;VENTOLIN HFA;PROAIR HFA  Inhale 2 puffs Every 6 (Six) Hours As Needed for Wheezing.  What changed: You were already taking a medication with the same name, and this prescription was added. Make sure you understand how and when to take each.         * This list has 2 medication(s) that are the same as other medications prescribed for you. Read the directions carefully, and ask your doctor or other care provider to review them with you.               Where to Get Your Medications      These medications were sent to [facility] [address] [place] [address] [address] [place] [address] [phone] [facility] [phone] [place] [address]  [place] [address]    Phone: [phone]   · albuterol sulfate  (90 Base) MCG/ACT inhaler  · azithromycin 250 MG tablet  · methylPREDNISolone 4 MG dose pack            Surinder Burns DO  04/26/23 0518

## 2023-04-26 ENCOUNTER — APPOINTMENT (OUTPATIENT)
Dept: CT IMAGING | Facility: HOSPITAL | Age: 63
End: 2023-04-26
Payer: MEDICARE

## 2023-04-26 VITALS
DIASTOLIC BLOOD PRESSURE: 73 MMHG | HEART RATE: 104 BPM | TEMPERATURE: 98.3 F | SYSTOLIC BLOOD PRESSURE: 144 MMHG | WEIGHT: 180 LBS | HEIGHT: 66 IN | OXYGEN SATURATION: 96 % | BODY MASS INDEX: 28.93 KG/M2 | RESPIRATION RATE: 18 BRPM

## 2023-04-26 LAB
GEN 5 2HR TROPONIN T REFLEX: 14 NG/L
HOLD SPECIMEN: NORMAL
TROPONIN T DELTA: 0 NG/L

## 2023-04-26 PROCEDURE — 25510000001 IOPAMIDOL PER 1 ML: Performed by: EMERGENCY MEDICINE

## 2023-04-26 PROCEDURE — 36415 COLL VENOUS BLD VENIPUNCTURE: CPT

## 2023-04-26 PROCEDURE — 84484 ASSAY OF TROPONIN QUANT: CPT | Performed by: EMERGENCY MEDICINE

## 2023-04-26 PROCEDURE — 71275 CT ANGIOGRAPHY CHEST: CPT

## 2023-04-26 RX ORDER — AZITHROMYCIN 250 MG/1
TABLET, FILM COATED ORAL
Qty: 6 TABLET | Refills: 0 | Status: SHIPPED | OUTPATIENT
Start: 2023-04-26

## 2023-04-26 RX ORDER — METHYLPREDNISOLONE 4 MG/1
TABLET ORAL
Qty: 21 TABLET | Refills: 0 | Status: SHIPPED | OUTPATIENT
Start: 2023-04-26

## 2023-04-26 RX ORDER — ALBUTEROL SULFATE 90 UG/1
2 AEROSOL, METERED RESPIRATORY (INHALATION) EVERY 6 HOURS PRN
Qty: 8 G | Refills: 0 | Status: SHIPPED | OUTPATIENT
Start: 2023-04-26

## 2023-04-26 RX ADMIN — IOPAMIDOL 69 ML: 755 INJECTION, SOLUTION INTRAVENOUS at 00:35

## 2023-04-29 LAB
QT INTERVAL: 450 MS
QTC INTERVAL: 556 MS

## 2023-08-08 ENCOUNTER — OFFICE VISIT (OUTPATIENT)
Dept: PULMONOLOGY | Facility: CLINIC | Age: 63
End: 2023-08-08
Payer: MEDICARE

## 2023-08-08 VITALS
SYSTOLIC BLOOD PRESSURE: 92 MMHG | DIASTOLIC BLOOD PRESSURE: 50 MMHG | HEART RATE: 93 BPM | BODY MASS INDEX: 28.72 KG/M2 | OXYGEN SATURATION: 93 % | TEMPERATURE: 98.2 F | HEIGHT: 66 IN | WEIGHT: 178.7 LBS

## 2023-08-08 DIAGNOSIS — G47.34 NOCTURNAL HYPOXEMIA: ICD-10-CM

## 2023-08-08 DIAGNOSIS — G47.10 HYPERSOMNIA: ICD-10-CM

## 2023-08-08 DIAGNOSIS — G47.33 OBSTRUCTIVE SLEEP APNEA: ICD-10-CM

## 2023-08-08 DIAGNOSIS — J44.9 COPD MIXED TYPE: ICD-10-CM

## 2023-08-08 DIAGNOSIS — J44.9 COPD MIXED TYPE: Primary | ICD-10-CM

## 2023-08-08 DIAGNOSIS — Z72.0 TOBACCO ABUSE: ICD-10-CM

## 2023-08-08 RX ORDER — ALBUTEROL SULFATE 90 UG/1
2 AEROSOL, METERED RESPIRATORY (INHALATION) EVERY 6 HOURS PRN
Qty: 18 G | Refills: 11 | Status: SHIPPED | OUTPATIENT
Start: 2023-08-08

## 2023-08-08 RX ORDER — ALBUTEROL SULFATE 90 UG/1
4 AEROSOL, METERED RESPIRATORY (INHALATION) ONCE
Status: COMPLETED | OUTPATIENT
Start: 2023-08-08 | End: 2023-08-08

## 2023-08-08 RX ORDER — ALBUTEROL SULFATE 2.5 MG/3ML
2.5 SOLUTION RESPIRATORY (INHALATION) 4 TIMES DAILY PRN
Qty: 120 EACH | Refills: 11 | Status: SHIPPED | OUTPATIENT
Start: 2023-08-08

## 2023-08-08 RX ORDER — DOXYCYCLINE HYCLATE 100 MG/1
100 CAPSULE ORAL 2 TIMES DAILY
Qty: 60 CAPSULE | Refills: 0 | Status: SHIPPED | OUTPATIENT
Start: 2023-08-08

## 2023-08-08 RX ORDER — TIOTROPIUM BROMIDE AND OLODATEROL 3.124; 2.736 UG/1; UG/1
2 SPRAY, METERED RESPIRATORY (INHALATION)
Qty: 1 EACH | Refills: 11 | Status: SHIPPED | OUTPATIENT
Start: 2023-08-08

## 2023-08-08 RX ORDER — PREDNISONE 10 MG/1
TABLET ORAL
Qty: 31 TABLET | Refills: 0 | Status: SHIPPED | OUTPATIENT
Start: 2023-08-08

## 2023-08-08 RX ADMIN — ALBUTEROL SULFATE 4 PUFF: 90 AEROSOL, METERED RESPIRATORY (INHALATION) at 12:48

## 2023-08-08 NOTE — PROGRESS NOTES
PULMONARY  NOTE    Chief Complaint     Stage III COPD, tobacco abuse, SABRINA, nocturnal hypoxemia, perennial rhinitis, daytime hypersomnolence    History of Present Illness     63-year-old female returns today for follow-up  I last saw her 2/7/2023    She has a history of ongoing tobacco abuse  She is trying to work toward smoking cessation    Most recent LDCT revealed no suspicious lesions in February 2023    She has stage III, severe, chronic obstructive pulmonary disease  She remains on Stiolto with albuterol    She has a history of obstructive sleep apnea  She is unable to get new equipment without a repeat sleep study  She was unable to go to the sleep lab in [place] but would like to try and arrange it at the [facility]  She continues to have symptoms of sleep apnea including daytime hypersomnolence, nonrestorative sleep, and fatigue    Patient Active Problem List   Diagnosis    Obstructive sleep apnea    Nocturnal hypoxemia    Polypharmacy    COVID-19 virus infection    DM2 (diabetes mellitus, type 2)    Stage III (Severe) COPD    Tobacco abuse     Allergies   Allergen Reactions    Bee Venom Anaphylaxis    Ciprofloxacin Shortness Of Breath       Current Outpatient Medications:     Accu-Chek Softclix Lancets lancets, USE 1  TO CHECK GLUCOSE TWICE DAILY TO THREE TIMES DAILY, Disp: 300 each, Rfl: 1    albuterol sulfate  (90 Base) MCG/ACT inhaler, INHALE 2 PUFFS BY MOUTH EVERY 4 HOURS AS NEEDED FOR WHEEZING, Disp: 18 g, Rfl: 5    ASPIRIN 81 PO, Take 1 tablet by mouth Daily., Disp: , Rfl:     glipizide (GLUCOTROL XL) 5 MG ER tablet, Take 3 tablets by mouth Daily., Disp: 90 tablet, Rfl: 5    glucose blood test strip, Use as instructed 2-3 times a day, Disp: 100 each, Rfl: 3    HYDROcodone-acetaminophen (NORCO) 5-325 MG per tablet, Take 1 tablet by mouth., Disp: , Rfl:     Insulin aspart (FIASP FLEXTOUCH) 100 UNIT/ML solution pen-injector injection pen, For use at mealtimes and per correctional scale,  MDD 80 units, Disp: 24 mL, Rfl: 5    Insulin Pen Needle (BD Pen Needle Nessa U/F) 32G X 4 MM misc, Use as directed 5 times daily, Disp: 150 each, Rfl: 3    ipratropium-albuterol (DUO-NEB) 0.5-2.5 mg/3 ml nebulizer, Take 3 mL by nebulization 4 (Four) Times a Day., Disp: 1080 mL, Rfl: 3    isosorbide mononitrate (IMDUR) 30 MG 24 hr tablet, Take 1 tablet by mouth once daily, Disp: 90 tablet, Rfl: 3    Lantus SoloStar 100 UNIT/ML injection pen, INJECT 50 UNITS SUBCUTANEOUSLY ONCE DAILY AS DIRECTED, Disp: 15 mL, Rfl: 0    meloxicam (MOBIC) 15 MG tablet, Take 1 tablet by mouth Daily. Take with food., Disp: , Rfl:     metFORMIN ER (GLUCOPHAGE-XR) 500 MG 24 hr tablet, TAKE 1 TABLET BY MOUTH IN THE MORNING BEFORE BREAKFAST, Disp: 90 tablet, Rfl: 0    rosuvastatin (CRESTOR) 20 MG tablet, Take 1 tablet by mouth Daily., Disp: 30 tablet, Rfl: 11    tiotropium bromide-olodaterol (Stiolto Respimat) 2.5-2.5 MCG/ACT aerosol solution inhaler, Inhale Daily., Disp: , Rfl:     tiZANidine (ZANAFLEX) 4 MG tablet, Take 1 tablet by mouth 3 (Three) Times a Day., Disp: , Rfl:     albuterol sulfate  (90 Base) MCG/ACT inhaler, Inhale 2 puffs Every 6 (Six) Hours As Needed for Wheezing., Disp: 8 g, Rfl: 0    azithromycin (ZITHROMAX) 250 MG tablet, Take 2 tablets the first day, then 1 tablet daily for 4 days., Disp: 6 tablet, Rfl: 0    doxycycline (VIBRAMYCIN) 100 MG capsule, Take 1 capsule by mouth 2 (Two) Times a Day., Disp: , Rfl:     doxycycline (VIBRAMYICN) 100 MG tablet, Take 1 tablet by mouth 2 (Two) Times a Day., Disp: 20 tablet, Rfl: 0    methylPREDNISolone (MEDROL) 4 MG dose pack, Take as directed on package instructions., Disp: 21 tablet, Rfl: 0  No current facility-administered medications for this visit.  MEDICATION LIST AND ALLERGIES REVIEWED.    Family History   Problem Relation Age of Onset    Arthritis Mother     Diabetes Mother     Heart disease Mother     Thyroid disease Mother     Uterine cancer Mother     Cancer Mother   "   Heart disease Brother     Diabetes Brother     Breast cancer Other         MATERNAL GREAT AUNT    Cancer Maternal Grandmother     Heart attack Maternal Grandmother     Ovarian cancer Neg Hx      Social History     Tobacco Use    Smoking status: Every Day     Packs/day: 1.00     Years: 50.00     Pack years: 50.00     Types: Cigarettes    Smokeless tobacco: Never   Vaping Use    Vaping Use: Former    Substances: Nicotine    Devices: Pre-filled or refillable cartridge   Substance Use Topics    Alcohol use: Not Currently     Comment: Only 2 or 3 times a year.    Drug use: Never     Social History     Social History Narrative        Has moved to [place] from [place]    Continues to smoke up to 1 pack of cigarettes per day    Denies regular alcohol use     FAMILY AND SOCIAL HISTORY REVIEWED.    Review of Systems  IF PRESENT REFER TO SCANNED ROS SHEET FROM SAME DATE  OTHERWISE ROS OBTAINED AND NON-CONTRIBUTORY OVER HPI.    BP 92/50 (BP Location: Left arm, Patient Position: Sitting, Cuff Size: Adult)   Pulse 93   Temp 98.2 øF (36.8 øC)   Ht 167.6 cm (66\")   Wt 81.1 kg (178 lb 11.2 oz)   LMP  (LMP Unknown)   SpO2 93% Comment: Room air at rest  BMI 28.84 kg/mý   Physical Exam  Vitals and nursing note reviewed.   Constitutional:       General: She is not in acute distress.     Appearance: She is well-developed. She is not diaphoretic.   HENT:      Head: Normocephalic and atraumatic.   Neck:      Thyroid: No thyromegaly.   Cardiovascular:      Rate and Rhythm: Normal rate and regular rhythm.      Heart sounds: Normal heart sounds. No murmur heard.  Pulmonary:      Effort: Pulmonary effort is normal.      Breath sounds: Normal breath sounds. No stridor.   Lymphadenopathy:      Cervical: No cervical adenopathy.      Upper Body:      Right upper body: No supraclavicular or epitrochlear adenopathy.      Left upper body: No supraclavicular or epitrochlear adenopathy.   Skin:     General: Skin is warm and dry. "   Neurological:      Mental Status: She is alert and oriented to person, place, and time.   Psychiatric:         Behavior: Behavior normal.       Results     PFTs reveal moderate airway obstruction with no significant change in FEV1 after bronchodilator  No restriction and a reduced diffusion capacity  Immunization History   Administered Date(s) Administered    COVID-19 (MODERNA) 1st,2nd,3rd Dose Monovalent 04/09/2021, 05/07/2021, 04/02/2022    Flu Vaccine Quad PF >36MO 01/11/2021    FluLaval/Fluzone >6mos 01/11/2021    Fluzone Quad >6mos (Multi-dose) 10/13/2016    Influenza Quad Vaccine (Inpatient) 10/13/2016    Pneumococcal Polysaccharide (PPSV23) 10/13/2016, 01/11/2021     Problem List       ICD-10-CM ICD-9-CM   1. Stage III (Severe) COPD  J44.9 496   2. Tobacco abuse  Z72.0 305.1   3. Obstructive sleep apnea  G47.33 327.23   4. Nocturnal hypoxemia  G47.34 327.24   5. Hypersomnia  G47.10 780.54       Discussion     Unfortunately continues to smoke but is working on smoking cessation    She is can remain on the same bronchodilator regimen  I sent in refills    I recommended a repeat LDCT in February 2024    Are going to reschedule her in lab sleep study for the [facility] lab  He continues to have symptoms of nonrestorative sleep, daytime hypersomnolence, fatigue, and snoring    I will plan to see her back in 6 months or earlier if there are any problems in the meantime    Moderate level of Medical Decision Making complexity based on 2 or more chronic stable illnesses and an independent review of test results and/or prescription drug management.  Silas Thakur MD  Note electronically signed    CC: Provider, No Known

## 2023-08-09 DIAGNOSIS — Z87.891 PERSONAL HISTORY OF NICOTINE DEPENDENCE: Primary | ICD-10-CM

## 2023-08-23 RX ORDER — ROSUVASTATIN CALCIUM 20 MG/1
TABLET, COATED ORAL
Qty: 90 TABLET | Refills: 3 | Status: SHIPPED | OUTPATIENT
Start: 2023-08-23

## 2023-09-07 RX ORDER — DOXYCYCLINE HYCLATE 100 MG/1
CAPSULE ORAL
Qty: 60 CAPSULE | Refills: 0 | OUTPATIENT
Start: 2023-09-07

## 2023-10-13 ENCOUNTER — TELEPHONE (OUTPATIENT)
Dept: PULMONOLOGY | Facility: CLINIC | Age: 63
End: 2023-10-13
Payer: MEDICARE

## 2023-10-13 DIAGNOSIS — J44.1 CHRONIC OBSTRUCTIVE PULMONARY DISEASE WITH ACUTE EXACERBATION: Primary | ICD-10-CM

## 2023-10-13 RX ORDER — PREDNISONE 10 MG/1
TABLET ORAL
Qty: 31 TABLET | Refills: 0 | Status: SHIPPED | OUTPATIENT
Start: 2023-10-13

## 2023-10-13 RX ORDER — DOXYCYCLINE HYCLATE 100 MG/1
100 CAPSULE ORAL 2 TIMES DAILY
Qty: 60 CAPSULE | Refills: 0 | Status: SHIPPED | OUTPATIENT
Start: 2023-10-13

## 2023-10-13 NOTE — TELEPHONE ENCOUNTER
Pt called today c/o chest tightness/cough/wheezing for over 1 week. Pt denies fever/chest pain/nausea/swelling/chills. Pt is requesting abx/steroids needing to be sent to [facility]. Last abx/steroids were sent to pharmacy was on 8/8/23.

## 2023-10-16 DIAGNOSIS — E11.65 TYPE 2 DIABETES MELLITUS WITH HYPERGLYCEMIA, WITH LONG-TERM CURRENT USE OF INSULIN: ICD-10-CM

## 2023-10-16 DIAGNOSIS — Z79.4 TYPE 2 DIABETES MELLITUS WITH HYPERGLYCEMIA, WITH LONG-TERM CURRENT USE OF INSULIN: ICD-10-CM

## 2023-10-16 RX ORDER — METFORMIN HYDROCHLORIDE 500 MG/1
TABLET, EXTENDED RELEASE ORAL
Qty: 30 TABLET | Refills: 0 | Status: SHIPPED | OUTPATIENT
Start: 2023-10-16

## 2023-10-16 NOTE — TELEPHONE ENCOUNTER
Last office visit with prescribing clinician: 3/2/2023       Next office visit with prescribing clinician: Visit date not found     {

## 2023-11-07 DIAGNOSIS — E11.65 TYPE 2 DIABETES MELLITUS WITH HYPERGLYCEMIA, WITH LONG-TERM CURRENT USE OF INSULIN: ICD-10-CM

## 2023-11-07 DIAGNOSIS — Z79.4 TYPE 2 DIABETES MELLITUS WITH HYPERGLYCEMIA, WITH LONG-TERM CURRENT USE OF INSULIN: ICD-10-CM

## 2023-11-07 RX ORDER — METFORMIN HYDROCHLORIDE 500 MG/1
TABLET, EXTENDED RELEASE ORAL
Qty: 30 TABLET | Refills: 0 | OUTPATIENT
Start: 2023-11-07

## 2024-02-05 PROBLEM — Z86.79 HISTORY OF RHEUMATIC FEVER AS A CHILD: Status: ACTIVE | Noted: 2024-02-05

## 2024-02-05 PROBLEM — R07.2 PRECORDIAL PAIN: Status: ACTIVE | Noted: 2024-02-05

## 2024-02-05 PROBLEM — E78.5 HYPERLIPIDEMIA LDL GOAL <100: Status: ACTIVE | Noted: 2024-02-05

## 2024-02-10 ENCOUNTER — HOSPITAL ENCOUNTER (OUTPATIENT)
Dept: CT IMAGING | Facility: HOSPITAL | Age: 64
Discharge: HOME | End: 2024-02-10
Payer: MEDICARE

## 2024-02-10 DIAGNOSIS — Z87.891 PERSONAL HISTORY OF NICOTINE DEPENDENCE: ICD-10-CM

## 2024-02-10 PROCEDURE — 71271 CT THORAX LUNG CANCER SCR C-: CPT

## 2024-02-13 ENCOUNTER — DOCUMENTATION (OUTPATIENT)
Dept: PULMONOLOGY | Facility: CLINIC | Age: 64
End: 2024-02-13
Payer: MEDICARE

## 2024-02-23 ENCOUNTER — TELEPHONE (OUTPATIENT)
Dept: PULMONOLOGY | Facility: CLINIC | Age: 64
End: 2024-02-23
Payer: MEDICARE

## 2024-02-23 DIAGNOSIS — J44.1 CHRONIC OBSTRUCTIVE PULMONARY DISEASE WITH ACUTE EXACERBATION: ICD-10-CM

## 2024-02-23 RX ORDER — DOXYCYCLINE HYCLATE 100 MG/1
100 CAPSULE ORAL 2 TIMES DAILY
Qty: 60 CAPSULE | Refills: 0 | Status: SHIPPED | OUTPATIENT
Start: 2024-02-23 | End: 2024-02-23

## 2024-02-23 RX ORDER — PREDNISONE 10 MG/1
TABLET ORAL
Qty: 31 TABLET | Refills: 0 | Status: SHIPPED | OUTPATIENT
Start: 2024-02-23

## 2024-02-23 RX ORDER — DOXYCYCLINE HYCLATE 100 MG
100 TABLET ORAL 2 TIMES DAILY
Qty: 20 TABLET | Refills: 0 | Status: SHIPPED | OUTPATIENT
Start: 2024-02-23

## 2024-02-23 NOTE — TELEPHONE ENCOUNTER
Pt called today c/o cough w/ sputum for 3 days and requesting abx/steroids [facility] to be [facility]. Pt denies fever/sob/wheezing/sore throat/chest pain. Pt last abx/steroids was on 10/13/23. Next f/u apt scheduled for 3/14. Please advise.

## 2024-02-23 NOTE — TELEPHONE ENCOUNTER
Called pt LVM informing her of medication being sent to [facility]. Office # given if any further questions.

## 2024-03-07 RX ORDER — ISOSORBIDE MONONITRATE 30 MG/1
30 TABLET, EXTENDED RELEASE ORAL DAILY
Qty: 90 TABLET | Refills: 0 | Status: SHIPPED | OUTPATIENT
Start: 2024-03-07

## 2024-03-14 ENCOUNTER — OFFICE VISIT (OUTPATIENT)
Dept: PULMONOLOGY | Facility: CLINIC | Age: 64
End: 2024-03-14
Payer: MEDICARE

## 2024-03-14 VITALS
DIASTOLIC BLOOD PRESSURE: 72 MMHG | HEIGHT: 66 IN | BODY MASS INDEX: 26.52 KG/M2 | WEIGHT: 165 LBS | HEART RATE: 87 BPM | TEMPERATURE: 97 F | OXYGEN SATURATION: 96 % | SYSTOLIC BLOOD PRESSURE: 110 MMHG

## 2024-03-14 DIAGNOSIS — J44.9 COPD MIXED TYPE: ICD-10-CM

## 2024-03-14 DIAGNOSIS — Z72.0 TOBACCO ABUSE: ICD-10-CM

## 2024-03-14 DIAGNOSIS — G47.33 OBSTRUCTIVE SLEEP APNEA: ICD-10-CM

## 2024-03-14 DIAGNOSIS — G47.34 NOCTURNAL HYPOXEMIA: ICD-10-CM

## 2024-03-14 DIAGNOSIS — J44.9 COPD MIXED TYPE: Primary | ICD-10-CM

## 2024-03-14 RX ORDER — ALBUTEROL SULFATE 90 UG/1
4 AEROSOL, METERED RESPIRATORY (INHALATION) ONCE
Status: COMPLETED | OUTPATIENT
Start: 2024-03-14 | End: 2024-03-14

## 2024-03-14 RX ORDER — IPRATROPIUM BROMIDE 21 UG/1
2 SPRAY, METERED NASAL EVERY 12 HOURS
Qty: 1 EACH | Refills: 11 | Status: SHIPPED | OUTPATIENT
Start: 2024-03-14

## 2024-03-14 RX ORDER — VARENICLINE TARTRATE 0.5 (11)-1
0.5 KIT ORAL DAILY
Qty: 1 EACH | Refills: 0 | Status: SHIPPED | OUTPATIENT
Start: 2024-03-14

## 2024-03-14 RX ORDER — TIOTROPIUM BROMIDE AND OLODATEROL 3.124; 2.736 UG/1; UG/1
2 SPRAY, METERED RESPIRATORY (INHALATION)
Qty: 1 EACH | Refills: 11 | Status: SHIPPED | OUTPATIENT
Start: 2024-03-14

## 2024-03-14 RX ORDER — ALBUTEROL SULFATE 2.5 MG/3ML
2.5 SOLUTION RESPIRATORY (INHALATION) 4 TIMES DAILY PRN
Qty: 120 EACH | Refills: 11 | Status: SHIPPED | OUTPATIENT
Start: 2024-03-14

## 2024-03-14 RX ORDER — VARENICLINE TARTRATE 1 MG/1
1 TABLET, FILM COATED ORAL 2 TIMES DAILY
Qty: 60 TABLET | Refills: 4 | Status: SHIPPED | OUTPATIENT
Start: 2024-03-14

## 2024-03-14 RX ORDER — ALBUTEROL SULFATE 90 UG/1
2 AEROSOL, METERED RESPIRATORY (INHALATION) EVERY 6 HOURS PRN
Qty: 18 G | Refills: 11 | Status: SHIPPED | OUTPATIENT
Start: 2024-03-14

## 2024-03-14 RX ADMIN — ALBUTEROL SULFATE 4 PUFF: 90 AEROSOL, METERED RESPIRATORY (INHALATION) at 15:38

## 2024-03-14 NOTE — PROGRESS NOTES
PULMONARY  NOTE    Chief Complaint     Stage III COPD, tobacco abuse, SABRINA, nocturnal hypoxemia, perennial rhinitis, daytime hypersomnolence    History of Present Illness     63-year-old female returns today for follow-up  I last saw her 8/8/2023    She has a history of tobacco abuse which is ongoing  She would like to try Chantix for smoking cessation    Last LDCT was in February 2024 which revealed no suspicious lesions    She has stage III, severe, chronic obstructive pulmonary disease  She is on the same inhalers    She had a recent exacerbation of sinopulmonary symptoms  She has been taking an antibiotic and feels a little bit better  She still having a lot of nasal congestion  She has prednisone but has not been taking it because her blood sugars have been elevated    She has a history of obstructive sleep apnea and is unable to get new equipment without a new sleep study  We had gotten set up to be done in [place] but she could not do it and has not heard anything back  She would like to have it done in [place]  She cannot do a home sleep study because she has to take care of her 3-year-old at night    Patient Active Problem List   Diagnosis    Obstructive sleep apnea    Nocturnal hypoxemia    Polypharmacy    COVID-19 virus infection    DM2 (diabetes mellitus, type 2)    Stage III (Severe) COPD    Tobacco abuse    Precordial pain    History of rheumatic fever as a child    Hyperlipidemia LDL goal <100      Allergies   Allergen Reactions    Bee Venom Anaphylaxis    Ciprofloxacin Shortness Of Breath       Current Outpatient Medications:     Accu-Chek Softclix Lancets lancets, USE 1  TO CHECK GLUCOSE TWICE DAILY TO THREE TIMES DAILY, Disp: 300 each, Rfl: 1    albuterol (PROVENTIL) (2.5 MG/3ML) 0.083% nebulizer solution, Take 2.5 mg by nebulization 4 (Four) Times a Day As Needed for Wheezing., Disp: 120 each, Rfl: 11    albuterol sulfate  (90 Base) MCG/ACT inhaler, Inhale 2 puffs Every 6 (Six) Hours As  Needed for Wheezing., Disp: 18 g, Rfl: 11    ASPIRIN 81 PO, Take 1 tablet by mouth Daily., Disp: , Rfl:     doxycycline (VIBRAMYICN) 100 MG tablet, Take 1 tablet by mouth 2 (Two) Times a Day., Disp: 20 tablet, Rfl: 0    glipizide (GLUCOTROL XL) 5 MG ER tablet, Take 3 tablets by mouth Daily., Disp: 90 tablet, Rfl: 5    glucose blood test strip, Use as instructed 2-3 times a day, Disp: 100 each, Rfl: 3    HYDROcodone-acetaminophen (NORCO) 5-325 MG per tablet, Take 1 tablet by mouth., Disp: , Rfl:     Insulin aspart (FIASP FLEXTOUCH) 100 UNIT/ML solution pen-injector injection pen, For use at mealtimes and per correctional scale, MDD 80 units, Disp: 24 mL, Rfl: 5    Insulin Pen Needle (BD Pen Needle Nessa U/F) 32G X 4 MM misc, Use as directed 5 times daily, Disp: 150 each, Rfl: 3    isosorbide mononitrate (IMDUR) 30 MG 24 hr tablet, Take 1 tablet by mouth Daily. NEED APPOINTMENT, Disp: 90 tablet, Rfl: 0    Lantus SoloStar 100 UNIT/ML injection pen, INJECT 50 UNITS SUBCUTANEOUSLY ONCE DAILY AS DIRECTED, Disp: 15 mL, Rfl: 0    meloxicam (MOBIC) 15 MG tablet, Take 1 tablet by mouth Daily. Take with food., Disp: , Rfl:     metFORMIN ER (GLUCOPHAGE-XR) 500 MG 24 hr tablet, TAKE 1 TABLET BY MOUTH IN THE MORNING BEFORE BREAKFAST, Disp: 30 tablet, Rfl: 0    predniSONE (DELTASONE) 10 MG tablet, Take 3 tabs daily x 4 days, then take 2 tabs daily x 4 days, then take 1 tab daily x 4 days, Disp: 31 tablet, Rfl: 0    rosuvastatin (CRESTOR) 20 MG tablet, Take 1 tablet by mouth once daily, Disp: 90 tablet, Rfl: 3    tiotropium bromide-olodaterol (Stiolto Respimat) 2.5-2.5 MCG/ACT aerosol solution inhaler, Inhale 2 puffs Daily., Disp: 1 each, Rfl: 11    tiZANidine (ZANAFLEX) 4 MG tablet, Take 1 tablet by mouth 3 (Three) Times a Day., Disp: , Rfl:   MEDICATION LIST AND ALLERGIES REVIEWED.    Family History   Problem Relation Age of Onset    Arthritis Mother     Diabetes Mother     Heart disease Mother     Thyroid disease Mother      "Uterine cancer Mother     Cancer Mother     Heart disease Brother     Diabetes Brother     Breast cancer Other         MATERNAL GREAT AUNT    Cancer Maternal Grandmother     Heart attack Maternal Grandmother     Ovarian cancer Neg Hx      Social History     Tobacco Use    Smoking status: Every Day     Current packs/day: 1.00     Average packs/day: 1 pack/day for 50.0 years (50.0 ttl pk-yrs)     Types: Cigarettes    Smokeless tobacco: Never   Vaping Use    Vaping status: Former    Substances: Nicotine    Devices: Pre-filled or refillable cartridge   Substance Use Topics    Alcohol use: Not Currently     Comment: Only 2 or 3 times a year.    Drug use: Never     Social History     Social History Narrative        Has moved to [place] from [place]    Continues to smoke up to 1 pack of cigarettes per day    Denies regular alcohol use     FAMILY AND SOCIAL HISTORY REVIEWED.    Review of Systems  IF PRESENT REFER TO SCANNED ROS SHEET FROM SAME DATE  OTHERWISE ROS OBTAINED AND NON-CONTRIBUTORY OVER HPI.    /72   Pulse 87   Temp 97 °F (36.1 °C)   Ht 167.6 cm (65.98\")   Wt 74.8 kg (165 lb)   LMP  (LMP Unknown)   SpO2 96% Comment: room air at rest  BMI 26.64 kg/m²   Physical Exam  Vitals and nursing note reviewed.   Constitutional:       General: She is not in acute distress.     Appearance: She is well-developed. She is not diaphoretic.   HENT:      Head: Normocephalic and atraumatic.   Neck:      Thyroid: No thyromegaly.   Cardiovascular:      Rate and Rhythm: Normal rate and regular rhythm.      Heart sounds: Normal heart sounds. No murmur heard.  Pulmonary:      Effort: Pulmonary effort is normal.      Breath sounds: No stridor.      Comments: Scattered wheezes  Lymphadenopathy:      Cervical: No cervical adenopathy.      Upper Body:      Right upper body: No supraclavicular or epitrochlear adenopathy.      Left upper body: No supraclavicular or epitrochlear adenopathy.   Skin:     General: Skin is warm " and dry.   Neurological:      Mental Status: She is alert and oriented to person, place, and time.   Psychiatric:         Behavior: Behavior normal.         Results     Low-dose CT scan of chest from 2/10/2024 revealed no suspicious intrathoracic lesions    PFTs today reveal moderate to severe airway obstruction, no significant change in FEV1 after bronchodilator  No restriction but air trapping and a reduced diffusion capacity    Immunization History   Administered Date(s) Administered    COVID-19 (MODERNA) 1st,2nd,3rd Dose Monovalent 04/09/2021, 05/07/2021, 04/02/2022    Flu Vaccine Quad PF >36MO 01/11/2021    Fluzone (or Fluarix & Flulaval for VFC) >6mos 01/11/2021    Fluzone Quad >6mos (Multi-dose) 10/13/2016    Influenza Quad Vaccine (Inpatient) 10/13/2016    Pneumococcal Polysaccharide (PPSV23) 10/13/2016, 01/11/2021     Problem List       ICD-10-CM ICD-9-CM   1. Stage III (Severe) COPD  J44.9 496   2. Obstructive sleep apnea  G47.33 327.23   3. Tobacco abuse  Z72.0 305.1   4. Nocturnal hypoxemia  G47.34 327.24       Discussion     Reviewed her test results  PFTs continue to exhibit significant airway obstruction  CT scan of the chest reveals no suspicious lesions    She probably would benefit from some steroids but her blood sugars have been high    I would recommend low-dose CT scan of the chest and February 2025    We again discussed the need for smoking cessation  She would like to try Chantix    She needs her medications refilled    She still has not gotten a sleep study arranged  Will try to get that going, again as well    I will plan to see her back in 6 months or earlier if there are any problems in the meantime    Moderate level of Medical Decision Making complexity based on 2 or more chronic stable illnesses and an independent review of test results and/or prescription drug management.    Silas Thakur MD  Note electronically signed    CC: Provider, No Known

## 2024-03-26 DIAGNOSIS — J44.9 COPD MIXED TYPE: ICD-10-CM

## 2024-03-26 RX ORDER — IPRATROPIUM BROMIDE AND ALBUTEROL SULFATE 2.5; .5 MG/3ML; MG/3ML
3 SOLUTION RESPIRATORY (INHALATION) 4 TIMES DAILY PRN
Qty: 240 ML | Refills: 5 | Status: SHIPPED | OUTPATIENT
Start: 2024-03-26

## 2024-03-26 RX ORDER — IPRATROPIUM BROMIDE AND ALBUTEROL SULFATE 2.5; .5 MG/3ML; MG/3ML
SOLUTION RESPIRATORY (INHALATION)
Refills: 3 | OUTPATIENT
Start: 2024-03-26

## 2024-03-26 NOTE — TELEPHONE ENCOUNTER
Refilled Rx Duo-Neb Sol per chart via fax sent to [facility] per pt's request. Pt verbalized appreciation.

## 2024-04-05 NOTE — PROGRESS NOTES
Subjective:     Encounter Date:04/18/2024      Patient ID: Kathy Taylor is a 64 y.o.  white female from [place], [place].    PCP: Unknown  CARDIOLOGIST: Jono Estrella MD  PULMONOLOGIST: Grant Thakur MD  ENDOCRINOLOGIST: Sayra Witt MD    Chief Complaint:   Chief Complaint   Patient presents with    Chest Pain     Problem List:  Precordial chest pain  Apparently normal echo and stress test per patient report 2020  Echocardiogram January 2022: LVEF 55%, no significant valvular abnormalities, no pericardial effusion  Stress test February 2022: Normal myocardial perfusion study with no evidence of ischemia, EF 57%  Hypotension, intolerant to HCTZ with symptomatic hypotension  Hyperlipidemia  Type 2 diabetes mellitus, onset 2012; hemoglobin A1c 9.7% March 2023  Stage III severe COPD with current tobacco use 1 pack/day, 50-pack-year history, PFTs show moderate to severe airway obstruction, no significant change in FEV1 after bronchodilator, no restriction but air trapping and reduced diffusion capacity March 2024  History of SABRINA with upcoming sleep study  Fibromyalgia  Migraines  [facility] admission January 2021 for COVID  Family history of CAD  History of rheumatic fever as a child  Surgical history:  Breast lumpectomy  Tubal ligation  Vulvar surgery  [place] teeth extraction    Allergies   Allergen Reactions    Bee Venom Anaphylaxis    Ciprofloxacin Shortness Of Breath       Current Outpatient Medications   Medication Instructions    Accu-Chek Softclix Lancets lancets USE 1  TO CHECK GLUCOSE TWICE DAILY TO THREE TIMES DAILY    albuterol (PROVENTIL) 2.5 mg, Nebulization, 4 Times Daily PRN    albuterol sulfate  (90 Base) MCG/ACT inhaler 2 puffs, Inhalation, Every 6 Hours PRN    ASPIRIN 81 PO 1 tablet, Oral, Daily    glipizide (GLUCOTROL XL) 15 mg, Oral, Daily    glucose blood test strip Use as instructed 2-3 times a day    HYDROcodone-acetaminophen (NORCO) 5-325 MG per tablet 1 tablet, Oral     Insulin aspart (FIASP FLEXTOUCH) 100 UNIT/ML solution pen-injector injection pen For use at mealtimes and per correctional scale, MDD 80 units    Insulin Pen Needle (BD Pen Needle Nessa U/F) 32G X 4 MM misc Use as directed 5 times daily    ipratropium (ATROVENT) 0.03 % nasal spray 2 sprays, Nasal, Every 12 Hours    ipratropium-albuterol (DUO-NEB) 0.5-2.5 mg/3 ml nebulizer 3 mL, Nebulization, 4 Times Daily PRN    isosorbide mononitrate (IMDUR) 30 mg, Oral, Daily, NEED APPOINTMENT    Lantus SoloStar 100 UNIT/ML injection pen INJECT 50 UNITS SUBCUTANEOUSLY ONCE DAILY AS DIRECTED    meloxicam (MOBIC) 15 mg, Oral, Daily, Take with food.    metFORMIN ER (GLUCOPHAGE-XR) 500 MG 24 hr tablet TAKE 1 TABLET BY MOUTH IN THE MORNING BEFORE BREAKFAST    rosuvastatin (CRESTOR) 20 MG tablet Take 1 tablet by mouth once daily    tiZANidine (ZANAFLEX) 4 MG tablet 1 tablet, Oral, 3 Times Daily         HISTORY OF PRESENT ILLNESS:  The patient is here after a 14-month hiatus and is a patient of Dr Estrella.  She saw her pulmonologist in March 2024 and was agreeable to try Chantix for smoking cessation.  She smokes 1 pack/day and has 50-pack-year history.  She also supposed to have an upcoming sleep study.  The patient said that insurance would not cover the Chantix and was too expensive.  She is agreeable to try nicotine patches.  She is still smoking 1 pack/day.  She is on limited activity due to her diabetic neuropathy.  She has not seen endocrinology in over 1 year and has an appointment in August 2024.  The patient says that she has not had labs in over a year so she is unsure what her hemoglobin A1c is.  She was out of her metformin and glipizide for a while and her glucose levels have been a little elevated lately.  She sometimes will have some stabbing chest pain in the middle of her chest and sometimes it will go to her back and sometimes to her arm.  She says that it will generally last about a minute and then go away.  She is  "unsure if this is related to her lungs or GERD or is cardiac related.  She is compliant with Imdur.  She does not have any nitroglycerin sublingual to use at home.  She says that her chest pain is random and not worsened by exertion.  She always has shortness of breath because she has stage III COPD.  She has increased stress lately because she has multiple family members living with her now and she used to be living by herself with just her and her dog.  Occasionally she will have nausea and occasionally she will have dizziness.  She has not checked her blood pressure at these times.  She denies any presyncope or syncope.  She has another doctor's appointment today and did not realize that she overbooked her appointments in the same day.She tries to walk her dog for activity.        ROS   All other systems reviewed and otherwise negative.      ECG 12 Lead    Date/Time: 4/18/2024 1:13 PM  Performed by: Margaret Rockwell APRN    Authorized by: Margaret Rockwell APRN  Rhythm comments: Normal sinus rhythm, possible RVH, nonspecific ST abnormality, prolonged QT, abnormal ECG, 91 bpm,  ms,  ms, QTc 509 ms, no significant changes from last ECG April 2023             Objective:       Vitals:    04/18/24 1116 04/18/24 1124   BP: 106/62 102/64   BP Location: Right arm Right arm   Patient Position: Sitting Standing   Cuff Size: Adult Adult   Pulse: 92 91   SpO2: 94% 98%   Weight: 77.8 kg (171 lb 9.6 oz)    Height: 167.6 cm (66\")      Body mass index is 27.7 kg/m².  Wt Readings from Last 2 Encounters:   04/18/24 77.8 kg (171 lb 9.6 oz)   03/14/24 74.8 kg (165 lb)        Constitutional:       Appearance: Healthy appearance. Not in distress.      Comments: Ambulating with cane   Neck:      Vascular: No JVR. JVD normal.   Pulmonary:      Effort: Pulmonary effort is normal.      Breath sounds: Decreased breath sounds present. No wheezing. No rhonchi. No rales.   Chest:      Chest wall: Not tender to palpatation. "   Cardiovascular:      PMI at left midclavicular line. Normal rate. Regular rhythm. Normal S1. Normal S2.       Murmurs: There is no murmur.      No gallop.  No click. No rub.   Pulses:     Intact distal pulses.   Edema:     Peripheral edema absent.   Abdominal:      General: Bowel sounds are normal.      Palpations: Abdomen is soft.      Tenderness: There is no abdominal tenderness.   Musculoskeletal: Normal range of motion.         General: No tenderness. Skin:     General: Skin is warm and dry.   Neurological:      General: No focal deficit present.      Mental Status: Alert and oriented to person, place and time.           Lab Review:   Lab Results   Component Value Date    GLUCOSE 72 04/25/2023    BUN 16 04/25/2023    CREATININE 0.94 04/25/2023    EGFRIFNONA 63 01/10/2022    BCR 17.0 04/25/2023    CO2 28.0 04/25/2023    CALCIUM 9.7 04/25/2023    ALBUMIN 4.2 04/25/2023    AST 26 04/25/2023    ALT 28 04/25/2023       Lab Results   Component Value Date    WBC 7.97 04/25/2023    HGB 14.5 04/25/2023    HCT 42.1 04/25/2023    MCV 87.0 04/25/2023     04/25/2023       Lab Results   Component Value Date    HGBA1C 9.7 03/02/2023       Lab Results   Component Value Date    TRIG 129 03/02/2023    TRIG 110 03/31/2022     Lab Results   Component Value Date    HDL 46 03/02/2023    HDL 66 03/31/2022     Lab Results   Component Value Date    LDL 47 03/02/2023    LDL 33 03/31/2022             Results for orders placed during the hospital encounter of 01/07/22    Adult Transthoracic Echo Complete W/ Cont if Necessary Per Protocol    Interpretation Summary  · Calculated left ventricular EF = 55%  · All left ventricular wall segments contract normally.  · No significant valvular abnormalities  · No pericardial effusion.       Results for orders placed during the hospital encounter of 02/28/23    Duplex Carotid Ultrasound CAR    Interpretation Summary    Right internal carotid artery stenosis of 0-49%.    Left internal  carotid artery stenosis of 0-49%.    Vertebral artery flow is antegrade bilaterally       Advance Care Planning   ACP discussion was held with the patient during this visit. Patient does not have an advance directive, declines further assistance.      Assessment:    Patient with precordial chest pain.  I will order CT cardiac calcium score.  Unable to order CTA coronaries due to stage III COPD, not optimal for beta-blockers for test.  I will order labs since she has not had any in over a year.  She still smoking 1 pack/day but is agreeable to try nicotine patches.  She was unable to start on Chantix due to cost. I advised the patient of the risks of continuing to use tobacco, and I provided this patient with smoking cessation educational materials and discussed how to quit smoking and patient has expressed the willingness to quit.  Counseled patient for 3-5 minutes.  Encouraged patient to follow-up for her endocrinology appointment in August 2024.     Diagnosis Plan   1. Precordial pain  CT cardiac calcium score, troponin, no ischemia on stress test in 2022      2. Hyperlipidemia LDL goal <100  No new labs to review, FLP, CMP      3. Tobacco abuse  Nicotine patches      4. History of rheumatic fever as a child  No valvular abnormalities on echocardiogram 2022             Plan:         Patient to continue current medications and close follow up with the above providers.  Tentative cardiology follow up in November 2024 with [facility] or patient may return sooner PRN.  Encouraged tobacco cessation; patient agreeable to try nicotine patches  Nitroglycerin sublingual as needed for recurrent chest pain  CBC, CMP, FLP, hemoglobin A1c, troponin, LP(a)  CT cardiac calcium score      Electronically signed by KALYAN Garvin, 04/18/24, 1:17 PM EDT.

## 2024-04-18 ENCOUNTER — OFFICE VISIT (OUTPATIENT)
Dept: CARDIOLOGY | Facility: CLINIC | Age: 64
End: 2024-04-18
Payer: MEDICARE

## 2024-04-18 VITALS
OXYGEN SATURATION: 98 % | SYSTOLIC BLOOD PRESSURE: 102 MMHG | BODY MASS INDEX: 27.58 KG/M2 | HEART RATE: 91 BPM | DIASTOLIC BLOOD PRESSURE: 64 MMHG | HEIGHT: 66 IN | WEIGHT: 171.6 LBS

## 2024-04-18 DIAGNOSIS — Z86.79 HISTORY OF RHEUMATIC FEVER AS A CHILD: ICD-10-CM

## 2024-04-18 DIAGNOSIS — E11.40 TYPE 2 DIABETES MELLITUS WITH DIABETIC NEUROPATHY, WITH LONG-TERM CURRENT USE OF INSULIN: ICD-10-CM

## 2024-04-18 DIAGNOSIS — R07.2 PRECORDIAL PAIN: Primary | ICD-10-CM

## 2024-04-18 DIAGNOSIS — E78.5 HYPERLIPIDEMIA LDL GOAL <100: ICD-10-CM

## 2024-04-18 DIAGNOSIS — Z79.4 TYPE 2 DIABETES MELLITUS WITH DIABETIC NEUROPATHY, WITH LONG-TERM CURRENT USE OF INSULIN: ICD-10-CM

## 2024-04-18 DIAGNOSIS — Z72.0 TOBACCO ABUSE: ICD-10-CM

## 2024-04-18 PROCEDURE — 93000 ELECTROCARDIOGRAM COMPLETE: CPT | Performed by: NURSE PRACTITIONER

## 2024-04-18 PROCEDURE — 99406 BEHAV CHNG SMOKING 3-10 MIN: CPT | Performed by: NURSE PRACTITIONER

## 2024-04-18 PROCEDURE — 1159F MED LIST DOCD IN RCRD: CPT | Performed by: NURSE PRACTITIONER

## 2024-04-18 PROCEDURE — 1160F RVW MEDS BY RX/DR IN RCRD: CPT | Performed by: NURSE PRACTITIONER

## 2024-04-18 PROCEDURE — 99214 OFFICE O/P EST MOD 30 MIN: CPT | Performed by: NURSE PRACTITIONER

## 2024-04-18 RX ORDER — NITROGLYCERIN 0.3 MG/1
TABLET SUBLINGUAL
Qty: 25 TABLET | Refills: 11 | Status: SHIPPED | OUTPATIENT
Start: 2024-04-18

## 2024-04-18 RX ORDER — NICOTINE 21 MG/24HR
1 PATCH, TRANSDERMAL 24 HOURS TRANSDERMAL EVERY 24 HOURS
Qty: 30 EACH | Refills: 5 | Status: SHIPPED | OUTPATIENT
Start: 2024-04-18

## 2024-04-19 RX ORDER — ISOSORBIDE MONONITRATE 30 MG/1
30 TABLET, EXTENDED RELEASE ORAL DAILY
Qty: 90 TABLET | Refills: 3 | Status: SHIPPED | OUTPATIENT
Start: 2024-04-19

## 2024-05-07 ENCOUNTER — TELEPHONE (OUTPATIENT)
Dept: ENDOCRINOLOGY | Facility: CLINIC | Age: 64
End: 2024-05-07

## 2024-05-07 DIAGNOSIS — E11.65 TYPE 2 DIABETES MELLITUS WITH HYPERGLYCEMIA, WITH LONG-TERM CURRENT USE OF INSULIN: ICD-10-CM

## 2024-05-07 DIAGNOSIS — Z79.4 TYPE 2 DIABETES MELLITUS WITH HYPERGLYCEMIA, WITH LONG-TERM CURRENT USE OF INSULIN: ICD-10-CM

## 2024-05-07 RX ORDER — INSULIN GLARGINE 100 [IU]/ML
INJECTION, SOLUTION SUBCUTANEOUS
Qty: 15 ML | Refills: 1 | Status: CANCELLED | OUTPATIENT
Start: 2024-05-07

## 2024-05-07 RX ORDER — LANCETS
EACH MISCELLANEOUS
Qty: 100 EACH | Refills: 11 | Status: SHIPPED | OUTPATIENT
Start: 2024-05-07

## 2024-05-07 RX ORDER — LANCETS
EACH MISCELLANEOUS
Qty: 300 EACH | Refills: 1 | Status: CANCELLED | OUTPATIENT
Start: 2024-05-07

## 2024-05-07 RX ORDER — GLIPIZIDE 5 MG/1
TABLET, FILM COATED, EXTENDED RELEASE ORAL
Qty: 90 TABLET | Refills: 3 | Status: SHIPPED | OUTPATIENT
Start: 2024-05-07

## 2024-05-07 RX ORDER — INSULIN GLARGINE 100 [IU]/ML
50 INJECTION, SOLUTION SUBCUTANEOUS DAILY
Qty: 15 ML | Refills: 3 | Status: SHIPPED | OUTPATIENT
Start: 2024-05-07

## 2024-05-07 RX ORDER — GLIPIZIDE 5 MG/1
15 TABLET, FILM COATED, EXTENDED RELEASE ORAL DAILY
Qty: 90 TABLET | Refills: 0 | Status: CANCELLED | OUTPATIENT
Start: 2024-05-07 | End: 2025-05-07

## 2024-05-07 NOTE — TELEPHONE ENCOUNTER
Another request for Lantus was also requested.    Last office visit with prescribing clinician: 3/2/2023       Next office visit with prescribing clinician: 8/21/2024     {

## 2024-06-06 NOTE — CASE MANAGEMENT/SOCIAL WORK
Discharge Planning Assessment  [facility]     Patient Name: Kathy Taylor  MRN: [id number]  Today's Date: 1/10/2022    Admit Date: 1/7/2022     Discharge Needs Assessment     Row Name 01/10/22 1035       Living Environment    Lives With child(teresa), adult    Name(s) of Who Lives With Patient Diane Taylor, son and Grandson 15mo    Current Living Arrangements home/apartment/condo    Primary Care Provided by self    Provides Primary Care For no one    Family Caregiver if Needed child(teresa), adult    Family Caregiver Names Diane Taylor    Quality of Family Relationships unable to assess    Able to Return to Prior Arrangements yes       Resource/Environmental Concerns    Resource/Environmental Concerns none       Transition Planning    Patient/Family Anticipates Transition to home with family    Patient/Family Anticipated Services at Transition none    Transportation Anticipated family or friend will provide       Discharge Needs Assessment    Readmission Within the Last 30 Days no previous admission in last 30 days    Equipment Currently Used at Home pulse ox; nebulizer    Concerns to be Addressed no discharge needs identified; denies needs/concerns at this time    Anticipated Changes Related to Illness none    Equipment Needed After Discharge none    Provided Post Acute Provider List? N/A    N/A Provider List Comment in [place]    Discharge Coordination/Progress Home               Discharge Plan     Row Name 01/10/22 1038       Plan    Plan Home    Patient/Family in Agreement with Plan yes    Plan Comments Spoke with patient via telephone regarding discharge planning.  Patient indicates she had used HH in the past when she lived in [place] but has not used HH for at least two years and reports she has a Nebulizer and Pulse Oximeter.  She indicates she has prescription coverage and her medications are affordable.  She lives in a single level downstairs apartment with three steps to enter and vianney ther son, daughter  and 15 month old grandson also lives with them.  She denies concerns regarding home safety and indicates that her family can assist her if needed. She reports that she received the COVID vaccine.  All of the family members living in the house are COVID positive.  No discharge needs verbalized.  CM following.  Patient plan is to discharge home via car with family to transport.    Final Discharge Disposition Code 01 - home or self-care              Continued Care and Services - Admitted Since 1/7/2022    Coordination has not been started for this encounter.       Expected Discharge Date and Time     Expected Discharge Date Expected Discharge Time    Jan 14, 2022          Demographic Summary     Row Name 01/10/22 1031       General Information    Admission Type inpatient    Arrived From home    Referral Source admission list    Reason for Consult discharge planning    Preferred Language English     Used During This Interaction no    General Information Comments January KALYAN Boudreaux       Contact Information    Permission Granted to Share Info With     Contact Information Obtained for     Contact Information Comments Diane Bear daughter  [phone]               Functional Status     Row Name 01/10/22 1034       Functional Status    Usual Activity Tolerance good    Current Activity Tolerance good       Functional Status, IADL    Medications independent    Meal Preparation independent    Housekeeping independent    Laundry independent    Shopping independent       Employment/    Employment/ Comments [employer] Medicare Replacement               Psychosocial    No documentation.                Abuse/Neglect    No documentation.                Legal    No documentation.                Substance Abuse    No documentation.                Patient Forms    No documentation.                   Yuli Dumont, ERNESTO     left arm AVF, B/L knee and B/L hip replacement/Artificial joint/Vascular access device

## 2024-06-19 ENCOUNTER — TELEPHONE (OUTPATIENT)
Dept: PULMONOLOGY | Facility: CLINIC | Age: 64
End: 2024-06-19
Payer: MEDICARE

## 2024-06-19 RX ORDER — DOXYCYCLINE HYCLATE 100 MG/1
100 CAPSULE ORAL 2 TIMES DAILY
Qty: 20 CAPSULE | Refills: 0 | Status: SHIPPED | OUTPATIENT
Start: 2024-06-19

## 2024-06-19 NOTE — TELEPHONE ENCOUNTER
"Pt called requesting ABX she states she thinks she has a lung infection and possible sinus infection. She states \" she can taste the infection in her mouth\" She also c/o cough and occ wheezing since this past weekend Please advise  "

## 2024-06-20 NOTE — TELEPHONE ENCOUNTER
Per Dr. Thakur , Doxycycline has been sent in to the pharmacy, I tried to call pt, GOVINDOM for pt to call office, # given

## 2024-06-25 RX ORDER — IPRATROPIUM BROMIDE AND ALBUTEROL SULFATE 2.5; .5 MG/3ML; MG/3ML
SOLUTION RESPIRATORY (INHALATION)
Qty: 180 ML | Refills: 3 | Status: SHIPPED | OUTPATIENT
Start: 2024-06-25

## 2024-07-02 ENCOUNTER — TELEPHONE (OUTPATIENT)
Dept: CARDIOLOGY | Facility: CLINIC | Age: 64
End: 2024-07-02
Payer: MEDICARE

## 2024-07-02 ENCOUNTER — HOSPITAL ENCOUNTER (OUTPATIENT)
Dept: CT IMAGING | Facility: HOSPITAL | Age: 64
Discharge: HOME | End: 2024-07-02
Admitting: NURSE PRACTITIONER

## 2024-07-02 DIAGNOSIS — R07.2 PRECORDIAL PAIN: Primary | ICD-10-CM

## 2024-07-02 DIAGNOSIS — R07.2 PRECORDIAL PAIN: ICD-10-CM

## 2024-07-02 PROCEDURE — 75571 CT HRT W/O DYE W/CA TEST: CPT

## 2024-07-02 NOTE — TELEPHONE ENCOUNTER
----- Message from Jono Estrella sent at 7/2/2024  4:37 PM EDT -----  Regarding: RE: Results of CT cardiac calcium  I will place the case request for cardiac catheterization.  Kimber Hilton can you let her know scheduling will be calling her to set this up.    Thanks Jena for letting us know  ----- Message -----  From: Carolin Gilbert APRN  Sent: 7/2/2024   3:29 PM EDT  To: Jono Estrella MD; #  Subject: Results of CT cardiac calcium                    The patient had an elevated calcium score and localized in the RCA.  I am checking Margaret elkins while she is on vacation.  I just now realized this is your patient.  When I spoke with her on the phone she is already on aspirin and statin.  She reports that she has a long history of atypical chest discomfort.  You had started her on isosorbide and it initially made her symptoms go away but they have now come back and tend to be worse.  I did know if you would want a cardiac catheterization or not.  I am including Kimber Mabry on this so she can contact patient with further recommendations.  ----- Message -----  From: [place], Rad [facility] [place]  Sent: 7/2/2024   2:04 PM EDT  To: KALYAN Garvin

## 2024-07-03 ENCOUNTER — PREP FOR SURGERY (OUTPATIENT)
Dept: OTHER | Facility: HOSPITAL | Age: 64
End: 2024-07-03
Payer: MEDICARE

## 2024-07-03 DIAGNOSIS — I25.10 CORONARY ARTERY DISEASE: Primary | ICD-10-CM

## 2024-07-03 RX ORDER — SODIUM CHLORIDE 0.9 % (FLUSH) 0.9 %
10 SYRINGE (ML) INJECTION AS NEEDED
OUTPATIENT
Start: 2024-07-03

## 2024-07-03 RX ORDER — SODIUM CHLORIDE 0.9 % (FLUSH) 0.9 %
10 SYRINGE (ML) INJECTION EVERY 12 HOURS SCHEDULED
OUTPATIENT
Start: 2024-07-03

## 2024-07-03 RX ORDER — ASPIRIN 81 MG/1
81 TABLET ORAL DAILY
OUTPATIENT
Start: 2024-07-04

## 2024-07-03 RX ORDER — SODIUM CHLORIDE 9 MG/ML
40 INJECTION, SOLUTION INTRAVENOUS AS NEEDED
OUTPATIENT
Start: 2024-07-03

## 2024-07-03 RX ORDER — ACETAMINOPHEN 325 MG/1
650 TABLET ORAL EVERY 4 HOURS PRN
OUTPATIENT
Start: 2024-07-03

## 2024-07-03 RX ORDER — ASPIRIN 81 MG/1
324 TABLET, CHEWABLE ORAL ONCE
OUTPATIENT
Start: 2024-07-03 | End: 2024-07-03

## 2024-07-03 RX ORDER — NITROGLYCERIN 0.4 MG/1
0.4 TABLET SUBLINGUAL
OUTPATIENT
Start: 2024-07-03

## 2024-07-15 ENCOUNTER — HOSPITAL ENCOUNTER (OUTPATIENT)
Facility: HOSPITAL | Age: 64
Setting detail: HOSPITAL OUTPATIENT SURGERY
Discharge: HOME | End: 2024-07-15
Attending: INTERNAL MEDICINE | Admitting: INTERNAL MEDICINE
Payer: MEDICARE

## 2024-07-15 ENCOUNTER — APPOINTMENT (OUTPATIENT)
Dept: CARDIOLOGY | Facility: HOSPITAL | Age: 64
End: 2024-07-15
Payer: MEDICARE

## 2024-07-15 VITALS
SYSTOLIC BLOOD PRESSURE: 149 MMHG | RESPIRATION RATE: 18 BRPM | TEMPERATURE: 97.3 F | WEIGHT: 174 LBS | OXYGEN SATURATION: 93 % | BODY MASS INDEX: 27.97 KG/M2 | HEART RATE: 82 BPM | DIASTOLIC BLOOD PRESSURE: 78 MMHG | HEIGHT: 66 IN

## 2024-07-15 DIAGNOSIS — R07.2 PRECORDIAL PAIN: ICD-10-CM

## 2024-07-15 DIAGNOSIS — I25.119 CORONARY ARTERY DISEASE INVOLVING NATIVE CORONARY ARTERY OF NATIVE HEART WITH ANGINA PECTORIS: Primary | ICD-10-CM

## 2024-07-15 DIAGNOSIS — I25.10 CORONARY ARTERY DISEASE: ICD-10-CM

## 2024-07-15 LAB
ALBUMIN SERPL-MCNC: 4.1 G/DL (ref 3.5–5.2)
ALBUMIN/GLOB SERPL: 1.3 G/DL
ALP SERPL-CCNC: 87 U/L (ref 39–117)
ALT SERPL W P-5'-P-CCNC: 14 U/L (ref 1–33)
ANION GAP SERPL CALCULATED.3IONS-SCNC: 9 MMOL/L (ref 5–15)
AST SERPL-CCNC: 16 U/L (ref 1–32)
BASOPHILS # BLD AUTO: 0.04 10*3/MM3 (ref 0–0.2)
BASOPHILS NFR BLD AUTO: 0.4 % (ref 0–1.5)
BILIRUB SERPL-MCNC: 0.4 MG/DL (ref 0–1.2)
BUN SERPL-MCNC: 19 MG/DL (ref 8–23)
BUN/CREAT SERPL: 18.1 (ref 7–25)
CALCIUM SPEC-SCNC: 8.9 MG/DL (ref 8.6–10.5)
CHLORIDE SERPL-SCNC: 100 MMOL/L (ref 98–107)
CHOLEST SERPL-MCNC: 97 MG/DL (ref 0–200)
CO2 SERPL-SCNC: 28 MMOL/L (ref 22–29)
CREAT SERPL-MCNC: 1.05 MG/DL (ref 0.57–1)
DEPRECATED RDW RBC AUTO: 41.7 FL (ref 37–54)
EGFRCR SERPLBLD CKD-EPI 2021: 60.2 ML/MIN/1.73
EOSINOPHIL # BLD AUTO: 0.27 10*3/MM3 (ref 0–0.4)
EOSINOPHIL NFR BLD AUTO: 2.9 % (ref 0.3–6.2)
ERYTHROCYTE [DISTWIDTH] IN BLOOD BY AUTOMATED COUNT: 13.1 % (ref 12.3–15.4)
GLOBULIN UR ELPH-MCNC: 3.2 GM/DL
GLUCOSE BLDC GLUCOMTR-MCNC: 274 MG/DL (ref 70–130)
GLUCOSE SERPL-MCNC: 240 MG/DL (ref 65–99)
HBA1C MFR BLD: 9.7 % (ref 4.8–5.6)
HCT VFR BLD AUTO: 40.9 % (ref 34–46.6)
HDLC SERPL-MCNC: 47 MG/DL (ref 40–60)
HGB BLD-MCNC: 13.8 G/DL (ref 12–15.9)
IMM GRANULOCYTES # BLD AUTO: 0.05 10*3/MM3 (ref 0–0.05)
IMM GRANULOCYTES NFR BLD AUTO: 0.5 % (ref 0–0.5)
LDLC SERPL CALC-MCNC: 24 MG/DL (ref 0–100)
LDLC/HDLC SERPL: 0.39 {RATIO}
LYMPHOCYTES # BLD AUTO: 1.99 10*3/MM3 (ref 0.7–3.1)
LYMPHOCYTES NFR BLD AUTO: 21.5 % (ref 19.6–45.3)
MCH RBC QN AUTO: 29.6 PG (ref 26.6–33)
MCHC RBC AUTO-ENTMCNC: 33.7 G/DL (ref 31.5–35.7)
MCV RBC AUTO: 87.8 FL (ref 79–97)
MONOCYTES # BLD AUTO: 0.58 10*3/MM3 (ref 0.1–0.9)
MONOCYTES NFR BLD AUTO: 6.3 % (ref 5–12)
NEUTROPHILS NFR BLD AUTO: 6.31 10*3/MM3 (ref 1.7–7)
NEUTROPHILS NFR BLD AUTO: 68.4 % (ref 42.7–76)
NRBC BLD AUTO-RTO: 0 /100 WBC (ref 0–0.2)
PLATELET # BLD AUTO: 172 10*3/MM3 (ref 140–450)
PMV BLD AUTO: 10 FL (ref 6–12)
POTASSIUM SERPL-SCNC: 4 MMOL/L (ref 3.5–5.2)
PROT SERPL-MCNC: 7.3 G/DL (ref 6–8.5)
RBC # BLD AUTO: 4.66 10*6/MM3 (ref 3.77–5.28)
SODIUM SERPL-SCNC: 137 MMOL/L (ref 136–145)
TRIGL SERPL-MCNC: 159 MG/DL (ref 0–150)
VLDLC SERPL-MCNC: 26 MG/DL (ref 5–40)
WBC NRBC COR # BLD AUTO: 9.24 10*3/MM3 (ref 3.4–10.8)

## 2024-07-15 PROCEDURE — C1894 INTRO/SHEATH, NON-LASER: HCPCS | Performed by: INTERNAL MEDICINE

## 2024-07-15 PROCEDURE — 25810000003 SODIUM CHLORIDE 0.9 % SOLUTION: Performed by: INTERNAL MEDICINE

## 2024-07-15 PROCEDURE — 80061 LIPID PANEL: CPT | Performed by: NURSE PRACTITIONER

## 2024-07-15 PROCEDURE — 25010000002 SULFUR HEXAFLUORIDE MICROSPH 60.7-25 MG RECONSTITUTED SUSPENSION: Performed by: INTERNAL MEDICINE

## 2024-07-15 PROCEDURE — 82948 REAGENT STRIP/BLOOD GLUCOSE: CPT

## 2024-07-15 PROCEDURE — 36415 COLL VENOUS BLD VENIPUNCTURE: CPT

## 2024-07-15 PROCEDURE — 93458 L HRT ARTERY/VENTRICLE ANGIO: CPT | Performed by: INTERNAL MEDICINE

## 2024-07-15 PROCEDURE — 85025 COMPLETE CBC W/AUTO DIFF WBC: CPT | Performed by: NURSE PRACTITIONER

## 2024-07-15 PROCEDURE — 25010000002 NICARDIPINE 2.5 MG/ML SOLUTION: Performed by: INTERNAL MEDICINE

## 2024-07-15 PROCEDURE — C1769 GUIDE WIRE: HCPCS | Performed by: INTERNAL MEDICINE

## 2024-07-15 PROCEDURE — 25510000001 IOPAMIDOL PER 1 ML: Performed by: INTERNAL MEDICINE

## 2024-07-15 PROCEDURE — 25010000002 MIDAZOLAM PER 1 MG: Performed by: INTERNAL MEDICINE

## 2024-07-15 PROCEDURE — S0260 H&P FOR SURGERY: HCPCS | Performed by: INTERNAL MEDICINE

## 2024-07-15 PROCEDURE — 83036 HEMOGLOBIN GLYCOSYLATED A1C: CPT | Performed by: NURSE PRACTITIONER

## 2024-07-15 PROCEDURE — 25810000003 SODIUM CHLORIDE 0.9 % SOLUTION 1,000 ML FLEX CONT: Performed by: NURSE PRACTITIONER

## 2024-07-15 PROCEDURE — 25010000002 HEPARIN (PORCINE) PER 1000 UNITS: Performed by: INTERNAL MEDICINE

## 2024-07-15 PROCEDURE — 25010000002 FENTANYL CITRATE (PF) 50 MCG/ML SOLUTION: Performed by: INTERNAL MEDICINE

## 2024-07-15 PROCEDURE — 93306 TTE W/DOPPLER COMPLETE: CPT

## 2024-07-15 PROCEDURE — 80053 COMPREHEN METABOLIC PANEL: CPT | Performed by: NURSE PRACTITIONER

## 2024-07-15 RX ORDER — FENTANYL CITRATE 50 UG/ML
INJECTION, SOLUTION INTRAMUSCULAR; INTRAVENOUS
Status: DISCONTINUED | OUTPATIENT
Start: 2024-07-15 | End: 2024-07-15 | Stop reason: HOSPADM

## 2024-07-15 RX ORDER — SODIUM CHLORIDE 9 MG/ML
40 INJECTION, SOLUTION INTRAVENOUS AS NEEDED
Status: DISCONTINUED | OUTPATIENT
Start: 2024-07-15 | End: 2024-07-15 | Stop reason: HOSPADM

## 2024-07-15 RX ORDER — LIDOCAINE HYDROCHLORIDE 10 MG/ML
INJECTION, SOLUTION EPIDURAL; INFILTRATION; INTRACAUDAL; PERINEURAL
Status: DISCONTINUED | OUTPATIENT
Start: 2024-07-15 | End: 2024-07-15 | Stop reason: HOSPADM

## 2024-07-15 RX ORDER — HEPARIN SODIUM 1000 [USP'U]/ML
INJECTION, SOLUTION INTRAVENOUS; SUBCUTANEOUS
Status: DISCONTINUED | OUTPATIENT
Start: 2024-07-15 | End: 2024-07-15 | Stop reason: HOSPADM

## 2024-07-15 RX ORDER — ASPIRIN 81 MG/1
81 TABLET ORAL DAILY
Status: DISCONTINUED | OUTPATIENT
Start: 2024-07-16 | End: 2024-07-15 | Stop reason: HOSPADM

## 2024-07-15 RX ORDER — ACETAMINOPHEN 325 MG/1
650 TABLET ORAL EVERY 4 HOURS PRN
Status: DISCONTINUED | OUTPATIENT
Start: 2024-07-15 | End: 2024-07-15 | Stop reason: HOSPADM

## 2024-07-15 RX ORDER — ASPIRIN 81 MG/1
324 TABLET, CHEWABLE ORAL ONCE
Status: COMPLETED | OUTPATIENT
Start: 2024-07-15 | End: 2024-07-15

## 2024-07-15 RX ORDER — NICARDIPINE HCL-0.9% SOD CHLOR 1 MG/10 ML
SYRINGE (ML) INTRAVENOUS
Status: DISCONTINUED | OUTPATIENT
Start: 2024-07-15 | End: 2024-07-15 | Stop reason: HOSPADM

## 2024-07-15 RX ORDER — SODIUM CHLORIDE 0.9 % (FLUSH) 0.9 %
10 SYRINGE (ML) INJECTION AS NEEDED
Status: DISCONTINUED | OUTPATIENT
Start: 2024-07-15 | End: 2024-07-15 | Stop reason: HOSPADM

## 2024-07-15 RX ORDER — MIDAZOLAM HYDROCHLORIDE 1 MG/ML
INJECTION INTRAMUSCULAR; INTRAVENOUS
Status: DISCONTINUED | OUTPATIENT
Start: 2024-07-15 | End: 2024-07-15 | Stop reason: HOSPADM

## 2024-07-15 RX ORDER — SODIUM CHLORIDE 0.9 % (FLUSH) 0.9 %
10 SYRINGE (ML) INJECTION EVERY 12 HOURS SCHEDULED
Status: DISCONTINUED | OUTPATIENT
Start: 2024-07-15 | End: 2024-07-15 | Stop reason: HOSPADM

## 2024-07-15 RX ORDER — NITROGLYCERIN 0.4 MG/1
0.4 TABLET SUBLINGUAL
Status: DISCONTINUED | OUTPATIENT
Start: 2024-07-15 | End: 2024-07-15 | Stop reason: HOSPADM

## 2024-07-15 RX ORDER — ISOSORBIDE MONONITRATE 60 MG/1
60 TABLET, EXTENDED RELEASE ORAL DAILY
Qty: 90 TABLET | Refills: 1 | Status: SHIPPED | OUTPATIENT
Start: 2024-07-15

## 2024-07-15 RX ADMIN — SODIUM CHLORIDE 40 ML: 9 INJECTION, SOLUTION INTRAVENOUS at 10:45

## 2024-07-15 RX ADMIN — SULFUR HEXAFLUORIDE 2 ML: KIT at 16:25

## 2024-07-15 RX ADMIN — ASPIRIN 324 MG: 81 TABLET, CHEWABLE ORAL at 10:45

## 2024-07-15 NOTE — H&P
[facility]  Cardiovascular History and Physical Note         Patient is a 64-year-old female with a history of type 2 diabetes mellitus, stage III COPD, hypotension, hyperlipidemia and family history of CAD who is being referred by Dr. Jono Estrella to undergo cardiac catheterization for abnormal CT cardiac calcium score of 814 localized to the area of the right coronary artery.  The patient has been having atypical chest pain not necessarily associated with exertional activities.  She was initially started on isosorbide with some improvement in her symptoms but her symptoms have returned and appear to be progressing.    Cardiac risk factors: Known CAD, advanced age, type 2 diabetes mellitus, hyperlipidemia, family history CAD    Past medical and surgical history, social and family history reviewed in EMR.    REVIEW OF SYSTEMS:   H&P ROS reviewed and pertinent CV ROS as noted in HPI.         Vital Sign Min/Max for last 24 hours  Temp  Min: 97.3 °F (36.3 °C)  Max: 97.3 °F (36.3 °C)   BP  Min: 128/80  Max: 136/92   Pulse  Min: 97  Max: 97   No data recorded   SpO2  Min: 95 %  Max: 95 %   No data recorded    No intake or output data in the 24 hours ending 07/15/24 1123        Constitutional:       Appearance: Healthy appearance.   Eyes:      General: No scleral icterus.  Neck:      Thyroid: No thyroid mass.      Vascular: No carotid bruit or JVD. JVD normal.   Pulmonary:      Effort: Pulmonary effort is normal.      Breath sounds: Normal breath sounds.   Cardiovascular:      Normal rate. Regular rhythm.      Murmurs: There is no murmur.      No gallop.    Edema:     Peripheral edema absent.   Skin:     General: Skin is warm. There is no cyanosis.   Neurological:      General: No focal deficit present.      Mental Status: Alert.   Psychiatric:         Attention and Perception: Attention normal.          Lab Review:   Labs reviewed in the electronic medical record.  Pertinent  findings include:  Lab Results   Component Value Date    GLUCOSE 240 (H) 07/15/2024    BUN 19 07/15/2024    CREATININE 1.05 (H) 07/15/2024    EGFR 60.2 07/15/2024    BCR 18.1 07/15/2024    K 4.0 07/15/2024    CO2 28.0 07/15/2024    CALCIUM 8.9 07/15/2024    ALBUMIN 4.1 07/15/2024    BILITOT 0.4 07/15/2024    AST 16 07/15/2024    ALT 14 07/15/2024     Lab Results   Component Value Date    WBC 9.24 07/15/2024    HGB 13.8 07/15/2024    HCT 40.9 07/15/2024    MCV 87.8 07/15/2024     07/15/2024     Lab Results   Component Value Date    CHOL 97 07/15/2024    CHLPL 116 03/02/2023    TRIG 159 (H) 07/15/2024    HDL 47 07/15/2024    LDL 24 07/15/2024     Results from last 7 days   Lab Units 07/15/24  1024   HEMOGLOBIN A1C % 9.70*            Active Hospital Problems    Diagnosis     **Coronary artery disease involving native coronary artery of native heart with angina pectoris      Apparently normal echo and stress test per patient report 2020  Echo (1/2022): LVEF 55%, no significant valvular abnormalities, no pericardial effusion  Stress test (2/2022): Normal myocardial perfusion study with no evidence of ischemia, EF 57%  , 2024      Type 2 diabetes mellitus, without long-term current use of insulin     Stage III (Severe) COPD     Hyperlipidemia LDL goal <70     Tobacco abuse    Patient presents today to undergo cardiac catheterization for abnormal cardiac calcium score noted on CT of 814 localized to the area of the right coronary artery.  The risks and benefits of the procedure were discussed and the patient is agreeable to proceed.  The patient takes 81 mg aspirin at home and has been premedicated 324 mg aspirin today.         Lab results pending and if acceptable proceed with cardiac cath via the right radial approach  Further recommendations to follow      KALYAN Hercules

## 2024-07-15 NOTE — CONSULTS
Discussed and taught patient about type 2 diabetes self-management, risk factors, and importance of blood glucose control to reduce complications. Target blood glucose readings and A1c goals per ADA were reviewed. Reviewed with patient current A1c 9.7 and discussed its significance. Signs, symptoms, and treatment of hyperglycemia and hypoglycemia were discussed. Lifestyle changes such as physical activity with MD approval and healthy eating were encouraged. Stressed the importance of strict blood sugar control after surgery to prevent complications such as infection and to promote healing of incision. Encouraged pt to monitor blood sugar at home 3+  times per day and to call PCP if blood sugar is trending high. Encouraged to keep record of blood glucose readings to take to follow up appointment with PCP. Encouraged endo follow up for proper care and titration of treatment. 30 minutes in the care and education of this patient.

## 2024-07-15 NOTE — TELEPHONE ENCOUNTER
Caller: Darlene Taylorn M    Relationship: Self    Best call back number: [phone]     Requested Prescriptions:   Requested Prescriptions     Pending Prescriptions Disp Refills    Insulin Aspart, w/Niacinamide, (Insulin aspart) 100 UNIT/ML solution pen-injector 24 mL 5     Sig: For use at mealtimes and per correctional scale, MDD 80 units        Pharmacy where request should be sent:      Last office visit with prescribing clinician: 3/2/2023   Last telemedicine visit with prescribing clinician: Visit date not found   Next office visit with prescribing clinician: 8/21/2024     Additional details provided by patient: PT IS ASKING FOR METFORMIN AS WELL. PT IS ALSO NEEDING A SAMPLE OF MEDICATION UNTIL SHE RECEIVE HER MEDICATION. PLEASE ADVISE AND CALL BACK.  PT HAD A PROCEDURE DONE TODAY AT , . STATED THAT THEY WANTED TO MAKE SURE PT BS STAYS EVEN.     Does the patient have less than a 3 day supply:  [x] Yes  [] No    Would you like a call back once the refill request has been completed: [x] Yes [] No    If the office needs to give you a call back, can they leave a voicemail: [x] Yes [] No    Juni Isbell   07/15/24 14:38 EDT

## 2024-07-16 LAB
ASCENDING AORTA: 2.6 CM
BH CV ECHO MEAS - AO MAX PG: 4.5 MMHG
BH CV ECHO MEAS - AO MEAN PG: 2.5 MMHG
BH CV ECHO MEAS - AO ROOT DIAM: 2.5 CM
BH CV ECHO MEAS - AO V2 MAX: 105.4 CM/SEC
BH CV ECHO MEAS - AO V2 VTI: 21.2 CM
BH CV ECHO MEAS - AVA(I,D): 2.9 CM2
BH CV ECHO MEAS - EDV(CUBED): 68.9 ML
BH CV ECHO MEAS - EDV(MOD-SP2): 99.2 ML
BH CV ECHO MEAS - EDV(MOD-SP4): 77.3 ML
BH CV ECHO MEAS - EF(MOD-BP): 64.8 %
BH CV ECHO MEAS - EF(MOD-SP2): 65.4 %
BH CV ECHO MEAS - EF(MOD-SP4): 64.4 %
BH CV ECHO MEAS - ESV(CUBED): 22 ML
BH CV ECHO MEAS - ESV(MOD-SP2): 34.3 ML
BH CV ECHO MEAS - ESV(MOD-SP4): 27.5 ML
BH CV ECHO MEAS - FS: 31.7 %
BH CV ECHO MEAS - IVS/LVPW: 1 CM
BH CV ECHO MEAS - IVSD: 0.8 CM
BH CV ECHO MEAS - LA DIMENSION: 2.5 CM
BH CV ECHO MEAS - LAT PEAK E' VEL: 11.3 CM/SEC
BH CV ECHO MEAS - LV MASS(C)D: 97.3 GRAMS
BH CV ECHO MEAS - LV MAX PG: 3.5 MMHG
BH CV ECHO MEAS - LV MEAN PG: 1.5 MMHG
BH CV ECHO MEAS - LV V1 MAX: 93.3 CM/SEC
BH CV ECHO MEAS - LV V1 VTI: 19.2 CM
BH CV ECHO MEAS - LVIDD: 4.1 CM
BH CV ECHO MEAS - LVIDS: 2.8 CM
BH CV ECHO MEAS - LVOT AREA: 3.1 CM2
BH CV ECHO MEAS - LVOT DIAM: 2 CM
BH CV ECHO MEAS - LVPWD: 0.8 CM
BH CV ECHO MEAS - MED PEAK E' VEL: 7.1 CM/SEC
BH CV ECHO MEAS - MV A MAX VEL: 96.4 CM/SEC
BH CV ECHO MEAS - MV DEC SLOPE: 851 CM/SEC2
BH CV ECHO MEAS - MV DEC TIME: 0.17 SEC
BH CV ECHO MEAS - MV E MAX VEL: 73.4 CM/SEC
BH CV ECHO MEAS - MV E/A: 0.76
BH CV ECHO MEAS - MV MAX PG: 3.7 MMHG
BH CV ECHO MEAS - MV MEAN PG: 2 MMHG
BH CV ECHO MEAS - MV P1/2T: 30.9 MSEC
BH CV ECHO MEAS - MV V2 VTI: 20.3 CM
BH CV ECHO MEAS - MVA(P1/2T): 7.1 CM2
BH CV ECHO MEAS - MVA(VTI): 3 CM2
BH CV ECHO MEAS - PA ACC TIME: 0.14 SEC
BH CV ECHO MEAS - PA V2 MAX: 92.2 CM/SEC
BH CV ECHO MEAS - SV(LVOT): 60.3 ML
BH CV ECHO MEAS - SV(MOD-SP2): 64.9 ML
BH CV ECHO MEAS - SV(MOD-SP4): 49.8 ML
BH CV ECHO MEAS - TAPSE (>1.6): 2.09 CM
BH CV ECHO MEASUREMENTS AVERAGE E/E' RATIO: 7.98
BH CV VAS BP LEFT ARM: NORMAL MMHG
BH CV XLRA - RV BASE: 3 CM
BH CV XLRA - RV LENGTH: 7.5 CM
BH CV XLRA - RV MID: 2.1 CM
BH CV XLRA - TDI S': 13.8 CM/SEC
LEFT ATRIUM VOLUME INDEX: 16.6 ML/M2

## 2024-07-16 RX ORDER — INSULIN ASPART INJECTION 100 [IU]/ML
INJECTION, SOLUTION SUBCUTANEOUS
Qty: 24 ML | Refills: 1 | OUTPATIENT
Start: 2024-07-16

## 2024-07-16 NOTE — TELEPHONE ENCOUNTER
Please contact patient.  I have not filled this medication in more than 2 years and she was last seen in this office in March 2023.  Given she has not been seen in the past year, it is not appropriate for us to be refilling prescriptions.  Please inquire who has been filling her insulin in the interim since last visit, she will likely need to contact that provider for refills until she is seen in this office (she is currently not scheduled until August).  If this is not an option, she needs to be scheduled back sooner.  Okay to schedule with [facility] to facilitate sooner visit.

## 2024-07-16 NOTE — TELEPHONE ENCOUNTER
Rx Refill Note    Requested Prescriptions     Pending Prescriptions Disp Refills    Insulin Aspart, w/Niacinamide, (Insulin aspart) 100 UNIT/ML solution pen-injector 24 mL 5     Sig: For use at mealtimes and per correctional scale, MDD 80 units        Last office visit with prescribing clinician: 3/2/2023       Next office visit with prescribing clinician: 8/21/2024     {    Krystle Mendenhall MA  07/16/24, 10:58 EDT

## 2024-07-22 ENCOUNTER — OFFICE VISIT (OUTPATIENT)
Dept: ENDOCRINOLOGY | Facility: CLINIC | Age: 64
End: 2024-07-22
Payer: MEDICARE

## 2024-07-22 VITALS
OXYGEN SATURATION: 95 % | BODY MASS INDEX: 28.16 KG/M2 | HEIGHT: 66 IN | DIASTOLIC BLOOD PRESSURE: 78 MMHG | SYSTOLIC BLOOD PRESSURE: 126 MMHG | WEIGHT: 175.2 LBS | HEART RATE: 100 BPM

## 2024-07-22 DIAGNOSIS — Z91.199 NON-ADHERENCE TO MEDICAL TREATMENT: ICD-10-CM

## 2024-07-22 DIAGNOSIS — E11.65 TYPE 2 DIABETES MELLITUS WITH HYPERGLYCEMIA, WITH LONG-TERM CURRENT USE OF INSULIN: Primary | ICD-10-CM

## 2024-07-22 DIAGNOSIS — Z79.4 TYPE 2 DIABETES MELLITUS WITH HYPERGLYCEMIA, WITH LONG-TERM CURRENT USE OF INSULIN: Primary | ICD-10-CM

## 2024-07-22 LAB
EXPIRATION DATE: ABNORMAL
GLUCOSE BLDC GLUCOMTR-MCNC: 353 MG/DL (ref 70–130)
Lab: ABNORMAL

## 2024-07-22 PROCEDURE — 1160F RVW MEDS BY RX/DR IN RCRD: CPT | Performed by: PHYSICIAN ASSISTANT

## 2024-07-22 PROCEDURE — 99214 OFFICE O/P EST MOD 30 MIN: CPT | Performed by: PHYSICIAN ASSISTANT

## 2024-07-22 PROCEDURE — 82043 UR ALBUMIN QUANTITATIVE: CPT | Performed by: PHYSICIAN ASSISTANT

## 2024-07-22 PROCEDURE — 82570 ASSAY OF URINE CREATININE: CPT | Performed by: PHYSICIAN ASSISTANT

## 2024-07-22 PROCEDURE — 3046F HEMOGLOBIN A1C LEVEL >9.0%: CPT | Performed by: PHYSICIAN ASSISTANT

## 2024-07-22 PROCEDURE — 1159F MED LIST DOCD IN RCRD: CPT | Performed by: PHYSICIAN ASSISTANT

## 2024-07-22 PROCEDURE — 82947 ASSAY GLUCOSE BLOOD QUANT: CPT | Performed by: PHYSICIAN ASSISTANT

## 2024-07-22 RX ORDER — INSULIN GLARGINE 100 [IU]/ML
10 INJECTION, SOLUTION SUBCUTANEOUS NIGHTLY
Qty: 27 ML | Refills: 1 | Status: SHIPPED | OUTPATIENT
Start: 2024-07-22

## 2024-07-22 RX ORDER — INSULIN ASPART INJECTION 100 [IU]/ML
5 INJECTION, SOLUTION SUBCUTANEOUS
Qty: 27 ML | Refills: 1 | Status: SHIPPED | OUTPATIENT
Start: 2024-07-22

## 2024-07-22 RX ORDER — METFORMIN HYDROCHLORIDE 500 MG/1
500 TABLET, EXTENDED RELEASE ORAL
Qty: 90 TABLET | Refills: 1 | Status: SHIPPED | OUTPATIENT
Start: 2024-07-22

## 2024-07-22 RX ORDER — SEMAGLUTIDE 0.68 MG/ML
0.25 INJECTION, SOLUTION SUBCUTANEOUS WEEKLY
Qty: 3 ML | Refills: 2 | Status: SHIPPED | OUTPATIENT
Start: 2024-07-22

## 2024-07-22 NOTE — PATIENT INSTRUCTIONS
Diabetes Treatment Recommendations  07/22/24     Kathy SUPA Brandon 1960     Your A1C is:   Lab Results   Component Value Date    HGBA1C 9.70 (H) 07/15/2024    HGBA1C 9.7 03/02/2023    HGBA1C 11.3 02/17/2022       ADA General Goals: A1c: < 7%                                                  Fasting/before meal glucose: <150 mg/dL                                    2 Hour after meal glucoses: < 180 mg/dL                                        Bedtime glucose:120-180                                                                Glucose testing frequency: daily in morning and before insulin use    Medication Changes:   - Stop glipizide  - Start Ozempic 0.25 mg: inject once weekly; may increase to 0.5 mg after 4 weeks if tolerating well. May cause abdominal pain, constipation, heartburn, reflux, diarrhea.  - Restart metformin 500 mg ER: 1 tablet once daily     Insulin dosing:  Basal insulin Lantus  10 units at bedtime. Titrate if blood sugar continues more than 140 fasting. Max 30 units - contact office for additional adjustment  Increase by 2 units every 3 days if fasting blood sugar is more than 140.   Keep at current dose if fasting blood sugar is between .  Decrease by 2 units if fasting blood sugar is less than 80 or concerns for hypoglycemia overnight or between meals.             Meal Insulin  Fiasp 5 units with meals. Add additional correction if blood sugar is more than 150 before meal.     Correction insulin (add to meal insulin)   0 unit  if glucose less than 150   1unit   if glucose  150 - 199   2 units if glucose 200 - 249   3 units if glucose 250 - 299   4 units if glucose 300 - 349   5  units if glucose Greater than 350     Nutritional Recommendations:   3-4 carb portions per meal (45-60 gm of carbs) female  Physical Activity Goals:  Walk at least 15 min every day.  Ideally, exercise 45-60 min most days (could be done in short bouts of 10-15 minutes).  Keep records of your glucose levels and insulin  adjustments. We may ask you to keep records on the content of your meals with insulin doses and before/after meal glucose levels to evaluate your ratios.  Call for advice if you have unexplained or unexpected hypoglycemia  (glucose < 60) or persistent high glucose > 300.  Office: [phone]    Kimber Rasmussen PA-C

## 2024-07-22 NOTE — PROGRESS NOTES
Chief Complaint   Patient presents with    Diabetes      HPI  Kathy Taylor is a 64 y.o. female presents today for follow-up evaluation of type 2 diabetes. They were last seen for this 3/2/2023 by Dr. Witt and A1C 9.7%. Was advised starting Fiasp at meals.  Lost to follow-up until today reports due to canceled appointments and not following through with scheduling.    She had additional A1C 9.7% 7/15/2024.     Reports has been out of metformin and Fiasp for past few months.  Reports has been taking Lantus only 1 unit a day as was not sure what dose she should be taking.    Without additional concerns today.  She recently had a heart cath and is following with cardiology with results pending.  She is not currently following with a primary care.    - Denies hypoglycemia  - Admits vision changes: more difficulty with reading  - Admits numbness in feet that have been chronic for years.   - Admits chronic intermittent SOB and chest pain - currently following with cardiology   - SOB, chest pain, constipation, diarrhea, abdominal pain, increased thirst or urination.     Past medical history: Type 2 diabetes, ASCVD, COPD, tobacco use, Hx SCC of vulva;    Type 2 Diabetes:   Diagnosed with diabetes: around age 42   Complications: ASCVD, neuropathy    - BG at home: not checking; has glucometer; last checked few weeks ago 320s late afternoon. 170-325 fasting    Current regimen includes: glipizide 15 mg ER daily (3 tablets 5 mg ER),  metformin  mg once daily (GI intolerance with increase dose), Lantus 25 units (only taking 1 unit- couldn't remember how much to take, fiasp 6-8 units (not taking currently/only using about 3x/week)  Compliance with medications is poor.  - HLD: rosuvastatin 20 mg   - Aspirin: 81 mg     DM Health Maintenance:  Ophthalmology: scheduled 8/2024; last around 2021; denies hx diabetic eye disease   Monofilament / Foot exam: performed today  Urine microalbumin:cr: 9, 3/2/2023   LDL:  24, 7/15/2024      Social Hx:  Diet: Meals: 2x/day   Exercise: minimal due to chronic pain; uses cane for ambulation    Past Medical History:   Diagnosis Date    Arthritis     Asthma ?    May have been earlier    Back ache     Bronchiectasis ?    Bronchitis     Chronic bronchitis ?    COPD (chronic obstructive pulmonary disease)     Coronary artery disease involving native coronary artery of native heart with angina pectoris 7/15/2024    Fibromyalgia     Hyperlipidemia LDL goal <100 2/5/2024    Migraines     Pneumonia     Rheumatic fever     Sleep apnea, obstructive ?    Tooth infection     Type 2 diabetes mellitus     Vulvar carcinoma      Past Surgical History:   Procedure Laterality Date    BREAST BIOPSY Left     BREAST LUMPECTOMY Left 2004    CARDIAC CATHETERIZATION N/A 7/15/2024    Procedure: Left Heart Cath;  Surgeon: Lonnie Salomon IV, MD;  Location: [facility] CATH INVASIVE LOCATION;  Service: Cardiology;  Laterality: N/A;    TONSILLECTOMY      TUBAL ABDOMINAL LIGATION      VULVA BIOPSY      VULVA SURGERY      WISDOM TOOTH EXTRACTION        Family History   Problem Relation Age of Onset    Arthritis Mother     Diabetes Mother     Heart disease Mother     Thyroid disease Mother     Uterine cancer Mother     Cancer Mother     Heart disease Brother     Diabetes Brother     Breast cancer Other         MATERNAL GREAT AUNT    Cancer Maternal Grandmother     Heart attack Maternal Grandmother     Ovarian cancer Neg Hx       Social History     Socioeconomic History    Marital status:    Tobacco Use    Smoking status: Every Day     Current packs/day: 1.00     Average packs/day: 1 pack/day for 50.0 years (50.0 ttl pk-yrs)     Types: Cigarettes     Passive exposure: Past    Smokeless tobacco: Never   Vaping Use    Vaping status: Former    Substances: Nicotine    Devices: Pre-filled or refillable cartridge   Substance and Sexual Activity    Alcohol use: Not Currently     Comment: Only 2 or 3 times a year.    Drug use:  Never    Sexual activity: Not Currently     Partners: Male     Birth control/protection: Tubal ligation      Allergies   Allergen Reactions    Bee Venom Anaphylaxis    Ciprofloxacin Shortness Of Breath      Current Outpatient Medications on File Prior to Visit   Medication Sig Dispense Refill    Accu-Chek Softclix Lancets lancets USE 1  TO CHECK GLUCOSE TWICE DAILY TO THREE TIMES DAILY dx e11.65 on insulin 100 each 11    albuterol (PROVENTIL) (2.5 MG/3ML) 0.083% nebulizer solution Take 2.5 mg by nebulization 4 (Four) Times a Day As Needed for Wheezing. 120 each 11    albuterol sulfate  (90 Base) MCG/ACT inhaler Inhale 2 puffs Every 6 (Six) Hours As Needed for Wheezing. 18 g 11    ASPIRIN 81 PO Take 1 tablet by mouth Daily.      glucose blood test strip Use as instructed 2-3 times a day 100 each 3    HYDROcodone-acetaminophen (NORCO) 7.5-325 MG per tablet Take 1 tablet by mouth Every 6 (Six) Hours As Needed for Severe Pain.      Insulin aspart (FIASP FLEXTOUCH) 100 UNIT/ML solution pen-injector injection pen For use at mealtimes and per correctional scale, MDD 80 units 24 mL 5    Insulin Pen Needle (BD Pen Needle Nessa U/F) 32G X 4 MM misc Use as directed 5 times daily 150 each 3    ipratropium (ATROVENT) 0.03 % nasal spray 2 sprays into the nostril(s) as directed by provider Every 12 (Twelve) Hours. 1 each 11    ipratropium-albuterol (DUO-NEB) 0.5-2.5 mg/3 ml nebulizer USE 3 ML VIA NEBULIZER FOUR TIMES DAILY AS NEEDED FOR WHEEZING 180 mL 3    isosorbide mononitrate (IMDUR) 60 MG 24 hr tablet Take 1 tablet by mouth Daily. 90 tablet 1    meloxicam (MOBIC) 15 MG tablet Take 1 tablet by mouth Daily. Take with food.      nitroglycerin (NITROSTAT) 0.3 MG SL tablet 1 under the tongue as needed for angina, may repeat q5mins for up three doses 25 tablet 11    rosuvastatin (CRESTOR) 20 MG tablet Take 1 tablet by mouth once daily 90 tablet 3    tiZANidine (ZANAFLEX) 4 MG tablet Take 1 tablet by mouth 3 (Three) Times a  "Day.      [DISCONTINUED] glipizide (GLUCOTROL XL) 5 MG ER tablet Take 3 tablets once daily 90 tablet 3    [DISCONTINUED] Lantus SoloStar 100 UNIT/ML injection pen INJECT 50 UNITS SUBCUTANEOUSLY ONCE DAILY 15 mL 3     No current facility-administered medications on file prior to visit.        Review of Systems   Constitutional:  Negative for activity change, appetite change, unexpected weight gain and unexpected weight loss.   Eyes:  Positive for blurred vision and visual disturbance.   Respiratory:  Positive for shortness of breath.    Cardiovascular:  Positive for chest pain.   Gastrointestinal:  Negative for abdominal pain, constipation and diarrhea.   Endocrine: Negative for polydipsia and polyuria.   Musculoskeletal:  Positive for arthralgias and back pain.   Skin:  Negative for rash and skin lesions.   Neurological:  Positive for numbness. Negative for dizziness.        /78 (BP Location: Left arm, Patient Position: Sitting, Cuff Size: Adult)   Pulse 100   Ht 167.6 cm (65.98\")   Wt 79.5 kg (175 lb 3.2 oz)   LMP  (LMP Unknown)   SpO2 95%   BMI 28.30 kg/m²      Physical Exam  Constitutional:       Appearance: Normal appearance.   Cardiovascular:      Rate and Rhythm: Normal rate and regular rhythm.      Pulses:           Dorsalis pedis pulses are 2+ on the right side and 2+ on the left side.        Posterior tibial pulses are 2+ on the right side and 2+ on the left side.   Pulmonary:      Effort: Pulmonary effort is normal.   Musculoskeletal:         General: Normal range of motion.      Right foot: No deformity.      Left foot: No deformity.   Feet:      Right foot:      Protective Sensation: 5 sites tested.  4 sites sensed.      Skin integrity: Skin integrity normal. No ulcer.      Toenail Condition: Right toenails are normal.      Left foot:      Protective Sensation: 5 sites tested.  4 sites sensed.      Skin integrity: Skin integrity normal. No ulcer.      Toenail Condition: Left toenails are " "normal.      Comments: Diabetic Foot Exam Performed and Monofilament Test Performed     Skin:     General: Skin is warm and dry.   Neurological:      General: No focal deficit present.      Mental Status: She is alert and oriented to person, place, and time.   Psychiatric:         Mood and Affect: Mood normal.         Behavior: Behavior normal.         LABS AND IMAGING  CMP:  Lab Results   Component Value Date    BUN 19 07/15/2024    CREATININE 1.05 (H) 07/15/2024    EGFR 60.2 07/15/2024    BCR 18.1 07/15/2024     07/15/2024    K 4.0 07/15/2024    CO2 28.0 07/15/2024    CALCIUM 8.9 07/15/2024    ALBUMIN 4.1 07/15/2024    BILITOT 0.4 07/15/2024    ALKPHOS 87 07/15/2024    AST 16 07/15/2024    ALT 14 07/15/2024     Lipid Panel:  Lab Results   Component Value Date    CHOL 97 07/15/2024    TRIG 159 (H) 07/15/2024    HDL 47 07/15/2024    VLDL 26 07/15/2024    LDL 24 07/15/2024     HbA1c:  Lab Results   Component Value Date    HGBA1C 9.70 (H) 07/15/2024    HGBA1C 9.7 03/02/2023     Glucose:  Lab Results   Component Value Date    POCGLU 353 (A) 07/22/2024     Microalbumin:  Lab Results   Component Value Date    MALBCRERATIO 9 03/02/2023     TSH:  No results found for: \"TSH\"    Assessment and Plan    Diagnoses and all orders for this visit:    1. Type 2 diabetes mellitus with hyperglycemia, with long-term current use of insulin (Primary)  -     POC Glucose, Blood  -     Microalbumin / Creatinine Urine Ratio - Urine, Clean Catch  -     Lantus SoloStar 100 UNIT/ML injection pen; Inject 10 Units under the skin into the appropriate area as directed Every Night. Titrate 2 units every 3 days if blood sugar is more than 140 fasting; Max daily dose: 30 units  Dispense: 27 mL; Refill: 1  -     metFORMIN ER (GLUCOPHAGE-XR) 500 MG 24 hr tablet; Take 1 tablet by mouth Daily With Breakfast.  Dispense: 90 tablet; Refill: 1  -     Insulin Aspart, w/Niacinamide, (Fiasp FlexTouch) 100 UNIT/ML solution pen-injector; Inject 5 Units " under the skin into the appropriate area as directed 3 (Three) Times a Day With Meals. Add additional insulin 1 unit for every 50 mg/dl over 150. Max daily dose: 30 units  Dispense: 27 mL; Refill: 1  -     Semaglutide,0.25 or 0.5MG/DOS, (Ozempic, 0.25 or 0.5 MG/DOSE,) 2 MG/3ML solution pen-injector; Inject 0.25 mg under the skin into the appropriate area as directed 1 (One) Time Per Week.  Dispense: 3 mL; Refill: 2    2. Non-adherence to medical treatment  Assessment & Plan:  Not taking medications as prescribed; reviewed and handout provided with additional instructions.  Encouraged following up with office visits for additional management.           Return in about 3 months (around 10/22/2024) for follow-up diabetes. The patient was instructed to contact the clinic with any interval questions or concerns.    Electronically signed by: Kimber Rasmussen PA-C   Endocrinology     Please note that portions of this note were completed with a voice recognition program.

## 2024-07-22 NOTE — ASSESSMENT & PLAN NOTE
Not taking medications as prescribed; reviewed and handout provided with additional instructions.  Encouraged following up with office visits for additional management.

## 2024-07-22 NOTE — ASSESSMENT & PLAN NOTE
Diabetes is not controlled.    A1C 9.7% 7/15/2024 - too early to recheck  Not taking blood sugars frequently at home; would benefit from CGM given MDI-will attempt to order through DME.    Rx: Stop glipizide due to risk for hypoglycemia with insulin use.  Discussed starting Ozempic for additional CVD benefit -agreeable to trying if affordable; caution risk for GI upset, vision changes during medication.  Start Ozempic 0.25 mg once weekly, restart metformin 500 mg ER once daily.  Restart Lantus 10 units nightly with instructions on titration as needed.  Restart Fiasp 5 units at meal with additional CF 1 unit:50 mg/dl over 150.     Foot exam performed today -mild neuropathy noted  Microalbumin today.  Ophthalmology due-has appointment scheduled 8/2024.  LDL at goal, continue rosuvastatin  BP controlled.    Discussed risk for complications with uncontrolled blood sugars.  Current efforts towards lifestyle management with diet/exercise.  Blood Glucose Goal: Blood glucose goal <150 fasting, <180 2 hr postprandial. Encouraged monitoring daily.   Discussed yearly microalbumin, annual eye examinations with Ophthalmology, and foot care.  Discussed medications, side effects and compliance.  Discussed hypoglycemia management and prevention.   Reviewed ‘ABCs’ of diabetes management (respective goals in parentheses):  A1C (<7), blood pressure (<130/80), and cholesterol (LDL <100, if CVD <70).

## 2024-07-23 LAB
ALBUMIN UR-MCNC: <1.2 MG/DL
CREAT UR-MCNC: 106.8 MG/DL
MICROALBUMIN/CREAT UR: NORMAL MG/G{CREAT}

## 2024-07-25 ENCOUNTER — TELEPHONE (OUTPATIENT)
Dept: ENDOCRINOLOGY | Facility: CLINIC | Age: 64
End: 2024-07-25
Payer: MEDICARE

## 2024-07-25 NOTE — TELEPHONE ENCOUNTER
----- Message from Kimber Rasmussen sent at 7/22/2024  3:53 PM EDT -----  Regarding: CGM through DME  Can we order CGM through DME?

## 2024-07-30 ENCOUNTER — TELEPHONE (OUTPATIENT)
Dept: CARDIOLOGY | Facility: CLINIC | Age: 64
End: 2024-07-30
Payer: MEDICARE

## 2024-07-30 RX ORDER — ISOSORBIDE MONONITRATE 30 MG/1
30 TABLET, EXTENDED RELEASE ORAL EVERY MORNING
Qty: 90 TABLET | Refills: 3 | Status: SHIPPED | OUTPATIENT
Start: 2024-07-30

## 2024-07-30 NOTE — TELEPHONE ENCOUNTER
Hilton Johnson MD Hurley, Kimber Morrison, RN  Caller: Unspecified (Today,  2:35 PM)  Might not be coronary related. No severe epicardial disease. Recommend decreasing Imdur back to 30 mg daily and see if Margaret can see her again sometime soon to consider other options.    Thanks  _______________________________________    Called pt and discussed MJS recommendations above. Pt verbalizes understanding and agreeable to plan.

## 2024-07-31 RX ORDER — ROSUVASTATIN CALCIUM 20 MG/1
TABLET, COATED ORAL
Qty: 90 TABLET | Refills: 0 | Status: SHIPPED | OUTPATIENT
Start: 2024-07-31

## 2024-08-06 ENCOUNTER — DOCUMENTATION (OUTPATIENT)
Dept: CARDIAC REHAB | Facility: HOSPITAL | Age: 64
End: 2024-08-06
Payer: MEDICARE

## 2024-08-06 ENCOUNTER — OFFICE VISIT (OUTPATIENT)
Dept: CARDIOLOGY | Facility: CLINIC | Age: 64
End: 2024-08-06
Payer: MEDICARE

## 2024-08-06 VITALS
DIASTOLIC BLOOD PRESSURE: 58 MMHG | HEIGHT: 66 IN | WEIGHT: 173.8 LBS | HEART RATE: 100 BPM | SYSTOLIC BLOOD PRESSURE: 106 MMHG | OXYGEN SATURATION: 95 % | BODY MASS INDEX: 27.93 KG/M2

## 2024-08-06 DIAGNOSIS — I25.118 CORONARY ARTERY DISEASE WITH STABLE ANGINA PECTORIS, UNSPECIFIED VESSEL OR LESION TYPE, UNSPECIFIED WHETHER NATIVE OR TRANSPLANTED HEART: ICD-10-CM

## 2024-08-06 DIAGNOSIS — I25.119 CORONARY ARTERY DISEASE INVOLVING NATIVE CORONARY ARTERY OF NATIVE HEART WITH ANGINA PECTORIS: Primary | ICD-10-CM

## 2024-08-06 PROCEDURE — 99214 OFFICE O/P EST MOD 30 MIN: CPT | Performed by: INTERNAL MEDICINE

## 2024-08-06 RX ORDER — DILTIAZEM HYDROCHLORIDE 120 MG/1
120 CAPSULE, EXTENDED RELEASE ORAL DAILY
Qty: 30 CAPSULE | Refills: 11 | Status: SHIPPED | OUTPATIENT
Start: 2024-08-06

## 2024-08-06 NOTE — PROGRESS NOTES
[facility] Cardiology  Follow Up Visit  Kathy Taylor  1960    VISIT DATE:  08/06/24    PCP:   Provider, No Known  [facility] [id number]          CC:  Primary hypertension      Problem List:  CAD  Echocardiogram January 2022: LVEF 55%, no significant valvular abnormalities, no pericardial effusion  Stress test February 2022: Normal myocardial perfusion study with no evidence of ischemia, EF 57%  Calcium score July 2024: 813  Cardiac catheterization July 2024: 40% LAD stenosis, 40% circumflex stenosis, RPDA 70% stenosis with SAWPNIL-3 flow.  Medical management recommended  Echo July 2024: EF 61 to 65% with normal valves  Hypotension, intolerant to HCTZ with symptomatic hypotension  Hyperlipidemia  Type 2 diabetes mellitus, onset 2012; hemoglobin A1c 9.7% March 2023  Stage III severe COPD with current tobacco use 1 pack/day, 50-pack-year history, PFTs show moderate to severe airway obstruction, no significant change in FEV1 after bronchodilator, no restriction but air trapping and reduced diffusion capacity March 2024  History of SABRINA with upcoming sleep study  Fibromyalgia  Migraines  [facility] admission January 2021 for COVID  Family history of CAD  History of rheumatic fever as a child  Surgical history:  Breast lumpectomy  Tubal ligation  Vulvar surgery  [place] teeth extraction    History of Present Illness:  Kathy Taylor  Is a 64 y.o. female with pertinent cardiac history detailed above.  Patient following up for CAD.  Recent cardiac catheterization showing moderate disease.  Highest grade in the RPDA.  Medical management recommended due to the small nature of the vessel.  Patient's cholesterol is well-controlled and blood pressure is normal.  She is working with endocrine on improving diabetes control and we discussed the benefits of quitting smoking.  Right now she feels like she gets more winded and has discomfort if her heart rate is elevated.  She is not on a beta-blocker, does  have COPD.  Discussed use of diltiazem as an antianginal medication.  Right wrist is healing well after heart catheterization      Patient Active Problem List    Diagnosis Date Noted    Non-adherence to medical treatment 07/22/2024    Coronary artery disease involving native coronary artery of native heart with angina pectoris 07/15/2024     Note Last Updated: 7/15/2024     Apparently normal echo and stress test per patient report 2020  Echo (1/2022): LVEF 55%, no significant valvular abnormalities, no pericardial effusion  Stress test (2/2022): Normal myocardial perfusion study with no evidence of ischemia, EF 57%  , 2024  Cardiac catheterization (7/15/2024): Moderate 1-vessel CAD (RPDA).  Normal LV filling pressure (LVEDP 8 mmHg).  Medical therapy recommended      History of rheumatic fever as a child 02/05/2024    Hyperlipidemia LDL goal <70 02/05/2024    Stage III (Severe) COPD 02/07/2023    Tobacco abuse 02/07/2023    COVID-19 virus infection 01/07/2022    Type 2 diabetes mellitus, without long-term current use of insulin 01/07/2022    Obstructive sleep apnea 02/24/2021    Nocturnal hypoxemia 02/24/2021    Polypharmacy 02/24/2021       Allergies   Allergen Reactions    Bee Venom Anaphylaxis    Ciprofloxacin Shortness Of Breath       Social History     Socioeconomic History    Marital status:    Tobacco Use    Smoking status: Every Day     Current packs/day: 1.00     Average packs/day: 1 pack/day for 50.0 years (50.0 ttl pk-yrs)     Types: Cigarettes     Passive exposure: Past    Smokeless tobacco: Never   Vaping Use    Vaping status: Former    Substances: Nicotine    Devices: Pre-filled or refillable cartridge   Substance and Sexual Activity    Alcohol use: Not Currently     Comment: Only 2 or 3 times a year.    Drug use: Never    Sexual activity: Not Currently     Partners: Male     Birth control/protection: Tubal ligation       Family History   Problem Relation Age of Onset    Arthritis Mother      Diabetes Mother     Heart disease Mother     Thyroid disease Mother     Uterine cancer Mother     Cancer Mother     Heart disease Brother     Diabetes Brother     Breast cancer Other         MATERNAL GREAT AUNT    Cancer Maternal Grandmother     Heart attack Maternal Grandmother     Ovarian cancer Neg Hx        Current Medications:    Current Outpatient Medications:     Accu-Chek Softclix Lancets lancets, USE 1  TO CHECK GLUCOSE TWICE DAILY TO THREE TIMES DAILY dx e11.65 on insulin, Disp: 100 each, Rfl: 11    albuterol sulfate  (90 Base) MCG/ACT inhaler, Inhale 2 puffs Every 6 (Six) Hours As Needed for Wheezing., Disp: 18 g, Rfl: 11    ASPIRIN 81 PO, Take 1 tablet by mouth Daily., Disp: , Rfl:     glucose blood test strip, Use as instructed 2-3 times a day, Disp: 100 each, Rfl: 3    HYDROcodone-acetaminophen (NORCO) 7.5-325 MG per tablet, Take 1 tablet by mouth Every 6 (Six) Hours As Needed for Severe Pain., Disp: , Rfl:     Insulin aspart (FIASP FLEXTOUCH) 100 UNIT/ML solution pen-injector injection pen, For use at mealtimes and per correctional scale, MDD 80 units, Disp: 24 mL, Rfl: 5    Insulin Aspart, w/Niacinamide, (Fiasp FlexTouch) 100 UNIT/ML solution pen-injector, Inject 5 Units under the skin into the appropriate area as directed 3 (Three) Times a Day With Meals. Add additional insulin 1 unit for every 50 mg/dl over 150. Max daily dose: 30 units, Disp: 27 mL, Rfl: 1    Insulin Pen Needle (BD Pen Needle Nessa U/F) 32G X 4 MM misc, Use as directed 5 times daily, Disp: 150 each, Rfl: 3    ipratropium (ATROVENT) 0.03 % nasal spray, 2 sprays into the nostril(s) as directed by provider Every 12 (Twelve) Hours., Disp: 1 each, Rfl: 11    ipratropium-albuterol (DUO-NEB) 0.5-2.5 mg/3 ml nebulizer, USE 3 ML VIA NEBULIZER FOUR TIMES DAILY AS NEEDED FOR WHEEZING, Disp: 180 mL, Rfl: 3    isosorbide mononitrate (IMDUR) 30 MG 24 hr tablet, Take 1 tablet by mouth Every Morning., Disp: 90 tablet, Rfl: 3    Lantus  "SoloStar 100 UNIT/ML injection pen, Inject 10 Units under the skin into the appropriate area as directed Every Night. Titrate 2 units every 3 days if blood sugar is more than 140 fasting; Max daily dose: 30 units, Disp: 27 mL, Rfl: 1    meloxicam (MOBIC) 15 MG tablet, Take 1 tablet by mouth Daily. Take with food., Disp: , Rfl:     metFORMIN ER (GLUCOPHAGE-XR) 500 MG 24 hr tablet, Take 1 tablet by mouth Daily With Breakfast., Disp: 90 tablet, Rfl: 1    nitroglycerin (NITROSTAT) 0.3 MG SL tablet, 1 under the tongue as needed for angina, may repeat q5mins for up three doses, Disp: 25 tablet, Rfl: 11    rosuvastatin (CRESTOR) 20 MG tablet, Take 1 tablet by mouth once daily, Disp: 90 tablet, Rfl: 0    Semaglutide,0.25 or 0.5MG/DOS, (Ozempic, 0.25 or 0.5 MG/DOSE,) 2 MG/3ML solution pen-injector, Inject 0.25 mg under the skin into the appropriate area as directed 1 (One) Time Per Week., Disp: 3 mL, Rfl: 2    tiZANidine (ZANAFLEX) 4 MG tablet, Take 1 tablet by mouth 3 (Three) Times a Day., Disp: , Rfl:     albuterol (PROVENTIL) (2.5 MG/3ML) 0.083% nebulizer solution, Take 2.5 mg by nebulization 4 (Four) Times a Day As Needed for Wheezing. (Patient not taking: Reported on 8/6/2024), Disp: 120 each, Rfl: 11    dilTIAZem XR (DILACOR XR) 120 MG 24 hr capsule, Take 1 capsule by mouth Daily., Disp: 30 capsule, Rfl: 11     Review of Systems   Cardiovascular:  Positive for dyspnea on exertion and palpitations.   Respiratory:  Positive for shortness of breath.        Vitals:    08/06/24 1318   BP: 106/58   BP Location: Left arm   Patient Position: Sitting   Cuff Size: Adult   Pulse: 100   SpO2: 95%   Weight: 78.8 kg (173 lb 12.8 oz)   Height: 167.6 cm (65.98\")       Physical Exam  Constitutional:       Appearance: Normal appearance.   Cardiovascular:      Rate and Rhythm: Normal rate and regular rhythm.      Pulses: Normal pulses.      Heart sounds: Normal heart sounds.   Pulmonary:      Effort: Pulmonary effort is normal.      " Breath sounds: Normal breath sounds.   Neurological:      Mental Status: She is alert.         Diagnostic Data:  Procedures  Lab Results   Component Value Date    CHLPL 116 03/02/2023    TRIG 159 (H) 07/15/2024    HDL 47 07/15/2024     Lab Results   Component Value Date    GLUCOSE 240 (H) 07/15/2024    BUN 19 07/15/2024    CREATININE 1.05 (H) 07/15/2024     07/15/2024    K 4.0 07/15/2024     07/15/2024    CO2 28.0 07/15/2024     Lab Results   Component Value Date    HGBA1C 9.70 (H) 07/15/2024     Lab Results   Component Value Date    WBC 9.24 07/15/2024    HGB 13.8 07/15/2024    HCT 40.9 07/15/2024     07/15/2024       Assessment:  No diagnosis found.    Plan:      CAD  Continue aspirin, Imdur, statin  Add diltiazem 120mg daily as an antianginal  Refer to cardiac rehab  Cardiac catheterization July 2024 with moderate one-vessel CAD in the RPDA.  Normal EF  2.  Severe COPD  1ppd smoker counseled  on cessation  3.  Diabetes/HLD   -Total cholesterol 97, HDL 47, LDL 24, triglycerides 159   -Continue rosuvastatin 20 mg   - insulin, metformin, Ozempic   -A1c 9.7   -follows with endocrine who is adjusting medications  4.   Hypertension   -WNL      ACP discussion was held with the patient during this visit. Patient does not have an advance directive, declines further assistance.      Jono Estrella MD [facility]

## 2024-08-08 ENCOUNTER — TELEPHONE (OUTPATIENT)
Dept: CARDIOLOGY | Facility: CLINIC | Age: 64
End: 2024-08-08
Payer: MEDICARE

## 2024-08-08 NOTE — TELEPHONE ENCOUNTER
Patient LVM regarding blood pressure and new medication, tried to call patient back no answer LVM.

## 2024-08-08 NOTE — TELEPHONE ENCOUNTER
Patient called to ask what parameter blood pressure reading would be too low for her to take diltiazem in the mornings.  She states her am BP yesterday prior to med was 108/64 so she was afraid to take it.  This morning it was 144/81, she did take but she did not re check her pressure afterward.  She says she would be more comfortable if she knew how low it needed to be for her to not take the diltiazem.

## 2024-08-09 NOTE — TELEPHONE ENCOUNTER
Left VM advising of Dr. Estrella's recommendation regarding diltiazem and to contact office with any questions or concerns.

## 2024-08-09 NOTE — TELEPHONE ENCOUNTER
I would say if her systolic blood pressures greater than 100 prior to taking the medication she can take the diltiazem.

## 2024-08-13 ENCOUNTER — HOSPITAL ENCOUNTER (EMERGENCY)
Facility: HOSPITAL | Age: 64
Discharge: HOME | End: 2024-08-13
Attending: EMERGENCY MEDICINE
Payer: MEDICARE

## 2024-08-13 ENCOUNTER — APPOINTMENT (OUTPATIENT)
Dept: GENERAL RADIOLOGY | Facility: HOSPITAL | Age: 64
End: 2024-08-13
Payer: MEDICARE

## 2024-08-13 VITALS
OXYGEN SATURATION: 91 % | RESPIRATION RATE: 16 BRPM | WEIGHT: 168 LBS | SYSTOLIC BLOOD PRESSURE: 166 MMHG | HEART RATE: 104 BPM | HEIGHT: 66 IN | BODY MASS INDEX: 27 KG/M2 | TEMPERATURE: 98.1 F | DIASTOLIC BLOOD PRESSURE: 82 MMHG

## 2024-08-13 DIAGNOSIS — Z86.79 HISTORY OF HYPERTENSION: ICD-10-CM

## 2024-08-13 DIAGNOSIS — Z87.39 HISTORY OF CHRONIC BACK PAIN: ICD-10-CM

## 2024-08-13 DIAGNOSIS — M54.31 SCIATICA OF RIGHT SIDE: ICD-10-CM

## 2024-08-13 DIAGNOSIS — R26.2 IMPAIRED AMBULATION: ICD-10-CM

## 2024-08-13 DIAGNOSIS — Z91.81 HISTORY OF FALL: Primary | ICD-10-CM

## 2024-08-13 DIAGNOSIS — Z72.0 TOBACCO ABUSE: ICD-10-CM

## 2024-08-13 DIAGNOSIS — Z87.39 HISTORY OF OSTEOARTHRITIS: ICD-10-CM

## 2024-08-13 DIAGNOSIS — S93.602A FOOT SPRAIN, LEFT, INITIAL ENCOUNTER: ICD-10-CM

## 2024-08-13 PROCEDURE — 99283 EMERGENCY DEPT VISIT LOW MDM: CPT

## 2024-08-13 PROCEDURE — 73610 X-RAY EXAM OF ANKLE: CPT

## 2024-08-13 PROCEDURE — 73630 X-RAY EXAM OF FOOT: CPT

## 2024-08-13 NOTE — DISCHARGE INSTRUCTIONS
ER evaluation reveals no acute bony abnormality to the left foot.  There is a small avulsion to the distal left fibula, but patient is nontender here on exam.  Suspect that this small avulsion is old in nature.  Diagnosis is left foot sprain with recent inversion injury.  We applied an Ace wrap and recommend rest, ice, elevation, and compression with Ace wrap.  Rx for diclofenac 50 mg by mouth 3 times daily with meals as needed for pain/inflammation.  Do not take meloxicam while taking diclofenac.  Continue with routinely prescribed Norco and Zanaflex.  Continue to use cane with ambulation to prevent any further falls.  Recommend close follow-up with Jarrod Moyer orthopedist, for recheck.  Return to the ER if worsening symptoms.

## 2024-08-13 NOTE — ED PROVIDER NOTES
"Subjective   History of Present Illness  This is a 64-year-old female that presents the ER after inversion injury of the left ankle with increased foot pain since 8/11/2024.  Patient says that she took her American bulldog out to use the bathroom around 1600 on 8/11/2024.  She was standing on the incline and he pulled her and she twisted her left ankle and foot.  She says that as she was falling, she feels like she \"tweaked\" her back and strained her right hamstring.  Patient is having pain to the right lower back that radiates to the right buttocks and says that symptoms feel similar with previous sciatica.  She reports painful weightbearing, but there is no significant swelling, bruising, or obvious deformity to the left foot or ankle.  Patient denies any numbness or tingling to the toes on the left foot.  She denies previous left ankle or foot fracture that she is aware of.  Patient is on routine Norco 7.5 mg by mouth every 6 hours as well as Zanaflex and meloxicam daily.  Past medical history is significant for osteoarthritis, COPD, history of rheumatic fever, asthma, type 2 diabetes mellitus, bronchiectasis, sleep apnea, hyperlipidemia, and continued tobacco dependence.  Patient denies any urinary or bowel changes or incontinence.  No other concerns at this time.    History provided by:  Patient  Ankle Injury  Location:  Left ankle injury, pt also strained her right hamstring and \"tweaked\" her back as she fell  Duration:  2 days  Timing:  Constant  Progression:  Unchanged  Chronicity:  New  Context:  Pt took her dog out to use the bathroom on 8/11/2024 and she twisted her left ankle and foot.  Painful weightbearing and increased pain to the dorsal aspect of the left foot.  No swelling, bruising, or deformity.  Worsened by:  Range of motion left foot and weightbearing  Ineffective treatments:  Routinely prescribed Norco 7.5 mg by mouth 4 times daily as well as meloxicam and Zanaflex  Associated symptoms: no " "abdominal pain, no chest pain, no diarrhea, no headaches, no myalgias, no nausea, no shortness of breath and no vomiting        Review of Systems   Constitutional: Negative.    Respiratory: Negative.  Negative for shortness of breath.         Positive for tobacco dependence and history of COPD   Cardiovascular: Negative.  Negative for chest pain, palpitations and leg swelling.   Gastrointestinal: Negative.  Negative for abdominal pain, diarrhea, nausea and vomiting.   Genitourinary: Negative.    Musculoskeletal:  Positive for arthralgias (Left ankle and foot inversion injury.  Increased pain to dorsal aspect of left foot), back pain (History of chronic low back pain, but patient reports \"tweaking\" her back with left ankle/foot injury.  She reports right lower back pain that radiates to the right buttocks and says symptoms feel typical of her sciatica) and gait problem (Painful weightbearing). Negative for joint swelling and myalgias.   Skin: Negative.  Negative for color change and wound.        No bruising, soft tissue swelling, or deformity of the left foot or ankle   Neurological:  Negative for dizziness, numbness (No numbness or tingling to the left foot) and headaches.   All other systems reviewed and are negative.      Past Medical History:   Diagnosis Date    Arthritis     Asthma ?    May have been earlier    Back ache     Bronchiectasis ?    Bronchitis     Chronic bronchitis ?    COPD (chronic obstructive pulmonary disease)     Coronary artery disease involving native coronary artery of native heart with angina pectoris 7/15/2024    Fibromyalgia     Hyperlipidemia LDL goal <100 2/5/2024    Migraines     Pneumonia     Rheumatic fever     Sleep apnea, obstructive ?    Tooth infection     Type 2 diabetes mellitus     Vulvar carcinoma        Allergies   Allergen Reactions    Bee Venom Anaphylaxis    Ciprofloxacin Shortness Of Breath       Past Surgical History:   Procedure Laterality Date    BREAST BIOPSY Left  "    BREAST LUMPECTOMY Left 2004    CARDIAC CATHETERIZATION N/A 7/15/2024    Procedure: Left Heart Cath;  Surgeon: Lonnie Salomon IV, MD;  Location: [facility] CATH INVASIVE LOCATION;  Service: Cardiology;  Laterality: N/A;    TONSILLECTOMY      TUBAL ABDOMINAL LIGATION      VULVA BIOPSY      VULVA SURGERY      WISDOM TOOTH EXTRACTION         Family History   Problem Relation Age of Onset    Arthritis Mother     Diabetes Mother     Heart disease Mother     Thyroid disease Mother     Uterine cancer Mother     Cancer Mother     Heart disease Brother     Diabetes Brother     Breast cancer Other         MATERNAL GREAT AUNT    Cancer Maternal Grandmother     Heart attack Maternal Grandmother     Ovarian cancer Neg Hx        Social History     Socioeconomic History    Marital status:    Tobacco Use    Smoking status: Every Day     Current packs/day: 1.00     Average packs/day: 1 pack/day for 50.0 years (50.0 ttl pk-yrs)     Types: Cigarettes     Passive exposure: Past    Smokeless tobacco: Never   Vaping Use    Vaping status: Former    Substances: Nicotine    Devices: Pre-filled or refillable cartridge   Substance and Sexual Activity    Alcohol use: Not Currently     Comment: Only 2 or 3 times a year.    Drug use: Never    Sexual activity: Not Currently     Partners: Male     Birth control/protection: Tubal ligation           Objective   Physical Exam  Vitals and nursing note reviewed.   Constitutional:       General: She is not in acute distress.     Appearance: Normal appearance. She is not ill-appearing, toxic-appearing or diaphoretic.      Comments: Strong tobacco odor noted.  No acute sign of pain or distress.  Nontoxic   HENT:      Head: Normocephalic and atraumatic.      Nose: Nose normal.      Mouth/Throat:      Mouth: Mucous membranes are moist.      Comments: Oral mucous membranes are moist  Eyes:      Extraocular Movements: Extraocular movements intact.      Conjunctiva/sclera: Conjunctivae normal.       Pupils: Pupils are equal, round, and reactive to light.   Cardiovascular:      Rate and Rhythm: Regular rhythm. Tachycardia present.      Pulses: Normal pulses.           Dorsalis pedis pulses are 2+ on the right side and 2+ on the left side.        Posterior tibial pulses are 2+ on the right side and 2+ on the left side.      Heart sounds: Normal heart sounds.      Comments: Mild tachycardia.  No ectopy.  No pedal edema to lower extremities.  Strong bilateral DP and PT pulses.  Pulmonary:      Effort: Pulmonary effort is normal. No tachypnea, accessory muscle usage or retractions.      Breath sounds: Decreased breath sounds present. No wheezing, rhonchi or rales.      Comments: Regular respiratory effort.  Globally diminished breath sounds secondary to COPD.  No wheezes or rhonchi or rales.  Abdominal:      General: Bowel sounds are normal.      Palpations: Abdomen is soft.   Musculoskeletal:         General: Normal range of motion.      Cervical back: Normal range of motion and neck supple.      Right lower leg: No edema.      Left lower leg: No edema.      Left ankle: No swelling or deformity. No tenderness. Normal range of motion. Anterior drawer test negative. Normal pulse.      Left Achilles Tendon: No tenderness or defects. Thakur's test negative.      Left foot: Normal range of motion and normal capillary refill. Tenderness and bony tenderness present. No swelling, deformity, bunion, Charcot foot, foot drop, laceration or crepitus. Normal pulse.      Comments: Tenderness to right lumbar myofascia and right buttocks.  No pelvic or hip tenderness.  Patient also has some muscular tenderness to the right hamstring.  No significant muscle tightness or spasm and no defect appreciated.  No tenderness to the left ankle, including left medial malleolus, posterior left ankle, or lateral malleolus.  No tenderness to the left distal fibula.  Patient has localized tenderness to dorsal aspect of left foot along the  fourth and fifth metatarsals.  No swelling, bruising, or deformity.  No tenderness to the calcaneus.  Painful weightbearing.   Skin:     General: Skin is warm and dry.      Findings: No abrasion, bruising, ecchymosis or wound.   Neurological:      General: No focal deficit present.      Mental Status: She is alert and oriented to person, place, and time.      Cranial Nerves: Cranial nerves 2-12 are intact.      Sensory: Sensation is intact.      Motor: Motor function is intact.      Coordination: Coordination is intact.      Comments: Neuro intact and nonfocal         Procedures           ED Course  ED Course as of 08/13/24 0333   Tue Aug 13, 2024   0327 Vital signs and exam are stable.  Patient has no bony tenderness to the left ankle, most notably the left lateral ankle or distal fibula.  Patient is tender to the dorsal/lateral aspect of the left foot.  There is no obvious deformity, soft tissue swelling, or bruising.  Patient does not have any calcaneal tenderness on the left.  I personally interpreted x-rays of the left ankle and foot.  I do not see any acute fracture or evidence of dislocation.  Radiologist formally read the left ankle and foot films this tiny bony density from the distal fibula which could represent a small avulsion injury.  Left foot has no acute bony abnormality.  We will give orthopedic follow-up info for Dr. Jarrod Moyer.  We applied an Ace wrap to the left ankle and foot.  Recommend rest, ice, elevation, and compression with Ace wrap.  Hold meloxicam and we will prescribe diclofenac 50 mg by mouth 3 times daily as needed for pain/inflammation.  Patient is also on routinely prescribed Norco 7.5 mg by mouth every 6 hours and Zanaflex.  Suspect that patient strained her back with the recent fall.  She is having radicular symptoms to right buttocks with history of sciatica.  Recommend patient to use her cane with ambulation to prevent any further falls.  She is agreeable with above  "treatment plan.  She is ready for discharge to home. [FC]   0329 After reviewing labs in epic, last creatinine was 1.05 on 7/15/2024. [FC]   0330 I do not believe that the small avulsion at the distal fibula is new.  Patient does not have any tenderness to this area. [FC]      ED Course User Index  [FC] Alesia Humphrey, JAMIL                                   No results found for this or any previous visit (from the past 24 hour(s)).  Note: In addition to lab results from this visit, the labs listed above may include labs taken at another facility or during a different encounter within the last 24 hours. Please correlate lab times with ED admission and discharge times for further clarification of the services performed during this visit.    XR Foot 3+ View Left   Final Result   Impression:   Tiny bony density from the distal fibula could represent a small avulsion injury.            Electronically Signed: Chad Valentin MD     8/13/2024 2:17 AM EDT     Workstation ID: [id number]      XR Ankle 3+ View Left   Final Result   Impression:   Tiny bony density from the distal fibula could represent a small avulsion injury.            Electronically Signed: Chad Valentin MD     8/13/2024 2:17 AM EDT     Workstation ID: [id number]        Vitals:    08/13/24 0108   BP: 166/82   BP Location: Right arm   Patient Position: Sitting   Pulse: 104   Resp: 16   Temp: 98.1 °F (36.7 °C)   TempSrc: Oral   SpO2: 91%   Weight: 76.2 kg (168 lb)   Height: 167.6 cm (66\")     Medications - No data to display  ECG/EMG Results (last 24 hours)       ** No results found for the last 24 hours. **          No orders to display                 Medical Decision Making  Amount and/or Complexity of Data Reviewed  Radiology: ordered.        Final diagnoses:   History of fall   Foot sprain, left, initial encounter   Impaired ambulation   Sciatica of right side   History of chronic back pain   History of hypertension   History of osteoarthritis   Tobacco abuse "       ED Disposition  ED Disposition       ED Disposition   Discharge    Condition   Stable    Comment   --               Jarrod Moyer MD  [address] [facility]  [address]  [place] [address]  [phone]    Call today  Call today for close recheck    [facility] EMERGENCY DEPARTMENT  [address] [facility] [address] [phone]    If symptoms worsen         Medication List        New Prescriptions      diclofenac 50 MG EC tablet  Commonly known as: VOLTAREN  Take 1 tablet by mouth 3 (Three) Times a Day.            Stop      meloxicam 15 MG tablet  Commonly known as: MOBIC               Where to Get Your Medications        These medications were sent to [facility] [address] [place] [address] - [address] [place] [address] - [phone]  - [phone] [place] [address]      Phone: [phone]   diclofenac 50 MG EC tablet            Alesia Humphrey PA-C  08/13/24 0333

## 2024-08-19 ENCOUNTER — OFFICE VISIT (OUTPATIENT)
Dept: ORTHOPEDIC SURGERY | Facility: CLINIC | Age: 64
End: 2024-08-19
Payer: MEDICARE

## 2024-08-19 VITALS
HEIGHT: 66 IN | BODY MASS INDEX: 27 KG/M2 | DIASTOLIC BLOOD PRESSURE: 70 MMHG | SYSTOLIC BLOOD PRESSURE: 120 MMHG | WEIGHT: 168 LBS

## 2024-08-19 DIAGNOSIS — S93.492A SPRAIN OF ANTERIOR TALOFIBULAR LIGAMENT OF LEFT ANKLE, INITIAL ENCOUNTER: Primary | ICD-10-CM

## 2024-08-19 DIAGNOSIS — E11.65 TYPE 2 DIABETES MELLITUS WITH HYPERGLYCEMIA, WITHOUT LONG-TERM CURRENT USE OF INSULIN: ICD-10-CM

## 2024-08-19 PROCEDURE — 1159F MED LIST DOCD IN RCRD: CPT | Performed by: ORTHOPAEDIC SURGERY

## 2024-08-19 PROCEDURE — 99203 OFFICE O/P NEW LOW 30 MIN: CPT | Performed by: ORTHOPAEDIC SURGERY

## 2024-08-19 PROCEDURE — 1160F RVW MEDS BY RX/DR IN RCRD: CPT | Performed by: ORTHOPAEDIC SURGERY

## 2024-08-19 NOTE — PROGRESS NOTES
[facility] Note        Subjective     Pain of the Left Foot      HPI    Kathy Taylor is a 64 y.o. female.  She rolled her left ankle on August 13.  No prior ankle problems.  She went to the ER and got x-rays.  She is now wearing a regular shoe and sock.  She has diabetes and has been told not to take NSAIDs.  She takes Tylenol.    Past Medical History:   Diagnosis Date    Ankle sprain     Arthritis     Arthritis of back     Arthritis of neck     Asthma ?    May have been earlier    Back ache     Bronchiectasis ?    Bronchitis     Bursitis of hip     Cervical disc disorder     Chronic bronchitis ?    COPD (chronic obstructive pulmonary disease)     Coronary artery disease involving native coronary artery of native heart with angina pectoris 07/15/2024    Fibromyalgia     Hip arthrosis     Hyperlipidemia LDL goal <100 02/05/2024    Knee sprain     Migraines     Neck strain     Pneumonia     Rheumatic fever     Rotator cuff syndrome     Sleep apnea, obstructive ?    Tooth infection     Type 2 diabetes mellitus     Vulvar carcinoma     Wrist sprain       Past Surgical History:   Procedure Laterality Date    BREAST BIOPSY Left     BREAST LUMPECTOMY Left 2004    CARDIAC CATHETERIZATION N/A 7/15/2024    Procedure: Left Heart Cath;  Surgeon: Lonnie Salomon IV, MD;  Location: [facility] CATH INVASIVE LOCATION;  Service: Cardiology;  Laterality: N/A;    TONSILLECTOMY      TUBAL ABDOMINAL LIGATION      VULVA BIOPSY      VULVA SURGERY      WISDOM TOOTH EXTRACTION        Family History   Problem Relation Age of Onset    Arthritis Mother     Diabetes Mother     Heart disease Mother     Thyroid disease Mother     Uterine cancer Mother     Cancer Mother     Heart disease Brother     Diabetes Brother     Breast cancer Other         MATERNAL GREAT AUNT    Cancer Maternal Grandmother     Heart attack Maternal Grandmother     Ovarian cancer Neg Hx      Social History     Socioeconomic History    Marital  status:    Tobacco Use    Smoking status: Every Day     Current packs/day: 1.00     Average packs/day: 1 pack/day for 50.0 years (50.0 ttl pk-yrs)     Types: Cigarettes     Passive exposure: Past    Smokeless tobacco: Never   Vaping Use    Vaping status: Former    Substances: Nicotine    Devices: Pre-filled or refillable cartridge   Substance and Sexual Activity    Alcohol use: Not Currently     Comment: Maybe 2 - 3 times a year    Drug use: Never    Sexual activity: Not Currently     Partners: Male     Birth control/protection: Spermicide, Vaginal insert contraception      Current Outpatient Medications on File Prior to Visit   Medication Sig Dispense Refill    Accu-Chek Softclix Lancets lancets USE 1  TO CHECK GLUCOSE TWICE DAILY TO THREE TIMES DAILY dx e11.65 on insulin 100 each 11    albuterol sulfate  (90 Base) MCG/ACT inhaler Inhale 2 puffs Every 6 (Six) Hours As Needed for Wheezing. 18 g 11    ASPIRIN 81 PO Take 1 tablet by mouth Daily.      diclofenac (VOLTAREN) 50 MG EC tablet Take 1 tablet by mouth 3 (Three) Times a Day. 21 tablet 0    dilTIAZem XR (DILACOR XR) 120 MG 24 hr capsule Take 1 capsule by mouth Daily. 30 capsule 11    glucose blood test strip Use as instructed 2-3 times a day 100 each 3    HYDROcodone-acetaminophen (NORCO) 7.5-325 MG per tablet Take 1 tablet by mouth Every 6 (Six) Hours As Needed for Severe Pain.      Insulin aspart (FIASP FLEXTOUCH) 100 UNIT/ML solution pen-injector injection pen For use at mealtimes and per correctional scale, MDD 80 units 24 mL 5    Insulin Aspart, w/Niacinamide, (Fiasp FlexTouch) 100 UNIT/ML solution pen-injector Inject 5 Units under the skin into the appropriate area as directed 3 (Three) Times a Day With Meals. Add additional insulin 1 unit for every 50 mg/dl over 150. Max daily dose: 30 units 27 mL 1    Insulin Pen Needle (BD Pen Needle Nessa U/F) 32G X 4 MM misc Use as directed 5 times daily 150 each 3    ipratropium (ATROVENT) 0.03 % nasal  "spray 2 sprays into the nostril(s) as directed by provider Every 12 (Twelve) Hours. 1 each 11    ipratropium-albuterol (DUO-NEB) 0.5-2.5 mg/3 ml nebulizer USE 3 ML VIA NEBULIZER FOUR TIMES DAILY AS NEEDED FOR WHEEZING 180 mL 3    isosorbide mononitrate (IMDUR) 30 MG 24 hr tablet Take 1 tablet by mouth Every Morning. 90 tablet 3    Lantus SoloStar 100 UNIT/ML injection pen Inject 10 Units under the skin into the appropriate area as directed Every Night. Titrate 2 units every 3 days if blood sugar is more than 140 fasting; Max daily dose: 30 units 27 mL 1    metFORMIN ER (GLUCOPHAGE-XR) 500 MG 24 hr tablet Take 1 tablet by mouth Daily With Breakfast. 90 tablet 1    nitroglycerin (NITROSTAT) 0.3 MG SL tablet 1 under the tongue as needed for angina, may repeat q5mins for up three doses 25 tablet 11    rosuvastatin (CRESTOR) 20 MG tablet Take 1 tablet by mouth once daily 90 tablet 0    Semaglutide,0.25 or 0.5MG/DOS, (Ozempic, 0.25 or 0.5 MG/DOSE,) 2 MG/3ML solution pen-injector Inject 0.25 mg under the skin into the appropriate area as directed 1 (One) Time Per Week. 3 mL 2    tiZANidine (ZANAFLEX) 4 MG tablet Take 1 tablet by mouth 3 (Three) Times a Day.      albuterol (PROVENTIL) (2.5 MG/3ML) 0.083% nebulizer solution Take 2.5 mg by nebulization 4 (Four) Times a Day As Needed for Wheezing. (Patient not taking: Reported on 8/6/2024) 120 each 11     No current facility-administered medications on file prior to visit.      Allergies   Allergen Reactions    Bee Venom Anaphylaxis    Ciprofloxacin Shortness Of Breath          Review of Systems     I reviewed the patient's chief complaint, history of present illness, review of systems, past medical history, surgical history, family history, social history, medications and allergy list.        Objective      Physical Exam  /70   Ht 167.6 cm (65.98\")   Wt 76.2 kg (168 lb)   LMP  (LMP Unknown)   BMI 27.13 kg/m²     Body mass index is 27.13 kg/m².    General  Mental " Status - alert  General Appearance - cooperative, well groomed, not in acute distress  Orientation - Oriented X3  Build & Nutrition - well developed and well nourished  Posture - normal posture  Gait - normal gait       Ortho Exam  Left ankle and foot with no visible swelling.  She has minimal tenderness over the ATFL and second and third webspace.  She has full motion full-strength.  Imaging/Studies Reviewed and Interpreted:  Imaging Results (Last 24 Hours)       ** No results found for the last 24 hours. **          I viewed and personally interpreted the left foot and ankle x-rays from August 13th as unremarkable.  Questionable tiny avulsion of the distal fibula less than 1 mm in size    Assessment    Assessment:  1. Sprain of anterior talofibular ligament of left ankle, initial encounter    2. Type 2 diabetes mellitus with hyperglycemia, without long-term current use of insulin        Plan:  Continue over-the-counter medication as needed for discomfort  I have ordered physical therapy.  She will do that in [place].  I have ordered a boot to help with ambulation and support.  She will follow-up in 3 weeks.        Jarrod Moyer MD  08/19/24  14:18 EDT      Dictated Utilizing Dragon Dictation.

## 2024-08-29 ENCOUNTER — TELEPHONE (OUTPATIENT)
Age: 64
End: 2024-08-29
Payer: MEDICARE

## 2024-08-29 DIAGNOSIS — J44.9 COPD MIXED TYPE: Primary | ICD-10-CM

## 2024-08-29 NOTE — TELEPHONE ENCOUNTER
Pt called stating that she is having some SOB, chest congestion, some semi-productive cough with yellow sputum, sneezing, body aches, nausea, and chills. Pt wants to know if abx/steroid could be called into [facility] pharmacy. Pt is aware you are not currently in and will be back tomorrow. Pt has been advised to go to ER if symptoms worsen overnight. Please advise.

## 2024-08-30 RX ORDER — PREDNISONE 10 MG/1
TABLET ORAL
Qty: 31 TABLET | Refills: 0 | Status: SHIPPED | OUTPATIENT
Start: 2024-08-30

## 2024-09-16 ENCOUNTER — OFFICE VISIT (OUTPATIENT)
Dept: ENDOCRINOLOGY | Facility: CLINIC | Age: 64
End: 2024-09-16
Payer: MEDICARE

## 2024-09-16 VITALS
BODY MASS INDEX: 27.31 KG/M2 | SYSTOLIC BLOOD PRESSURE: 122 MMHG | HEART RATE: 90 BPM | WEIGHT: 169.9 LBS | HEIGHT: 66 IN | OXYGEN SATURATION: 95 % | DIASTOLIC BLOOD PRESSURE: 70 MMHG

## 2024-09-16 DIAGNOSIS — Z79.4 TYPE 2 DIABETES MELLITUS WITH HYPERGLYCEMIA, WITH LONG-TERM CURRENT USE OF INSULIN: Primary | ICD-10-CM

## 2024-09-16 DIAGNOSIS — E11.65 TYPE 2 DIABETES MELLITUS WITH HYPERGLYCEMIA, WITH LONG-TERM CURRENT USE OF INSULIN: Primary | ICD-10-CM

## 2024-09-16 LAB
EXPIRATION DATE: ABNORMAL
GLUCOSE BLDC GLUCOMTR-MCNC: 236 MG/DL (ref 70–130)
Lab: ABNORMAL

## 2024-09-16 PROCEDURE — 1160F RVW MEDS BY RX/DR IN RCRD: CPT | Performed by: PHYSICIAN ASSISTANT

## 2024-09-16 PROCEDURE — 1159F MED LIST DOCD IN RCRD: CPT | Performed by: PHYSICIAN ASSISTANT

## 2024-09-16 PROCEDURE — 82947 ASSAY GLUCOSE BLOOD QUANT: CPT | Performed by: PHYSICIAN ASSISTANT

## 2024-09-16 PROCEDURE — 3046F HEMOGLOBIN A1C LEVEL >9.0%: CPT | Performed by: PHYSICIAN ASSISTANT

## 2024-09-16 PROCEDURE — 99214 OFFICE O/P EST MOD 30 MIN: CPT | Performed by: PHYSICIAN ASSISTANT

## 2024-09-16 RX ORDER — SEMAGLUTIDE 0.68 MG/ML
0.5 INJECTION, SOLUTION SUBCUTANEOUS WEEKLY
Qty: 3 ML | Refills: 3 | Status: SHIPPED | OUTPATIENT
Start: 2024-09-16

## 2024-09-16 RX ORDER — INSULIN GLARGINE 100 [IU]/ML
12 INJECTION, SOLUTION SUBCUTANEOUS NIGHTLY
Qty: 27 ML | Refills: 1 | Status: SHIPPED | OUTPATIENT
Start: 2024-09-16

## 2024-09-16 RX ORDER — METFORMIN HCL 500 MG
500 TABLET, EXTENDED RELEASE 24 HR ORAL
Qty: 90 TABLET | Refills: 1 | Status: SHIPPED | OUTPATIENT
Start: 2024-09-16

## 2024-09-17 PROBLEM — Z79.4 TYPE 2 DIABETES MELLITUS WITH HYPERGLYCEMIA, WITH LONG-TERM CURRENT USE OF INSULIN: Status: ACTIVE | Noted: 2022-01-07

## 2024-09-17 PROBLEM — Z79.4 TYPE 2 DIABETES MELLITUS WITH HYPERGLYCEMIA, WITH LONG-TERM CURRENT USE OF INSULIN: Status: ACTIVE | Noted: 2024-09-17

## 2024-09-17 PROBLEM — E11.65 TYPE 2 DIABETES MELLITUS WITH HYPERGLYCEMIA, WITH LONG-TERM CURRENT USE OF INSULIN: Status: ACTIVE | Noted: 2022-01-07

## 2024-09-17 PROBLEM — E11.65 TYPE 2 DIABETES MELLITUS WITH HYPERGLYCEMIA, WITH LONG-TERM CURRENT USE OF INSULIN: Status: ACTIVE | Noted: 2024-09-17

## 2024-09-18 ENCOUNTER — TELEPHONE (OUTPATIENT)
Dept: PHYSICAL THERAPY | Facility: CLINIC | Age: 64
End: 2024-09-18

## 2024-09-25 ENCOUNTER — OFFICE VISIT (OUTPATIENT)
Dept: PULMONOLOGY | Facility: CLINIC | Age: 64
End: 2024-09-25
Payer: MEDICARE

## 2024-09-25 VITALS
BODY MASS INDEX: 27.16 KG/M2 | DIASTOLIC BLOOD PRESSURE: 78 MMHG | HEART RATE: 101 BPM | WEIGHT: 168.2 LBS | SYSTOLIC BLOOD PRESSURE: 120 MMHG | OXYGEN SATURATION: 98 % | TEMPERATURE: 97.7 F

## 2024-09-25 DIAGNOSIS — Z72.0 TOBACCO ABUSE: ICD-10-CM

## 2024-09-25 DIAGNOSIS — G47.10 HYPERSOMNIA: ICD-10-CM

## 2024-09-25 DIAGNOSIS — J44.9 COPD MIXED TYPE: Primary | ICD-10-CM

## 2024-09-25 DIAGNOSIS — G47.33 OBSTRUCTIVE SLEEP APNEA (ADULT) (PEDIATRIC): ICD-10-CM

## 2024-09-25 PROCEDURE — 99214 OFFICE O/P EST MOD 30 MIN: CPT | Performed by: INTERNAL MEDICINE

## 2024-09-25 RX ORDER — MELOXICAM 15 MG/1
TABLET ORAL
COMMUNITY
Start: 2024-08-14

## 2024-09-25 RX ORDER — FLUTICASONE FUROATE, UMECLIDINIUM BROMIDE AND VILANTEROL TRIFENATATE 200; 62.5; 25 UG/1; UG/1; UG/1
1 POWDER RESPIRATORY (INHALATION) DAILY
Qty: 1 EACH | Refills: 11 | Status: SHIPPED | OUTPATIENT
Start: 2024-09-25

## 2024-09-25 RX ORDER — ALBUTEROL SULFATE 0.83 MG/ML
2.5 SOLUTION RESPIRATORY (INHALATION) 4 TIMES DAILY PRN
Qty: 120 EACH | Refills: 11 | Status: SHIPPED | OUTPATIENT
Start: 2024-09-25

## 2024-09-26 DIAGNOSIS — Z72.0 TOBACCO ABUSE: Primary | ICD-10-CM

## 2024-09-26 DIAGNOSIS — F17.210 CIGARETTE SMOKER: ICD-10-CM

## 2024-10-16 RX ORDER — ROSUVASTATIN CALCIUM 20 MG/1
TABLET, COATED ORAL
Qty: 90 TABLET | Refills: 0 | Status: SHIPPED | OUTPATIENT
Start: 2024-10-16

## 2024-10-16 NOTE — TELEPHONE ENCOUNTER
Lab Results   Component Value Date    CHOL 97 07/15/2024    CHLPL 116 03/02/2023    TRIG 159 (H) 07/15/2024    HDL 47 07/15/2024    LDL 24 07/15/2024

## 2024-10-19 ENCOUNTER — HOSPITAL ENCOUNTER (EMERGENCY)
Facility: HOSPITAL | Age: 64
Discharge: HOME | End: 2024-10-19
Attending: STUDENT IN AN ORGANIZED HEALTH CARE EDUCATION/TRAINING PROGRAM
Payer: MEDICARE

## 2024-10-19 ENCOUNTER — APPOINTMENT (OUTPATIENT)
Dept: ULTRASOUND IMAGING | Facility: HOSPITAL | Age: 64
End: 2024-10-19
Payer: MEDICARE

## 2024-10-19 ENCOUNTER — APPOINTMENT (OUTPATIENT)
Dept: CT IMAGING | Facility: HOSPITAL | Age: 64
End: 2024-10-19
Payer: MEDICARE

## 2024-10-19 VITALS
TEMPERATURE: 97.7 F | HEART RATE: 86 BPM | OXYGEN SATURATION: 98 % | WEIGHT: 168 LBS | BODY MASS INDEX: 27 KG/M2 | SYSTOLIC BLOOD PRESSURE: 120 MMHG | RESPIRATION RATE: 16 BRPM | HEIGHT: 66 IN | DIASTOLIC BLOOD PRESSURE: 65 MMHG

## 2024-10-19 DIAGNOSIS — E11.65 TYPE 2 DIABETES MELLITUS WITH HYPERGLYCEMIA, WITH LONG-TERM CURRENT USE OF INSULIN: ICD-10-CM

## 2024-10-19 DIAGNOSIS — Z79.4 TYPE 2 DIABETES MELLITUS WITH HYPERGLYCEMIA, WITH LONG-TERM CURRENT USE OF INSULIN: ICD-10-CM

## 2024-10-19 DIAGNOSIS — R10.13 EPIGASTRIC DISCOMFORT: Primary | ICD-10-CM

## 2024-10-19 DIAGNOSIS — K59.00 CONSTIPATION, UNSPECIFIED CONSTIPATION TYPE: ICD-10-CM

## 2024-10-19 LAB
ALBUMIN SERPL-MCNC: 4 G/DL (ref 3.5–5.2)
ALBUMIN/GLOB SERPL: 1.2 G/DL
ALP SERPL-CCNC: 78 U/L (ref 39–117)
ALT SERPL W P-5'-P-CCNC: 16 U/L (ref 1–33)
ANION GAP SERPL CALCULATED.3IONS-SCNC: 13 MMOL/L (ref 5–15)
AST SERPL-CCNC: 19 U/L (ref 1–32)
BACTERIA UR QL AUTO: ABNORMAL /HPF
BASOPHILS # BLD AUTO: 0.06 10*3/MM3 (ref 0–0.2)
BASOPHILS NFR BLD AUTO: 0.7 % (ref 0–1.5)
BILIRUB SERPL-MCNC: 0.3 MG/DL (ref 0–1.2)
BILIRUB UR QL STRIP: ABNORMAL
BUN SERPL-MCNC: 20 MG/DL (ref 8–23)
BUN/CREAT SERPL: 22.7 (ref 7–25)
CALCIUM SPEC-SCNC: 9.7 MG/DL (ref 8.6–10.5)
CHLORIDE SERPL-SCNC: 100 MMOL/L (ref 98–107)
CLARITY UR: CLEAR
CO2 SERPL-SCNC: 20 MMOL/L (ref 22–29)
COLOR UR: YELLOW
CREAT SERPL-MCNC: 0.88 MG/DL (ref 0.57–1)
D-LACTATE SERPL-SCNC: 1.6 MMOL/L (ref 0.5–2)
DEPRECATED RDW RBC AUTO: 43.3 FL (ref 37–54)
EGFRCR SERPLBLD CKD-EPI 2021: 74.4 ML/MIN/1.73
EOSINOPHIL # BLD AUTO: 0.23 10*3/MM3 (ref 0–0.4)
EOSINOPHIL NFR BLD AUTO: 2.6 % (ref 0.3–6.2)
ERYTHROCYTE [DISTWIDTH] IN BLOOD BY AUTOMATED COUNT: 13.3 % (ref 12.3–15.4)
GLOBULIN UR ELPH-MCNC: 3.3 GM/DL
GLUCOSE SERPL-MCNC: 182 MG/DL (ref 65–99)
GLUCOSE UR STRIP-MCNC: NEGATIVE MG/DL
HCT VFR BLD AUTO: 42.7 % (ref 34–46.6)
HGB BLD-MCNC: 14.7 G/DL (ref 12–15.9)
HGB UR QL STRIP.AUTO: ABNORMAL
HOLD SPECIMEN: NORMAL
HYALINE CASTS UR QL AUTO: ABNORMAL /LPF
IMM GRANULOCYTES # BLD AUTO: 0.03 10*3/MM3 (ref 0–0.05)
IMM GRANULOCYTES NFR BLD AUTO: 0.3 % (ref 0–0.5)
KETONES UR QL STRIP: ABNORMAL
LEUKOCYTE ESTERASE UR QL STRIP.AUTO: ABNORMAL
LIPASE SERPL-CCNC: 39 U/L (ref 13–60)
LYMPHOCYTES # BLD AUTO: 1.84 10*3/MM3 (ref 0.7–3.1)
LYMPHOCYTES NFR BLD AUTO: 21.1 % (ref 19.6–45.3)
MAGNESIUM SERPL-MCNC: 2 MG/DL (ref 1.6–2.4)
MCH RBC QN AUTO: 30.6 PG (ref 26.6–33)
MCHC RBC AUTO-ENTMCNC: 34.4 G/DL (ref 31.5–35.7)
MCV RBC AUTO: 88.8 FL (ref 79–97)
MONOCYTES # BLD AUTO: 0.38 10*3/MM3 (ref 0.1–0.9)
MONOCYTES NFR BLD AUTO: 4.3 % (ref 5–12)
NEUTROPHILS NFR BLD AUTO: 6.2 10*3/MM3 (ref 1.7–7)
NEUTROPHILS NFR BLD AUTO: 71 % (ref 42.7–76)
NITRITE UR QL STRIP: NEGATIVE
NRBC BLD AUTO-RTO: 0 /100 WBC (ref 0–0.2)
PH UR STRIP.AUTO: 6 [PH] (ref 5–8)
PLATELET # BLD AUTO: 192 10*3/MM3 (ref 140–450)
PMV BLD AUTO: 10.1 FL (ref 6–12)
POTASSIUM SERPL-SCNC: 4.5 MMOL/L (ref 3.5–5.2)
PROT SERPL-MCNC: 7.3 G/DL (ref 6–8.5)
PROT UR QL STRIP: ABNORMAL
QT INTERVAL: 418 MS
QTC INTERVAL: 528 MS
RBC # BLD AUTO: 4.81 10*6/MM3 (ref 3.77–5.28)
RBC # UR STRIP: ABNORMAL /HPF
REF LAB TEST METHOD: ABNORMAL
SODIUM SERPL-SCNC: 133 MMOL/L (ref 136–145)
SP GR UR STRIP: 1.02 (ref 1–1.03)
SQUAMOUS #/AREA URNS HPF: ABNORMAL /HPF
TROPONIN T SERPL HS-MCNC: 19 NG/L
TROPONIN T SERPL HS-MCNC: 19 NG/L
UROBILINOGEN UR QL STRIP: ABNORMAL
WBC # UR STRIP: ABNORMAL /HPF
WBC NRBC COR # BLD AUTO: 8.74 10*3/MM3 (ref 3.4–10.8)
WHOLE BLOOD HOLD COAG: NORMAL
WHOLE BLOOD HOLD SPECIMEN: NORMAL

## 2024-10-19 PROCEDURE — 84484 ASSAY OF TROPONIN QUANT: CPT | Performed by: STUDENT IN AN ORGANIZED HEALTH CARE EDUCATION/TRAINING PROGRAM

## 2024-10-19 PROCEDURE — 83605 ASSAY OF LACTIC ACID: CPT | Performed by: STUDENT IN AN ORGANIZED HEALTH CARE EDUCATION/TRAINING PROGRAM

## 2024-10-19 PROCEDURE — 85025 COMPLETE CBC W/AUTO DIFF WBC: CPT | Performed by: STUDENT IN AN ORGANIZED HEALTH CARE EDUCATION/TRAINING PROGRAM

## 2024-10-19 PROCEDURE — 96361 HYDRATE IV INFUSION ADD-ON: CPT

## 2024-10-19 PROCEDURE — 83690 ASSAY OF LIPASE: CPT | Performed by: STUDENT IN AN ORGANIZED HEALTH CARE EDUCATION/TRAINING PROGRAM

## 2024-10-19 PROCEDURE — 99285 EMERGENCY DEPT VISIT HI MDM: CPT

## 2024-10-19 PROCEDURE — 96375 TX/PRO/DX INJ NEW DRUG ADDON: CPT

## 2024-10-19 PROCEDURE — 25010000002 METOCLOPRAMIDE PER 10 MG: Performed by: STUDENT IN AN ORGANIZED HEALTH CARE EDUCATION/TRAINING PROGRAM

## 2024-10-19 PROCEDURE — 25010000002 DIPHENHYDRAMINE PER 50 MG: Performed by: STUDENT IN AN ORGANIZED HEALTH CARE EDUCATION/TRAINING PROGRAM

## 2024-10-19 PROCEDURE — 36415 COLL VENOUS BLD VENIPUNCTURE: CPT

## 2024-10-19 PROCEDURE — 83735 ASSAY OF MAGNESIUM: CPT | Performed by: STUDENT IN AN ORGANIZED HEALTH CARE EDUCATION/TRAINING PROGRAM

## 2024-10-19 PROCEDURE — 25510000001 IOPAMIDOL 61 % SOLUTION: Performed by: STUDENT IN AN ORGANIZED HEALTH CARE EDUCATION/TRAINING PROGRAM

## 2024-10-19 PROCEDURE — 96374 THER/PROPH/DIAG INJ IV PUSH: CPT

## 2024-10-19 PROCEDURE — 76705 ECHO EXAM OF ABDOMEN: CPT

## 2024-10-19 PROCEDURE — 80053 COMPREHEN METABOLIC PANEL: CPT | Performed by: STUDENT IN AN ORGANIZED HEALTH CARE EDUCATION/TRAINING PROGRAM

## 2024-10-19 PROCEDURE — 74177 CT ABD & PELVIS W/CONTRAST: CPT

## 2024-10-19 PROCEDURE — 25810000003 SODIUM CHLORIDE 0.9 % SOLUTION: Performed by: STUDENT IN AN ORGANIZED HEALTH CARE EDUCATION/TRAINING PROGRAM

## 2024-10-19 PROCEDURE — 81001 URINALYSIS AUTO W/SCOPE: CPT | Performed by: STUDENT IN AN ORGANIZED HEALTH CARE EDUCATION/TRAINING PROGRAM

## 2024-10-19 PROCEDURE — 93005 ELECTROCARDIOGRAM TRACING: CPT | Performed by: STUDENT IN AN ORGANIZED HEALTH CARE EDUCATION/TRAINING PROGRAM

## 2024-10-19 RX ORDER — IOPAMIDOL 612 MG/ML
100 INJECTION, SOLUTION INTRAVASCULAR
Status: COMPLETED | OUTPATIENT
Start: 2024-10-19 | End: 2024-10-19

## 2024-10-19 RX ORDER — SODIUM CHLORIDE 9 MG/ML
10 INJECTION, SOLUTION INTRAMUSCULAR; INTRAVENOUS; SUBCUTANEOUS AS NEEDED
Status: DISCONTINUED | OUTPATIENT
Start: 2024-10-19 | End: 2024-10-19 | Stop reason: HOSPADM

## 2024-10-19 RX ORDER — METOCLOPRAMIDE HYDROCHLORIDE 5 MG/ML
5 INJECTION INTRAMUSCULAR; INTRAVENOUS ONCE
Status: COMPLETED | OUTPATIENT
Start: 2024-10-19 | End: 2024-10-19

## 2024-10-19 RX ORDER — DIPHENHYDRAMINE HYDROCHLORIDE 50 MG/ML
25 INJECTION INTRAMUSCULAR; INTRAVENOUS ONCE
Status: COMPLETED | OUTPATIENT
Start: 2024-10-19 | End: 2024-10-19

## 2024-10-19 RX ORDER — POLYETHYLENE GLYCOL 3350 17 G/17G
17 POWDER, FOR SOLUTION ORAL DAILY
Qty: 100 EACH | Refills: 0 | Status: SHIPPED | OUTPATIENT
Start: 2024-10-19

## 2024-10-19 RX ORDER — ALUMINA, MAGNESIA, AND SIMETHICONE 2400; 2400; 240 MG/30ML; MG/30ML; MG/30ML
15 SUSPENSION ORAL ONCE
Status: COMPLETED | OUTPATIENT
Start: 2024-10-19 | End: 2024-10-19

## 2024-10-19 RX ORDER — METOCLOPRAMIDE 5 MG/1
5 TABLET ORAL 3 TIMES DAILY PRN
Qty: 10 TABLET | Refills: 0 | Status: SHIPPED | OUTPATIENT
Start: 2024-10-19

## 2024-10-19 RX ORDER — ALUMINA, MAGNESIA, AND SIMETHICONE 2400; 2400; 240 MG/30ML; MG/30ML; MG/30ML
15 SUSPENSION ORAL ONCE
Status: DISCONTINUED | OUTPATIENT
Start: 2024-10-19 | End: 2024-10-19

## 2024-10-19 RX ORDER — SUCRALFATE 1 G/1
1 TABLET ORAL 3 TIMES DAILY
Qty: 60 TABLET | Refills: 0 | Status: SHIPPED | OUTPATIENT
Start: 2024-10-19

## 2024-10-19 RX ADMIN — SODIUM CHLORIDE 1000 ML: 9 INJECTION, SOLUTION INTRAVENOUS at 13:59

## 2024-10-19 RX ADMIN — ALUMINUM HYDROXIDE, MAGNESIUM HYDROXIDE, DIMETHICONE 15 ML: 400; 400; 40 SUSPENSION ORAL at 16:18

## 2024-10-19 RX ADMIN — IOPAMIDOL 75 ML: 612 INJECTION, SOLUTION INTRAVENOUS at 15:53

## 2024-10-19 RX ADMIN — DIPHENHYDRAMINE HYDROCHLORIDE 25 MG: 50 INJECTION INTRAMUSCULAR; INTRAVENOUS at 13:59

## 2024-10-19 RX ADMIN — METOCLOPRAMIDE 5 MG: 5 INJECTION, SOLUTION INTRAMUSCULAR; INTRAVENOUS at 14:03

## 2024-10-19 NOTE — ED PROVIDER NOTES
EMERGENCY DEPARTMENT ENCOUNTER    Pt Name: Kathy Taylor  MRN: [id number]  Pt :   1960  Room Number:  33/33  Date of encounter:  10/19/2024  PCP: Provider, No Known  ED Provider: Nigel Zhou MD    Historian: Patient      HPI:  Chief Complaint: Epigastric pain, nausea        Context: Kathy Taylor is a 64-year-old woman presents the emergency department for 1 month of migratory abdominal pain that is now localizing to her epigastrium and right upper quadrant the pain is made worse by eating.  She feels like she has been constipated as well using a bowel regimen and suppositories but only been having small bowel movements with that.  She feels like her abdomen is more distended.  She denies chest pain or shortness of breath she denies fevers or systemic symptoms.  Denies dysuria.  No other complaints at this time.      PAST MEDICAL HISTORY  Past Medical History:   Diagnosis Date    Ankle sprain     Arthritis     Arthritis of back     Arthritis of neck     Asthma ?    May have been earlier    Back ache     Bronchiectasis ?    Bronchitis     Bursitis of hip     Cervical disc disorder     Chronic bronchitis ?    COPD (chronic obstructive pulmonary disease)     Coronary artery disease involving native coronary artery of native heart with angina pectoris 07/15/2024    Emphysema of lung     Dont remember when told.    Fibromyalgia     Hip arthrosis     Hyperlipidemia LDL goal <100 2024    Knee sprain     Migraines     Neck strain     Pneumonia     Rheumatic fever     Rotator cuff syndrome     Sleep apnea, obstructive ?    Tooth infection     Type 2 diabetes mellitus     Vulvar carcinoma     Wrist sprain          PAST SURGICAL HISTORY  Past Surgical History:   Procedure Laterality Date    BREAST BIOPSY Left     BREAST LUMPECTOMY Left 2004    CARDIAC CATHETERIZATION N/A 07/15/2024    Procedure: Left Heart Cath;  Surgeon: Lonnie Salomon IV, MD;  Location: [facility] CATH INVASIVE LOCATION;   Service: Cardiology;  Laterality: N/A;    TONSILLECTOMY      TUBAL ABDOMINAL LIGATION      VULVA BIOPSY      VULVA SURGERY      WISDOM TOOTH EXTRACTION           FAMILY HISTORY  Family History   Problem Relation Age of Onset    Arthritis Mother     Diabetes Mother     Heart disease Mother     Thyroid disease Mother     Uterine cancer Mother     Cancer Mother     Heart disease Brother     Diabetes Brother     Breast cancer Other         MATERNAL GREAT AUNT    Cancer Maternal Grandmother     Heart attack Maternal Grandmother     Ovarian cancer Neg Hx          SOCIAL HISTORY  Social History     Socioeconomic History    Marital status:    Tobacco Use    Smoking status: Every Day     Current packs/day: 1.00     Average packs/day: 1 pack/day for 50.0 years (50.0 ttl pk-yrs)     Types: Cigarettes     Passive exposure: Past    Smokeless tobacco: Never   Vaping Use    Vaping status: Former    Substances: Nicotine    Devices: Pre-filled or refillable cartridge   Substance and Sexual Activity    Alcohol use: Not Currently     Comment: Only 2 or 3 times a year.    Drug use: Never    Sexual activity: Not Currently     Partners: Male     Birth control/protection: Tubal ligation         ALLERGIES  Bee venom and Ciprofloxacin        REVIEW OF SYSTEMS  Review of Systems       All systems reviewed and negative except for those discussed in HPI.       PHYSICAL EXAM    I have reviewed the triage vital signs and nursing notes.    ED Triage Vitals [10/19/24 1304]   Temp Heart Rate Resp BP SpO2   97.7 °F (36.5 °C) 105 16 126/67 97 %      Temp src Heart Rate Source Patient Position BP Location FiO2 (%)   Oral Monitor Sitting Left arm --       Physical Exam  GENERAL:   Appears in no acute distress.   HENT: Nares patent.  EYES: No scleral icterus.  CV: Regular rhythm, regular rate.  RESPIRATORY: Normal effort.  No audible wheezes, rales or rhonchi.  ABDOMEN: Soft, endorses tenderness in the epigastrium and right upper  quadrant  MUSCULOSKELETAL: No deformities.   NEURO: Alert, moves all extremities, follows commands.  SKIN: Warm, dry, no rash visualized.      LAB RESULTS  Recent Results (from the past 24 hours)   Urinalysis With Microscopic If Indicated (No Culture) - Urine, Clean Catch    Collection Time: 10/19/24  1:22 PM    Specimen: Urine, Clean Catch   Result Value Ref Range    Color, UA Yellow Yellow, Straw    Appearance, UA Clear Clear    pH, UA 6.0 5.0 - 8.0    Specific Gravity, UA 1.020 1.001 - 1.030    Glucose, UA Negative Negative    Ketones, UA Trace (A) Negative    Bilirubin, UA Moderate (2+) (A) Negative    Blood, UA Trace (A) Negative    Protein, UA Trace (A) Negative    Leuk Esterase, UA Trace (A) Negative    Nitrite, UA Negative Negative    Urobilinogen, UA 0.2 E.U./dL 0.2 - 1.0 E.U./dL   Urinalysis, Microscopic Only - Urine, Clean Catch    Collection Time: 10/19/24  1:22 PM    Specimen: Urine, Clean Catch   Result Value Ref Range    RBC, UA 0-2 None Seen, 0-2 /HPF    WBC, UA 0-2 None Seen, 0-2 /HPF    Bacteria, UA None Seen None Seen, Trace /HPF    Squamous Epithelial Cells, UA 3-6 (A) None Seen, 0-2 /HPF    Hyaline Casts, UA 0-6 0 - 6 /LPF    Methodology Manual Light Microscopy    Comprehensive Metabolic Panel    Collection Time: 10/19/24  1:29 PM    Specimen: Arm, Right; Blood   Result Value Ref Range    Glucose 182 (H) 65 - 99 mg/dL    BUN 20 8 - 23 mg/dL    Creatinine 0.88 0.57 - 1.00 mg/dL    Sodium 133 (L) 136 - 145 mmol/L    Potassium 4.5 3.5 - 5.2 mmol/L    Chloride 100 98 - 107 mmol/L    CO2 20.0 (L) 22.0 - 29.0 mmol/L    Calcium 9.7 8.6 - 10.5 mg/dL    Total Protein 7.3 6.0 - 8.5 g/dL    Albumin 4.0 3.5 - 5.2 g/dL    ALT (SGPT) 16 1 - 33 U/L    AST (SGOT) 19 1 - 32 U/L    Alkaline Phosphatase 78 39 - 117 U/L    Total Bilirubin 0.3 0.0 - 1.2 mg/dL    Globulin 3.3 gm/dL    A/G Ratio 1.2 g/dL    BUN/Creatinine Ratio 22.7 7.0 - 25.0    Anion Gap 13.0 5.0 - 15.0 mmol/L    eGFR 74.4 >60.0 mL/min/1.73   Lipase     Collection Time: 10/19/24  1:29 PM    Specimen: Arm, Right; Blood   Result Value Ref Range    Lipase 39 13 - 60 U/L   Lactic Acid, Plasma    Collection Time: 10/19/24  1:29 PM    Specimen: Arm, Right; Blood   Result Value Ref Range    Lactate 1.6 0.5 - 2.0 mmol/L   Green Top (Gel)    Collection Time: 10/19/24  1:29 PM   Result Value Ref Range    Extra Tube Hold for add-ons.    Lavender Top    Collection Time: 10/19/24  1:29 PM   Result Value Ref Range    Extra Tube hold for add-on    Gold Top - SST    Collection Time: 10/19/24  1:29 PM   Result Value Ref Range    Extra Tube Hold for add-ons.    Gray Top    Collection Time: 10/19/24  1:29 PM   Result Value Ref Range    Extra Tube Hold for add-ons.    Light Blue Top    Collection Time: 10/19/24  1:29 PM   Result Value Ref Range    Extra Tube Hold for add-ons.    CBC Auto Differential    Collection Time: 10/19/24  1:29 PM    Specimen: Arm, Right; Blood   Result Value Ref Range    WBC 8.74 3.40 - 10.80 10*3/mm3    RBC 4.81 3.77 - 5.28 10*6/mm3    Hemoglobin 14.7 12.0 - 15.9 g/dL    Hematocrit 42.7 34.0 - 46.6 %    MCV 88.8 79.0 - 97.0 fL    MCH 30.6 26.6 - 33.0 pg    MCHC 34.4 31.5 - 35.7 g/dL    RDW 13.3 12.3 - 15.4 %    RDW-SD 43.3 37.0 - 54.0 fl    MPV 10.1 6.0 - 12.0 fL    Platelets 192 140 - 450 10*3/mm3    Neutrophil % 71.0 42.7 - 76.0 %    Lymphocyte % 21.1 19.6 - 45.3 %    Monocyte % 4.3 (L) 5.0 - 12.0 %    Eosinophil % 2.6 0.3 - 6.2 %    Basophil % 0.7 0.0 - 1.5 %    Immature Grans % 0.3 0.0 - 0.5 %    Neutrophils, Absolute 6.20 1.70 - 7.00 10*3/mm3    Lymphocytes, Absolute 1.84 0.70 - 3.10 10*3/mm3    Monocytes, Absolute 0.38 0.10 - 0.90 10*3/mm3    Eosinophils, Absolute 0.23 0.00 - 0.40 10*3/mm3    Basophils, Absolute 0.06 0.00 - 0.20 10*3/mm3    Immature Grans, Absolute 0.03 0.00 - 0.05 10*3/mm3    nRBC 0.0 0.0 - 0.2 /100 WBC   Single High Sensitivity Troponin T    Collection Time: 10/19/24  1:29 PM    Specimen: Arm, Right; Blood   Result Value Ref Range     HS Troponin T 19 (H) <14 ng/L   Magnesium    Collection Time: 10/19/24  1:29 PM    Specimen: Arm, Right; Blood   Result Value Ref Range    Magnesium 2.0 1.6 - 2.4 mg/dL   ECG 12 Lead Chest Pain    Collection Time: 10/19/24  1:50 PM   Result Value Ref Range    QT Interval 418 ms    QTC Interval 528 ms   Single High Sensitivity Troponin T    Collection Time: 10/19/24  4:32 PM    Specimen: Arm, Left; Blood   Result Value Ref Range    HS Troponin T 19 (H) <14 ng/L       If labs were ordered, I independently reviewed the results and considered them in treating the patient.        RADIOLOGY  CT Abdomen Pelvis With Contrast    Result Date: 10/19/2024  CT ABDOMEN PELVIS W CONTRAST Date of Exam: 10/19/2024 3:48 PM EDT Indication: Progressively worsening epigastric pain with nausea and vomiting, constipation. Comparison: Right upper quadrant ultrasound dated 10/19/2024 Technique: Axial CT images were obtained of the abdomen and pelvis following the uneventful intravenous administration of 75 mL Isovue-300. Reconstructed coronal and sagittal images were also obtained. Automated exposure control and iterative construction methods were used. Findings: The heart size is normal. There is no pericardial effusion. The lung bases demonstrate minimal dependent atelectasis. The right hepatic lobe is elongated. There is a small left hepatic lobe. This likely relates to Riedel lobe configuration. The gallbladder is present without wall thickening. There is no intrahepatic or extrahepatic biliary ductal dilatation. The spleen, adrenal glands, and pancreas appear within normal limits. There is no pancreatic ductal dilatation. The kidneys are symmetric in size and enhancement. There is no hydronephrosis or hydroureter. The urinary bladder is fluid-filled without wall thickening. The uterus is present and retroflexed. There are no adnexal masses. The stomach and duodenum are normal in caliber and configuration. There are no abnormally  dilated loops of small bowel to suggest small bowel obstruction or small bowel inflammation. The appendix is visualized and normal within the right lower quadrant. There is no acute colitis or diverticulitis. There is a large colonic stool burden. There is no free fluid or free air. The aorta is normal in caliber without evidence of aneurysm formation. There is aortoiliac atherosclerotic calcification. There is no abdominal or pelvic lymphadenopathy. There is degenerative disc disease at L5-S1.     Impression: 1. No acute intra-abdominal or pelvic abnormality. 2. Large colonic stool burden Electronically Signed: Mickey Rothman  10/19/2024 4:22 PM EDT  Workstation ID: [id number]    US Gallbladder    Result Date: 10/19/2024  US GALLBLADDER Date of Exam: 10/19/2024 2:33 PM EDT Indication: Epigastric and right upper quadrant pain made worse with eating. Comparison: No comparisons available. Technique: Grayscale and color Doppler ultrasound evaluation of the right upper quadrant was performed. Findings: The visualized portions of the pancreas appear within normal limits. The liver is normal in size measuring at least 15 cm in length. There is mild increased echogenicity of the liver parenchyma which can be seen with mild hepatic steatosis. There is no focal liver lesion. There is normal hepatopetal flow within the main portal vein. There is no intrahepatic biliary ductal dilation. The common bile duct is normal in caliber measuring 5 mm. The gallbladder is present without wall thickening, stones, or sludge. There is a negative sonographic Izaguirre sign. The right kidney measures 12.4 x 4.4 x 5.6 cm. There is no hydronephrosis. There is no ascites.     Impression: 1. Mildly increased echogenicity of the liver parenchyma likely related to underlying mild hepatic steatosis. 2. No evidence of cholelithiasis or acute cholecystitis. Normal caliber common bile duct. Electronically Signed: Mickey Rothman  10/19/2024 3:05 PM EDT   Workstation ID: [id number]     I ordered and independently reviewed the above noted radiographic studies.      I viewed images of CT scan which showed moderate constipation but otherwise no acute pathology that I can appreciate per my independent interpretation.  Gallbladder ultrasound not showing any stones or gallbladder wall thickening or other acute pathology that I can appreciate    See radiologist's dictation for official interpretation.        PROCEDURES    Procedures    ECG 12 Lead Chest Pain   Preliminary Result   Test Reason : Chest Pain   Blood Pressure :   */*   mmHG   Vent. Rate :  96 BPM     Atrial Rate :  96 BPM      P-R Int : 158 ms          QRS Dur : 124 ms       QT Int : 418 ms       P-R-T Axes :  67 105  60 degrees      QTc Int : 528 ms      Normal sinus rhythm   Right bundle branch block   Abnormal ECG   When compared with ECG of 26-APR-2023 00:07,   No significant change was found      Referred By: LUCY           Confirmed By:           MEDICATIONS GIVEN IN ER    Medications   Sodium Chloride (PF) 0.9 % 10 mL (has no administration in time range)   sodium chloride 0.9 % bolus 1,000 mL (0 mL Intravenous Stopped 10/19/24 1849)   metoclopramide (REGLAN) injection 5 mg (5 mg Intravenous Given 10/19/24 1403)   diphenhydrAMINE (BENADRYL) injection 25 mg (25 mg Intravenous Given 10/19/24 1359)   iopamidol (ISOVUE-300) 61 % injection 100 mL (75 mL Intravenous Given 10/19/24 1553)   aluminum-magnesium hydroxide-simethicone (MAALOX MAX) 400-400-40 MG/5ML suspension 15 mL (15 mL Oral Given 10/19/24 1618)         MEDICAL DECISION MAKING, PROGRESS, and CONSULTS    All labs, if obtained, have been independently reviewed by me.  All radiology studies, if obtained, have been reviewed by me and the radiologist dictating the report.  All EKG's, if obtained, have been independently viewed and interpreted by me/my attending physician.      Discussion below represents my analysis of pertinent findings related to  patient's condition, differential diagnosis, treatment plan and final disposition.                         Differential diagnosis:    Cholecystitis, cholelithiasis, biliary obstruction, pancreatitis, gastritis, peptic ulcer disease, volvulus, ileus, bowel obstruction, sepsis, anemia, electrolyte abnormality      Additional sources:    - Discussed/ obtained information from independent historians:      - External (non-ED) record review:  chart review shows pulmonology notes for severe COPD, endocrinology notes for diabetes, cardiology notes for CAD    - Chronic or social conditions impacting care: Diabetes, CAD, COPD    - Shared decision making: Patient/patient representative in complete agreement with current plans for evaluation and management.      Orders placed during this visit:  Orders Placed This Encounter   Procedures    US Gallbladder    CT Abdomen Pelvis With Contrast    [place] Draw    Comprehensive Metabolic Panel    Lipase    Urinalysis With Microscopic If Indicated (No Culture) - Urine, Clean Catch    Lactic Acid, Plasma    CBC Auto Differential    Urinalysis, Microscopic Only - Urine, Clean Catch    Single High Sensitivity Troponin T    Magnesium    Single High Sensitivity Troponin T    NPO Diet NPO Type: Strict NPO    Undress & Gown    ECG 12 Lead Chest Pain    Insert Peripheral IV    CBC & Differential    Green Top (Gel)    Lavender Top    Gold Top - SST    Gray Top    Light Blue Top         Additional orders considered but not ordered:      ED Course:    Consultants:      ED Course as of 10/19/24 1853   Sat Oct 19, 2024   1342 Chart review shows pulmonology notes for severe COPD, endocrinology notes for diabetes, cardiology notes for CAD [CC]   1342 This very nice 64-year-old woman presents the emergency department for 1 month of migratory abdominal pain that is now localizing to her epigastrium and right upper quadrant the pain is made worse by eating.  She feels like she has been constipated as  well using a bowel regimen and suppositories but only been having small bowel movements with that.  She feels like her abdomen is more distended.  She denies chest pain or shortness of breath she denies fevers or systemic symptoms.  Denies dysuria.  No other complaints at this time. [CC]   1343 She arrived awake and alert mildly tachycardic at 105 she has tenderness in the epigastrium and right upper quadrant history of no abdominal surgeries concern for gallbladder pancreas etc. obtaining full abdominal workup and adding on ECG and high-sensitivity troponin.  Getting both gallbladder ultrasound and CT scan of the abdomen pelvis will reevaluate pending initial workup. [CC]   1810 Symptoms improved but not resolved after the fluids and nausea medicine she does feel like the Reglan in particular was helpful.  She denies any significant improvement with the GI cocktail.  Gallbladder ultrasound not showing any acute pathology. [CC]   1810 CT scan of the abdomen pelvis not showing anything acute but does show moderate stool burden including in the transverse colon where her symptoms are.  Urinalysis is clean  CBC reassuring nonactionable.  Metabolic panel only significant for hyperglycemia consistent with her known diabetes. [CC]   1811 Initial troponin borderline elevated at 19 but repeat troponin shows no significant delta.  No elevation in lactic acid or lipase.  Vitals normal and she remains well upon reevaluation we discussed the workup I do not think it is likely gastritis or peptic ulcers with no improvement with a GI cocktail but we discussed the significant stool burden we will try bowel regimen with MiraLAX and we discussed the importance of oral hydration.  With her diabetes there may be a mild gastroparesis component to this as well so we will try Reglan I warned her about possible side effects and discontinuing.  She does not currently have a primary doctor giving her referral for this as well.  Discharged  in stable condition with strict return precautions. [CC]      ED Course User Index  [CC] Nigel Zhou MD              Shared Decision Making:  After my consideration of clinical presentation and any laboratory/radiology studies obtained, I discussed the findings with the patient/patient representative who is in agreement with the treatment plan and the final disposition.   Risks and benefits of discharge and/or observation/admission were discussed.       AS OF 18:53 EDT VITALS:    BP - 133/71  HR - 89  TEMP - 97.7 °F (36.5 °C) (Oral)  O2 SATS - 96%      SABAS reviewed by Nigel Zhou MD           DIAGNOSIS  Final diagnoses:   Epigastric discomfort   Constipation, unspecified constipation type   Type 2 diabetes mellitus with hyperglycemia, with long-term current use of insulin         DISPOSITION  DISCHARGE    Patient discharged in stable condition.    Reviewed implications of results, diagnosis, meds, responsibility to follow up, warning signs and symptoms of possible worsening, potential complications and reasons to return to ER.    Patient/Family voiced understanding of above instructions.    Discussed plan for discharge, as there is no emergent indication for admission.  Pt/family is agreeable and understands need for follow up and possible repeat testing.  Pt/family is aware that discharge does not mean that nothing is wrong but that it indicates no emergency is currently present that requires admission and they must continue care with follow-up as given below or with a physician of their choice.     FOLLOW-UP  PATIENT CONNECTION - [facility] [address] [phone]  Call   To establish with a primary doctor.         Medication List        New Prescriptions      metoclopramide 5 MG tablet  Commonly known as: REGLAN  Take 1 tablet by mouth 3 (Three) Times a Day As Needed (nausea).     polyethylene glycol 17 g packet  Commonly known as: MIRALAX  Take 17 g by mouth Daily.      sucralfate 1 g tablet  Commonly known as: CARAFATE  Take 1 tablet by mouth 3 (Three) Times a Day. Thoroughly crush pill and mix in small amount of water prior to swallowing.               Where to Get Your Medications        These medications were sent to [facility] [address] [place] [address] [address] [place] [address] - [phone]  - [phone]   [address] [place] [address]      Phone: [phone]   metoclopramide 5 MG tablet  polyethylene glycol 17 g packet  sucralfate 1 g tablet             Please note that portions of this document were completed with voice recognition software.        Nigel Zhou MD  10/19/24 9877       Nigel Zhou MD  10/19/24 [id number]

## 2024-10-19 NOTE — DISCHARGE INSTRUCTIONS
Try the provided bowel regimen in conjunction with good oral hydration.  Minimizing your opioid pain medicine will also help.  Follow-up with your PCP.  Please return to the ED or seek other medical care for any concerning symptoms.

## 2024-11-21 ENCOUNTER — TELEPHONE (OUTPATIENT)
Dept: PULMONOLOGY | Facility: CLINIC | Age: 64
End: 2024-11-21
Payer: MEDICARE

## 2024-11-21 ENCOUNTER — TELEPHONE (OUTPATIENT)
Dept: ENDOCRINOLOGY | Facility: CLINIC | Age: 64
End: 2024-11-21
Payer: MEDICARE

## 2024-11-21 DIAGNOSIS — J44.9 COPD MIXED TYPE: ICD-10-CM

## 2024-11-21 NOTE — TELEPHONE ENCOUNTER
Pt called today c/o sob/cough/wheezing and requesting abx/steroids needing to be sent to [facility]. Pt denies sore throat/fever/chest pain.

## 2024-11-21 NOTE — TELEPHONE ENCOUNTER
Called the pt and told her since it had been 2 weeks on last injection she needs to back to the 0.25.     She verbalized understanding.

## 2024-11-21 NOTE — TELEPHONE ENCOUNTER
PATIENT HAS NOT TAKEN OZEMPIC FOR 2 WEEKS, SHE JUST PICKED UP HER NEW PRESCRIPTION AND IS NOT SURE WHAT DOSAGE SHE NEEDS AS SHE MISSED A FEW WEEKS. PATIENTS NUMBER [phone]

## 2024-11-22 RX ORDER — PREDNISONE 10 MG/1
TABLET ORAL
Qty: 31 TABLET | Refills: 0 | Status: SHIPPED | OUTPATIENT
Start: 2024-11-22

## 2024-11-22 RX ORDER — DOXYCYCLINE HYCLATE 100 MG
100 TABLET ORAL 2 TIMES DAILY
Qty: 20 TABLET | Refills: 0 | Status: SHIPPED | OUTPATIENT
Start: 2024-11-22

## 2024-12-04 ENCOUNTER — HOSPITAL ENCOUNTER (EMERGENCY)
Facility: HOSPITAL | Age: 64
Discharge: HOME | End: 2024-12-04
Attending: EMERGENCY MEDICINE | Admitting: EMERGENCY MEDICINE
Payer: MEDICARE

## 2024-12-04 ENCOUNTER — APPOINTMENT (OUTPATIENT)
Dept: GENERAL RADIOLOGY | Facility: HOSPITAL | Age: 64
End: 2024-12-04
Payer: MEDICARE

## 2024-12-04 VITALS
DIASTOLIC BLOOD PRESSURE: 64 MMHG | SYSTOLIC BLOOD PRESSURE: 117 MMHG | RESPIRATION RATE: 18 BRPM | TEMPERATURE: 98.1 F | HEIGHT: 66 IN | OXYGEN SATURATION: 95 % | HEART RATE: 88 BPM | BODY MASS INDEX: 27 KG/M2 | WEIGHT: 168 LBS

## 2024-12-04 DIAGNOSIS — B02.9 HERPES ZOSTER WITHOUT COMPLICATION: Primary | ICD-10-CM

## 2024-12-04 DIAGNOSIS — R07.89 LEFT-SIDED CHEST WALL PAIN: ICD-10-CM

## 2024-12-04 LAB
ALBUMIN SERPL-MCNC: 4.3 G/DL (ref 3.5–5.2)
ALBUMIN/GLOB SERPL: 1.5 G/DL
ALP SERPL-CCNC: 73 U/L (ref 39–117)
ALT SERPL W P-5'-P-CCNC: 21 U/L (ref 1–33)
ANION GAP SERPL CALCULATED.3IONS-SCNC: 10 MMOL/L (ref 5–15)
AST SERPL-CCNC: 26 U/L (ref 1–32)
BASOPHILS # BLD AUTO: 0.03 10*3/MM3 (ref 0–0.2)
BASOPHILS NFR BLD AUTO: 0.5 % (ref 0–1.5)
BILIRUB SERPL-MCNC: 0.4 MG/DL (ref 0–1.2)
BUN SERPL-MCNC: 19 MG/DL (ref 8–23)
BUN/CREAT SERPL: 22.9 (ref 7–25)
CALCIUM SPEC-SCNC: 9.3 MG/DL (ref 8.6–10.5)
CHLORIDE SERPL-SCNC: 103 MMOL/L (ref 98–107)
CO2 SERPL-SCNC: 27 MMOL/L (ref 22–29)
CREAT SERPL-MCNC: 0.83 MG/DL (ref 0.57–1)
DEPRECATED RDW RBC AUTO: 44.3 FL (ref 37–54)
EGFRCR SERPLBLD CKD-EPI 2021: 79.8 ML/MIN/1.73
EOSINOPHIL # BLD AUTO: 0.2 10*3/MM3 (ref 0–0.4)
EOSINOPHIL NFR BLD AUTO: 3 % (ref 0.3–6.2)
ERYTHROCYTE [DISTWIDTH] IN BLOOD BY AUTOMATED COUNT: 13.4 % (ref 12.3–15.4)
GEN 5 1HR TROPONIN T REFLEX: 19 NG/L
GLOBULIN UR ELPH-MCNC: 2.8 GM/DL
GLUCOSE SERPL-MCNC: 191 MG/DL (ref 65–99)
HCT VFR BLD AUTO: 38.9 % (ref 34–46.6)
HGB BLD-MCNC: 13.2 G/DL (ref 12–15.9)
HOLD SPECIMEN: NORMAL
IMM GRANULOCYTES # BLD AUTO: 0.03 10*3/MM3 (ref 0–0.05)
IMM GRANULOCYTES NFR BLD AUTO: 0.5 % (ref 0–0.5)
LIPASE SERPL-CCNC: 26 U/L (ref 13–60)
LYMPHOCYTES # BLD AUTO: 0.96 10*3/MM3 (ref 0.7–3.1)
LYMPHOCYTES NFR BLD AUTO: 14.5 % (ref 19.6–45.3)
MCH RBC QN AUTO: 30.1 PG (ref 26.6–33)
MCHC RBC AUTO-ENTMCNC: 33.9 G/DL (ref 31.5–35.7)
MCV RBC AUTO: 88.8 FL (ref 79–97)
MONOCYTES # BLD AUTO: 0.44 10*3/MM3 (ref 0.1–0.9)
MONOCYTES NFR BLD AUTO: 6.6 % (ref 5–12)
NEUTROPHILS NFR BLD AUTO: 4.97 10*3/MM3 (ref 1.7–7)
NEUTROPHILS NFR BLD AUTO: 74.9 % (ref 42.7–76)
NRBC BLD AUTO-RTO: 0 /100 WBC (ref 0–0.2)
NT-PROBNP SERPL-MCNC: 159.4 PG/ML (ref 0–900)
PLATELET # BLD AUTO: 152 10*3/MM3 (ref 140–450)
PMV BLD AUTO: 9.6 FL (ref 6–12)
POTASSIUM SERPL-SCNC: 3.9 MMOL/L (ref 3.5–5.2)
PROT SERPL-MCNC: 7.1 G/DL (ref 6–8.5)
RBC # BLD AUTO: 4.38 10*6/MM3 (ref 3.77–5.28)
SODIUM SERPL-SCNC: 140 MMOL/L (ref 136–145)
TROPONIN T DELTA: 2 NG/L
TROPONIN T SERPL HS-MCNC: 17 NG/L
WBC NRBC COR # BLD AUTO: 6.63 10*3/MM3 (ref 3.4–10.8)
WHOLE BLOOD HOLD COAG: NORMAL
WHOLE BLOOD HOLD SPECIMEN: NORMAL

## 2024-12-04 PROCEDURE — 85025 COMPLETE CBC W/AUTO DIFF WBC: CPT | Performed by: EMERGENCY MEDICINE

## 2024-12-04 PROCEDURE — 84484 ASSAY OF TROPONIN QUANT: CPT | Performed by: EMERGENCY MEDICINE

## 2024-12-04 PROCEDURE — 71045 X-RAY EXAM CHEST 1 VIEW: CPT

## 2024-12-04 PROCEDURE — 83880 ASSAY OF NATRIURETIC PEPTIDE: CPT | Performed by: EMERGENCY MEDICINE

## 2024-12-04 PROCEDURE — 93005 ELECTROCARDIOGRAM TRACING: CPT | Performed by: EMERGENCY MEDICINE

## 2024-12-04 PROCEDURE — 99284 EMERGENCY DEPT VISIT MOD MDM: CPT

## 2024-12-04 PROCEDURE — 36415 COLL VENOUS BLD VENIPUNCTURE: CPT

## 2024-12-04 PROCEDURE — 83690 ASSAY OF LIPASE: CPT | Performed by: EMERGENCY MEDICINE

## 2024-12-04 PROCEDURE — 80053 COMPREHEN METABOLIC PANEL: CPT | Performed by: EMERGENCY MEDICINE

## 2024-12-04 RX ORDER — HYDROCODONE BITARTRATE AND ACETAMINOPHEN 5; 325 MG/1; MG/1
2 TABLET ORAL ONCE
Status: COMPLETED | OUTPATIENT
Start: 2024-12-04 | End: 2024-12-04

## 2024-12-04 RX ORDER — VALACYCLOVIR HYDROCHLORIDE 500 MG/1
1000 TABLET, FILM COATED ORAL ONCE
Status: COMPLETED | OUTPATIENT
Start: 2024-12-04 | End: 2024-12-04

## 2024-12-04 RX ORDER — VALACYCLOVIR HYDROCHLORIDE 1 G/1
1000 TABLET, FILM COATED ORAL 3 TIMES DAILY
Qty: 21 TABLET | Refills: 0 | Status: SHIPPED | OUTPATIENT
Start: 2024-12-04 | End: 2024-12-11

## 2024-12-04 RX ORDER — SODIUM CHLORIDE 0.9 % (FLUSH) 0.9 %
10 SYRINGE (ML) INJECTION AS NEEDED
Status: DISCONTINUED | OUTPATIENT
Start: 2024-12-04 | End: 2024-12-04 | Stop reason: HOSPADM

## 2024-12-04 RX ORDER — ASPIRIN 81 MG/1
324 TABLET, CHEWABLE ORAL ONCE
Status: COMPLETED | OUTPATIENT
Start: 2024-12-04 | End: 2024-12-04

## 2024-12-04 RX ADMIN — VALACYCLOVIR HYDROCHLORIDE 1000 MG: 500 TABLET, FILM COATED ORAL at 13:01

## 2024-12-04 RX ADMIN — HYDROCODONE BITARTRATE AND ACETAMINOPHEN 2 TABLET: 5; 325 TABLET ORAL at 13:01

## 2024-12-04 RX ADMIN — ASPIRIN 81 MG 243 MG: 81 TABLET ORAL at 13:01

## 2024-12-04 NOTE — Clinical Note
[facility] EMERGENCY DEPARTMENT  [address]  [facility] [address]  Phone: [phone]    Kathy Taylor was seen and treated in our emergency department on 12/4/2024.  She may return to work on 12/07/2024.         Thank you for choosing [facility].    Smitha Collado MD

## 2024-12-04 NOTE — ED PROVIDER NOTES
Subjective   History of Present Illness  64-year-old female who presents for evaluation of left axillary pain with radiation into the left chest into the mouth.  Down the medial aspect of the left arm.  She reports that she began to have symptoms of pain in the left axilla roughly 1 week ago.  She describes intermittent episodes of sharp and burning pain into the left anterior chest.  No radiation to the back.  No pain of the neck.  No history of coronary artery disease or coronary intervention.  No abdominal pain.  No nausea or vomiting.  No change in bowel or urinary function.  She denies any previous history of shingles in the past.  She does have a rash over her chest that developed within the last 3 to 4 days.  No fever or infectious symptoms.  No new medications.  No other acute complaints.      Review of Systems   Constitutional:  Positive for fatigue. Negative for chills and fever.   HENT:  Negative for congestion, ear pain, postnasal drip, sinus pressure and sore throat.    Eyes:  Negative for pain, redness and visual disturbance.   Respiratory:  Positive for shortness of breath. Negative for cough and chest tightness.    Cardiovascular:  Positive for chest pain. Negative for palpitations and leg swelling.   Gastrointestinal:  Negative for abdominal pain, anal bleeding, blood in stool, diarrhea, nausea and vomiting.   Endocrine: Negative for polydipsia and polyuria.   Genitourinary:  Negative for difficulty urinating, dysuria, frequency and urgency.   Musculoskeletal:  Negative for arthralgias, back pain and neck pain.   Skin:  Negative for pallor and rash.   Allergic/Immunologic: Negative for environmental allergies and immunocompromised state.   Neurological:  Negative for dizziness, weakness and headaches.   Hematological:  Negative for adenopathy.   Psychiatric/Behavioral:  Negative for confusion, self-injury and suicidal ideas. The patient is not nervous/anxious.    All other systems reviewed and are  negative.      Past Medical History:   Diagnosis Date    Ankle sprain     Arthritis     Arthritis of back     Arthritis of neck     Asthma ?    May have been earlier    Back ache     Bronchiectasis ?    Bronchitis     Bursitis of hip     Cervical disc disorder     Chronic bronchitis ?    COPD (chronic obstructive pulmonary disease)     Coronary artery disease involving native coronary artery of native heart with angina pectoris 07/15/2024    Emphysema of lung     Dont remember when told.    Fibromyalgia     Hip arthrosis     Hyperlipidemia LDL goal <100 02/05/2024    Knee sprain     Migraines     Neck strain     Pneumonia     Rheumatic fever     Rotator cuff syndrome     Sleep apnea, obstructive ?    Tooth infection     Type 2 diabetes mellitus     Vulvar carcinoma     Wrist sprain        Allergies   Allergen Reactions    Bee Venom Anaphylaxis    Ciprofloxacin Shortness Of Breath       Past Surgical History:   Procedure Laterality Date    BREAST BIOPSY Left     BREAST LUMPECTOMY Left 2004    CARDIAC CATHETERIZATION N/A 07/15/2024    Procedure: Left Heart Cath;  Surgeon: Lonnie Salomon IV, MD;  Location: [facility] CATH INVASIVE LOCATION;  Service: Cardiology;  Laterality: N/A;    TONSILLECTOMY      TUBAL ABDOMINAL LIGATION      VULVA BIOPSY      VULVA SURGERY      WISDOM TOOTH EXTRACTION         Family History   Problem Relation Age of Onset    Arthritis Mother     Diabetes Mother     Heart disease Mother     Thyroid disease Mother     Uterine cancer Mother     Cancer Mother     Heart disease Brother     Diabetes Brother     Breast cancer Other         MATERNAL GREAT AUNT    Cancer Maternal Grandmother     Heart attack Maternal Grandmother     Ovarian cancer Neg Hx        Social History     Socioeconomic History    Marital status:    Tobacco Use    Smoking status: Every Day     Current packs/day: 1.00     Average packs/day: 1 pack/day for 50.0 years (50.0 ttl pk-yrs)     Types: Cigarettes     Passive  exposure: Past    Smokeless tobacco: Never   Vaping Use    Vaping status: Former    Substances: Nicotine    Devices: Pre-filled or refillable cartridge   Substance and Sexual Activity    Alcohol use: Not Currently     Comment: Only 2 or 3 times a year.    Drug use: Never    Sexual activity: Not Currently     Partners: Male     Birth control/protection: Tubal ligation           Objective   Physical Exam  Vitals and nursing note reviewed.   Constitutional:       General: She is not in acute distress.     Appearance: Normal appearance. She is well-developed. She is not toxic-appearing or diaphoretic.   HENT:      Head: Normocephalic and atraumatic.      Right Ear: External ear normal.      Left Ear: External ear normal.      Nose: Nose normal.   Eyes:      General: Lids are normal.      Pupils: Pupils are equal, round, and reactive to light.   Neck:      Trachea: No tracheal deviation.   Cardiovascular:      Rate and Rhythm: Normal rate and regular rhythm.      Pulses: No decreased pulses.      Heart sounds: Normal heart sounds. No murmur heard.     No friction rub. No gallop.   Pulmonary:      Effort: Pulmonary effort is normal. No respiratory distress.      Breath sounds: Normal breath sounds. No decreased breath sounds, wheezing, rhonchi or rales.   Abdominal:      General: Bowel sounds are normal.      Palpations: Abdomen is soft.      Tenderness: There is no abdominal tenderness. There is no guarding or rebound.   Musculoskeletal:         General: No deformity. Normal range of motion.      Cervical back: Normal range of motion and neck supple.   Lymphadenopathy:      Cervical: No cervical adenopathy.   Skin:     General: Skin is warm and dry.      Findings: No rash.      Comments: Rash along the left chest wall extending into the left axilla consistent with shingles.   Neurological:      Mental Status: She is alert and oriented to person, place, and time.      Cranial Nerves: No cranial nerve deficit.       Sensory: No sensory deficit.   Psychiatric:         Speech: Speech normal.         Behavior: Behavior normal.         Thought Content: Thought content normal.         Judgment: Judgment normal.         Procedures           ED Course                                               SABAS reviewed by Smitha Collado MD       Medical Decision Making  Differential diagnosis includes shingles, chest wall pain, acute coronary syndrome, pneumonia, pneumothorax, other unspecified etiology.    Labs show normal BMP, normal kidney function electrolytes, nonconcerning troponin that remains essentially unchanged on repeat evaluation.    Normal lipase.  Normal white count and H&H.    Chest x-ray shows no acute disease.    The patient has a rash over the axillary region and the left anterior chest consistent with shingles rash.  The patient is a diabetic and has had the symptoms for almost 1 week.  Valacyclovir will be initiated but steroids will be held.    The patient will also be discharged with pain medication.    Problems Addressed:  Herpes zoster without complication: complicated acute illness or injury with systemic symptoms  Left-sided chest wall pain: complicated acute illness or injury with systemic symptoms    Amount and/or Complexity of Data Reviewed  External Data Reviewed: labs and radiology.  Labs: ordered. Decision-making details documented in ED Course.  Radiology: ordered and independent interpretation performed. Decision-making details documented in ED Course.  ECG/medicine tests: ordered and independent interpretation performed. Decision-making details documented in ED Course.     Details: EKG independently interpreted by myself shows sinus tachycardia, right bundle branch block, prolonged QTc, no prolonged QRS, no acute ischemic changes.    Risk  OTC drugs.  Prescription drug management.        Final diagnoses:   Herpes zoster without complication   Left-sided chest wall pain       ED Disposition  ED  Disposition       ED Disposition   Discharge    Condition   Stable    Comment   --               PATIENT CONNECTION - [place] [address]  [phone]  In 3 days           Medication List        New Prescriptions      valACYclovir 1000 MG tablet  Commonly known as: VALTREX  Take 1 tablet by mouth 3 (Three) Times a Day for 7 days.               Where to Get Your Medications        These medications were sent to [facility] [address] [place] [address] [phone]  - [phone] [place] [address]      Phone: [phone]   valACYclovir 1000 MG tablet            Smitha Collado MD  12/04/24 [id number]

## 2024-12-04 NOTE — DISCHARGE INSTRUCTIONS
Take acyclovir as prescribed.    Take your hydrocodone as needed for pain.    Follow-up with primary care physician for recheck in 1 week.

## 2024-12-07 LAB
QT INTERVAL: 412 MS
QT INTERVAL: 450 MS
QTC INTERVAL: 534 MS
QTC INTERVAL: 538 MS

## 2024-12-19 ENCOUNTER — TELEPHONE (OUTPATIENT)
Age: 64
End: 2024-12-19
Payer: MEDICARE

## 2024-12-19 DIAGNOSIS — J44.9 COPD MIXED TYPE: ICD-10-CM

## 2024-12-19 NOTE — TELEPHONE ENCOUNTER
Pt called stating that she is experiencing cough with yellow/green sputum, chest congestion, and pain in left lung. Pt also has an active Shingles infection on her left side and is unsure if the pain is from that. Pt wanted to know if you could send in an abx. Pt is aware you are not in office and has been advise to go to ER if she worsens before tomorrow. Please advise.

## 2024-12-20 RX ORDER — DOXYCYCLINE HYCLATE 100 MG
100 TABLET ORAL 2 TIMES DAILY
Qty: 20 TABLET | Refills: 0 | Status: SHIPPED | OUTPATIENT
Start: 2024-12-20

## 2024-12-20 RX ORDER — PREDNISONE 10 MG/1
TABLET ORAL
Qty: 31 TABLET | Refills: 0 | Status: SHIPPED | OUTPATIENT
Start: 2024-12-20

## 2024-12-27 RX ORDER — IPRATROPIUM BROMIDE AND ALBUTEROL SULFATE 2.5; .5 MG/3ML; MG/3ML
SOLUTION RESPIRATORY (INHALATION)
Qty: 180 ML | Refills: 3 | Status: SHIPPED | OUTPATIENT
Start: 2024-12-27

## 2025-01-08 ENCOUNTER — HOSPITAL ENCOUNTER (OUTPATIENT)
Dept: SLEEP MEDICINE | Facility: HOSPITAL | Age: 65
Discharge: HOME | End: 2025-01-08
Admitting: INTERNAL MEDICINE
Payer: MEDICARE

## 2025-01-08 VITALS
WEIGHT: 167 LBS | SYSTOLIC BLOOD PRESSURE: 142 MMHG | OXYGEN SATURATION: 96 % | DIASTOLIC BLOOD PRESSURE: 65 MMHG | BODY MASS INDEX: 26.84 KG/M2 | HEIGHT: 66 IN | HEART RATE: 108 BPM

## 2025-01-08 DIAGNOSIS — J44.9 COPD MIXED TYPE: ICD-10-CM

## 2025-01-08 DIAGNOSIS — Z72.0 TOBACCO ABUSE: ICD-10-CM

## 2025-01-08 DIAGNOSIS — G47.33 OBSTRUCTIVE SLEEP APNEA (ADULT) (PEDIATRIC): ICD-10-CM

## 2025-01-08 DIAGNOSIS — G47.10 HYPERSOMNIA: ICD-10-CM

## 2025-01-08 PROCEDURE — 95810 POLYSOM 6/> YRS 4/> PARAM: CPT

## 2025-01-09 RX ORDER — ROSUVASTATIN CALCIUM 20 MG/1
TABLET, COATED ORAL
Qty: 90 TABLET | Refills: 0 | Status: SHIPPED | OUTPATIENT
Start: 2025-01-09

## 2025-01-31 ENCOUNTER — OFFICE VISIT (OUTPATIENT)
Dept: ENDOCRINOLOGY | Facility: CLINIC | Age: 65
End: 2025-01-31
Payer: MEDICARE

## 2025-01-31 ENCOUNTER — TELEPHONE (OUTPATIENT)
Dept: ENDOCRINOLOGY | Facility: CLINIC | Age: 65
End: 2025-01-31

## 2025-01-31 VITALS
WEIGHT: 167 LBS | DIASTOLIC BLOOD PRESSURE: 68 MMHG | HEART RATE: 100 BPM | SYSTOLIC BLOOD PRESSURE: 134 MMHG | BODY MASS INDEX: 26.84 KG/M2 | HEIGHT: 66 IN

## 2025-01-31 DIAGNOSIS — E11.65 TYPE 2 DIABETES MELLITUS WITH HYPERGLYCEMIA, WITH LONG-TERM CURRENT USE OF INSULIN: Primary | ICD-10-CM

## 2025-01-31 DIAGNOSIS — Z79.4 TYPE 2 DIABETES MELLITUS WITH HYPERGLYCEMIA, WITH LONG-TERM CURRENT USE OF INSULIN: Primary | ICD-10-CM

## 2025-01-31 LAB
EXPIRATION DATE: ABNORMAL
EXPIRATION DATE: ABNORMAL
GLUCOSE BLDC GLUCOMTR-MCNC: 317 MG/DL (ref 70–130)
HBA1C MFR BLD: 8.7 % (ref 4.5–5.7)
Lab: ABNORMAL
Lab: ABNORMAL

## 2025-01-31 RX ORDER — SEMAGLUTIDE 0.68 MG/ML
0.5 INJECTION, SOLUTION SUBCUTANEOUS WEEKLY
Qty: 3 ML | Refills: 3 | Status: SHIPPED | OUTPATIENT
Start: 2025-01-31

## 2025-01-31 RX ORDER — METFORMIN HYDROCHLORIDE 500 MG/1
500 TABLET, EXTENDED RELEASE ORAL
Qty: 90 TABLET | Refills: 1 | Status: SHIPPED | OUTPATIENT
Start: 2025-01-31

## 2025-01-31 RX ORDER — INSULIN GLARGINE 100 [IU]/ML
10 INJECTION, SOLUTION SUBCUTANEOUS NIGHTLY
Qty: 27 ML | Refills: 1 | Status: SHIPPED | OUTPATIENT
Start: 2025-01-31

## 2025-01-31 NOTE — TELEPHONE ENCOUNTER
----- Message from Kimber Baigews sent at 1/31/2025  3:35 PM EST -----  Regarding: CGM change  Can we change CGM from freestyle to Dexcom through DME? Intolerance with use of sensor on arm

## 2025-01-31 NOTE — PROGRESS NOTES
Chief Complaint   Patient presents with    Diabetes        HPI  Kathy Taylor is a 64 y.o. female presents today for follow-up evaluation of type 2 diabetes. They were last seen for this 9/16/2024. Advised increase Ozempic 0.5 mg - advised contact office after 4 weeks if tolerating well for additional titration. Increase Lantus 12 units nightly with instructions on titration as needed. Advised Fiasp 5 units at meal with additional CF 1 unit:50 mg/dl over 150. Continue metformin 500 mg ER once daily.     Reports has not been using lantus or fiasp for several weeks since not using sensors due to pain in arm with application; inquires about alternative. Was previously only using Fiasp as needed. Forgetting to take lantus in evening.     Reports was having low appetite, constipation and prescribed laxative with relief. Associated with pain medication at time. Continues with intermittent constipation and reflux but denies changes since starting Ozempic and taking weekly. Continues metformin once daily.     Reports shingles and on prednisone for COPD 12/2024.    - BG at home: not checking currently - does not like finger sticks; was using Freestyle ashley sensor that was assisting with blood sugar control  Review data from 11/28-12/11: 90% in range, 9% high, 1% very high, 0% low; GMI 6.6%    - Admits chronic fatigue-unchanged  - Admits numbness in feet that have been chronic for years.   - Admits chronic intermittent SOB and chest pain - currently following with cardiology   - Denies vision changes.   - Denies diarrhea, abdominal pain.  - Denies increased thirst or urination.   - Denies hypoglycemia.     Past medical history: Type 2 diabetes, ASCVD, COPD, tobacco use, Hx SCC of vulva;     Type 2 Diabetes:   Diagnosed with diabetes: around age 42   Complications: ASCVD, neuropathy     Current regimen includes: Ozempic 0.5 mg, metformin  mg once daily (GI intolerance with increase dose), Lantus (not currently using),  fiasp (not currently using).   Compliance with medications is fair  - HLD: rosuvastatin 20 mg   - Aspirin: 81 mg      DM Health Maintenance:  Ophthalmology: 8/2024; denies hx diabetic eye disease   Monofilament / Foot exam: 7/22/2024  Urine microalbumin:cr: negative 7/22/2024   LDL:  24, 7/15/2024      Social Hx:  Diet: Meals: 2x/day B: nutritional shake L/D: stoffers meal (65 g carbs)   Exercise: minimal due to chronic pain; uses cane for ambulation    The following portions of the patient's history were reviewed and updated as appropriate: allergies, current medications, past family history, past medical history, past social history, past surgical history and problem list.    Past Medical History:   Diagnosis Date    Ankle sprain     Arthritis     Arthritis of back     Arthritis of neck     Asthma ?    May have been earlier    Back ache     Bronchiectasis ?    Bronchitis     Bursitis of hip     Cervical disc disorder     Chronic bronchitis ?    COPD (chronic obstructive pulmonary disease)     Coronary artery disease involving native coronary artery of native heart with angina pectoris 07/15/2024    Emphysema of lung     Dont remember when told.    Fibromyalgia     Hip arthrosis     Hyperlipidemia LDL goal <100 02/05/2024    Knee sprain     Migraines     Neck strain     Pneumonia     Rheumatic fever     Rotator cuff syndrome     Sleep apnea, obstructive ?    Tooth infection     Type 2 diabetes mellitus     Vulvar carcinoma     Wrist sprain      Past Surgical History:   Procedure Laterality Date    BREAST BIOPSY Left     BREAST LUMPECTOMY Left 2004    CARDIAC CATHETERIZATION N/A 07/15/2024    Procedure: Left Heart Cath;  Surgeon: Lonnie Salomon IV, MD;  Location: [facility] CATH INVASIVE LOCATION;  Service: Cardiology;  Laterality: N/A;    TONSILLECTOMY      TUBAL ABDOMINAL LIGATION      VULVA BIOPSY      VULVA SURGERY      WISDOM TOOTH EXTRACTION        Family History   Problem Relation Age of Onset     Arthritis Mother     Diabetes Mother     Heart disease Mother     Thyroid disease Mother     Uterine cancer Mother     Cancer Mother     Heart disease Brother     Diabetes Brother     Breast cancer Other         MATERNAL GREAT AUNT    Cancer Maternal Grandmother     Heart attack Maternal Grandmother     Ovarian cancer Neg Hx       Social History     Socioeconomic History    Marital status:    Tobacco Use    Smoking status: Every Day     Current packs/day: 1.00     Average packs/day: 1 pack/day for 50.0 years (50.0 ttl pk-yrs)     Types: Cigarettes     Passive exposure: Past    Smokeless tobacco: Never   Vaping Use    Vaping status: Former    Substances: Nicotine    Devices: Pre-filled or refillable cartridge   Substance and Sexual Activity    Alcohol use: Not Currently     Comment: Only 2 or 3 times a year.    Drug use: Never    Sexual activity: Not Currently     Partners: Male     Birth control/protection: Tubal ligation      Allergies   Allergen Reactions    Bee Venom Anaphylaxis    Ciprofloxacin Shortness Of Breath      Current Outpatient Medications on File Prior to Visit   Medication Sig Dispense Refill    Accu-Chek Softclix Lancets lancets USE 1  TO CHECK GLUCOSE TWICE DAILY TO THREE TIMES DAILY dx e11.65 on insulin 100 each 11    albuterol sulfate  (90 Base) MCG/ACT inhaler Inhale 2 puffs Every 6 (Six) Hours As Needed for Wheezing. 18 g 11    ASPIRIN 81 PO Take 1 tablet by mouth Daily.      diclofenac (VOLTAREN) 50 MG EC tablet Take 1 tablet by mouth 3 (Three) Times a Day. 21 tablet 0    dilTIAZem XR (DILACOR XR) 120 MG 24 hr capsule Take 1 capsule by mouth Daily. 30 capsule 11    Fluticasone-Umeclidin-Vilant (Trelegy Ellipta) 200-62.5-25 MCG/ACT inhaler Inhale 1 puff Daily. 1 each 11    glucose blood test strip Use as instructed 2-3 times a day 100 each 3    HYDROcodone-acetaminophen (NORCO) 7.5-325 MG per tablet Take 1 tablet by mouth Every 6 (Six) Hours As Needed for Severe Pain.      Insulin  Pen Needle (BD Pen Needle Nessa U/F) 32G X 4 MM misc Use as directed 5 times daily 150 each 3    ipratropium (ATROVENT) 0.03 % nasal spray 2 sprays into the nostril(s) as directed by provider Every 12 (Twelve) Hours. 1 each 11    ipratropium-albuterol (DUO-NEB) 0.5-2.5 mg/3 ml nebulizer USE 3 ML VIA NEBULIZER FOUR TIMES DAILY AS NEEDED FOR WHEEZING 180 mL 3    isosorbide mononitrate (IMDUR) 30 MG 24 hr tablet Take 1 tablet by mouth Every Morning. 90 tablet 3    meloxicam (Mobic) 15 MG tablet Take 1 tablet every day by oral route with meals for 30 days.      nitroglycerin (NITROSTAT) 0.3 MG SL tablet 1 under the tongue as needed for angina, may repeat q5mins for up three doses 25 tablet 11    polyethylene glycol (MIRALAX) 17 g packet Take 17 g by mouth Daily. 100 each 0    rosuvastatin (CRESTOR) 20 MG tablet Take 1 tablet by mouth once daily 90 tablet 0    sucralfate (CARAFATE) 1 g tablet Take 1 tablet by mouth 3 (Three) Times a Day. Thoroughly crush pill and mix in small amount of water prior to swallowing. 60 tablet 0    [DISCONTINUED] albuterol (PROVENTIL) (2.5 MG/3ML) 0.083% nebulizer solution Take 2.5 mg by nebulization 4 (Four) Times a Day As Needed for Wheezing. 120 each 11    [DISCONTINUED] doxycycline (VIBRAMYICN) 100 MG tablet Take 1 tablet by mouth 2 (Two) Times a Day. 20 tablet 0    [DISCONTINUED] Insulin aspart (FIASP FLEXTOUCH) 100 UNIT/ML solution pen-injector injection pen For use at mealtimes and per correctional scale, MDD 80 units 24 mL 5    [DISCONTINUED] Insulin Aspart, w/Niacinamide, (Fiasp FlexTouch) 100 UNIT/ML solution pen-injector Inject 5 Units under the skin into the appropriate area as directed 3 (Three) Times a Day With Meals. Add additional insulin 1 unit for every 50 mg/dl over 150. Max daily dose: 30 units 27 mL 1    [DISCONTINUED] Lantus SoloStar 100 UNIT/ML injection pen Inject 12 Units under the skin into the appropriate area as directed Every Night. Titrate 2 units every 3 days if  "blood sugar is more than 140 fasting; Max daily dose: 30 units 27 mL 1    [DISCONTINUED] metFORMIN ER (GLUCOPHAGE-XR) 500 MG 24 hr tablet Take 1 tablet by mouth Daily With Breakfast. 90 tablet 1    [DISCONTINUED] metoclopramide (REGLAN) 5 MG tablet Take 1 tablet by mouth 3 (Three) Times a Day As Needed (nausea). 10 tablet 0    [DISCONTINUED] predniSONE (DELTASONE) 10 MG tablet Take 4 tabs daily x 3 days, then take 3 tabs daily x 3 days, then take 2 tabs daily x 3 days, then take 1 tab daily x 3 days 31 tablet 0    [DISCONTINUED] Semaglutide,0.25 or 0.5MG/DOS, (Ozempic, 0.25 or 0.5 MG/DOSE,) 2 MG/3ML solution pen-injector Inject 0.5 mg under the skin into the appropriate area as directed 1 (One) Time Per Week. Contact office after 4 weeks for additional adjustment if tolerating well. 3 mL 3     No current facility-administered medications on file prior to visit.        Review of Systems   Constitutional:  Negative for activity change, appetite change, unexpected weight gain and unexpected weight loss.   Eyes:  Negative for visual disturbance.   Respiratory:  Negative for shortness of breath.    Cardiovascular:  Negative for chest pain.   Gastrointestinal:  Positive for constipation and GERD. Negative for abdominal pain and diarrhea.   Endocrine: Negative for polydipsia and polyuria.   Musculoskeletal:  Positive for arthralgias.   Skin:  Negative for rash and skin lesions.   Neurological:  Negative for dizziness and numbness.        /68   Pulse 100   Ht 167.6 cm (65.98\")   Wt 75.8 kg (167 lb)   LMP  (LMP Unknown)   BMI 26.97 kg/m²      Physical Exam  Constitutional:       Appearance: Normal appearance.      Comments: Uses cane for ambulation   Cardiovascular:      Rate and Rhythm: Normal rate.   Pulmonary:      Effort: Pulmonary effort is normal.   Musculoskeletal:         General: Normal range of motion.   Skin:     General: Skin is warm and dry.   Neurological:      General: No focal deficit present.      " "Mental Status: She is alert and oriented to person, place, and time.   Psychiatric:         Mood and Affect: Mood normal.         Behavior: Behavior normal.         LABS AND IMAGING  CMP:  Lab Results   Component Value Date    Glucose 317 (A) 01/31/2025    Glucose, UA Negative 10/19/2024    BUN 19 12/04/2024    BUN/Creatinine Ratio 22.9 12/04/2024    Creatinine 0.83 12/04/2024    Creatinine, Urine 106.8 07/22/2024    eGFR 79.8 12/04/2024    eGFR Non African Amer 63 01/10/2022    Ketones, UA Trace (A) 10/19/2024    CO2 27.0 12/04/2024    Calcium 9.3 12/04/2024    Albumin 4.3 12/04/2024    AST (SGOT) 26 12/04/2024    ALT (SGPT) 21 12/04/2024     Lipid Panel:  Lab Results   Component Value Date    CHOL 97 07/15/2024    TRIG 159 (H) 07/15/2024    HDL 47 07/15/2024    VLDL 26 07/15/2024    LDL 24 07/15/2024     HbA1c:  Hemoglobin A1C   Date Value Ref Range Status   01/31/2025 8.7 (A) 4.5 - 5.7 % Final     Glucose:  Lab Results   Component Value Date    POCGLU 317 (A) 01/31/2025     Microalbumin:  Lab Results   Component Value Date    MALBCRERATIO  07/22/2024      Comment:      Unable to calculate     TSH:  No results found for: \"TSH\"    Assessment and Plan    Diagnoses and all orders for this visit:    1. Type 2 diabetes mellitus with hyperglycemia, with long-term current use of insulin (Primary)  Assessment & Plan:  Diabetes is not controlled  A1C 8.7%;  in context not using insulin  Not checking BG at home; review CGM data from 11/28-12/11 with stable BG in target range mostly without hypoglycemia.     Will attempt alternative Dexcom through DME given issues with intolerance with placement on left arm.     Discussed need for insulin use; agreeable to restarting. Advised fingerstick once daily with insulin use.   Rx:Restart lantus 10 units - advised once daily in morning if forgetting evening use. Continue Ozempic 0.5 mg weekly. Continue metformin 500 mg ER once daily.   Cautioned hypoglycemia with insulin use; " encouraged glucose supplement/glucagon as needed.   Cautioned with GLP to stop medication if concerns for worsening appetite loss, weight loss >2 lbs per weeks, nausea, heartburn, reflux, vomiting, abdominal pain, constipation, loose stools, vision changes, mood changes, or additional concerns.       Foot exam 7/2024; due 7/2025 -mild neuropathy noted  Microalbumin up to date, due 7/2025   Ophthalmology up to date, due 8/2025.  LDL at goal, continue rosuvastatin  BP close to goal.     Orders:  -     POC Glucose, Blood  -     POC Glycosylated Hemoglobin (Hb A1C)  -     Lantus SoloStar 100 UNIT/ML injection pen; Inject 10 Units under the skin into the appropriate area as directed Every Night. Titrate 2 units every 3 days if blood sugar is more than 140 fasting; Max daily dose: 30 units  Dispense: 27 mL; Refill: 1  -     Semaglutide,0.25 or 0.5MG/DOS, (Ozempic, 0.25 or 0.5 MG/DOSE,) 2 MG/3ML solution pen-injector; Inject 0.5 mg under the skin into the appropriate area as directed 1 (One) Time Per Week. Contact office after 4 weeks for additional adjustment if tolerating well.  Dispense: 3 mL; Refill: 3  -     metFORMIN ER (GLUCOPHAGE-XR) 500 MG 24 hr tablet; Take 1 tablet by mouth Daily With Breakfast.  Dispense: 90 tablet; Refill: 1  -     Glucagon 1 MG/0.2ML solution auto-injector; Inject 1 mg under the skin into the appropriate area as directed As Needed (severe hypoglycemia).  Dispense: 1 mL; Refill: 0         Return in about 3 months (around 4/30/2025) for follow-up diabetes. The patient was instructed to contact the clinic with any interval questions or concerns.    Electronically signed by: Kimber Rasmussen PA-C   Endocrinology     Please note that portions of this note were completed with a voice recognition program.

## 2025-01-31 NOTE — ASSESSMENT & PLAN NOTE
Diabetes is not controlled  A1C 8.7%;  in context not using insulin  Not checking BG at home; review CGM data from 11/28-12/11 with stable BG in target range mostly without hypoglycemia.     Will attempt alternative Dexcom through DME given issues with intolerance with placement on left arm.     Discussed need for insulin use; agreeable to restarting. Advised fingerstick once daily with insulin use.   Rx:Restart lantus 10 units - advised once daily in morning if forgetting evening use. Continue Ozempic 0.5 mg weekly. Continue metformin 500 mg ER once daily.   Cautioned hypoglycemia with insulin use; encouraged glucose supplement/glucagon as needed.   Cautioned with GLP to stop medication if concerns for worsening appetite loss, weight loss >2 lbs per weeks, nausea, heartburn, reflux, vomiting, abdominal pain, constipation, loose stools, vision changes, mood changes, or additional concerns.       Foot exam 7/2024; due 7/2025 -mild neuropathy noted  Microalbumin up to date, due 7/2025   Ophthalmology up to date, due 8/2025.  LDL at goal, continue rosuvastatin  BP close to goal.

## 2025-01-31 NOTE — PATIENT INSTRUCTIONS
Diabetes Treatment Recommendations  Patient     Kathy Taylor     Date:        01/31/25     ADA General Goals: A1c: < 7%                                                                                   Your A1C is   Lab Results   Component Value Date    HGBA1C 8.7 (A) 01/31/2025    HGBA1C 9.70 (H) 07/15/2024    HGBA1C 9.7 03/02/2023     Fasting/before meal glucose: <150 mg/dL                                    2 Hour after meal glucoses: < 180 mg/dL                                        Bedtime glucose:120-180                                                                Glucose testing frequency: daily with insulin use    Medication Changes:  - Continue Ozempic 0.5 mg weekly  - Continue metformin 500 mg ER: 1 tablet once daily     Insulin dosing:  Basal insulin  Lantus  10 Units nightly  Increase by 2 units every 3 days if fasting blood sugar is more than 140.   Keep at current dose if fasting blood sugar is between .  Decrease by 2 units if fasting blood sugar is less than 80 or concerns for hypoglycemia overnight or between meals.    Keep records of your glucose levels and insulin adjustments. We may ask you to keep records on the content of your meals with insulin doses and before/after meal glucose levels to evaluate your ratios.  Call for advice if you have unexplained or unexpected hypoglycemia  (glucose < 60) or persistent high glucose > 300.  Office: [phone]      Kimber Rasmussen PA-C

## 2025-02-03 NOTE — PROGRESS NOTES
[facility] Cardiology  Follow Up Visit  Kathy Taylor  1960    VISIT DATE:  02/04/25    PCP:   Provider, No Known  [facility] [id number]          CC:  Coronary Artery Disease (Coronary artery disease involving native coronary artery of native heart with angina pectoris), Chest Pain, Shortness of Breath, and Dizziness      Problem List:  CAD  Echocardiogram January 2022: LVEF 55%, no significant valvular abnormalities, no pericardial effusion  Stress test February 2022: Normal myocardial perfusion study with no evidence of ischemia, EF 57%  Calcium score July 2024: 813  Cardiac catheterization July 2024: 40% LAD stenosis, 40% circumflex stenosis, RPDA 70% stenosis with SWAPNIL-3 flow.  Medical management recommended  Echo July 2024: EF 61 to 65% with normal valves  Hypotension, intolerant to HCTZ with symptomatic hypotension  Hyperlipidemia  Type 2 diabetes mellitus, onset 2012; hemoglobin A1c 9.7% March 2023  Stage III severe COPD with current tobacco use 1 pack/day, 50-pack-year history, PFTs show moderate to severe airway obstruction, no significant change in FEV1 after bronchodilator, no restriction but air trapping and reduced diffusion capacity March 2024  History of SABRINA with upcoming sleep study  Fibromyalgia  Migraines  [facility] admission January 2021 for COVID  Family history of CAD  History of rheumatic fever as a child  Surgical history:  Breast lumpectomy  Tubal ligation  Vulvar surgery  [place] teeth extraction      History of Present Illness:  Kathy Taylor  Is a 64 y.o. female with pertinent cardiac history detailed above.  Patient had an ER visit for chest pain in December.  No acute EKG changes that day.  Stable low-level troponin elevation.  She states 5 chest pain that can sometimes last just a few minutes.  Does not appear consistently provoked by exertion.  Heart catheterization July 2024 with moderate disease and medical management recommended.  She is on appropriate  therapy including aspirin statin diltiazem and Imdur.  Other cardiac risk factors include ongoing tobacco abuse and diabetes.  She recently reestablished with endocrinology.  Has occasional pain in the calves that could be representative of claudication      Patient Active Problem List    Diagnosis Date Noted    Non-adherence to medical treatment 07/22/2024    Coronary artery disease involving native coronary artery of native heart with angina pectoris 07/15/2024     Note Last Updated: 7/15/2024     Apparently normal echo and stress test per patient report 2020  Echo (1/2022): LVEF 55%, no significant valvular abnormalities, no pericardial effusion  Stress test (2/2022): Normal myocardial perfusion study with no evidence of ischemia, EF 57%  , 2024  Cardiac catheterization (7/15/2024): Moderate 1-vessel CAD (RPDA).  Normal LV filling pressure (LVEDP 8 mmHg).  Medical therapy recommended      History of rheumatic fever as a child 02/05/2024    Hyperlipidemia LDL goal <70 02/05/2024    Stage III (Severe) COPD 02/07/2023    Tobacco abuse 02/07/2023    COVID-19 virus infection 01/07/2022    Type 2 diabetes mellitus with hyperglycemia, with long-term current use of insulin 01/07/2022    Obstructive sleep apnea 02/24/2021    Nocturnal hypoxemia 02/24/2021    Polypharmacy 02/24/2021       Allergies   Allergen Reactions    Bee Venom Anaphylaxis    Ciprofloxacin Shortness Of Breath       Social History     Socioeconomic History    Marital status:    Tobacco Use    Smoking status: Every Day     Current packs/day: 1.00     Average packs/day: 1 pack/day for 50.0 years (50.0 ttl pk-yrs)     Types: Cigarettes     Passive exposure: Past    Smokeless tobacco: Never   Vaping Use    Vaping status: Former    Substances: Nicotine    Devices: Pre-filled or refillable cartridge   Substance and Sexual Activity    Alcohol use: Not Currently    Drug use: Never    Sexual activity: Not Currently     Partners: Male     Birth  control/protection: Tubal ligation       Family History   Problem Relation Age of Onset    Arthritis Mother     Diabetes Mother     Heart disease Mother     Thyroid disease Mother     Uterine cancer Mother     Cancer Mother     Heart attack Mother     Heart disease Brother     Diabetes Brother     Breast cancer Other         MATERNAL GREAT AUNT    Cancer Maternal Grandmother     Heart attack Maternal Grandmother     Heart attack Brother     Ovarian cancer Neg Hx        Current Medications:    Current Outpatient Medications:     Accu-Chek Softclix Lancets lancets, USE 1  TO CHECK GLUCOSE TWICE DAILY TO THREE TIMES DAILY dx e11.65 on insulin, Disp: 100 each, Rfl: 11    albuterol sulfate  (90 Base) MCG/ACT inhaler, Inhale 2 puffs Every 6 (Six) Hours As Needed for Wheezing., Disp: 18 g, Rfl: 11    aspirin (ASPIR) 81 MG EC tablet, Take 1 tablet by mouth Daily., Disp: 90 tablet, Rfl: 3    diclofenac (VOLTAREN) 50 MG EC tablet, Take 1 tablet by mouth 3 (Three) Times a Day., Disp: 21 tablet, Rfl: 0    dilTIAZem XR (DILACOR XR) 120 MG 24 hr capsule, Take 1 capsule by mouth Daily., Disp: 90 capsule, Rfl: 3    Fluticasone-Umeclidin-Vilant (Trelegy Ellipta) 200-62.5-25 MCG/ACT inhaler, Inhale 1 puff Daily., Disp: 1 each, Rfl: 11    Glucagon 1 MG/0.2ML solution auto-injector, Inject 1 mg under the skin into the appropriate area as directed As Needed (severe hypoglycemia)., Disp: 1 mL, Rfl: 0    glucose blood test strip, Use as instructed 2-3 times a day, Disp: 100 each, Rfl: 3    HYDROcodone-acetaminophen (NORCO) 7.5-325 MG per tablet, Take 1 tablet by mouth Every 6 (Six) Hours As Needed for Severe Pain., Disp: , Rfl:     Insulin Pen Needle (BD Pen Needle Nessa U/F) 32G X 4 MM misc, Use as directed 5 times daily, Disp: 150 each, Rfl: 3    ipratropium (ATROVENT) 0.03 % nasal spray, 2 sprays into the nostril(s) as directed by provider Every 12 (Twelve) Hours., Disp: 1 each, Rfl: 11    ipratropium-albuterol (DUO-NEB) 0.5-2.5  mg/3 ml nebulizer, USE 3 ML VIA NEBULIZER FOUR TIMES DAILY AS NEEDED FOR WHEEZING, Disp: 180 mL, Rfl: 3    isosorbide mononitrate (IMDUR) 30 MG 24 hr tablet, Take 1 tablet by mouth Every Morning., Disp: 90 tablet, Rfl: 3    Lantus SoloStar 100 UNIT/ML injection pen, Inject 10 Units under the skin into the appropriate area as directed Every Night. Titrate 2 units every 3 days if blood sugar is more than 140 fasting; Max daily dose: 30 units, Disp: 27 mL, Rfl: 1    meloxicam (Mobic) 15 MG tablet, Take 1 tablet every day by oral route with meals for 30 days., Disp: , Rfl:     metFORMIN ER (GLUCOPHAGE-XR) 500 MG 24 hr tablet, Take 1 tablet by mouth Daily With Breakfast., Disp: 90 tablet, Rfl: 1    nitroglycerin (NITROSTAT) 0.3 MG SL tablet, 1 under the tongue as needed for angina, may repeat q5mins for up three doses, Disp: 25 tablet, Rfl: 11    polyethylene glycol (MIRALAX) 17 g packet, Take 17 g by mouth Daily., Disp: 100 each, Rfl: 0    rosuvastatin (CRESTOR) 20 MG tablet, Take 1 tablet by mouth Daily., Disp: 90 tablet, Rfl: 3    Semaglutide,0.25 or 0.5MG/DOS, (Ozempic, 0.25 or 0.5 MG/DOSE,) 2 MG/3ML solution pen-injector, Inject 0.5 mg under the skin into the appropriate area as directed 1 (One) Time Per Week. Contact office after 4 weeks for additional adjustment if tolerating well., Disp: 3 mL, Rfl: 3    tiZANidine (ZANAFLEX) 4 MG tablet, Take 1 tablet by mouth 3 (Three) Times a Day As Needed., Disp: , Rfl:     nicotine (NICOTROL) 10 MG inhaler, Inhale 1 puff As Needed for Smoking Cessation., Disp: 1 each, Rfl: 6    sucralfate (CARAFATE) 1 g tablet, Take 1 tablet by mouth 3 (Three) Times a Day. Thoroughly crush pill and mix in small amount of water prior to swallowing. (Patient not taking: Reported on 2/4/2025), Disp: 60 tablet, Rfl: 0     Review of Systems   Cardiovascular:  Positive for chest pain (Short in nature), claudication and dyspnea on exertion.       Vitals:    02/04/25 0927   BP: 108/50   BP Location:  "Right arm   Patient Position: Sitting   Pulse: 102   SpO2: 97%   Weight: 77.8 kg (171 lb 9.6 oz)   Height: 167.6 cm (66\")       Physical Exam  Constitutional:       Appearance: Normal appearance.   Neck:      Vascular: No carotid bruit.   Cardiovascular:      Rate and Rhythm: Normal rate and regular rhythm.      Pulses: Normal pulses.   Pulmonary:      Effort: Pulmonary effort is normal.      Breath sounds: Wheezing present.   Musculoskeletal:      Right lower leg: No edema.      Left lower leg: No edema.   Neurological:      Mental Status: She is alert.         Diagnostic Data:  Procedures  Lab Results   Component Value Date    CHLPL 116 03/02/2023    TRIG 159 (H) 07/15/2024    HDL 47 07/15/2024     Lab Results   Component Value Date    GLUCOSE 191 (H) 12/04/2024    BUN 19 12/04/2024    CREATININE 0.83 12/04/2024     12/04/2024    K 3.9 12/04/2024     12/04/2024    CO2 27.0 12/04/2024     Lab Results   Component Value Date    HGBA1C 8.7 (A) 01/31/2025     Lab Results   Component Value Date    WBC 6.63 12/04/2024    HGB 13.2 12/04/2024    HCT 38.9 12/04/2024     12/04/2024       Assessment:   Diagnosis Plan   1. PVD (peripheral vascular disease) with claudication  Doppler Arterial Multi Level Lower Extremity - Bilateral CAR          Plan:    CAD  Continue aspirin, Imdur, statin, diltiazem  Cardiac catheterization July 2024 with moderate one-vessel CAD in the RPDA.  Normal EF  Is not having consistent anginal symptoms at this time  2.  Severe COPD  1ppd smoker counseled  on cessation  Will add nicotine inhaler today to help with cessation  3.  Diabetes/HLD              -Total cholesterol 97, HDL 47, LDL 24, triglycerides 159              -Continue rosuvastatin 20 mg              - insulin, metformin, Ozempic              -A1c 8.7   -follows with endocrine          4.   Hypertension              -BP  controlled  5.  Possible claudication   Check MACK to screen for PAD    ACP discussion was held with " the patient during this visit. Patient does not have an advance directive, declines further assistance.    Jono Estrella MD FACC

## 2025-02-04 ENCOUNTER — OFFICE VISIT (OUTPATIENT)
Dept: CARDIOLOGY | Facility: CLINIC | Age: 65
End: 2025-02-04
Payer: MEDICARE

## 2025-02-04 VITALS
WEIGHT: 171.6 LBS | DIASTOLIC BLOOD PRESSURE: 50 MMHG | HEART RATE: 102 BPM | BODY MASS INDEX: 27.58 KG/M2 | OXYGEN SATURATION: 97 % | HEIGHT: 66 IN | SYSTOLIC BLOOD PRESSURE: 108 MMHG

## 2025-02-04 DIAGNOSIS — I73.9 PVD (PERIPHERAL VASCULAR DISEASE) WITH CLAUDICATION: Primary | ICD-10-CM

## 2025-02-04 DIAGNOSIS — Z79.4 TYPE 2 DIABETES MELLITUS WITH HYPERGLYCEMIA, WITH LONG-TERM CURRENT USE OF INSULIN: ICD-10-CM

## 2025-02-04 DIAGNOSIS — E11.65 TYPE 2 DIABETES MELLITUS WITH HYPERGLYCEMIA, WITH LONG-TERM CURRENT USE OF INSULIN: ICD-10-CM

## 2025-02-04 DIAGNOSIS — E78.5 HYPERLIPIDEMIA LDL GOAL <70: ICD-10-CM

## 2025-02-04 DIAGNOSIS — I25.119 CORONARY ARTERY DISEASE INVOLVING NATIVE CORONARY ARTERY OF NATIVE HEART WITH ANGINA PECTORIS: ICD-10-CM

## 2025-02-04 RX ORDER — ROSUVASTATIN CALCIUM 20 MG/1
20 TABLET, COATED ORAL DAILY
Qty: 90 TABLET | Refills: 3 | Status: SHIPPED | OUTPATIENT
Start: 2025-02-04

## 2025-02-04 RX ORDER — ASPIRIN 81 MG/1
81 TABLET ORAL DAILY
Qty: 90 TABLET | Refills: 3 | Status: SHIPPED | OUTPATIENT
Start: 2025-02-04

## 2025-02-04 RX ORDER — DILTIAZEM HYDROCHLORIDE 120 MG/1
120 CAPSULE, EXTENDED RELEASE ORAL DAILY
Qty: 90 CAPSULE | Refills: 3 | Status: SHIPPED | OUTPATIENT
Start: 2025-02-04

## 2025-02-04 RX ORDER — ISOSORBIDE MONONITRATE 30 MG/1
30 TABLET, EXTENDED RELEASE ORAL EVERY MORNING
Qty: 90 TABLET | Refills: 3 | Status: SHIPPED | OUTPATIENT
Start: 2025-02-04

## 2025-02-11 ENCOUNTER — DOCUMENTATION (OUTPATIENT)
Dept: PULMONOLOGY | Facility: CLINIC | Age: 65
End: 2025-02-11
Payer: MEDICARE

## 2025-02-11 NOTE — PROGRESS NOTES
The patient underwent a sleep study in January.  I finally was able to get a hold of her by phone.  No significant sleep apnea and only minimal desaturation for about 6 minutes below 89%.    She has scheduled for her screening CT scan of the chest this week and have encouraged her to keep a follow-up appointment to review her CT scan results and the sleep study

## 2025-02-12 ENCOUNTER — HOSPITAL ENCOUNTER (OUTPATIENT)
Dept: CT IMAGING | Facility: HOSPITAL | Age: 65
Discharge: HOME | End: 2025-02-12
Admitting: INTERNAL MEDICINE
Payer: MEDICARE

## 2025-02-12 DIAGNOSIS — F17.210 CIGARETTE SMOKER: ICD-10-CM

## 2025-02-12 PROCEDURE — 71271 CT THORAX LUNG CANCER SCR C-: CPT

## 2025-02-13 ENCOUNTER — TELEPHONE (OUTPATIENT)
Dept: PULMONOLOGY | Facility: CLINIC | Age: 65
End: 2025-02-13
Payer: MEDICARE

## 2025-02-13 NOTE — TELEPHONE ENCOUNTER
Patient called requesting abx. Pt has c/o cough getting worse, chest tightness, chills, SOB for over a week. She has only been taking OTC cough syrup with no benefit. She denies any fever or body aches. Please advise.

## 2025-02-17 ENCOUNTER — DOCUMENTATION (OUTPATIENT)
Dept: PULMONOLOGY | Facility: CLINIC | Age: 65
End: 2025-02-17
Payer: MEDICARE

## 2025-02-17 RX ORDER — DOXYCYCLINE 100 MG/1
100 CAPSULE ORAL 2 TIMES DAILY
Qty: 20 CAPSULE | Refills: 0 | OUTPATIENT
Start: 2025-02-17

## 2025-02-17 NOTE — PROGRESS NOTES
The patient underwent a low-dose CT scan of the chest which revealed no suspicious intrathoracic findings    I communicated these results to the patient over the phone.  As per my prior phone call to her I have encouraged follow-up as discussed

## 2025-02-19 ENCOUNTER — HOSPITAL ENCOUNTER (INPATIENT)
Facility: HOSPITAL | Age: 65
LOS: 4 days | Discharge: HOME | DRG: 193 | End: 2025-02-23
Attending: EMERGENCY MEDICINE | Admitting: INTERNAL MEDICINE
Payer: MEDICARE

## 2025-02-19 ENCOUNTER — APPOINTMENT (OUTPATIENT)
Dept: CT IMAGING | Facility: HOSPITAL | Age: 65
DRG: 193 | End: 2025-02-19
Payer: MEDICARE

## 2025-02-19 ENCOUNTER — APPOINTMENT (OUTPATIENT)
Dept: GENERAL RADIOLOGY | Facility: HOSPITAL | Age: 65
DRG: 193 | End: 2025-02-19
Payer: MEDICARE

## 2025-02-19 DIAGNOSIS — A41.9 ACUTE SEPSIS: Primary | ICD-10-CM

## 2025-02-19 DIAGNOSIS — J44.1 ACUTE EXACERBATION OF CHRONIC OBSTRUCTIVE PULMONARY DISEASE (COPD): ICD-10-CM

## 2025-02-19 DIAGNOSIS — J18.9 MULTIFOCAL PNEUMONIA: ICD-10-CM

## 2025-02-19 DIAGNOSIS — R09.02 HYPOXIA: ICD-10-CM

## 2025-02-19 DIAGNOSIS — F17.200 SMOKER: ICD-10-CM

## 2025-02-19 DIAGNOSIS — Z79.4 TYPE 2 DIABETES MELLITUS WITH HYPERGLYCEMIA, WITH LONG-TERM CURRENT USE OF INSULIN: ICD-10-CM

## 2025-02-19 DIAGNOSIS — E11.65 TYPE 2 DIABETES MELLITUS WITH HYPERGLYCEMIA, WITH LONG-TERM CURRENT USE OF INSULIN: ICD-10-CM

## 2025-02-19 DIAGNOSIS — Z72.0 TOBACCO ABUSE: ICD-10-CM

## 2025-02-19 DIAGNOSIS — I25.119 CORONARY ARTERY DISEASE INVOLVING NATIVE CORONARY ARTERY OF NATIVE HEART WITH ANGINA PECTORIS: ICD-10-CM

## 2025-02-19 LAB
ALBUMIN SERPL-MCNC: 4.1 G/DL (ref 3.5–5.2)
ALBUMIN/GLOB SERPL: 1.2 G/DL
ALP SERPL-CCNC: 90 U/L (ref 39–117)
ALT SERPL W P-5'-P-CCNC: 15 U/L (ref 1–33)
ANION GAP SERPL CALCULATED.3IONS-SCNC: 15 MMOL/L (ref 5–15)
AST SERPL-CCNC: 18 U/L (ref 1–32)
BASOPHILS # BLD AUTO: 0.07 10*3/MM3 (ref 0–0.2)
BASOPHILS NFR BLD AUTO: 0.3 % (ref 0–1.5)
BILIRUB SERPL-MCNC: 0.6 MG/DL (ref 0–1.2)
BUN SERPL-MCNC: 22 MG/DL (ref 8–23)
BUN/CREAT SERPL: 22.4 (ref 7–25)
CALCIUM SPEC-SCNC: 9.6 MG/DL (ref 8.6–10.5)
CHLORIDE SERPL-SCNC: 96 MMOL/L (ref 98–107)
CO2 SERPL-SCNC: 22 MMOL/L (ref 22–29)
CREAT SERPL-MCNC: 0.98 MG/DL (ref 0.57–1)
D DIMER PPP FEU-MCNC: 0.54 MCGFEU/ML (ref 0–0.62)
DEPRECATED RDW RBC AUTO: 40.6 FL (ref 37–54)
EGFRCR SERPLBLD CKD-EPI 2021: 65.4 ML/MIN/1.73
EOSINOPHIL # BLD AUTO: 0.15 10*3/MM3 (ref 0–0.4)
EOSINOPHIL NFR BLD AUTO: 0.6 % (ref 0.3–6.2)
ERYTHROCYTE [DISTWIDTH] IN BLOOD BY AUTOMATED COUNT: 12.8 % (ref 12.3–15.4)
FLUAV SUBTYP SPEC NAA+PROBE: NOT DETECTED
FLUBV RNA ISLT QL NAA+PROBE: NOT DETECTED
GEN 5 1HR TROPONIN T REFLEX: 34 NG/L
GLOBULIN UR ELPH-MCNC: 3.3 GM/DL
GLUCOSE BLDC GLUCOMTR-MCNC: 338 MG/DL (ref 70–130)
GLUCOSE SERPL-MCNC: 295 MG/DL (ref 65–99)
HCT VFR BLD AUTO: 40.3 % (ref 34–46.6)
HGB BLD-MCNC: 14.4 G/DL (ref 12–15.9)
IMM GRANULOCYTES # BLD AUTO: 0.2 10*3/MM3 (ref 0–0.05)
IMM GRANULOCYTES NFR BLD AUTO: 0.7 % (ref 0–0.5)
LYMPHOCYTES # BLD AUTO: 1.64 10*3/MM3 (ref 0.7–3.1)
LYMPHOCYTES NFR BLD AUTO: 6.1 % (ref 19.6–45.3)
MCH RBC QN AUTO: 31.3 PG (ref 26.6–33)
MCHC RBC AUTO-ENTMCNC: 35.7 G/DL (ref 31.5–35.7)
MCV RBC AUTO: 87.6 FL (ref 79–97)
MONOCYTES # BLD AUTO: 1.33 10*3/MM3 (ref 0.1–0.9)
MONOCYTES NFR BLD AUTO: 4.9 % (ref 5–12)
NEUTROPHILS NFR BLD AUTO: 23.55 10*3/MM3 (ref 1.7–7)
NEUTROPHILS NFR BLD AUTO: 87.4 % (ref 42.7–76)
NRBC BLD AUTO-RTO: 0 /100 WBC (ref 0–0.2)
NT-PROBNP SERPL-MCNC: 326.4 PG/ML (ref 0–900)
PLATELET # BLD AUTO: 244 10*3/MM3 (ref 140–450)
PMV BLD AUTO: 9.8 FL (ref 6–12)
POTASSIUM SERPL-SCNC: 4.7 MMOL/L (ref 3.5–5.2)
PROT SERPL-MCNC: 7.4 G/DL (ref 6–8.5)
RBC # BLD AUTO: 4.6 10*6/MM3 (ref 3.77–5.28)
SARS-COV-2 RNA RESP QL NAA+PROBE: NOT DETECTED
SODIUM SERPL-SCNC: 133 MMOL/L (ref 136–145)
TROPONIN T % DELTA: -17
TROPONIN T NUMERIC DELTA: -7 NG/L
TROPONIN T SERPL HS-MCNC: 41 NG/L
WBC NRBC COR # BLD AUTO: 26.94 10*3/MM3 (ref 3.4–10.8)

## 2025-02-19 PROCEDURE — 25810000003 SODIUM CHLORIDE 0.9 % SOLUTION: Performed by: EMERGENCY MEDICINE

## 2025-02-19 PROCEDURE — 25010000002 METHYLPREDNISOLONE PER 125 MG: Performed by: EMERGENCY MEDICINE

## 2025-02-19 PROCEDURE — 85379 FIBRIN DEGRADATION QUANT: CPT | Performed by: EMERGENCY MEDICINE

## 2025-02-19 PROCEDURE — 94799 UNLISTED PULMONARY SVC/PX: CPT

## 2025-02-19 PROCEDURE — 71045 X-RAY EXAM CHEST 1 VIEW: CPT

## 2025-02-19 PROCEDURE — 63710000001 INSULIN GLARGINE PER 5 UNITS: Performed by: HOSPITALIST

## 2025-02-19 PROCEDURE — 99285 EMERGENCY DEPT VISIT HI MDM: CPT

## 2025-02-19 PROCEDURE — 85025 COMPLETE CBC W/AUTO DIFF WBC: CPT | Performed by: EMERGENCY MEDICINE

## 2025-02-19 PROCEDURE — 82948 REAGENT STRIP/BLOOD GLUCOSE: CPT

## 2025-02-19 PROCEDURE — 99223 1ST HOSP IP/OBS HIGH 75: CPT | Performed by: HOSPITALIST

## 2025-02-19 PROCEDURE — 36415 COLL VENOUS BLD VENIPUNCTURE: CPT

## 2025-02-19 PROCEDURE — 80053 COMPREHEN METABOLIC PANEL: CPT | Performed by: EMERGENCY MEDICINE

## 2025-02-19 PROCEDURE — 71250 CT THORAX DX C-: CPT

## 2025-02-19 PROCEDURE — 87636 SARSCOV2 & INF A&B AMP PRB: CPT | Performed by: EMERGENCY MEDICINE

## 2025-02-19 PROCEDURE — 94640 AIRWAY INHALATION TREATMENT: CPT

## 2025-02-19 PROCEDURE — 84484 ASSAY OF TROPONIN QUANT: CPT | Performed by: EMERGENCY MEDICINE

## 2025-02-19 PROCEDURE — 25010000002 CEFTRIAXONE PER 250 MG: Performed by: EMERGENCY MEDICINE

## 2025-02-19 PROCEDURE — 63710000001 REVEFENACIN 175 MCG/3ML SOLUTION: Performed by: HOSPITALIST

## 2025-02-19 PROCEDURE — 25010000002 HEPARIN (PORCINE) PER 1000 UNITS: Performed by: HOSPITALIST

## 2025-02-19 PROCEDURE — 63710000001 INSULIN LISPRO (HUMAN) PER 5 UNITS: Performed by: HOSPITALIST

## 2025-02-19 PROCEDURE — 93005 ELECTROCARDIOGRAM TRACING: CPT | Performed by: EMERGENCY MEDICINE

## 2025-02-19 PROCEDURE — 83880 ASSAY OF NATRIURETIC PEPTIDE: CPT | Performed by: EMERGENCY MEDICINE

## 2025-02-19 RX ORDER — IPRATROPIUM BROMIDE AND ALBUTEROL SULFATE 2.5; .5 MG/3ML; MG/3ML
3 SOLUTION RESPIRATORY (INHALATION) EVERY 4 HOURS PRN
Status: DISCONTINUED | OUTPATIENT
Start: 2025-02-19 | End: 2025-02-20

## 2025-02-19 RX ORDER — ACETAMINOPHEN 160 MG/5ML
650 SOLUTION ORAL EVERY 4 HOURS PRN
Status: DISCONTINUED | OUTPATIENT
Start: 2025-02-19 | End: 2025-02-23 | Stop reason: HOSPADM

## 2025-02-19 RX ORDER — SODIUM CHLORIDE 9 MG/ML
40 INJECTION, SOLUTION INTRAVENOUS AS NEEDED
Status: DISCONTINUED | OUTPATIENT
Start: 2025-02-19 | End: 2025-02-23 | Stop reason: HOSPADM

## 2025-02-19 RX ORDER — HYDROMORPHONE HYDROCHLORIDE 1 MG/ML
0.5 INJECTION, SOLUTION INTRAMUSCULAR; INTRAVENOUS; SUBCUTANEOUS
Status: DISCONTINUED | OUTPATIENT
Start: 2025-02-19 | End: 2025-02-23 | Stop reason: HOSPADM

## 2025-02-19 RX ORDER — BISACODYL 5 MG/1
5 TABLET, DELAYED RELEASE ORAL DAILY PRN
Status: DISCONTINUED | OUTPATIENT
Start: 2025-02-19 | End: 2025-02-23 | Stop reason: HOSPADM

## 2025-02-19 RX ORDER — BUDESONIDE AND FORMOTEROL FUMARATE DIHYDRATE 160; 4.5 UG/1; UG/1
2 AEROSOL RESPIRATORY (INHALATION)
Status: DISCONTINUED | OUTPATIENT
Start: 2025-02-19 | End: 2025-02-23 | Stop reason: HOSPADM

## 2025-02-19 RX ORDER — BISACODYL 10 MG
10 SUPPOSITORY, RECTAL RECTAL DAILY PRN
Status: DISCONTINUED | OUTPATIENT
Start: 2025-02-19 | End: 2025-02-23 | Stop reason: HOSPADM

## 2025-02-19 RX ORDER — DEXTROSE MONOHYDRATE 25 G/50ML
25 INJECTION, SOLUTION INTRAVENOUS
Status: DISCONTINUED | OUTPATIENT
Start: 2025-02-19 | End: 2025-02-23 | Stop reason: HOSPADM

## 2025-02-19 RX ORDER — METHYLPREDNISOLONE SODIUM SUCCINATE 125 MG/2ML
125 INJECTION, POWDER, LYOPHILIZED, FOR SOLUTION INTRAMUSCULAR; INTRAVENOUS ONCE
Status: COMPLETED | OUTPATIENT
Start: 2025-02-19 | End: 2025-02-19

## 2025-02-19 RX ORDER — NICOTINE 21 MG/24HR
1 PATCH, TRANSDERMAL 24 HOURS TRANSDERMAL
Status: DISCONTINUED | OUTPATIENT
Start: 2025-02-20 | End: 2025-02-23 | Stop reason: HOSPADM

## 2025-02-19 RX ORDER — ACETAMINOPHEN 650 MG/1
650 SUPPOSITORY RECTAL EVERY 4 HOURS PRN
Status: DISCONTINUED | OUTPATIENT
Start: 2025-02-19 | End: 2025-02-23 | Stop reason: HOSPADM

## 2025-02-19 RX ORDER — DOXYCYCLINE 100 MG/1
100 CAPSULE ORAL EVERY 12 HOURS SCHEDULED
Status: DISCONTINUED | OUTPATIENT
Start: 2025-02-20 | End: 2025-02-20

## 2025-02-19 RX ORDER — ISOSORBIDE MONONITRATE 30 MG/1
30 TABLET, EXTENDED RELEASE ORAL DAILY
Status: DISCONTINUED | OUTPATIENT
Start: 2025-02-20 | End: 2025-02-23 | Stop reason: HOSPADM

## 2025-02-19 RX ORDER — ACETAMINOPHEN 325 MG/1
650 TABLET ORAL EVERY 4 HOURS PRN
Status: DISCONTINUED | OUTPATIENT
Start: 2025-02-19 | End: 2025-02-23 | Stop reason: HOSPADM

## 2025-02-19 RX ORDER — SODIUM CHLORIDE 0.9 % (FLUSH) 0.9 %
10 SYRINGE (ML) INJECTION EVERY 12 HOURS SCHEDULED
Status: DISCONTINUED | OUTPATIENT
Start: 2025-02-19 | End: 2025-02-23 | Stop reason: HOSPADM

## 2025-02-19 RX ORDER — ASPIRIN 81 MG/1
81 TABLET ORAL DAILY
Status: DISCONTINUED | OUTPATIENT
Start: 2025-02-20 | End: 2025-02-23 | Stop reason: HOSPADM

## 2025-02-19 RX ORDER — ONDANSETRON 4 MG/1
4 TABLET, ORALLY DISINTEGRATING ORAL EVERY 6 HOURS PRN
Status: DISCONTINUED | OUTPATIENT
Start: 2025-02-19 | End: 2025-02-23 | Stop reason: HOSPADM

## 2025-02-19 RX ORDER — SODIUM CHLORIDE 0.9 % (FLUSH) 0.9 %
10 SYRINGE (ML) INJECTION AS NEEDED
Status: DISCONTINUED | OUTPATIENT
Start: 2025-02-19 | End: 2025-02-23 | Stop reason: HOSPADM

## 2025-02-19 RX ORDER — ROSUVASTATIN CALCIUM 20 MG/1
20 TABLET, COATED ORAL DAILY
Status: DISCONTINUED | OUTPATIENT
Start: 2025-02-20 | End: 2025-02-23 | Stop reason: HOSPADM

## 2025-02-19 RX ORDER — GUAIFENESIN 600 MG/1
600 TABLET, EXTENDED RELEASE ORAL EVERY 12 HOURS SCHEDULED
Status: DISCONTINUED | OUTPATIENT
Start: 2025-02-19 | End: 2025-02-23 | Stop reason: HOSPADM

## 2025-02-19 RX ORDER — NALOXONE HCL 0.4 MG/ML
0.4 VIAL (ML) INJECTION
Status: DISCONTINUED | OUTPATIENT
Start: 2025-02-19 | End: 2025-02-23 | Stop reason: HOSPADM

## 2025-02-19 RX ORDER — IBUPROFEN 600 MG/1
1 TABLET ORAL
Status: DISCONTINUED | OUTPATIENT
Start: 2025-02-19 | End: 2025-02-23 | Stop reason: HOSPADM

## 2025-02-19 RX ORDER — DILTIAZEM HYDROCHLORIDE 120 MG/1
120 CAPSULE, COATED, EXTENDED RELEASE ORAL
Status: DISCONTINUED | OUTPATIENT
Start: 2025-02-20 | End: 2025-02-23 | Stop reason: HOSPADM

## 2025-02-19 RX ORDER — POLYETHYLENE GLYCOL 3350 17 G/17G
17 POWDER, FOR SOLUTION ORAL DAILY
Status: DISCONTINUED | OUTPATIENT
Start: 2025-02-20 | End: 2025-02-23 | Stop reason: HOSPADM

## 2025-02-19 RX ORDER — HYDROCODONE BITARTRATE AND ACETAMINOPHEN 7.5; 325 MG/1; MG/1
1 TABLET ORAL EVERY 6 HOURS PRN
Status: DISCONTINUED | OUTPATIENT
Start: 2025-02-19 | End: 2025-02-23 | Stop reason: HOSPADM

## 2025-02-19 RX ORDER — NITROGLYCERIN 0.4 MG/1
0.4 TABLET SUBLINGUAL
Status: DISCONTINUED | OUTPATIENT
Start: 2025-02-19 | End: 2025-02-23 | Stop reason: HOSPADM

## 2025-02-19 RX ORDER — IPRATROPIUM BROMIDE AND ALBUTEROL SULFATE 2.5; .5 MG/3ML; MG/3ML
3 SOLUTION RESPIRATORY (INHALATION)
Status: DISCONTINUED | OUTPATIENT
Start: 2025-02-19 | End: 2025-02-20

## 2025-02-19 RX ORDER — DOXYCYCLINE 100 MG/1
100 CAPSULE ORAL ONCE
Status: COMPLETED | OUTPATIENT
Start: 2025-02-19 | End: 2025-02-19

## 2025-02-19 RX ORDER — SODIUM CHLORIDE FOR INHALATION 7 %
4 VIAL, NEBULIZER (ML) INHALATION
Status: DISPENSED | OUTPATIENT
Start: 2025-02-19 | End: 2025-02-22

## 2025-02-19 RX ORDER — HEPARIN SODIUM 5000 [USP'U]/ML
5000 INJECTION, SOLUTION INTRAVENOUS; SUBCUTANEOUS EVERY 8 HOURS SCHEDULED
Status: DISCONTINUED | OUTPATIENT
Start: 2025-02-19 | End: 2025-02-23 | Stop reason: HOSPADM

## 2025-02-19 RX ORDER — AMOXICILLIN 250 MG
2 CAPSULE ORAL 2 TIMES DAILY PRN
Status: DISCONTINUED | OUTPATIENT
Start: 2025-02-19 | End: 2025-02-23 | Stop reason: HOSPADM

## 2025-02-19 RX ORDER — POLYETHYLENE GLYCOL 3350 17 G/17G
17 POWDER, FOR SOLUTION ORAL DAILY PRN
Status: DISCONTINUED | OUTPATIENT
Start: 2025-02-19 | End: 2025-02-23 | Stop reason: HOSPADM

## 2025-02-19 RX ORDER — HYDROCODONE BITARTRATE AND ACETAMINOPHEN 5; 325 MG/1; MG/1
1 TABLET ORAL EVERY 6 HOURS PRN
Status: DISCONTINUED | OUTPATIENT
Start: 2025-02-19 | End: 2025-02-23 | Stop reason: HOSPADM

## 2025-02-19 RX ORDER — ONDANSETRON 2 MG/ML
4 INJECTION INTRAMUSCULAR; INTRAVENOUS EVERY 6 HOURS PRN
Status: DISCONTINUED | OUTPATIENT
Start: 2025-02-19 | End: 2025-02-23 | Stop reason: HOSPADM

## 2025-02-19 RX ORDER — INSULIN LISPRO 100 [IU]/ML
2-7 INJECTION, SOLUTION INTRAVENOUS; SUBCUTANEOUS
Status: DISCONTINUED | OUTPATIENT
Start: 2025-02-19 | End: 2025-02-23 | Stop reason: HOSPADM

## 2025-02-19 RX ORDER — IPRATROPIUM BROMIDE AND ALBUTEROL SULFATE 2.5; .5 MG/3ML; MG/3ML
3 SOLUTION RESPIRATORY (INHALATION) ONCE
Status: COMPLETED | OUTPATIENT
Start: 2025-02-19 | End: 2025-02-19

## 2025-02-19 RX ORDER — METHYLPREDNISOLONE SODIUM SUCCINATE 125 MG/2ML
60 INJECTION, POWDER, LYOPHILIZED, FOR SOLUTION INTRAMUSCULAR; INTRAVENOUS EVERY 12 HOURS
Status: DISCONTINUED | OUTPATIENT
Start: 2025-02-20 | End: 2025-02-21

## 2025-02-19 RX ORDER — NICOTINE POLACRILEX 4 MG
15 LOZENGE BUCCAL
Status: DISCONTINUED | OUTPATIENT
Start: 2025-02-19 | End: 2025-02-23 | Stop reason: HOSPADM

## 2025-02-19 RX ADMIN — INSULIN GLARGINE 15 UNITS: 100 INJECTION, SOLUTION SUBCUTANEOUS at 21:29

## 2025-02-19 RX ADMIN — BUDESONIDE AND FORMOTEROL FUMARATE DIHYDRATE 2 PUFF: 160; 4.5 AEROSOL RESPIRATORY (INHALATION) at 21:19

## 2025-02-19 RX ADMIN — IPRATROPIUM BROMIDE AND ALBUTEROL SULFATE 3 ML: 2.5; .5 SOLUTION RESPIRATORY (INHALATION) at 17:57

## 2025-02-19 RX ADMIN — Medication 10 ML: at 21:29

## 2025-02-19 RX ADMIN — INSULIN LISPRO 5 UNITS: 100 INJECTION, SOLUTION INTRAVENOUS; SUBCUTANEOUS at 21:28

## 2025-02-19 RX ADMIN — IPRATROPIUM BROMIDE AND ALBUTEROL SULFATE 3 ML: 2.5; .5 SOLUTION RESPIRATORY (INHALATION) at 21:19

## 2025-02-19 RX ADMIN — REVEFENACIN 175 MCG: 175 SOLUTION RESPIRATORY (INHALATION) at 21:19

## 2025-02-19 RX ADMIN — SODIUM CHLORIDE 1000 ML: 9 INJECTION, SOLUTION INTRAVENOUS at 19:41

## 2025-02-19 RX ADMIN — METHYLPREDNISOLONE SODIUM SUCCINATE 125 MG: 125 INJECTION INTRAMUSCULAR; INTRAVENOUS at 17:17

## 2025-02-19 RX ADMIN — DOXYCYCLINE 100 MG: 100 CAPSULE ORAL at 17:17

## 2025-02-19 RX ADMIN — HEPARIN SODIUM 5000 UNITS: 5000 INJECTION INTRAVENOUS; SUBCUTANEOUS at 21:28

## 2025-02-19 RX ADMIN — SODIUM CHLORIDE 1000 MG: 900 INJECTION INTRAVENOUS at 19:41

## 2025-02-19 RX ADMIN — NICOTINE 1 PATCH: 21 PATCH TRANSDERMAL at 23:36

## 2025-02-19 RX ADMIN — GUAIFENESIN 600 MG: 600 TABLET ORAL at 21:28

## 2025-02-19 NOTE — ED PROVIDER NOTES
[place]    EMERGENCY DEPARTMENT ENCOUNTER      Pt Name: Kathy Taylor  MRN: [id number]  YOB: 1960  Date of evaluation: 2/19/2025  Provider: Shayne Simental MD    CHIEF COMPLAINT       Chief Complaint   Patient presents with    Shortness of Breath         HISTORY OF PRESENT ILLNESS   Kathy Taylor is a 64 y.o. female who presents to the emergency department with complaint of worsening shortness of breath and productive cough over the course the past week with some associated tightness across her chest that does not radiate into her extremities, abdomen, back, or neck.  She has had no fever, chills, diaphoresis, nausea, or vomiting.  Denies any prior history of coronary artery disease or CHF.  She has known emphysema and does continue to smoke. Patient denies any history of VTE as well as any recent surgery, hospitalization, long distance travel, swelling or pain in the lower extremities, or exogenous hormone use.  Per patient, she has used her nebulizer at home without significant relief.        Nursing notes were reviewed.    REVIEW OF SYSTEMS     ROS:  A chief complaint appropriate review of systems was completed and is negative except as noted in the HPI.      PAST MEDICAL HISTORY     Past Medical History:   Diagnosis Date    Ankle sprain     Arthritis     Arthritis of back     Arthritis of neck     Asthma ?    May have been earlier    Back ache     Bronchiectasis ?    Bronchitis     Bursitis of hip     Cervical disc disorder     Chronic bronchitis ?    COPD (chronic obstructive pulmonary disease)     Coronary artery disease involving native coronary artery of native heart with angina pectoris 07/15/2024    Emphysema of lung     Dont remember when told.    Fibromyalgia     Hip arthrosis     Hyperlipidemia LDL goal <100 02/05/2024    Knee sprain     Migraines     Myocardial infarction     Was told I've  had a couple small ones    Neck strain     Pneumonia     Rheumatic fever     Rotator cuff syndrome      Shingles     Sleep apnea, obstructive ?    Tooth infection     Type 2 diabetes mellitus     Vulvar carcinoma     Wrist sprain          SURGICAL HISTORY       Past Surgical History:   Procedure Laterality Date    BREAST BIOPSY Left     BREAST LUMPECTOMY Left 2004    CARDIAC CATHETERIZATION N/A 07/15/2024    Procedure: Left Heart Cath;  Surgeon: Lonnie Salomon IV, MD;  Location: [facility] CATH INVASIVE LOCATION;  Service: Cardiology;  Laterality: N/A;    CARDIAC CATHETERIZATION  07/15/2024    TONSILLECTOMY      TUBAL ABDOMINAL LIGATION      VULVA BIOPSY      VULVA SURGERY      WISDOM TOOTH EXTRACTION           CURRENT MEDICATIONS       Current Facility-Administered Medications:     cefTRIAXone (ROCEPHIN) 1,000 mg in sodium chloride 0.9 % 100 mL MBP, 1,000 mg, Intravenous, Once, Shayne Simental MD    sodium chloride 0.9 % bolus 1,000 mL, 1,000 mL, Intravenous, Once, Shayne Simental MD    Current Outpatient Medications:     Accu-Chek Softclix Lancets lancets, USE 1  TO CHECK GLUCOSE TWICE DAILY TO THREE TIMES DAILY dx e11.65 on insulin, Disp: 100 each, Rfl: 11    albuterol sulfate  (90 Base) MCG/ACT inhaler, Inhale 2 puffs Every 6 (Six) Hours As Needed for Wheezing., Disp: 18 g, Rfl: 11    aspirin (ASPIR) 81 MG EC tablet, Take 1 tablet by mouth Daily., Disp: 90 tablet, Rfl: 3    diclofenac (VOLTAREN) 50 MG EC tablet, Take 1 tablet by mouth 3 (Three) Times a Day., Disp: 21 tablet, Rfl: 0    dilTIAZem XR (DILACOR XR) 120 MG 24 hr capsule, Take 1 capsule by mouth Daily., Disp: 90 capsule, Rfl: 3    Fluticasone-Umeclidin-Vilant (Trelegy Ellipta) 200-62.5-25 MCG/ACT inhaler, Inhale 1 puff Daily., Disp: 1 each, Rfl: 11    Glucagon 1 MG/0.2ML solution auto-injector, Inject 1 mg under the skin into the appropriate area as directed As Needed (severe hypoglycemia)., Disp: 1 mL, Rfl: 0    glucose blood test strip, Use as instructed 2-3 times a day, Disp: 100 each, Rfl: 3    HYDROcodone-acetaminophen (NORCO)  7.5-325 MG per tablet, Take 1 tablet by mouth Every 6 (Six) Hours As Needed for Severe Pain., Disp: , Rfl:     Insulin Pen Needle (BD Pen Needle Nessa U/F) 32G X 4 MM misc, Use as directed 5 times daily, Disp: 150 each, Rfl: 3    ipratropium (ATROVENT) 0.03 % nasal spray, 2 sprays into the nostril(s) as directed by provider Every 12 (Twelve) Hours., Disp: 1 each, Rfl: 11    ipratropium-albuterol (DUO-NEB) 0.5-2.5 mg/3 ml nebulizer, USE 3 ML VIA NEBULIZER FOUR TIMES DAILY AS NEEDED FOR WHEEZING, Disp: 180 mL, Rfl: 3    isosorbide mononitrate (IMDUR) 30 MG 24 hr tablet, Take 1 tablet by mouth Every Morning., Disp: 90 tablet, Rfl: 3    Lantus SoloStar 100 UNIT/ML injection pen, Inject 10 Units under the skin into the appropriate area as directed Every Night. Titrate 2 units every 3 days if blood sugar is more than 140 fasting; Max daily dose: 30 units, Disp: 27 mL, Rfl: 1    meloxicam (Mobic) 15 MG tablet, Take 1 tablet every day by oral route with meals for 30 days., Disp: , Rfl:     metFORMIN ER (GLUCOPHAGE-XR) 500 MG 24 hr tablet, Take 1 tablet by mouth Daily With Breakfast., Disp: 90 tablet, Rfl: 1    nicotine (NICOTROL) 10 MG inhaler, Inhale 1 puff As Needed for Smoking Cessation., Disp: 1 each, Rfl: 6    nitroglycerin (NITROSTAT) 0.3 MG SL tablet, 1 under the tongue as needed for angina, may repeat q5mins for up three doses, Disp: 25 tablet, Rfl: 11    polyethylene glycol (MIRALAX) 17 g packet, Take 17 g by mouth Daily., Disp: 100 each, Rfl: 0    rosuvastatin (CRESTOR) 20 MG tablet, Take 1 tablet by mouth Daily., Disp: 90 tablet, Rfl: 3    Semaglutide,0.25 or 0.5MG/DOS, (Ozempic, 0.25 or 0.5 MG/DOSE,) 2 MG/3ML solution pen-injector, Inject 0.5 mg under the skin into the appropriate area as directed 1 (One) Time Per Week. Contact office after 4 weeks for additional adjustment if tolerating well., Disp: 3 mL, Rfl: 3    sucralfate (CARAFATE) 1 g tablet, Take 1 tablet by mouth 3 (Three) Times a Day. Thoroughly crush  pill and mix in small amount of water prior to swallowing. (Patient not taking: Reported on 2/4/2025), Disp: 60 tablet, Rfl: 0    tiZANidine (ZANAFLEX) 4 MG tablet, Take 1 tablet by mouth 3 (Three) Times a Day As Needed., Disp: , Rfl:     ALLERGIES     Bee venom and Ciprofloxacin    FAMILY HISTORY       Family History   Problem Relation Age of Onset    Arthritis Mother     Diabetes Mother     Heart disease Mother     Thyroid disease Mother     Uterine cancer Mother     Cancer Mother     Heart attack Mother     Heart disease Brother     Diabetes Brother     Breast cancer Other         MATERNAL GREAT AUNT    Cancer Maternal Grandmother     Heart attack Maternal Grandmother     Heart attack Brother     Ovarian cancer Neg Hx           SOCIAL HISTORY       Social History     Socioeconomic History    Marital status:    Tobacco Use    Smoking status: Every Day     Current packs/day: 1.00     Average packs/day: 1 pack/day for 50.0 years (50.0 ttl pk-yrs)     Types: Cigarettes     Passive exposure: Past    Smokeless tobacco: Never   Vaping Use    Vaping status: Former    Substances: Nicotine    Devices: Pre-filled or refillable cartridge   Substance and Sexual Activity    Alcohol use: Not Currently    Drug use: Never    Sexual activity: Not Currently     Partners: Male     Birth control/protection: Tubal ligation         PHYSICAL EXAM    (up to 7 for level 4, 8 or more for level 5)     Vitals:    02/19/25 1848 02/19/25 1853 02/19/25 1858 02/19/25 1903   BP:       Pulse: 104 105 103 103   Resp:       Temp:       TempSrc:       SpO2: (!) 89% (!) 87% 93% 93%   Weight:       Height:           General: Awake, alert, no acute distress.  HEENT: Conjunctivae normal.  Neck: Trachea midline.  Cardiac: Heart regular rate, rhythm, no murmurs, rubs, or gallops  Lungs: No tachypnea.  Moderate diffuse expiratory wheezes.  No focal findings.  Chest wall: There is no tenderness to palpation over the chest wall or over ribs  Abdomen:  Abdomen is soft, nontender, nondistended. There are no firm or pulsatile masses, no rebound rigidity or guarding.   Musculoskeletal: No deformity.  Neuro: Alert and oriented x 4.  Dermatology: Skin is warm and dry  Psych: Mentation is grossly normal, cognition is grossly normal. Affect is appropriate.        DIAGNOSTIC RESULTS     EKG: All EKGs are interpreted by the Emergency Department Physician who either signs or Co-signs this chart in the absence of a cardiologist.    ECG 12 Lead Dyspnea   Preliminary Result   Test Reason : Dyspnea   Blood Pressure :   */*   mmHG   Vent. Rate : 109 BPM     Atrial Rate : 109 BPM      P-R Int : 166 ms          QRS Dur : 110 ms       QT Int : 362 ms       P-R-T Axes :  72 112  78 degrees     QTcB Int : 487 ms      Sinus tachycardia   Right axis deviation   Incomplete right bundle branch block   Possible Right ventricular hypertrophy   Nonspecific ST and T wave abnormality   Abnormal ECG   When compared with ECG of 04-Dec-2024 14:54,   Incomplete right bundle branch block has replaced Right bundle branch    block      Referred By: EDMD           Confirmed By:             RADIOLOGY:   [x] Radiologist's Report Reviewed:  CT Chest Without Contrast Diagnostic   Final Result   Impression:   1. Mild/early multifocal pneumonia versus aspiration.   2. Mild emphysema with stable small pulmonary nodules since 2/12/2025. Recommend continued annual low-dose chest CT screening, due February 2026.   3. Aortic and severe coronary atherosclerotic disease.            Electronically Signed: Ghassan Brewster MD     2/19/2025 6:34 PM EST     Workstation ID: [id number]      XR Chest 1 View   Final Result   Impression:   No radiographic evidence of acute cardiopulmonary disease.            Electronically Signed: Corey Pérez     2/19/2025 5:33 PM EST     Workstation ID: [id number]          I ordered and independently reviewed the above noted radiographic studies.        LABS:    I have reviewed and  interpreted all of the currently available lab results from this visit (if applicable):  Results for orders placed or performed during the hospital encounter of 02/19/25   ECG 12 Lead Dyspnea    Collection Time: 02/19/25  5:01 PM   Result Value Ref Range    QT Interval 362 ms    QTC Interval 487 ms   Comprehensive Metabolic Panel    Collection Time: 02/19/25  5:04 PM    Specimen: Blood   Result Value Ref Range    Glucose 295 (H) 65 - 99 mg/dL    BUN 22 8 - 23 mg/dL    Creatinine 0.98 0.57 - 1.00 mg/dL    Sodium 133 (L) 136 - 145 mmol/L    Potassium 4.7 3.5 - 5.2 mmol/L    Chloride 96 (L) 98 - 107 mmol/L    CO2 22.0 22.0 - 29.0 mmol/L    Calcium 9.6 8.6 - 10.5 mg/dL    Total Protein 7.4 6.0 - 8.5 g/dL    Albumin 4.1 3.5 - 5.2 g/dL    ALT (SGPT) 15 1 - 33 U/L    AST (SGOT) 18 1 - 32 U/L    Alkaline Phosphatase 90 39 - 117 U/L    Total Bilirubin 0.6 0.0 - 1.2 mg/dL    Globulin 3.3 gm/dL    A/G Ratio 1.2 g/dL    BUN/Creatinine Ratio 22.4 7.0 - 25.0    Anion Gap 15.0 5.0 - 15.0 mmol/L    eGFR 65.4 >60.0 mL/min/1.73   BNP    Collection Time: 02/19/25  5:04 PM    Specimen: Blood   Result Value Ref Range    proBNP 326.4 0.0 - 900.0 pg/mL   High Sensitivity Troponin T    Collection Time: 02/19/25  5:04 PM    Specimen: Blood   Result Value Ref Range    HS Troponin T 41 (H) <14 ng/L   D-dimer, Quantitative    Collection Time: 02/19/25  5:04 PM    Specimen: Blood   Result Value Ref Range    D-Dimer, Quantitative 0.54 0.00 - 0.62 MCGFEU/mL   CBC Auto Differential    Collection Time: 02/19/25  5:04 PM    Specimen: Blood   Result Value Ref Range    WBC 26.94 (H) 3.40 - 10.80 10*3/mm3    RBC 4.60 3.77 - 5.28 10*6/mm3    Hemoglobin 14.4 12.0 - 15.9 g/dL    Hematocrit 40.3 34.0 - 46.6 %    MCV 87.6 79.0 - 97.0 fL    MCH 31.3 26.6 - 33.0 pg    MCHC 35.7 31.5 - 35.7 g/dL    RDW 12.8 12.3 - 15.4 %    RDW-SD 40.6 37.0 - 54.0 fl    MPV 9.8 6.0 - 12.0 fL    Platelets 244 140 - 450 10*3/mm3    Neutrophil % 87.4 (H) 42.7 - 76.0 %     Lymphocyte % 6.1 (L) 19.6 - 45.3 %    Monocyte % 4.9 (L) 5.0 - 12.0 %    Eosinophil % 0.6 0.3 - 6.2 %    Basophil % 0.3 0.0 - 1.5 %    Immature Grans % 0.7 (H) 0.0 - 0.5 %    Neutrophils, Absolute 23.55 (H) 1.70 - 7.00 10*3/mm3    Lymphocytes, Absolute 1.64 0.70 - 3.10 10*3/mm3    Monocytes, Absolute 1.33 (H) 0.10 - 0.90 10*3/mm3    Eosinophils, Absolute 0.15 0.00 - 0.40 10*3/mm3    Basophils, Absolute 0.07 0.00 - 0.20 10*3/mm3    Immature Grans, Absolute 0.20 (H) 0.00 - 0.05 10*3/mm3    nRBC 0.0 0.0 - 0.2 /100 WBC   COVID-19 and FLU A/B PCR, 1 HR TAT - Swab, Nasopharynx    Collection Time: 02/19/25  5:20 PM    Specimen: Nasopharynx; Swab   Result Value Ref Range    COVID19 Not Detected Not Detected - Ref. Range    Influenza A PCR Not Detected Not Detected    Influenza B PCR Not Detected Not Detected   High Sensitivity Troponin T 1Hr    Collection Time: 02/19/25  6:18 PM    Specimen: Blood   Result Value Ref Range    HS Troponin T 34 (H) <14 ng/L    Troponin T Numeric Delta -7 ng/L    Troponin T % Delta -17 Abnormal if >/= 20%        If labs were ordered, I independently reviewed the results and considered them in treating the patient.      EMERGENCY DEPARTMENT COURSE and DIFFERENTIAL DIAGNOSIS/MDM:   Vitals:  AS OF 19:08 EST    BP - 126/78  HR - 103  TEMP - 98.2 °F (36.8 °C) (Oral)  O2 SATS - 93%        Discussion below represents my analysis of pertinent findings related to patient's condition, differential diagnosis, treatment plan and final disposition.      Differential diagnosis:  The differential diagnosis associated with the patient's presentation includes: COPD, CHF, ACS, dysrhythmia, pulmonary embolism, COVID-19, flu, anemia      Independent interpretations (ECG/rhythm strip/X-ray/US/CT scan): I independently interpreted the patient's chest x-ray and cardiac monitor.  Patient sinus tachycardia.  No evidence of lobar infiltrate.      Additional sources:  Discussed/obtained information from independent  historians:   [] Spouse:   [] Parent:   [] Friend:   [x] EMS: Report was taken from EMS.  Vital signs stable during transport.  No additional treatment administered.   [] Other:  External (non-ED) record review:   [] Inpatient record:   [] Office record:   [] Outpatient record:   [] Prior Outpatient labs:   [] Prior Outpatient radiology:   [] Primary Care record:   [] Outside ED record:   [x] Other: I reviewed echo in 2024.  Normal ejection fraction.  No significant valvular abnormality.      Patient's care impacted by:   [x] Diabetes   [] Hypertension   [x] Coronary Artery Disease   [] Cancer   [x] Other: COPD, smoker    Care significantly affected by Social Determinants of Health (housing and economic circumstances, unemployment)    [] Yes     [x] No   If yes, Patient's care significantly limited by  Social Determinants of Health including:    [] Inadequate housing    [] Low income    [] Alcoholism and drug addiction in family    [] Problems related to primary support group    [] Unemployment    [] Problems related to employment    [] Other Social Determinants of Health:       Consideration of admission/observation vs discharge: Patient presents with findings concerning for acute sepsis and has hypoxia related to what appears to be multifocal pneumonia and warrants admission for further workup.      I considered prescription management with:    [] Pain medication:   [] Antiviral:   [x] Antibiotic: Started IV Rocephin and doxycycline   [] Other:    ED Course:    ED Course as of 02/19/25 1908 Wed Feb 19, 2025 1907 Patient presents with sepsis, pneumonia, COPD exacerbation. Patient has history of COPD, continues to smoke, has had worsening productive cough and dyspnea over the course the past several days not relieved with her nebulizer. Saturating in the upper 80s here, currently stable on 2 L, does not wear oxygen at home, had diffuse wheezing on initial examination. Chest x-ray is clear, has a white count of  26 and says that she has not been on any steroids recently. Slight tachycardia between 100 and 110. D-dimer was negative. Noncontrast chest CT suggestive of developing multifocal pneumonia. Given her DuoNeb, Solu-Medrol, doxycycline, have ordered Rocephin. [NS]   1907 Discussed case with hospitalist Dr. Manzano.  Discussed history, presentation, workup.  Accepts patient for admission. [NS]      ED Course User Index  [NS] Shayne Simental MD       CRITICAL CARE TIME    Approximately 30 minutes of discontinuous critical care time was provided to this patient by myself absent of any time spent performing procedures.  Patient presents critically ill with acute sepsis secondary to multifocal pneumonia complicated by COPD exacerbation and acute hypoxia placing the cardiovascular, respiratory, neurologic, renal systems at risk requiring the following interventions: IV fluids, supplemental oxygen, broad-spectrum IV antibiotics, interpretation of lab/ECG/imaging, frequent reassessment, coordination of admission with the following response: Resolution of hypoxia.  Patient at high risk of deterioration and possibly death without these interventions.      FINAL IMPRESSION      1. Acute sepsis    2. Multifocal pneumonia    3. Acute exacerbation of chronic obstructive pulmonary disease (COPD)    4. Hypoxia    5. Smoker          DISPOSITION/PLAN     ED Disposition       ED Disposition   Decision to Admit    Condition   --    Comment   --                 Comment: Please note this report has been produced using speech recognition software.      Shayne Simental MD  Attending Emergency Physician             Shayne Simental MD  02/19/25 1909

## 2025-02-19 NOTE — Clinical Note
Level of Care: Telemetry [5]   Diagnosis: Pneumonia [995220]   Admitting Physician: AYDEN HOLLY [[id number]]   Certification: I Certify That Inpatient Hospital Services Are Medically Necessary For Greater Than 2 Midnights

## 2025-02-20 LAB
ALBUMIN SERPL-MCNC: 3.8 G/DL (ref 3.5–5.2)
ALBUMIN/GLOB SERPL: 1.3 G/DL
ALP SERPL-CCNC: 86 U/L (ref 39–117)
ALT SERPL W P-5'-P-CCNC: 13 U/L (ref 1–33)
ANION GAP SERPL CALCULATED.3IONS-SCNC: 10 MMOL/L (ref 5–15)
AST SERPL-CCNC: 15 U/L (ref 1–32)
BASOPHILS # BLD AUTO: 0.04 10*3/MM3 (ref 0–0.2)
BASOPHILS NFR BLD AUTO: 0.2 % (ref 0–1.5)
BILIRUB SERPL-MCNC: 0.5 MG/DL (ref 0–1.2)
BUN SERPL-MCNC: 21 MG/DL (ref 8–23)
BUN/CREAT SERPL: 24.1 (ref 7–25)
CALCIUM SPEC-SCNC: 9.4 MG/DL (ref 8.6–10.5)
CHLORIDE SERPL-SCNC: 100 MMOL/L (ref 98–107)
CO2 SERPL-SCNC: 23 MMOL/L (ref 22–29)
CREAT SERPL-MCNC: 0.87 MG/DL (ref 0.57–1)
DEPRECATED RDW RBC AUTO: 41.5 FL (ref 37–54)
EGFRCR SERPLBLD CKD-EPI 2021: 75.4 ML/MIN/1.73
EOSINOPHIL # BLD AUTO: 0 10*3/MM3 (ref 0–0.4)
EOSINOPHIL NFR BLD AUTO: 0 % (ref 0.3–6.2)
ERYTHROCYTE [DISTWIDTH] IN BLOOD BY AUTOMATED COUNT: 12.6 % (ref 12.3–15.4)
GLOBULIN UR ELPH-MCNC: 3 GM/DL
GLUCOSE BLDC GLUCOMTR-MCNC: 321 MG/DL (ref 70–130)
GLUCOSE BLDC GLUCOMTR-MCNC: 382 MG/DL (ref 70–130)
GLUCOSE BLDC GLUCOMTR-MCNC: 388 MG/DL (ref 70–130)
GLUCOSE BLDC GLUCOMTR-MCNC: 389 MG/DL (ref 70–130)
GLUCOSE BLDC GLUCOMTR-MCNC: 408 MG/DL (ref 70–130)
GLUCOSE BLDC GLUCOMTR-MCNC: 428 MG/DL (ref 70–130)
GLUCOSE BLDC GLUCOMTR-MCNC: 433 MG/DL (ref 70–130)
GLUCOSE SERPL-MCNC: 320 MG/DL (ref 65–99)
HCT VFR BLD AUTO: 38.1 % (ref 34–46.6)
HGB BLD-MCNC: 12.9 G/DL (ref 12–15.9)
IMM GRANULOCYTES # BLD AUTO: 0.16 10*3/MM3 (ref 0–0.05)
IMM GRANULOCYTES NFR BLD AUTO: 0.9 % (ref 0–0.5)
LYMPHOCYTES # BLD AUTO: 0.94 10*3/MM3 (ref 0.7–3.1)
LYMPHOCYTES NFR BLD AUTO: 5.2 % (ref 19.6–45.3)
MCH RBC QN AUTO: 30.3 PG (ref 26.6–33)
MCHC RBC AUTO-ENTMCNC: 33.9 G/DL (ref 31.5–35.7)
MCV RBC AUTO: 89.4 FL (ref 79–97)
MONOCYTES # BLD AUTO: 0.18 10*3/MM3 (ref 0.1–0.9)
MONOCYTES NFR BLD AUTO: 1 % (ref 5–12)
NEUTROPHILS NFR BLD AUTO: 16.78 10*3/MM3 (ref 1.7–7)
NEUTROPHILS NFR BLD AUTO: 92.7 % (ref 42.7–76)
NRBC BLD AUTO-RTO: 0 /100 WBC (ref 0–0.2)
PLATELET # BLD AUTO: 195 10*3/MM3 (ref 140–450)
PMV BLD AUTO: 10 FL (ref 6–12)
POTASSIUM SERPL-SCNC: 4.2 MMOL/L (ref 3.5–5.2)
PROT SERPL-MCNC: 6.8 G/DL (ref 6–8.5)
RBC # BLD AUTO: 4.26 10*6/MM3 (ref 3.77–5.28)
SODIUM SERPL-SCNC: 133 MMOL/L (ref 136–145)
WBC NRBC COR # BLD AUTO: 18.1 10*3/MM3 (ref 3.4–10.8)

## 2025-02-20 PROCEDURE — 25010000002 METHYLPREDNISOLONE PER 125 MG: Performed by: HOSPITALIST

## 2025-02-20 PROCEDURE — 80053 COMPREHEN METABOLIC PANEL: CPT | Performed by: HOSPITALIST

## 2025-02-20 PROCEDURE — 97165 OT EVAL LOW COMPLEX 30 MIN: CPT

## 2025-02-20 PROCEDURE — 85025 COMPLETE CBC W/AUTO DIFF WBC: CPT | Performed by: HOSPITALIST

## 2025-02-20 PROCEDURE — 87070 CULTURE OTHR SPECIMN AEROBIC: CPT | Performed by: HOSPITALIST

## 2025-02-20 PROCEDURE — 63710000001 INSULIN GLARGINE PER 5 UNITS: Performed by: HOSPITALIST

## 2025-02-20 PROCEDURE — 25010000002 CEFTRIAXONE PER 250 MG: Performed by: INTERNAL MEDICINE

## 2025-02-20 PROCEDURE — 94799 UNLISTED PULMONARY SVC/PX: CPT

## 2025-02-20 PROCEDURE — 63710000001 INSULIN LISPRO (HUMAN) PER 5 UNITS: Performed by: HOSPITALIST

## 2025-02-20 PROCEDURE — 63710000001 INSULIN LISPRO (HUMAN) PER 5 UNITS: Performed by: INTERNAL MEDICINE

## 2025-02-20 PROCEDURE — 99232 SBSQ HOSP IP/OBS MODERATE 35: CPT | Performed by: INTERNAL MEDICINE

## 2025-02-20 PROCEDURE — 63710000001 REVEFENACIN 175 MCG/3ML SOLUTION: Performed by: HOSPITALIST

## 2025-02-20 PROCEDURE — 94664 DEMO&/EVAL PT USE INHALER: CPT

## 2025-02-20 PROCEDURE — 97162 PT EVAL MOD COMPLEX 30 MIN: CPT

## 2025-02-20 PROCEDURE — 87899 AGENT NOS ASSAY W/OPTIC: CPT | Performed by: INTERNAL MEDICINE

## 2025-02-20 PROCEDURE — 87449 NOS EACH ORGANISM AG IA: CPT | Performed by: INTERNAL MEDICINE

## 2025-02-20 PROCEDURE — 82948 REAGENT STRIP/BLOOD GLUCOSE: CPT

## 2025-02-20 PROCEDURE — 87205 SMEAR GRAM STAIN: CPT | Performed by: HOSPITALIST

## 2025-02-20 PROCEDURE — 25010000002 HEPARIN (PORCINE) PER 1000 UNITS: Performed by: HOSPITALIST

## 2025-02-20 RX ORDER — INSULIN LISPRO 100 [IU]/ML
5 INJECTION, SOLUTION INTRAVENOUS; SUBCUTANEOUS
Status: DISCONTINUED | OUTPATIENT
Start: 2025-02-20 | End: 2025-02-21

## 2025-02-20 RX ORDER — DOXYCYCLINE 100 MG/1
100 CAPSULE ORAL EVERY 12 HOURS SCHEDULED
Status: DISCONTINUED | OUTPATIENT
Start: 2025-02-20 | End: 2025-02-23 | Stop reason: HOSPADM

## 2025-02-20 RX ORDER — ALBUTEROL SULFATE 0.83 MG/ML
2.5 SOLUTION RESPIRATORY (INHALATION)
Status: DISCONTINUED | OUTPATIENT
Start: 2025-02-20 | End: 2025-02-23 | Stop reason: HOSPADM

## 2025-02-20 RX ADMIN — DILTIAZEM HYDROCHLORIDE 120 MG: 120 CAPSULE, COATED, EXTENDED RELEASE ORAL at 08:20

## 2025-02-20 RX ADMIN — DOXYCYCLINE 100 MG: 100 CAPSULE ORAL at 20:19

## 2025-02-20 RX ADMIN — INSULIN LISPRO 5 UNITS: 100 INJECTION, SOLUTION INTRAVENOUS; SUBCUTANEOUS at 17:23

## 2025-02-20 RX ADMIN — METHYLPREDNISOLONE SODIUM SUCCINATE 60 MG: 125 INJECTION INTRAMUSCULAR; INTRAVENOUS at 20:19

## 2025-02-20 RX ADMIN — ISOSORBIDE MONONITRATE 30 MG: 30 TABLET, EXTENDED RELEASE ORAL at 08:33

## 2025-02-20 RX ADMIN — Medication 10 ML: at 20:25

## 2025-02-20 RX ADMIN — SODIUM CHLORIDE 4 ML: 7 NEBU SOLN,3 % NEBU at 09:16

## 2025-02-20 RX ADMIN — INSULIN LISPRO 7 UNITS: 100 INJECTION, SOLUTION INTRAVENOUS; SUBCUTANEOUS at 20:20

## 2025-02-20 RX ADMIN — ALBUTEROL SULFATE 2.5 MG: 2.5 SOLUTION RESPIRATORY (INHALATION) at 19:15

## 2025-02-20 RX ADMIN — Medication 10 ML: at 08:26

## 2025-02-20 RX ADMIN — DOXYCYCLINE 100 MG: 100 CAPSULE ORAL at 08:20

## 2025-02-20 RX ADMIN — INSULIN LISPRO 5 UNITS: 100 INJECTION, SOLUTION INTRAVENOUS; SUBCUTANEOUS at 11:39

## 2025-02-20 RX ADMIN — HEPARIN SODIUM 5000 UNITS: 5000 INJECTION INTRAVENOUS; SUBCUTANEOUS at 14:35

## 2025-02-20 RX ADMIN — HEPARIN SODIUM 5000 UNITS: 5000 INJECTION INTRAVENOUS; SUBCUTANEOUS at 05:39

## 2025-02-20 RX ADMIN — GUAIFENESIN 600 MG: 600 TABLET ORAL at 08:20

## 2025-02-20 RX ADMIN — METHYLPREDNISOLONE SODIUM SUCCINATE 60 MG: 125 INJECTION INTRAMUSCULAR; INTRAVENOUS at 08:33

## 2025-02-20 RX ADMIN — BUDESONIDE AND FORMOTEROL FUMARATE DIHYDRATE 2 PUFF: 160; 4.5 AEROSOL RESPIRATORY (INHALATION) at 19:15

## 2025-02-20 RX ADMIN — GUAIFENESIN 600 MG: 600 TABLET ORAL at 20:19

## 2025-02-20 RX ADMIN — HEPARIN SODIUM 5000 UNITS: 5000 INJECTION INTRAVENOUS; SUBCUTANEOUS at 22:04

## 2025-02-20 RX ADMIN — REVEFENACIN 175 MCG: 175 SOLUTION RESPIRATORY (INHALATION) at 09:16

## 2025-02-20 RX ADMIN — ROSUVASTATIN 20 MG: 20 TABLET, FILM COATED ORAL at 08:20

## 2025-02-20 RX ADMIN — INSULIN GLARGINE 15 UNITS: 100 INJECTION, SOLUTION SUBCUTANEOUS at 20:20

## 2025-02-20 RX ADMIN — CEFTRIAXONE 1000 MG: 1 INJECTION, POWDER, FOR SOLUTION INTRAMUSCULAR; INTRAVENOUS at 17:22

## 2025-02-20 RX ADMIN — POLYETHYLENE GLYCOL 3350 17 G: 17 POWDER, FOR SOLUTION ORAL at 08:26

## 2025-02-20 RX ADMIN — ALBUTEROL SULFATE 2.5 MG: 2.5 SOLUTION RESPIRATORY (INHALATION) at 13:59

## 2025-02-20 RX ADMIN — INSULIN LISPRO 5 UNITS: 100 INJECTION, SOLUTION INTRAVENOUS; SUBCUTANEOUS at 08:24

## 2025-02-20 RX ADMIN — INSULIN LISPRO 6 UNITS: 100 INJECTION, SOLUTION INTRAVENOUS; SUBCUTANEOUS at 17:27

## 2025-02-20 RX ADMIN — INSULIN LISPRO 6 UNITS: 100 INJECTION, SOLUTION INTRAVENOUS; SUBCUTANEOUS at 11:38

## 2025-02-20 RX ADMIN — BUDESONIDE AND FORMOTEROL FUMARATE DIHYDRATE 2 PUFF: 160; 4.5 AEROSOL RESPIRATORY (INHALATION) at 09:16

## 2025-02-20 RX ADMIN — INSULIN LISPRO 5 UNITS: 100 INJECTION, SOLUTION INTRAVENOUS; SUBCUTANEOUS at 08:25

## 2025-02-20 RX ADMIN — ASPIRIN 81 MG: 81 TABLET, COATED ORAL at 08:20

## 2025-02-20 NOTE — PROGRESS NOTES
[facility] Medicine Services  PROGRESS NOTE    Patient Name: Kathy Taylor  : 1960  MRN: [id number]    Date of Admission: 2025  Primary Care Physician: Provider, No Known    Subjective   Subjective     CC: Follow-up SOA    HPI: No acute events overnight, patient rested well, still feeling the same      Objective   Objective     Vital Signs:   Temp:  [97.7 °F (36.5 °C)-98.3 °F (36.8 °C)] 97.7 °F (36.5 °C)  Heart Rate:  [] 90  Resp:  [18-22] 18  BP: (126-167)/(58-93) 128/74  Flow (L/min) (Oxygen Therapy):  [2] 2     Physical Exam:  Constitutional: No acute distress, awake, alert  HENT: NCAT, mucous membranes moist  Respiratory: Nonlabored respirations, diffuse coarse breath sounds on 2 L NC cardiovascular: RRR, no murmurs, rubs, or gallops  Gastrointestinal: Positive bowel sounds, soft, nontender, nondistended  Musculoskeletal: No bilateral ankle edema  Psychiatric: Appropriate affect, cooperative  Neurologic: Oriented x 3, nonfocal  Skin: No rashes      Results Reviewed:  LAB RESULTS:      Lab 25   WBC 18.10*  --  26.94*   HEMOGLOBIN 12.9  --  14.4   HEMATOCRIT 38.1  --  40.3   PLATELETS 195  --  244   NEUTROS ABS 16.78*  --  23.55*   IMMATURE GRANS (ABS) 0.16*  --  0.20*   LYMPHS ABS 0.94  --  1.64   MONOS ABS 0.18  --  1.33*   EOS ABS 0.00  --  0.15   MCV 89.4  --  87.6   D DIMER QUANT  --   --  0.54   HSTROP T  --  34* 41*         Lab 25  170   SODIUM 133* 133*   POTASSIUM 4.2 4.7   CHLORIDE 100 96*   CO2 23.0 22.0   ANION GAP 10.0 15.0   BUN 21 22   CREATININE 0.87 0.98   EGFR 75.4 65.4   GLUCOSE 320* 295*   CALCIUM 9.4 9.6         Lab 25  1704   TOTAL PROTEIN 6.8 7.4   ALBUMIN 3.8 4.1   GLOBULIN 3.0 3.3   ALT (SGPT) 13 15   AST (SGOT) 15 18   BILIRUBIN 0.5 0.6   ALK PHOS 86 90         Lab 25  1818 25  1704   PROBNP  --  326.4   HSTROP T 34* 41*                 Brief Urine  Lab Results  (Last result in the past 365 days)        Color   Clarity   Blood   Leuk Est   Nitrite   Protein   CREAT   Urine HCG        10/19/24 1322 Yellow   Clear   Trace   Trace   Negative   Trace                   Microbiology Results Abnormal       None            CT Chest Without Contrast Diagnostic    Result Date: 2/19/2025  CT CHEST WO CONTRAST DIAGNOSTIC Date of Exam: 2/19/2025 6:23 PM EST Indication: cough, sob, eval for pna. Comparison: CT chest 2/12/2025 Technique: Axial CT images were obtained of the chest without contrast administration.  Reconstructed coronal and sagittal images were also obtained. Automated exposure control and iterative construction methods were used. Findings: Thyroid unremarkable. No supraclavicular adenopathy. Aortic atherosclerotic disease without aneurysm. Severe calcified coronary atherosclerotic disease. Normal caliber main pulmonary artery. Heart size normal. No pericardial effusion. Esophagus unremarkable. No suspicious adenopathy. Trachea patent. Mild emphysema. There are new infectious/inflammatory patchy groundglass airspace opacities in the lingula and left greater than right lower lobes. Scattered small discrete pulmonary nodules stable from recent low-dose chest CT screening.  Linear bandlike areas of minimal atelectasis versus scarring. No edema, effusion or pneumothorax. No acute findings partially imaged upper abdomen. No acute chest wall findings. No axillary adenopathy. Degenerative changes throughout the spine without acute displaced fracture or aggressive lesion.     Impression: Impression: 1. Mild/early multifocal pneumonia versus aspiration. 2. Mild emphysema with stable small pulmonary nodules since 2/12/2025. Recommend continued annual low-dose chest CT screening, due February 2026. 3. Aortic and severe coronary atherosclerotic disease. Electronically Signed: Ghassan Brewster MD  2/19/2025 6:34 PM EST  Workstation ID: [id number]    XR Chest 1 View    Result  Date: 2/19/2025  XR CHEST 1 VW Date of Exam: 2/19/2025 5:05 PM EST Indication: sob Comparison: Chest radiograph 12/4/2024 and a chest CT to 1225 Findings: Mediastinum: Cardiac silhouette appears unchanged and normal in size Lungs: The lungs appear clear without focal consolidation appreciated. Pleura: No pleural effusion or pneumothorax. Bones and soft tissues: No acute, displaced fracture seen.     Impression: Impression: No radiographic evidence of acute cardiopulmonary disease. Electronically Signed: Corey Pérez  2/19/2025 5:33 PM EST  Workstation ID: [id number]     Results for orders placed during the hospital encounter of 07/15/24    Adult Transthoracic Echo Complete W/ Cont if Necessary Per Protocol    Interpretation Summary    Left ventricular ejection fraction appears to be 61 - 65%.    All left ventricular wall segments contract normally.    No significant valvular stenosis or regurgitation.      Current medications:  Scheduled Meds:aspirin, 81 mg, Oral, Daily  budesonide-formoterol, 2 puff, Inhalation, BID - RT   And  revefenacin, 175 mcg, Nebulization, Daily - RT  cefTRIAXone, 1,000 mg, Intravenous, Q24H  dilTIAZem CD, 120 mg, Oral, Q24H  doxycycline, 100 mg, Oral, Q12H  guaiFENesin, 600 mg, Oral, Q12H  heparin (porcine), 5,000 Units, Subcutaneous, Q8H  insulin glargine, 15 Units, Subcutaneous, Nightly  insulin lispro, 2-7 Units, Subcutaneous, 4x Daily AC & at Bedtime  ipratropium-albuterol, 3 mL, Nebulization, 4x Daily - RT  isosorbide mononitrate, 30 mg, Oral, Daily  methylPREDNISolone sodium succinate, 60 mg, Intravenous, Q12H  nicotine, 1 patch, Transdermal, Q24H  polyethylene glycol, 17 g, Oral, Daily  rosuvastatin, 20 mg, Oral, Daily  sodium chloride, 10 mL, Intravenous, Q12H  sodium chloride, 4 mL, Nebulization, Daily - RT      Continuous Infusions:   PRN Meds:.  acetaminophen **OR** acetaminophen **OR** acetaminophen    senna-docusate sodium **AND** polyethylene glycol **AND** bisacodyl **AND**  bisacodyl    dextrose    dextrose    glucagon (human recombinant)    HYDROcodone-acetaminophen    HYDROcodone-acetaminophen    HYDROmorphone **AND** naloxone    ipratropium-albuterol    nitroglycerin    ondansetron ODT **OR** ondansetron    sodium chloride    sodium chloride    Assessment & Plan   Assessment & Plan     Active Hospital Problems    Diagnosis  POA    **Pneumonia [J18.9]  Yes    Coronary artery disease involving native coronary artery of native heart with angina pectoris [I25.119]  Yes    Stage III (Severe) COPD [J44.9]  Yes    Type 2 diabetes mellitus with hyperglycemia, with long-term current use of insulin [E11.65, Z79.4]  Not Applicable    Obstructive sleep apnea [G47.33]  Yes      Resolved Hospital Problems   No resolved problems to display.        Brief Hospital Course to date:  Kathy Taylor is a 64 y.o. female with history of type 2 diabetes, SABRINA, COPD, hypertension, CAD who presents to the ED with 3 days of progressively worsening SOA, cough found to have multifocal pneumonia    Acute respiratory failure with hypoxia secondary to multifocal pneumonia, exacerbated by COPD with bronchospasm  Tobacco abuse  -Patient started on Rocephin and doxycycline, unfortunately blood cultures not collected, follow-up urinary antigens  -She is currently on 2 L NC, wean as able  -Continue steroids, DuoNebs  -Continue NRT    Poorly controlled type 2 diabetes with A1c 8.7%  -FSBG's reviewed and are suboptimal  -Continue basal and SSI, add prandial Humalog 5 units 3 times daily adjust as warranted    Hypertension  CAD  -Continue aspirin, Imdur, diltiazem    All problems listed above are new to me today    Expected Discharge Location and Transportation: TBD  Expected Discharge   Expected discharge date/ time has not been documented.     VTE Prophylaxis:  Pharmacologic VTE prophylaxis orders are present.         AM-PAC 6 Clicks Score (PT): 23 (02/19/25 4703)    CODE STATUS:   Code Status and Medical Interventions:  CPR (Attempt to Resuscitate); Full Support   Ordered at: 02/19/25 2012     Code Status (Patient has no pulse and is not breathing):    CPR (Attempt to Resuscitate)     Medical Interventions (Patient has pulse or is breathing):    Full Support       Eliane Oviedo MD  02/20/25

## 2025-02-20 NOTE — PLAN OF CARE
Problem: Sepsis/Septic Shock  Goal: Optimal Coping  Outcome: Not Progressing  Intervention: Support Patient and Family Response  Recent Flowsheet Documentation  Taken 2/19/2025 2155 by Soraya Thomas, RN  Family/Support System Care: self-care encouraged  Goal: Absence of Bleeding  Outcome: Not Progressing  Goal: Blood Glucose Level Within Target Range  Outcome: Not Progressing  Intervention: Optimize Glycemic Control  Recent Flowsheet Documentation  Taken 2/19/2025 2155 by Soraya Thomas, RN  Hyperglycemia Management:   blood glucose monitored   correctional insulin given  Hypoglycemia Management: blood glucose monitored  Goal: Absence of Infection Signs and Symptoms  Outcome: Not Progressing  Intervention: Promote Recovery  Recent Flowsheet Documentation  Taken 2/20/2025 0600 by Soraya Thomas, RN  Activity Management: up ad ruben  Taken 2/20/2025 0400 by Soraya Thomas, RN  Activity Management: up ad ruben  Taken 2/20/2025 0200 by Soraya Thomas, RN  Activity Management: up ad ruben  Taken 2/20/2025 0000 by Soraya Thomas, RN  Activity Management: up ad ruben  Taken 2/19/2025 2200 by Soraya Thomas, RN  Activity Management: up ad ruben  Taken 2/19/2025 2155 by Soraya Thomas, RN  Activity Management: ambulated in room  Goal: Optimal Nutrition Delivery  Outcome: Not Progressing   Goal Outcome Evaluation:      Patient from ED to 5G last night. VSS on 2L nasal cannula. Patient initially tachycardic, now sinus rhythm. Intermittently c/o SOB. Flutter valve and IS setup at bedside, encouraged to use. Independent with cane, mobility appropriate for age. No complaints of pain. Patient rested well overnight. No concerns at this time.

## 2025-02-20 NOTE — PAYOR COMM NOTE
"Ref# [id number]     BING Ornelas, RN  Utilization Review  Phone [phone]  Fax [phone]    [facility]  [address] [place] [address]         Giordano, Kathy SUPA (64 y.o. Female)       Date of Birth   1960    Social Security Number       Address   [address] [place] [address] [address] [facility] [address]    Home Phone   [phone]    MRN   [id number]       Latter day   Non-Jewish    Marital Status                               Admission Date   2/19/25    Admission Type   Emergency    Admitting Provider   Eliane Oviedo MD    Attending Provider   Eliane Oviedo MD    Department, Room/Bed   [facility] 5G, S560/1       Discharge Date       Discharge Disposition       Discharge Destination                                 Attending Provider: Eliane Oviedo MD    Allergies: Bee Venom, Ciprofloxacin    Isolation: None   Infection: None   Code Status: CPR    Ht: 167.6 cm (66\")   Wt: 76.6 kg (168 lb 14.4 oz)    Admission Cmt: None   Principal Problem: Pneumonia [J18.9]                   Active Insurance as of 2/19/2025       Primary Coverage       Payor Plan Insurance Group Employer/Plan Group    AETNA MEDICARE REPLACEMENT AETNA MED ADV HMO [id number]       Payor Plan Address Payor Plan Phone Number Payor Plan Fax Number Effective Dates    [address] [phone]  3/1/2024 - None Entered    [facility] [address]         Subscriber Name Subscriber Birth Date Member ID       KATHY GIORDANO 1960 [id number]                     Emergency Contacts        (Rel.) Home Phone Work Phone Mobile Phone    Diane Giordano (Daughter) [phone] -- [phone]              [place]: NPI [id number] Tax ID [id number]  Insurance Information                  AETNA MEDICARE REPLACEMENT/AETNA MED ADV HMO Phone: [phone]    Subscriber: Kathy Giordano Subscriber#: [id number]    Group#: [id number] Precert#: [id number]    Authorization#: [id number] " Effective Date: --             History & Physical        Toy Manzano MD at 25              [facility] Medicine Services  HISTORY AND PHYSICAL    Patient Name: Kathy Taylor  : 1960  MRN: [id number]  Primary Care Physician: Provider, No Known  Date of admission: 2025      Subjective  Subjective     Chief Complaint: SOA    HPI:  Kathy Taylor is a 64 y.o. female with history of COPD, NOT on home oxygen, CAD, FM, HTN, SABRINA, tobacco use, DM here with progressive SOA x 3 days. Increased cough/congestion. Dark sputum, green-yellow, no blood. Fevers and chills. Loose stool. No n/v. Decreased PO intake.     Review of Systems   Constitutional:  Positive for activity change and fatigue.   HENT:  Positive for congestion.    Respiratory:  Positive for cough, chest tightness, shortness of breath and wheezing.      Problem List:  CAD  Echocardiogram 2022: LVEF 55%, no significant valvular abnormalities, no pericardial effusion  Stress test 2022: Normal myocardial perfusion study with no evidence of ischemia, EF 57%  Calcium score 2024: 813  Cardiac catheterization 2024: 40% LAD stenosis, 40% circumflex stenosis, RPDA 70% stenosis with SWAPNIL-3 flow.  Medical management recommended  Echo 2024: EF 61 to 65% with normal valves  Hypotension, intolerant to HCTZ with symptomatic hypotension  Hyperlipidemia  Type 2 diabetes mellitus, onset ; hemoglobin A1c 9.7% 2023  Stage III severe COPD with current tobacco use 1 pack/day, 50-pack-year history, PFTs show moderate to severe airway obstruction, no significant change in FEV1 after bronchodilator, no restriction but air trapping and reduced diffusion capacity 2024  History of SABRINA with upcoming sleep study  Fibromyalgia  Migraines  [facility] admission 2021 for COVID  Family history of CAD  History of rheumatic fever as a child  Surgical history:  Breast lumpectomy  Tubal ligation  Vulvar  surgery  [place] teeth extraction        Personal History     Past Medical History:   Diagnosis Date    Ankle sprain     Arthritis     Arthritis of back     Arthritis of neck     Asthma ?    May have been earlier    Back ache     Bronchiectasis ?    Bronchitis     Bursitis of hip     Cervical disc disorder     Chronic bronchitis ?    COPD (chronic obstructive pulmonary disease)     Coronary artery disease involving native coronary artery of native heart with angina pectoris 07/15/2024    Emphysema of lung     Dont remember when told.    Fibromyalgia     Hip arthrosis     Hyperlipidemia LDL goal <100 02/05/2024    Knee sprain     Migraines     Myocardial infarction     Was told I've  had a couple small ones    Neck strain     Pneumonia     Rheumatic fever     Rotator cuff syndrome     Shingles     Sleep apnea, obstructive ?    Tooth infection     Type 2 diabetes mellitus     Vulvar carcinoma     Wrist sprain            Past Surgical History:   Procedure Laterality Date    BREAST BIOPSY Left     BREAST LUMPECTOMY Left 2004    CARDIAC CATHETERIZATION N/A 07/15/2024    Procedure: Left Heart Cath;  Surgeon: Lonnie Salomon IV, MD;  [facility];  Service: Cardiology;  Laterality: N/A;    CARDIAC CATHETERIZATION  07/15/2024    TONSILLECTOMY      TUBAL ABDOMINAL LIGATION      VULVA BIOPSY      VULVA SURGERY      WISDOM TOOTH EXTRACTION         Family History: family history includes Arthritis in her mother; Breast cancer in an other family member; Cancer in her maternal grandmother and mother; Diabetes in her brother and mother; Heart attack in her brother, maternal grandmother, and mother; Heart disease in her brother and mother; Thyroid disease in her mother; Uterine cancer in her mother.     Social History:  reports that she has been smoking cigarettes. She has a 50 pack-year smoking history. She has been exposed to tobacco smoke. She has never used smokeless tobacco. She reports that  she does not currently use alcohol. She reports that she does not use drugs.  Social History     Social History Narrative        Has moved to [place] from [place]    Continues to smoke up to 1 pack of cigarettes per day    Denies regular alcohol use       Medications:  Available home medication information reviewed.  Accu-Chek Softclix Lancets, Fluticasone-Umeclidin-Vilant, Glucagon, HYDROcodone-acetaminophen, Insulin Glargine, Insulin Pen Needle, Semaglutide(0.25 or 0.5MG/DOS), albuterol sulfate HFA, aspirin, diclofenac, dilTIAZem XR, glucose blood, ipratropium, ipratropium-albuterol, isosorbide mononitrate, meloxicam, metFORMIN ER, nicotine, nitroglycerin, polyethylene glycol, rosuvastatin, sucralfate, and tiZANidine    Allergies   Allergen Reactions    Bee Venom Anaphylaxis    Ciprofloxacin Shortness Of Breath       Objective  Objective     Vital Signs:   Temp:  [98.2 °F (36.8 °C)] 98.2 °F (36.8 °C)  Heart Rate:  [100-109] 100  Resp:  [22] 22  BP: (126-167)/(58-93) 126/69  Flow (L/min) (Oxygen Therapy):  [2] 2       Physical Exam   NAD, alert and oriented  OP clear, dry MM  Neck supple  No LAD  Wheezes, coarse at bases  Tachy  +BS, soft  WERNER  Normal affect  No rashes    Result Review:  I have personally reviewed the results from the time of this admission to 2/19/2025 20:14 EST and agree with these findings:  []  Laboratory list / accordion  []  Microbiology  []  Radiology  []  EKG/Telemetry   []  Cardiology/Vascular   []  Pathology  []  Old records  []  Other:  Most notable findings include: Multifocal PNA, WBC 26.9      LAB RESULTS:      Lab 02/19/25  1704   WBC 26.94*   HEMOGLOBIN 14.4   HEMATOCRIT 40.3   PLATELETS 244   NEUTROS ABS 23.55*   IMMATURE GRANS (ABS) 0.20*   LYMPHS ABS 1.64   MONOS ABS 1.33*   EOS ABS 0.15   MCV 87.6   D DIMER QUANT 0.54         Lab 02/19/25  1704   SODIUM 133*   POTASSIUM 4.7   CHLORIDE 96*   CO2 22.0   ANION GAP 15.0   BUN 22   CREATININE 0.98   EGFR 65.4   GLUCOSE 295*    CALCIUM 9.6         Lab 02/19/25 1704   TOTAL PROTEIN 7.4   ALBUMIN 4.1   GLOBULIN 3.3   ALT (SGPT) 15   AST (SGOT) 18   BILIRUBIN 0.6   ALK PHOS 90         Lab 02/19/25  1818 02/19/25 1704   PROBNP  --  326.4   HSTROP T 34* 41*                 UA          10/19/2024    13:22   Urinalysis   Squamous Epithelial Cells, UA 3-6    Specific Gravity, UA 1.020    Ketones, UA Trace    Blood, UA Trace    Leukocytes, UA Trace    Nitrite, UA Negative    RBC, UA 0-2    WBC, UA 0-2    Bacteria, UA None Seen        Microbiology Results (last 10 days)       Procedure Component Value - Date/Time    COVID PRE-OP / PRE-PROCEDURE SCREENING ORDER (NO ISOLATION) - Swab, Nasopharynx [[id number]]  (Normal) Collected: 02/19/25 1720    Lab Status: Final result Specimen: Swab from Nasopharynx Updated: 02/19/25 1748    Narrative:      The following orders were created for panel order COVID PRE-OP / PRE-PROCEDURE SCREENING ORDER (NO ISOLATION) - Swab, Nasopharynx.  Procedure                               Abnormality         Status                     ---------                               -----------         ------                     COVID-19 and FLU A/B PCR...[[id number]]  Normal              Final result                 Please view results for these tests on the individual orders.    COVID-19 and FLU A/B PCR, 1 HR TAT - Swab, Nasopharynx [[id number]]  (Normal) Collected: 02/19/25 1720    Lab Status: Final result Specimen: Swab from Nasopharynx Updated: 02/19/25 1748     COVID19 Not Detected     Influenza A PCR Not Detected     Influenza B PCR Not Detected    Narrative:      Fact sheet for providers: https://www.fda.gov/media/410418/download    Fact sheet for patients: https://www.fda.gov/media/534410/download    Test performed by PCR.            CT Chest Without Contrast Diagnostic    Result Date: 2/19/2025  CT CHEST WO CONTRAST DIAGNOSTIC Date of Exam: 2/19/2025 6:23 PM EST Indication: cough, sob, eval for pna. Comparison: CT chest  2/12/2025 Technique: Axial CT images were obtained of the chest without contrast administration.  Reconstructed coronal and sagittal images were also obtained. Automated exposure control and iterative construction methods were used. Findings: Thyroid unremarkable. No supraclavicular adenopathy. Aortic atherosclerotic disease without aneurysm. Severe calcified coronary atherosclerotic disease. Normal caliber main pulmonary artery. Heart size normal. No pericardial effusion. Esophagus unremarkable. No suspicious adenopathy. Trachea patent. Mild emphysema. There are new infectious/inflammatory patchy groundglass airspace opacities in the lingula and left greater than right lower lobes. Scattered small discrete pulmonary nodules stable from recent low-dose chest CT screening.  Linear bandlike areas of minimal atelectasis versus scarring. No edema, effusion or pneumothorax. No acute findings partially imaged upper abdomen. No acute chest wall findings. No axillary adenopathy. Degenerative changes throughout the spine without acute displaced fracture or aggressive lesion.     Impression: Impression: 1. Mild/early multifocal pneumonia versus aspiration. 2. Mild emphysema with stable small pulmonary nodules since 2/12/2025. Recommend continued annual low-dose chest CT screening, due February 2026. 3. Aortic and severe coronary atherosclerotic disease. Electronically Signed: Ghassan Brewster MD  2/19/2025 6:34 PM EST  Workstation ID: [id number]    XR Chest 1 View    Result Date: 2/19/2025  XR CHEST 1 VW Date of Exam: 2/19/2025 5:05 PM EST Indication: sob Comparison: Chest radiograph 12/4/2024 and a chest CT to 1225 Findings: Mediastinum: Cardiac silhouette appears unchanged and normal in size Lungs: The lungs appear clear without focal consolidation appreciated. Pleura: No pleural effusion or pneumothorax. Bones and soft tissues: No acute, displaced fracture seen.     Impression: Impression: No radiographic evidence of acute  cardiopulmonary disease. Electronically Signed: Corey ENID Pérez  2/19/2025 5:33 PM EST  Workstation ID: [id number]     Results for orders placed during the hospital encounter of 07/15/24    Adult Transthoracic Echo Complete W/ Cont if Necessary Per Protocol    Interpretation Summary    Left ventricular ejection fraction appears to be 61 - 65%.    All left ventricular wall segments contract normally.    No significant valvular stenosis or regurgitation.      Assessment & Plan  Assessment & Plan       Pneumonia    Obstructive sleep apnea    Stage III (Severe) COPD    Coronary artery disease involving native coronary artery of native heart with angina pectoris    Type 2 diabetes mellitus with hyperglycemia, with long-term current use of insulin    Multifocal PNA  Acute respiratory failure with hypoxia  COPD, with bronchospasm  -rocephin/doxy  -nebs/pulmonary toilet    CAD  -on ASA, followed by cardiology here    DM  -basal insulin/SSI, titrate on steroids/prn    HTN  -continue appropriate home meds    VTE Prophylaxis:  Pharmacologic VTE prophylaxis orders are signed & held.            CODE STATUS:    Code Status and Medical Interventions: CPR (Attempt to Resuscitate); Full Support   Ordered at: 02/19/25 2012     Code Status (Patient has no pulse and is not breathing):    CPR (Attempt to Resuscitate)     Medical Interventions (Patient has pulse or is breathing):    Full Support       Expected Discharge   Expected discharge date/ time has not been documented.     Toy Manzano MD  02/19/25      Electronically signed by Toy Manzano MD at 02/19/25 2024          Emergency Department Notes        Audrey Holland, RN at 02/19/25 1912           Kathy Taylor    Nursing Report ED to Floor:  Mental status: alert and oriented  Ambulatory status: stand by  Oxygen Therapy:  2L NC while resting. Does not wear oxygen at home.  Cardiac Rhythm: sinus tachycardia  Safety Concerns:  none noted  Precautions: none  Social Issues:  none  noted  ED Room #:  09    ED Nurse Phone Extension - [phone] or may call [phone].      HPI:   Chief Complaint   Patient presents with    Shortness of Breath       Past Medical History:  Past Medical History:   Diagnosis Date    Ankle sprain     Arthritis     Arthritis of back     Arthritis of neck     Asthma ?    May have been earlier    Back ache     Bronchiectasis ?    Bronchitis     Bursitis of hip     Cervical disc disorder     Chronic bronchitis ?    COPD (chronic obstructive pulmonary disease)     Coronary artery disease involving native coronary artery of native heart with angina pectoris 07/15/2024    Emphysema of lung     Dont remember when told.    Fibromyalgia     Hip arthrosis     Hyperlipidemia LDL goal <100 02/05/2024    Knee sprain     Migraines     Myocardial infarction     Was told I've  had a couple small ones    Neck strain     Pneumonia     Rheumatic fever     Rotator cuff syndrome     Shingles     Sleep apnea, obstructive ?    Tooth infection     Type 2 diabetes mellitus     Vulvar carcinoma     Wrist sprain         Past Surgical History:  Past Surgical History:   Procedure Laterality Date    BREAST BIOPSY Left     BREAST LUMPECTOMY Left 2004    CARDIAC CATHETERIZATION N/A 07/15/2024    Procedure: Left Heart Cath;  Surgeon: Lonnie Salomon IV, MD;  [facility];  Service: Cardiology;  Laterality: N/A;    CARDIAC CATHETERIZATION  07/15/2024    TONSILLECTOMY      TUBAL ABDOMINAL LIGATION      VULVA BIOPSY      VULVA SURGERY      WISDOM TOOTH EXTRACTION          Admitting Doctor:   Toy Manzano MD    Consulting Provider(s):  Consults       No orders found from 1/21/2025 to 2/20/2025.             Admitting Diagnosis:   The primary encounter diagnosis was Acute sepsis. Diagnoses of Multifocal pneumonia, Acute exacerbation of chronic obstructive pulmonary disease (COPD), Hypoxia, and Smoker were also pertinent to this visit.    Most Recent Vitals:   Vitals:     02/19/25 1848 02/19/25 1853 02/19/25 1858 02/19/25 1903   BP:       Pulse: 104 105 103 103   Resp:       Temp:       TempSrc:       SpO2: (!) 89% (!) 87% 93% 93%   Weight:       Height:           Active LDAs/IV Access:   Lines, Drains & Airways       Active LDAs       Name Placement date Placement time Site Days    Peripheral IV 02/19/25 1717 Anterior;Right Forearm 02/19/25  1717  Forearm  less than 1                    Labs (abnormal labs have a star):   Labs Reviewed   COMPREHENSIVE METABOLIC PANEL - Abnormal; Notable for the following components:       Result Value    Glucose 295 (*)     Sodium 133 (*)     Chloride 96 (*)     All other components within normal limits    Narrative:     GFR Categories in Chronic Kidney Disease (CKD)      GFR Category          GFR (mL/min/1.73)    Interpretation  G1                     90 or greater         Normal or high (1)  G2                      60-89                Mild decrease (1)  G3a                   45-59                Mild to moderate decrease  G3b                   30-44                Moderate to severe decrease  G4                    15-29                Severe decrease  G5                    14 or less           Kidney failure          (1)In the absence of evidence of kidney disease, neither GFR category G1 or G2 fulfill the criteria for CKD.    eGFR calculation 2021 CKD-EPI creatinine equation, which does not include race as a factor   TROPONIN - Abnormal; Notable for the following components:    HS Troponin T 41 (*)     All other components within normal limits    Narrative:     High Sensitive Troponin T Reference Range:  <14.0 ng/L- Negative Female for AMI  <22.0 ng/L- Negative Male for AMI  >=14 - Abnormal Female indicating possible myocardial injury.  >=22 - Abnormal Male indicating possible myocardial injury.   Clinicians would have to utilize clinical acumen, EKG, Troponin, and serial changes to determine if it is an Acute Myocardial Infarction or myocardial  injury due to an underlying chronic condition.        CBC WITH AUTO DIFFERENTIAL - Abnormal; Notable for the following components:    WBC 26.94 (*)     Neutrophil % 87.4 (*)     Lymphocyte % 6.1 (*)     Monocyte % 4.9 (*)     Immature Grans % 0.7 (*)     Neutrophils, Absolute 23.55 (*)     Monocytes, Absolute 1.33 (*)     Immature Grans, Absolute 0.20 (*)     All other components within normal limits   HIGH SENSITIVITIY TROPONIN T 1HR - Abnormal; Notable for the following components:    HS Troponin T 34 (*)     All other components within normal limits    Narrative:     High Sensitive Troponin T Reference Range:  <14.0 ng/L- Negative Female for AMI  <22.0 ng/L- Negative Male for AMI  >=14 - Abnormal Female indicating possible myocardial injury.  >=22 - Abnormal Male indicating possible myocardial injury.   Clinicians would have to utilize clinical acumen, EKG, Troponin, and serial changes to determine if it is an Acute Myocardial Infarction or myocardial injury due to an underlying chronic condition.        COVID-19 AND FLU A/B, NP SWAB IN TRANSPORT MEDIA 1 HR TAT - Normal    Narrative:     Fact sheet for providers: https://www.fda.gov/media/761566/download    Fact sheet for patients: https://www.fda.gov/media/539229/download    Test performed by PCR.   BNP (IN-HOUSE) - Normal    Narrative:     This assay is used as an aid in the diagnosis of individuals suspected of having heart failure. It can be used as an aid in the diagnosis of acute decompensated heart failure (ADHF) in patients presenting with signs and symptoms of ADHF to the emergency department (ED). In addition, NT-proBNP of <300 pg/mL indicates ADHF is not likely.    Age Range Result Interpretation  NT-proBNP Concentration (pg/mL:      <50             Positive            >450                   Gray                 300-450                    Negative             <300    50-75           Positive            >900                  Santos                 "300-900                  Negative            <300      >75             Positive            >1800                  Gray                300-1800                  Negative            <300   D-DIMER, QUANTITATIVE - Normal    Narrative:     According to the assay 's published package insert, a normal (<0.50 MCGFEU/mL) D-dimer result in conjunction with a non-high clinical probability assessment, excludes deep vein thrombosis (DVT) and pulmonary embolism (PE) with high sensitivity.    D-dimer values increase with age and this can make VTE exclusion of an older population difficult. To address this, the American College of Physicians, based on best available evidence and recent guidelines, recommends that clinicians use age-adjusted D-dimer thresholds in patients greater than 50 years of age with: a) a low probability of PE who do not meet all Pulmonary Embolism Rule Out Criteria, or b) in those with intermediate probability of PE.   The formula for an age-adjusted D-dimer cut-off is \"age/100\".  For example, a 60 year old patient would have an age-adjusted cut-off of 0.60 MCGFEU/mL and an 80 year old 0.80 MCGFEU/mL.   COVID PRE-OP / PRE-PROCEDURE SCREENING ORDER (NO ISOLATION)    Narrative:     The following orders were created for panel order COVID PRE-OP / PRE-PROCEDURE SCREENING ORDER (NO ISOLATION) - Swab, Nasopharynx.  Procedure                               Abnormality         Status                     ---------                               -----------         ------                     COVID-19 and FLU A/B PCR...[[id number]]  Normal              Final result                 Please view results for these tests on the individual orders.   CBC AND DIFFERENTIAL    Narrative:     The following orders were created for panel order CBC & Differential.  Procedure                               Abnormality         Status                     ---------                               -----------         ------         "             CBC Auto Differential[[id number]]        Abnormal            Final result                 Please view results for these tests on the individual orders.       Meds Given in ED:   Medications   cefTRIAXone (ROCEPHIN) 1,000 mg in sodium chloride 0.9 % 100 mL MBP (has no administration in time range)   sodium chloride 0.9 % bolus 1,000 mL (has no administration in time range)   ipratropium-albuterol (DUO-NEB) nebulizer solution 3 mL (3 mL Nebulization Given 2/19/25 1757)   methylPREDNISolone sodium succinate (SOLU-Medrol) injection 125 mg (125 mg Intravenous Given 2/19/25 1717)   doxycycline (MONODOX) capsule 100 mg (100 mg Oral Given 2/19/25 1717)           Last NIH score:                                                          Dysphagia screening results:        Kieran Coma Scale:  No data recorded     CIWA:        Restraint Type:            Isolation Status:  No active isolations          Electronically signed by Audrey Holland RN at 02/19/25 1913       Shayne Simental MD at 02/19/25 1653            [place]    EMERGENCY DEPARTMENT ENCOUNTER      Pt Name: Kathy Taylor  MRN: [id number]  YOB: 1960  Date of evaluation: 2/19/2025  Provider: Shayne Simental MD    CHIEF COMPLAINT       Chief Complaint   Patient presents with    Shortness of Breath         HISTORY OF PRESENT ILLNESS   Kathy Taylor is a 64 y.o. female who presents to the emergency department with complaint of worsening shortness of breath and productive cough over the course the past week with some associated tightness across her chest that does not radiate into her extremities, abdomen, back, or neck.  She has had no fever, chills, diaphoresis, nausea, or vomiting.  Denies any prior history of coronary artery disease or CHF.  She has known emphysema and does continue to smoke. Patient denies any history of VTE as well as any recent surgery, hospitalization, long distance travel, swelling or pain in the lower extremities, or  exogenous hormone use.  Per patient, she has used her nebulizer at home without significant relief.        Nursing notes were reviewed.    REVIEW OF SYSTEMS     ROS:  A chief complaint appropriate review of systems was completed and is negative except as noted in the HPI.      PAST MEDICAL HISTORY     Past Medical History:   Diagnosis Date    Ankle sprain     Arthritis     Arthritis of back     Arthritis of neck     Asthma ?    May have been earlier    Back ache     Bronchiectasis ?    Bronchitis     Bursitis of hip     Cervical disc disorder     Chronic bronchitis ?    COPD (chronic obstructive pulmonary disease)     Coronary artery disease involving native coronary artery of native heart with angina pectoris 07/15/2024    Emphysema of lung     Dont remember when told.    Fibromyalgia     Hip arthrosis     Hyperlipidemia LDL goal <100 02/05/2024    Knee sprain     Migraines     Myocardial infarction     Was told I've  had a couple small ones    Neck strain     Pneumonia     Rheumatic fever     Rotator cuff syndrome     Shingles     Sleep apnea, obstructive ?    Tooth infection     Type 2 diabetes mellitus     Vulvar carcinoma     Wrist sprain          SURGICAL HISTORY       Past Surgical History:   Procedure Laterality Date    BREAST BIOPSY Left     BREAST LUMPECTOMY Left 2004    CARDIAC CATHETERIZATION N/A 07/15/2024    Procedure: Left Heart Cath;  Surgeon: Lonnie Salomon IV, MD;  Location: [facility] CATH INVASIVE LOCATION;  Service: Cardiology;  Laterality: N/A;    CARDIAC CATHETERIZATION  07/15/2024    TONSILLECTOMY      TUBAL ABDOMINAL LIGATION      VULVA BIOPSY      VULVA SURGERY      WISDOM TOOTH EXTRACTION           CURRENT MEDICATIONS       Current Facility-Administered Medications:     cefTRIAXone (ROCEPHIN) 1,000 mg in sodium chloride 0.9 % 100 mL MBP, 1,000 mg, Intravenous, Once, Shayne Simental MD    sodium chloride 0.9 % bolus 1,000 mL, 1,000 mL, Intravenous, Once, Shayne Simental  MD    Current Outpatient Medications:     Accu-Chek Softclix Lancets lancets, USE 1  TO CHECK GLUCOSE TWICE DAILY TO THREE TIMES DAILY dx e11.65 on insulin, Disp: 100 each, Rfl: 11    albuterol sulfate  (90 Base) MCG/ACT inhaler, Inhale 2 puffs Every 6 (Six) Hours As Needed for Wheezing., Disp: 18 g, Rfl: 11    aspirin (ASPIR) 81 MG EC tablet, Take 1 tablet by mouth Daily., Disp: 90 tablet, Rfl: 3    diclofenac (VOLTAREN) 50 MG EC tablet, Take 1 tablet by mouth 3 (Three) Times a Day., Disp: 21 tablet, Rfl: 0    dilTIAZem XR (DILACOR XR) 120 MG 24 hr capsule, Take 1 capsule by mouth Daily., Disp: 90 capsule, Rfl: 3    Fluticasone-Umeclidin-Vilant (Trelegy Ellipta) 200-62.5-25 MCG/ACT inhaler, Inhale 1 puff Daily., Disp: 1 each, Rfl: 11    Glucagon 1 MG/0.2ML solution auto-injector, Inject 1 mg under the skin into the appropriate area as directed As Needed (severe hypoglycemia)., Disp: 1 mL, Rfl: 0    glucose blood test strip, Use as instructed 2-3 times a day, Disp: 100 each, Rfl: 3    HYDROcodone-acetaminophen (NORCO) 7.5-325 MG per tablet, Take 1 tablet by mouth Every 6 (Six) Hours As Needed for Severe Pain., Disp: , Rfl:     Insulin Pen Needle (BD Pen Needle Nessa U/F) 32G X 4 MM misc, Use as directed 5 times daily, Disp: 150 each, Rfl: 3    ipratropium (ATROVENT) 0.03 % nasal spray, 2 sprays into the nostril(s) as directed by provider Every 12 (Twelve) Hours., Disp: 1 each, Rfl: 11    ipratropium-albuterol (DUO-NEB) 0.5-2.5 mg/3 ml nebulizer, USE 3 ML VIA NEBULIZER FOUR TIMES DAILY AS NEEDED FOR WHEEZING, Disp: 180 mL, Rfl: 3    isosorbide mononitrate (IMDUR) 30 MG 24 hr tablet, Take 1 tablet by mouth Every Morning., Disp: 90 tablet, Rfl: 3    Lantus SoloStar 100 UNIT/ML injection pen, Inject 10 Units under the skin into the appropriate area as directed Every Night. Titrate 2 units every 3 days if blood sugar is more than 140 fasting; Max daily dose: 30 units, Disp: 27 mL, Rfl: 1    meloxicam (Mobic) 15 MG  tablet, Take 1 tablet every day by oral route with meals for 30 days., Disp: , Rfl:     metFORMIN ER (GLUCOPHAGE-XR) 500 MG 24 hr tablet, Take 1 tablet by mouth Daily With Breakfast., Disp: 90 tablet, Rfl: 1    nicotine (NICOTROL) 10 MG inhaler, Inhale 1 puff As Needed for Smoking Cessation., Disp: 1 each, Rfl: 6    nitroglycerin (NITROSTAT) 0.3 MG SL tablet, 1 under the tongue as needed for angina, may repeat q5mins for up three doses, Disp: 25 tablet, Rfl: 11    polyethylene glycol (MIRALAX) 17 g packet, Take 17 g by mouth Daily., Disp: 100 each, Rfl: 0    rosuvastatin (CRESTOR) 20 MG tablet, Take 1 tablet by mouth Daily., Disp: 90 tablet, Rfl: 3    Semaglutide,0.25 or 0.5MG/DOS, (Ozempic, 0.25 or 0.5 MG/DOSE,) 2 MG/3ML solution pen-injector, Inject 0.5 mg under the skin into the appropriate area as directed 1 (One) Time Per Week. Contact office after 4 weeks for additional adjustment if tolerating well., Disp: 3 mL, Rfl: 3    sucralfate (CARAFATE) 1 g tablet, Take 1 tablet by mouth 3 (Three) Times a Day. Thoroughly crush pill and mix in small amount of water prior to swallowing. (Patient not taking: Reported on 2/4/2025), Disp: 60 tablet, Rfl: 0    tiZANidine (ZANAFLEX) 4 MG tablet, Take 1 tablet by mouth 3 (Three) Times a Day As Needed., Disp: , Rfl:     ALLERGIES     Bee venom and Ciprofloxacin    FAMILY HISTORY       Family History   Problem Relation Age of Onset    Arthritis Mother     Diabetes Mother     Heart disease Mother     Thyroid disease Mother     Uterine cancer Mother     Cancer Mother     Heart attack Mother     Heart disease Brother     Diabetes Brother     Breast cancer Other         MATERNAL GREAT AUNT    Cancer Maternal Grandmother     Heart attack Maternal Grandmother     Heart attack Brother     Ovarian cancer Neg Hx           SOCIAL HISTORY       Social History     Socioeconomic History    Marital status:    Tobacco Use    Smoking status: Every Day     Current packs/day: 1.00      Average packs/day: 1 pack/day for 50.0 years (50.0 ttl pk-yrs)     Types: Cigarettes     Passive exposure: Past    Smokeless tobacco: Never   Vaping Use    Vaping status: Former    Substances: Nicotine    Devices: Pre-filled or refillable cartridge   Substance and Sexual Activity    Alcohol use: Not Currently    Drug use: Never    Sexual activity: Not Currently     Partners: Male     Birth control/protection: Tubal ligation         PHYSICAL EXAM    (up to 7 for level 4, 8 or more for level 5)     Vitals:    02/19/25 1848 02/19/25 1853 02/19/25 1858 02/19/25 1903   BP:       Pulse: 104 105 103 103   Resp:       Temp:       TempSrc:       SpO2: (!) 89% (!) 87% 93% 93%   Weight:       Height:           General: Awake, alert, no acute distress.  HEENT: Conjunctivae normal.  Neck: Trachea midline.  Cardiac: Heart regular rate, rhythm, no murmurs, rubs, or gallops  Lungs: No tachypnea.  Moderate diffuse expiratory wheezes.  No focal findings.  Chest wall: There is no tenderness to palpation over the chest wall or over ribs  Abdomen: Abdomen is soft, nontender, nondistended. There are no firm or pulsatile masses, no rebound rigidity or guarding.   Musculoskeletal: No deformity.  Neuro: Alert and oriented x 4.  Dermatology: Skin is warm and dry  Psych: Mentation is grossly normal, cognition is grossly normal. Affect is appropriate.        DIAGNOSTIC RESULTS     EKG: All EKGs are interpreted by the Emergency Department Physician who either signs or Co-signs this chart in the absence of a cardiologist.    ECG 12 Lead Dyspnea   Preliminary Result   Test Reason : Dyspnea   Blood Pressure :   */*   mmHG   Vent. Rate : 109 BPM     Atrial Rate : 109 BPM      P-R Int : 166 ms          QRS Dur : 110 ms       QT Int : 362 ms       P-R-T Axes :  72 112  78 degrees     QTcB Int : 487 ms      Sinus tachycardia   Right axis deviation   Incomplete right bundle branch block   Possible Right ventricular hypertrophy   Nonspecific ST and T  wave abnormality   Abnormal ECG   When compared with ECG of 04-Dec-2024 14:54,   Incomplete right bundle branch block has replaced Right bundle branch    block      Referred By: EDMD           Confirmed By:             RADIOLOGY:   [x] Radiologist's Report Reviewed:  CT Chest Without Contrast Diagnostic   Final Result   Impression:   1. Mild/early multifocal pneumonia versus aspiration.   2. Mild emphysema with stable small pulmonary nodules since 2/12/2025. Recommend continued annual low-dose chest CT screening, due February 2026.   3. Aortic and severe coronary atherosclerotic disease.            Electronically Signed: Ghassan Brewster MD     2/19/2025 6:34 PM EST     Workstation ID: [id number]      XR Chest 1 View   Final Result   Impression:   No radiographic evidence of acute cardiopulmonary disease.            Electronically Signed: Corey Pérez     2/19/2025 5:33 PM EST     Workstation ID: [id number]          I ordered and independently reviewed the above noted radiographic studies.        LABS:    I have reviewed and interpreted all of the currently available lab results from this visit (if applicable):  Results for orders placed or performed during the hospital encounter of 02/19/25   ECG 12 Lead Dyspnea    Collection Time: 02/19/25  5:01 PM   Result Value Ref Range    QT Interval 362 ms    QTC Interval 487 ms   Comprehensive Metabolic Panel    Collection Time: 02/19/25  5:04 PM    Specimen: Blood   Result Value Ref Range    Glucose 295 (H) 65 - 99 mg/dL    BUN 22 8 - 23 mg/dL    Creatinine 0.98 0.57 - 1.00 mg/dL    Sodium 133 (L) 136 - 145 mmol/L    Potassium 4.7 3.5 - 5.2 mmol/L    Chloride 96 (L) 98 - 107 mmol/L    CO2 22.0 22.0 - 29.0 mmol/L    Calcium 9.6 8.6 - 10.5 mg/dL    Total Protein 7.4 6.0 - 8.5 g/dL    Albumin 4.1 3.5 - 5.2 g/dL    ALT (SGPT) 15 1 - 33 U/L    AST (SGOT) 18 1 - 32 U/L    Alkaline Phosphatase 90 39 - 117 U/L    Total Bilirubin 0.6 0.0 - 1.2 mg/dL    Globulin 3.3 gm/dL    A/G Ratio  1.2 g/dL    BUN/Creatinine Ratio 22.4 7.0 - 25.0    Anion Gap 15.0 5.0 - 15.0 mmol/L    eGFR 65.4 >60.0 mL/min/1.73   BNP    Collection Time: 02/19/25  5:04 PM    Specimen: Blood   Result Value Ref Range    proBNP 326.4 0.0 - 900.0 pg/mL   High Sensitivity Troponin T    Collection Time: 02/19/25  5:04 PM    Specimen: Blood   Result Value Ref Range    HS Troponin T 41 (H) <14 ng/L   D-dimer, Quantitative    Collection Time: 02/19/25  5:04 PM    Specimen: Blood   Result Value Ref Range    D-Dimer, Quantitative 0.54 0.00 - 0.62 MCGFEU/mL   CBC Auto Differential    Collection Time: 02/19/25  5:04 PM    Specimen: Blood   Result Value Ref Range    WBC 26.94 (H) 3.40 - 10.80 10*3/mm3    RBC 4.60 3.77 - 5.28 10*6/mm3    Hemoglobin 14.4 12.0 - 15.9 g/dL    Hematocrit 40.3 34.0 - 46.6 %    MCV 87.6 79.0 - 97.0 fL    MCH 31.3 26.6 - 33.0 pg    MCHC 35.7 31.5 - 35.7 g/dL    RDW 12.8 12.3 - 15.4 %    RDW-SD 40.6 37.0 - 54.0 fl    MPV 9.8 6.0 - 12.0 fL    Platelets 244 140 - 450 10*3/mm3    Neutrophil % 87.4 (H) 42.7 - 76.0 %    Lymphocyte % 6.1 (L) 19.6 - 45.3 %    Monocyte % 4.9 (L) 5.0 - 12.0 %    Eosinophil % 0.6 0.3 - 6.2 %    Basophil % 0.3 0.0 - 1.5 %    Immature Grans % 0.7 (H) 0.0 - 0.5 %    Neutrophils, Absolute 23.55 (H) 1.70 - 7.00 10*3/mm3    Lymphocytes, Absolute 1.64 0.70 - 3.10 10*3/mm3    Monocytes, Absolute 1.33 (H) 0.10 - 0.90 10*3/mm3    Eosinophils, Absolute 0.15 0.00 - 0.40 10*3/mm3    Basophils, Absolute 0.07 0.00 - 0.20 10*3/mm3    Immature Grans, Absolute 0.20 (H) 0.00 - 0.05 10*3/mm3    nRBC 0.0 0.0 - 0.2 /100 WBC   COVID-19 and FLU A/B PCR, 1 HR TAT - Swab, Nasopharynx    Collection Time: 02/19/25  5:20 PM    Specimen: Nasopharynx; Swab   Result Value Ref Range    COVID19 Not Detected Not Detected - Ref. Range    Influenza A PCR Not Detected Not Detected    Influenza B PCR Not Detected Not Detected   High Sensitivity Troponin T 1Hr    Collection Time: 02/19/25  6:18 PM    Specimen: Blood   Result Value  Ref Range    HS Troponin T 34 (H) <14 ng/L    Troponin T Numeric Delta -7 ng/L    Troponin T % Delta -17 Abnormal if >/= 20%        If labs were ordered, I independently reviewed the results and considered them in treating the patient.      EMERGENCY DEPARTMENT COURSE and DIFFERENTIAL DIAGNOSIS/MDM:   Vitals:  AS OF 19:08 EST    BP - 126/78  HR - 103  TEMP - 98.2 °F (36.8 °C) (Oral)  O2 SATS - 93%        Discussion below represents my analysis of pertinent findings related to patient's condition, differential diagnosis, treatment plan and final disposition.      Differential diagnosis:  The differential diagnosis associated with the patient's presentation includes: COPD, CHF, ACS, dysrhythmia, pulmonary embolism, COVID-19, flu, anemia      Independent interpretations (ECG/rhythm strip/X-ray/US/CT scan): I independently interpreted the patient's chest x-ray and cardiac monitor.  Patient sinus tachycardia.  No evidence of lobar infiltrate.      Additional sources:  Discussed/obtained information from independent historians:   [] Spouse:   [] Parent:   [] Friend:   [x] EMS: Report was taken from EMS.  Vital signs stable during transport.  No additional treatment administered.   [] Other:  External (non-ED) record review:   [] Inpatient record:   [] Office record:   [] Outpatient record:   [] Prior Outpatient labs:   [] Prior Outpatient radiology:   [] Primary Care record:   [] Outside ED record:   [x] Other: I reviewed echo in 2024.  Normal ejection fraction.  No significant valvular abnormality.      Patient's care impacted by:   [x] Diabetes   [] Hypertension   [x] Coronary Artery Disease   [] Cancer   [x] Other: COPD, smoker    Care significantly affected by Social Determinants of Health (housing and economic circumstances, unemployment)    [] Yes     [x] No   If yes, Patient's care significantly limited by  Social Determinants of Health including:    [] Inadequate housing    [] Low income    [] Alcoholism and drug  addiction in family    [] Problems related to primary support group    [] Unemployment    [] Problems related to employment    [] Other Social Determinants of Health:       Consideration of admission/observation vs discharge: Patient presents with findings concerning for acute sepsis and has hypoxia related to what appears to be multifocal pneumonia and warrants admission for further workup.      I considered prescription management with:    [] Pain medication:   [] Antiviral:   [x] Antibiotic: Started IV Rocephin and doxycycline   [] Other:    ED Course:    ED Course as of 02/19/25 1908 Wed Feb 19, 2025 1907 Patient presents with sepsis, pneumonia, COPD exacerbation. Patient has history of COPD, continues to smoke, has had worsening productive cough and dyspnea over the course the past several days not relieved with her nebulizer. Saturating in the upper 80s here, currently stable on 2 L, does not wear oxygen at home, had diffuse wheezing on initial examination. Chest x-ray is clear, has a white count of 26 and says that she has not been on any steroids recently. Slight tachycardia between 100 and 110. D-dimer was negative. Noncontrast chest CT suggestive of developing multifocal pneumonia. Given her DuoNeb, Solu-Medrol, doxycycline, have ordered Rocephin. [NS]   1907 Discussed case with hospitalist Dr. Manzano.  Discussed history, presentation, workup.  Accepts patient for admission. [NS]      ED Course User Index  [NS] Shayne Simental MD       CRITICAL CARE TIME    Approximately 30 minutes of discontinuous critical care time was provided to this patient by myself absent of any time spent performing procedures.  Patient presents critically ill with acute sepsis secondary to multifocal pneumonia complicated by COPD exacerbation and acute hypoxia placing the cardiovascular, respiratory, neurologic, renal systems at risk requiring the following interventions: IV fluids, supplemental oxygen, broad-spectrum IV  antibiotics, interpretation of lab/ECG/imaging, frequent reassessment, coordination of admission with the following response: Resolution of hypoxia.  Patient at high risk of deterioration and possibly death without these interventions.      FINAL IMPRESSION      1. Acute sepsis    2. Multifocal pneumonia    3. Acute exacerbation of chronic obstructive pulmonary disease (COPD)    4. Hypoxia    5. Smoker          DISPOSITION/PLAN     ED Disposition       ED Disposition   Decision to Admit    Condition   --    Comment   --                 Comment: Please note this report has been produced using speech recognition software.      Shayne Simental MD  Attending Emergency Physician             Shayne Simental MD  02/19/25 1909      Electronically signed by Shayne Simental MD at 02/19/25 1909       Oxygen Therapy (since admission)       Date/Time SpO2 Device (Oxygen Therapy) Flow (L/min) (Oxygen Therapy) Oxygen Concentration (%) ETCO2 (mmHg)    02/20/25 0929 -- -- 2 -- --    02/20/25 0916 -- nasal cannula 2 -- --    02/20/25 0820 95 nasal cannula 2 -- --    02/20/25 0600 -- nasal cannula 2 -- --    02/20/25 0400 -- nasal cannula 2 -- --    02/20/25 0343 92 nasal cannula 2 -- --    02/20/25 0200 -- nasal cannula 2 -- --    02/20/25 0000 93 nasal cannula 2 -- --    02/19/25 2200 92 nasal cannula 2 -- --    02/19/25 2155 -- nasal cannula 2 -- --    02/19/25 2119 95 nasal cannula 2 -- --    02/19/25 2031 94 nasal cannula 2 -- --    02/19/25 1958 94 -- -- -- --    02/19/25 1931 92 -- -- -- --    02/19/25 19:03:55 -- nasal cannula 2 -- --    02/19/25 1903 93 -- -- -- --    02/19/25 1901 91 -- -- -- --    02/19/25 1858 93 -- -- -- --    02/19/25 1853 87 -- -- -- --    02/19/25 1848 89 -- -- -- --    02/19/25 1843 88 -- -- -- --    02/19/25 1833 92 -- -- -- --    02/19/25 1828 91 -- -- -- --    02/19/25 1805 99 -- -- -- --    02/19/25 1804 99 -- -- -- --    02/19/25 1803 98 -- -- -- --    02/19/25 1802 97 -- -- -- --     02/19/25 1800 89 -- -- -- --    02/19/25 1757 88 -- -- -- --    02/19/25 1707 93 -- -- -- --    02/19/25 1702 92 -- -- -- --    02/19/25 1652 92 room air -- -- --          Current Facility-Administered Medications   Medication Dose Route Frequency Provider Last Rate Last Admin    acetaminophen (TYLENOL) tablet 650 mg  650 mg Oral Q4H PRN Toy Manzano MD        Or    acetaminophen (TYLENOL) 160 MG/5ML oral solution 650 mg  650 mg Oral Q4H PRN Toy Manzano MD        Or    acetaminophen (TYLENOL) suppository 650 mg  650 mg Rectal Q4H PRN Toy Manzano MD        albuterol (PROVENTIL) nebulizer solution 0.083% 2.5 mg/3mL  2.5 mg Nebulization Q6H - RT Eliane Oviedo MD        aspirin EC tablet 81 mg  81 mg Oral Daily Toy Manzano MD   81 mg at 02/20/25 0820    sennosides-docusate (PERICOLACE) 8.6-50 MG per tablet 2 tablet  2 tablet Oral BID PRN Toy Manzano MD        And    polyethylene glycol (MIRALAX) packet 17 g  17 g Oral Daily PRN Toy Manzano MD        And    bisacodyl (DULCOLAX) EC tablet 5 mg  5 mg Oral Daily PRN Toy Manzano MD        And    bisacodyl (DULCOLAX) suppository 10 mg  10 mg Rectal Daily PRN Toy Manzano MD        budesonide-formoterol (SYMBICORT) 160-4.5 MCG/ACT inhaler 2 puff  2 puff Inhalation BID - RT Toy Manzano MD   2 puff at 02/20/25 0916    And    revefenacin (YUPELRI) nebulizer solution 175 mcg  175 mcg Nebulization Daily - RT Toy Manzano MD   175 mcg at 02/20/25 0916    cefTRIAXone (ROCEPHIN) 1,000 mg in sodium chloride 0.9 % 100 mL MBP  1,000 mg Intravenous Q24H Eliane Oviedo MD        dextrose (D50W) (25 g/50 mL) IV injection 25 g  25 g Intravenous Q15 Min PRN Toy Manzano MD        dextrose (GLUTOSE) oral gel 15 g  15 g Oral Q15 Min PRN Toy Manzano MD        dilTIAZem CD (CARDIZEM CD) 24 hr capsule 120 mg  120 mg Oral Q24H Toy Manzano MD   120 mg at 02/20/25 0820    doxycycline (MONODOX) capsule 100 mg  100 mg Oral Q12H Opii,  MD Eliane        glucagon (GLUCAGEN) injection 1 mg  1 mg Intramuscular Q15 Min PRN Toy Manzano MD        guaiFENesin (MUCINEX) 12 hr tablet 600 mg  600 mg Oral Q12H Toy Manzano MD   600 mg at 02/20/25 0820    heparin (porcine) 5000 UNIT/ML injection 5,000 Units  5,000 Units Subcutaneous Q8H Toy Manzano MD   5,000 Units at 02/20/25 0539    HYDROcodone-acetaminophen (NORCO) 5-325 MG per tablet 1 tablet  1 tablet Oral Q6H PRN Toy Manzano MD        HYDROcodone-acetaminophen (NORCO) 7.5-325 MG per tablet 1 tablet  1 tablet Oral Q6H PRN Toy Manzano MD        HYDROmorphone (DILAUDID) injection 0.5 mg  0.5 mg Intravenous Q2H PRN Toy Manzano MD        And    naloxone (NARCAN) injection 0.4 mg  0.4 mg Intravenous Q5 Min PRN Toy Manzano MD        insulin glargine (LANTUS, SEMGLEE) injection 15 Units  15 Units Subcutaneous Nightly Toy Manzano MD   15 Units at 02/19/25 2129    Insulin Lispro (humaLOG) injection 2-7 Units  2-7 Units Subcutaneous 4x Daily AC & at Bedtime Toy Manzano MD   6 Units at 02/20/25 1138    Insulin Lispro (humaLOG) injection 5 Units  5 Units Subcutaneous TID With Meals Eliane Oveido MD   5 Units at 02/20/25 1139    isosorbide mononitrate (IMDUR) 24 hr tablet 30 mg  30 mg Oral Daily Toy Manzano MD   30 mg at 02/20/25 0833    methylPREDNISolone sodium succinate (SOLU-Medrol) injection 60 mg  60 mg Intravenous Q12H Toy Manzano MD   60 mg at 02/20/25 0833    nicotine (NICODERM CQ) 21 MG/24HR patch 1 patch  1 patch Transdermal Q24H Kristina He APRN   1 patch at 02/19/25 2336    nitroglycerin (NITROSTAT) SL tablet 0.4 mg  0.4 mg Sublingual Q5 Min PRN Toy Manzano MD        ondansetron ODT (ZOFRAN-ODT) disintegrating tablet 4 mg  4 mg Oral Q6H PRN Toy Manzano MD        Or    ondansetron (ZOFRAN) injection 4 mg  4 mg Intravenous Q6H PRN Toy Manzano MD        polyethylene glycol (MIRALAX) packet 17 g  17 g Oral Daily Toy Manzano  MD MORTEZA   17 g at 02/20/25 0826    rosuvastatin (CRESTOR) tablet 20 mg  20 mg Oral Daily Toy Manzano MD   20 mg at 02/20/25 0820    sodium chloride 0.9 % flush 10 mL  10 mL Intravenous Q12H Toy Manzano MD   10 mL at 02/20/25 0826    sodium chloride 0.9 % flush 10 mL  10 mL Intravenous PRN Toy Manzano MD        sodium chloride 0.9 % infusion 40 mL  40 mL Intravenous PRN Toy Manzano MD        sodium chloride 7 % nebulizer solution nebulizer solution 4 mL  4 mL Nebulization Daily - RT Toy Manzano MD   4 mL at 02/20/25 0916     Lab Results (all)       Procedure Component Value Units Date/Time    Respiratory Culture - Sputum, Cough [[id number]] Collected: 02/20/25 1139    Specimen: Sputum from Cough Updated: 02/20/25 1203    Legionella Antigen, Urine - Urine, Urine, Clean Catch [[id number]] Collected: 02/20/25 1139    Specimen: Urine, Clean Catch Updated: 02/20/25 1149    S. Pneumo Ag Urine or CSF - Urine, Urine, Clean Catch [[id number]] Collected: 02/20/25 1139    Specimen: Urine, Clean Catch Updated: 02/20/25 1149    POC Glucose Once [[id number]]  (Abnormal) Collected: 02/20/25 1129    Specimen: Blood Updated: 02/20/25 1132     Glucose 382 mg/dL     POC Glucose Once [[id number]]  (Abnormal) Collected: 02/20/25 0730    Specimen: Blood Updated: 02/20/25 0730     Glucose 321 mg/dL     Comprehensive Metabolic Panel [[id number]]  (Abnormal) Collected: 02/20/25 0417    Specimen: Blood Updated: 02/20/25 0518     Glucose 320 mg/dL      BUN 21 mg/dL      Creatinine 0.87 mg/dL      Sodium 133 mmol/L      Potassium 4.2 mmol/L      Chloride 100 mmol/L      CO2 23.0 mmol/L      Calcium 9.4 mg/dL      Total Protein 6.8 g/dL      Albumin 3.8 g/dL      ALT (SGPT) 13 U/L      AST (SGOT) 15 U/L      Alkaline Phosphatase 86 U/L      Total Bilirubin 0.5 mg/dL      Globulin 3.0 gm/dL      Comment: Calculated Result        A/G Ratio 1.3 g/dL      BUN/Creatinine Ratio 24.1     Anion Gap 10.0 mmol/L      eGFR 75.4 mL/min/1.73      Narrative:      GFR Categories in Chronic Kidney Disease (CKD)      GFR Category          GFR (mL/min/1.73)    Interpretation  G1                     90 or greater         Normal or high (1)  G2                      60-89                Mild decrease (1)  G3a                   45-59                Mild to moderate decrease  G3b                   30-44                Moderate to severe decrease  G4                    15-29                Severe decrease  G5                    14 or less           Kidney failure          (1)In the absence of evidence of kidney disease, neither GFR category G1 or G2 fulfill the criteria for CKD.    eGFR calculation 2021 CKD-EPI creatinine equation, which does not include race as a factor    CBC Auto Differential [[id number]]  (Abnormal) Collected: 02/20/25 0417    Specimen: Blood Updated: 02/20/25 0441     WBC 18.10 10*3/mm3      RBC 4.26 10*6/mm3      Hemoglobin 12.9 g/dL      Hematocrit 38.1 %      MCV 89.4 fL      MCH 30.3 pg      MCHC 33.9 g/dL      RDW 12.6 %      RDW-SD 41.5 fl      MPV 10.0 fL      Platelets 195 10*3/mm3      Neutrophil % 92.7 %      Lymphocyte % 5.2 %      Monocyte % 1.0 %      Eosinophil % 0.0 %      Basophil % 0.2 %      Immature Grans % 0.9 %      Neutrophils, Absolute 16.78 10*3/mm3      Lymphocytes, Absolute 0.94 10*3/mm3      Monocytes, Absolute 0.18 10*3/mm3      Eosinophils, Absolute 0.00 10*3/mm3      Basophils, Absolute 0.04 10*3/mm3      Immature Grans, Absolute 0.16 10*3/mm3      nRBC 0.0 /100 WBC     POC Glucose Once [[id number]]  (Abnormal) Collected: 02/19/25 2108    Specimen: Blood Updated: 02/19/25 2112     Glucose 338 mg/dL     High Sensitivity Troponin T 1Hr [[id number]]  (Abnormal) Collected: 02/19/25 1818    Specimen: Blood Updated: 02/19/25 1847     HS Troponin T 34 ng/L      Troponin T Numeric Delta -7 ng/L      Troponin T % Delta -17    Narrative:      High Sensitive Troponin T Reference Range:  <14.0 ng/L- Negative Female for  AMI  <22.0 ng/L- Negative Male for AMI  >=14 - Abnormal Female indicating possible myocardial injury.  >=22 - Abnormal Male indicating possible myocardial injury.   Clinicians would have to utilize clinical acumen, EKG, Troponin, and serial changes to determine if it is an Acute Myocardial Infarction or myocardial injury due to an underlying chronic condition.         COVID PRE-OP / PRE-PROCEDURE SCREENING ORDER (NO ISOLATION) - Swab, Nasopharynx [[id number]]  (Normal) Collected: 02/19/25 1720    Specimen: Swab from Nasopharynx Updated: 02/19/25 1748    Narrative:      The following orders were created for panel order COVID PRE-OP / PRE-PROCEDURE SCREENING ORDER (NO ISOLATION) - Swab, Nasopharynx.  Procedure                               Abnormality         Status                     ---------                               -----------         ------                     COVID-19 and FLU A/B PCR...[[id number]]  Normal              Final result                 Please view results for these tests on the individual orders.    COVID-19 and FLU A/B PCR, 1 HR TAT - Swab, Nasopharynx [[id number]]  (Normal) Collected: 02/19/25 1720    Specimen: Swab from Nasopharynx Updated: 02/19/25 1748     COVID19 Not Detected     Influenza A PCR Not Detected     Influenza B PCR Not Detected    Narrative:      Fact sheet for providers: https://www.fda.gov/media/026033/download    Fact sheet for patients: https://www.fda.gov/media/406686/download    Test performed by PCR.    Comprehensive Metabolic Panel [[id number]]  (Abnormal) Collected: 02/19/25 1704    Specimen: Blood Updated: 02/19/25 1745     Glucose 295 mg/dL      BUN 22 mg/dL      Creatinine 0.98 mg/dL      Sodium 133 mmol/L      Potassium 4.7 mmol/L      Chloride 96 mmol/L      CO2 22.0 mmol/L      Calcium 9.6 mg/dL      Total Protein 7.4 g/dL      Albumin 4.1 g/dL      ALT (SGPT) 15 U/L      AST (SGOT) 18 U/L      Alkaline Phosphatase 90 U/L      Total Bilirubin 0.6 mg/dL       Globulin 3.3 gm/dL      Comment: Calculated Result        A/G Ratio 1.2 g/dL      BUN/Creatinine Ratio 22.4     Anion Gap 15.0 mmol/L      eGFR 65.4 mL/min/1.73     Narrative:      GFR Categories in Chronic Kidney Disease (CKD)      GFR Category          GFR (mL/min/1.73)    Interpretation  G1                     90 or greater         Normal or high (1)  G2                      60-89                Mild decrease (1)  G3a                   45-59                Mild to moderate decrease  G3b                   30-44                Moderate to severe decrease  G4                    15-29                Severe decrease  G5                    14 or less           Kidney failure          (1)In the absence of evidence of kidney disease, neither GFR category G1 or G2 fulfill the criteria for CKD.    eGFR calculation 2021 CKD-EPI creatinine equation, which does not include race as a factor    BNP [[id number]]  (Normal) Collected: 02/19/25 1704    Specimen: Blood Updated: 02/19/25 1741     proBNP 326.4 pg/mL     Narrative:      This assay is used as an aid in the diagnosis of individuals suspected of having heart failure. It can be used as an aid in the diagnosis of acute decompensated heart failure (ADHF) in patients presenting with signs and symptoms of ADHF to the emergency department (ED). In addition, NT-proBNP of <300 pg/mL indicates ADHF is not likely.    Age Range Result Interpretation  NT-proBNP Concentration (pg/mL:      <50             Positive            >450                   Gray                 300-450                    Negative             <300    50-75           Positive            >900                  Gray                300-900                  Negative            <300      >75             Positive            >1800                  Gray                300-1800                  Negative            <300    High Sensitivity Troponin T [[id number]]  (Abnormal) Collected: 02/19/25 1704    Specimen: Blood Updated:  "02/19/25 1741     HS Troponin T 41 ng/L     Narrative:      High Sensitive Troponin T Reference Range:  <14.0 ng/L- Negative Female for AMI  <22.0 ng/L- Negative Male for AMI  >=14 - Abnormal Female indicating possible myocardial injury.  >=22 - Abnormal Male indicating possible myocardial injury.   Clinicians would have to utilize clinical acumen, EKG, Troponin, and serial changes to determine if it is an Acute Myocardial Infarction or myocardial injury due to an underlying chronic condition.         D-dimer, Quantitative [[id number]]  (Normal) Collected: 02/19/25 1704    Specimen: Blood Updated: 02/19/25 1728     D-Dimer, Quantitative 0.54 MCGFEU/mL     Narrative:      According to the assay 's published package insert, a normal (<0.50 MCGFEU/mL) D-dimer result in conjunction with a non-high clinical probability assessment, excludes deep vein thrombosis (DVT) and pulmonary embolism (PE) with high sensitivity.    D-dimer values increase with age and this can make VTE exclusion of an older population difficult. To address this, the [employer], based on best available evidence and recent guidelines, recommends that clinicians use age-adjusted D-dimer thresholds in patients greater than 50 years of age with: a) a low probability of PE who do not meet all Pulmonary Embolism Rule Out Criteria, or b) in those with intermediate probability of PE.   The formula for an age-adjusted D-dimer cut-off is \"age/100\".  For example, a 60 year old patient would have an age-adjusted cut-off of 0.60 MCGFEU/mL and an 80 year old 0.80 MCGFEU/mL.    CBC & Differential [[id number]]  (Abnormal) Collected: 02/19/25 1704    Specimen: Blood Updated: 02/19/25 1714    Narrative:      The following orders were created for panel order CBC & Differential.  Procedure                               Abnormality         Status                     ---------                               -----------         ------            "          CBC Auto Differential[[id number]]        Abnormal            Final result                 Please view results for these tests on the individual orders.    CBC Auto Differential [[id number]]  (Abnormal) Collected: 02/19/25 1704    Specimen: Blood Updated: 02/19/25 1714     WBC 26.94 10*3/mm3      RBC 4.60 10*6/mm3      Hemoglobin 14.4 g/dL      Hematocrit 40.3 %      MCV 87.6 fL      MCH 31.3 pg      MCHC 35.7 g/dL      RDW 12.8 %      RDW-SD 40.6 fl      MPV 9.8 fL      Platelets 244 10*3/mm3      Neutrophil % 87.4 %      Lymphocyte % 6.1 %      Monocyte % 4.9 %      Eosinophil % 0.6 %      Basophil % 0.3 %      Immature Grans % 0.7 %      Neutrophils, Absolute 23.55 10*3/mm3      Lymphocytes, Absolute 1.64 10*3/mm3      Monocytes, Absolute 1.33 10*3/mm3      Eosinophils, Absolute 0.15 10*3/mm3      Basophils, Absolute 0.07 10*3/mm3      Immature Grans, Absolute 0.20 10*3/mm3      nRBC 0.0 /100 WBC           Imaging Results (All)       Procedure Component Value Units Date/Time    CT Chest Without Contrast Diagnostic [[id number]] Collected: 02/19/25 1831     Updated: 02/19/25 1837    Narrative:      CT CHEST WO CONTRAST DIAGNOSTIC    Date of Exam: 2/19/2025 6:23 PM EST    Indication: cough, sob, eval for pna.    Comparison: CT chest 2/12/2025    Technique: Axial CT images were obtained of the chest without contrast administration.  Reconstructed coronal and sagittal images were also obtained. Automated exposure control and iterative construction methods were used.      Findings:  Thyroid unremarkable. No supraclavicular adenopathy. Aortic atherosclerotic disease without aneurysm. Severe calcified coronary atherosclerotic disease. Normal caliber main pulmonary artery. Heart size normal. No pericardial effusion. Esophagus   unremarkable. No suspicious adenopathy.    Trachea patent. Mild emphysema. There are new infectious/inflammatory patchy groundglass airspace opacities in the lingula and left greater than  right lower lobes. Scattered small discrete pulmonary nodules stable from recent low-dose chest CT screening.   Linear bandlike areas of minimal atelectasis versus scarring. No edema, effusion or pneumothorax.    No acute findings partially imaged upper abdomen. No acute chest wall findings. No axillary adenopathy. Degenerative changes throughout the spine without acute displaced fracture or aggressive lesion.      Impression:      Impression:  1. Mild/early multifocal pneumonia versus aspiration.  2. Mild emphysema with stable small pulmonary nodules since 2025. Recommend continued annual low-dose chest CT screening, due 2026.  3. Aortic and severe coronary atherosclerotic disease.        Electronically Signed: Ghassan Brewster MD    2025 6:34 PM EST    Workstation ID: [id number]    XR Chest 1 View [[id number]] Collected: 25     Updated: 25    Narrative:      XR CHEST 1 VW    Date of Exam: 2025 5:05 PM EST    Indication: sob    Comparison: Chest radiograph 2024 and a chest CT to 1225    Findings:      Mediastinum: Cardiac silhouette appears unchanged and normal in size    Lungs: The lungs appear clear without focal consolidation appreciated.    Pleura: No pleural effusion or pneumothorax.    Bones and soft tissues: No acute, displaced fracture seen.        Impression:      Impression:  No radiographic evidence of acute cardiopulmonary disease.        Electronically Signed: Corey Pérez    2025 5:33 PM EST    Workstation ID: [id number]             Physician Progress Notes (all)        Eliane Oviedo MD at 25 0616              [facility]  PROGRESS NOTE    Patient Name: Kathy Taylor  : 1960  MRN: [id number]    Date of Admission: 2025  Primary Care Physician: Provider, No Known    Subjective   Subjective     CC: Follow-up SOA    HPI: No acute events overnight, patient rested well, still feeling the  same      Objective   Objective     Vital Signs:   Temp:  [97.7 °F (36.5 °C)-98.3 °F (36.8 °C)] 97.7 °F (36.5 °C)  Heart Rate:  [] 90  Resp:  [18-22] 18  BP: (126-167)/(58-93) 128/74  Flow (L/min) (Oxygen Therapy):  [2] 2     Physical Exam:  Constitutional: No acute distress, awake, alert  HENT: NCAT, mucous membranes moist  Respiratory: Nonlabored respirations, diffuse coarse breath sounds on 2 L NC cardiovascular: RRR, no murmurs, rubs, or gallops  Gastrointestinal: Positive bowel sounds, soft, nontender, nondistended  Musculoskeletal: No bilateral ankle edema  Psychiatric: Appropriate affect, cooperative  Neurologic: Oriented x 3, nonfocal  Skin: No rashes      Results Reviewed:  LAB RESULTS:      Lab 02/20/25 0417 02/19/25 1818 02/19/25 1704   WBC 18.10*  --  26.94*   HEMOGLOBIN 12.9  --  14.4   HEMATOCRIT 38.1  --  40.3   PLATELETS 195  --  244   NEUTROS ABS 16.78*  --  23.55*   IMMATURE GRANS (ABS) 0.16*  --  0.20*   LYMPHS ABS 0.94  --  1.64   MONOS ABS 0.18  --  1.33*   EOS ABS 0.00  --  0.15   MCV 89.4  --  87.6   D DIMER QUANT  --   --  0.54   HSTROP T  --  34* 41*         Lab 02/20/25 0417 02/19/25  1704   SODIUM 133* 133*   POTASSIUM 4.2 4.7   CHLORIDE 100 96*   CO2 23.0 22.0   ANION GAP 10.0 15.0   BUN 21 22   CREATININE 0.87 0.98   EGFR 75.4 65.4   GLUCOSE 320* 295*   CALCIUM 9.4 9.6         Lab 02/20/25 0417 02/19/25  1704   TOTAL PROTEIN 6.8 7.4   ALBUMIN 3.8 4.1   GLOBULIN 3.0 3.3   ALT (SGPT) 13 15   AST (SGOT) 15 18   BILIRUBIN 0.5 0.6   ALK PHOS 86 90         Lab 02/19/25 1818 02/19/25  1704   PROBNP  --  326.4   HSTROP T 34* 41*                 Brief Urine Lab Results  (Last result in the past 365 days)        Color   Clarity   Blood   Leuk Est   Nitrite   Protein   CREAT   Urine HCG        10/19/24 1322 Yellow   Clear   Trace   Trace   Negative   Trace                   Microbiology Results Abnormal       None            CT Chest Without Contrast Diagnostic    Result Date:  2/19/2025  CT CHEST WO CONTRAST DIAGNOSTIC Date of Exam: 2/19/2025 6:23 PM EST Indication: cough, sob, eval for pna. Comparison: CT chest 2/12/2025 Technique: Axial CT images were obtained of the chest without contrast administration.  Reconstructed coronal and sagittal images were also obtained. Automated exposure control and iterative construction methods were used. Findings: Thyroid unremarkable. No supraclavicular adenopathy. Aortic atherosclerotic disease without aneurysm. Severe calcified coronary atherosclerotic disease. Normal caliber main pulmonary artery. Heart size normal. No pericardial effusion. Esophagus unremarkable. No suspicious adenopathy. Trachea patent. Mild emphysema. There are new infectious/inflammatory patchy groundglass airspace opacities in the lingula and left greater than right lower lobes. Scattered small discrete pulmonary nodules stable from recent low-dose chest CT screening.  Linear bandlike areas of minimal atelectasis versus scarring. No edema, effusion or pneumothorax. No acute findings partially imaged upper abdomen. No acute chest wall findings. No axillary adenopathy. Degenerative changes throughout the spine without acute displaced fracture or aggressive lesion.     Impression: Impression: 1. Mild/early multifocal pneumonia versus aspiration. 2. Mild emphysema with stable small pulmonary nodules since 2/12/2025. Recommend continued annual low-dose chest CT screening, due February 2026. 3. Aortic and severe coronary atherosclerotic disease. Electronically Signed: Ghassan Brewster MD  2/19/2025 6:34 PM EST  Workstation ID: [id number]    XR Chest 1 View    Result Date: 2/19/2025  XR CHEST 1 VW Date of Exam: 2/19/2025 5:05 PM EST Indication: sob Comparison: Chest radiograph 12/4/2024 and a chest CT to 1225 Findings: Mediastinum: Cardiac silhouette appears unchanged and normal in size Lungs: The lungs appear clear without focal consolidation appreciated. Pleura: No pleural effusion or  pneumothorax. Bones and soft tissues: No acute, displaced fracture seen.     Impression: Impression: No radiographic evidence of acute cardiopulmonary disease. Electronically Signed: Corey Pérez  2/19/2025 5:33 PM EST  Workstation ID: [id number]     Results for orders placed during the hospital encounter of 07/15/24    Adult Transthoracic Echo Complete W/ Cont if Necessary Per Protocol    Interpretation Summary    Left ventricular ejection fraction appears to be 61 - 65%.    All left ventricular wall segments contract normally.    No significant valvular stenosis or regurgitation.      Current medications:  Scheduled Meds:aspirin, 81 mg, Oral, Daily  budesonide-formoterol, 2 puff, Inhalation, BID - RT   And  revefenacin, 175 mcg, Nebulization, Daily - RT  cefTRIAXone, 1,000 mg, Intravenous, Q24H  dilTIAZem CD, 120 mg, Oral, Q24H  doxycycline, 100 mg, Oral, Q12H  guaiFENesin, 600 mg, Oral, Q12H  heparin (porcine), 5,000 Units, Subcutaneous, Q8H  insulin glargine, 15 Units, Subcutaneous, Nightly  insulin lispro, 2-7 Units, Subcutaneous, 4x Daily AC & at Bedtime  ipratropium-albuterol, 3 mL, Nebulization, 4x Daily - RT  isosorbide mononitrate, 30 mg, Oral, Daily  methylPREDNISolone sodium succinate, 60 mg, Intravenous, Q12H  nicotine, 1 patch, Transdermal, Q24H  polyethylene glycol, 17 g, Oral, Daily  rosuvastatin, 20 mg, Oral, Daily  sodium chloride, 10 mL, Intravenous, Q12H  sodium chloride, 4 mL, Nebulization, Daily - RT      Continuous Infusions:   PRN Meds:.  acetaminophen **OR** acetaminophen **OR** acetaminophen    senna-docusate sodium **AND** polyethylene glycol **AND** bisacodyl **AND** bisacodyl    dextrose    dextrose    glucagon (human recombinant)    HYDROcodone-acetaminophen    HYDROcodone-acetaminophen    HYDROmorphone **AND** naloxone    ipratropium-albuterol    nitroglycerin    ondansetron ODT **OR** ondansetron    sodium chloride    sodium chloride    Assessment & Plan   Assessment & Plan      Active Hospital Problems    Diagnosis  POA    **Pneumonia [J18.9]  Yes    Coronary artery disease involving native coronary artery of native heart with angina pectoris [I25.119]  Yes    Stage III (Severe) COPD [J44.9]  Yes    Type 2 diabetes mellitus with hyperglycemia, with long-term current use of insulin [E11.65, Z79.4]  Not Applicable    Obstructive sleep apnea [G47.33]  Yes      Resolved Hospital Problems   No resolved problems to display.        Brief Hospital Course to date:  Kathy Taylor is a 64 y.o. female with history of type 2 diabetes, SABRINA, COPD, hypertension, CAD who presents to the ED with 3 days of progressively worsening SOA, cough found to have multifocal pneumonia    Acute respiratory failure with hypoxia secondary to multifocal pneumonia, exacerbated by COPD with bronchospasm  Tobacco abuse  -Patient started on Rocephin and doxycycline, unfortunately blood cultures not collected, follow-up urinary antigens  -She is currently on 2 L NC, wean as able  -Continue steroids, DuoNebs  -Continue NRT    Poorly controlled type 2 diabetes with A1c 8.7%  -FSBG's reviewed and are suboptimal  -Continue basal and SSI, add prandial Humalog 5 units 3 times daily adjust as warranted    Hypertension  CAD  -Continue aspirin, Imdur, diltiazem    All problems listed above are new to me today    Expected Discharge Location and Transportation: TBD  Expected Discharge   Expected discharge date/ time has not been documented.     VTE Prophylaxis:  Pharmacologic VTE prophylaxis orders are present.         AM-PAC 6 Clicks Score (PT): 23 (02/19/25 3266)    CODE STATUS:   Code Status and Medical Interventions: CPR (Attempt to Resuscitate); Full Support   Ordered at: 02/19/25 2012     Code Status (Patient has no pulse and is not breathing):    CPR (Attempt to Resuscitate)     Medical Interventions (Patient has pulse or is breathing):    Full Support       Eliane Oviedo MD  02/20/25        Electronically signed by Ivelisse  MD Eliane at 02/20/25 1024       Consult Notes (all)    No notes of this type exist for this encounter.

## 2025-02-20 NOTE — THERAPY EVALUATION
Patient Name: Kathy Taylor  : 1960    MRN: [id number]                              Today's Date: 2025       Admit Date: 2025    Visit Dx:     ICD-10-CM ICD-9-CM   1. Acute sepsis  A41.9 038.9     995.91   2. Multifocal pneumonia  J18.9 486   3. Acute exacerbation of chronic obstructive pulmonary disease (COPD)  J44.1 491.21   4. Hypoxia  R09.02 799.02   5. Smoker  F17.200 305.1     Patient Active Problem List   Diagnosis    Obstructive sleep apnea    Nocturnal hypoxemia    Polypharmacy    COVID-19 virus infection    Stage III (Severe) COPD    Tobacco abuse    History of rheumatic fever as a child    Hyperlipidemia LDL goal <70    Coronary artery disease involving native coronary artery of native heart with angina pectoris    Non-adherence to medical treatment    Type 2 diabetes mellitus with hyperglycemia, with long-term current use of insulin    Pneumonia     Past Medical History:   Diagnosis Date    Ankle sprain     Arthritis     Arthritis of back     Arthritis of neck     Asthma ?    May have been earlier    Back ache     Bronchiectasis ?    Bronchitis     Bursitis of hip     Cervical disc disorder     Chronic bronchitis ?    COPD (chronic obstructive pulmonary disease)     Coronary artery disease involving native coronary artery of native heart with angina pectoris 07/15/2024    Emphysema of lung     Dont remember when told.    Fibromyalgia     Hip arthrosis     Hyperlipidemia LDL goal <100 2024    Knee sprain     Migraines     Myocardial infarction     Was told I've  had a couple small ones    Neck strain     Pneumonia     Rheumatic fever     Rotator cuff syndrome     Shingles     Sleep apnea, obstructive ?    Tooth infection     Type 2 diabetes mellitus     Vulvar carcinoma     Wrist sprain      Past Surgical History:   Procedure Laterality Date    BREAST BIOPSY Left     BREAST LUMPECTOMY Left     CARDIAC CATHETERIZATION N/A 07/15/2024    Procedure: Left Heart Cath;  Surgeon:  Lonnie Salomon IV, MD;  Location: [facility] CATH INVASIVE LOCATION;  Service: Cardiology;  Laterality: N/A;    CARDIAC CATHETERIZATION  07/15/2024    TONSILLECTOMY      TUBAL ABDOMINAL LIGATION      VULVA BIOPSY      VULVA SURGERY      WISDOM TOOTH EXTRACTION        General Information       Row Name 02/20/25 1019          Physical Therapy Time and Intention    Document Type evaluation  -KW     Mode of Treatment physical therapy  -KW       Row Name 02/20/25 1019          General Information    Patient Profile Reviewed yes  -KW     Existing Precautions/Restrictions oxygen therapy device and L/min  -KW     Barriers to Rehab medically complex  -KW       Row Name 02/20/25 1019          Living Environment    People in Home child(teresa), adult  -KW       Row Name 02/20/25 1019          Home Main Entrance    Number of Stairs, Main Entrance none  -KW       Row Name 02/20/25 1019          Stairs Within Home, Primary    Number of Stairs, Within Home, Primary none  -KW       Row Name 02/20/25 1019          Cognition    Orientation Status (Cognition) oriented x 3  -KW       Row Name 02/20/25 1019          Safety Issues/Impairments Affecting Functional Mobility    Impairments Affecting Function (Mobility) endurance/activity tolerance  -KW               User Key  (r) = Recorded By, (t) = Taken By, (c) = Cosigned By      Initials Name Provider Type    KW Marian Rosenberg, PT Physical Therapist                   Mobility       Row Name 02/20/25 1019          Sit-Stand Transfer    Sit-Stand [place] (Transfers) modified independence  -KW     Assistive Device (Sit-Stand Transfers) cane, straight  -KW       Row Name 02/20/25 1019          Gait/Stairs (Locomotion)    [place] Level (Gait) supervision  -KW     Assistive Device (Gait) cane, straight  -KW     Distance in Feet (Gait) 300  -KW     Deviations/Abnormal Patterns (Gait) stride length decreased;gait speed decreased;nori decreased  -KW     Bilateral Gait  Deviations heel strike decreased  -KW               User Key  (r) = Recorded By, (t) = Taken By, (c) = Cosigned By      Initials Name Provider Type    Marian Goncalves PT Physical Therapist                   Obj/Interventions       Row Name 02/20/25 1019          Balance    Balance Assessment sitting static balance;sitting dynamic balance;standing static balance;standing dynamic balance  -KW     Static Sitting Balance independent  -KW     Dynamic Sitting Balance independent  -KW     Position, Sitting Balance unsupported;sitting in chair  -KW     Static Standing Balance supervision  -KW     Dynamic Standing Balance supervision  -KW     Position/Device Used, Standing Balance supported;cane, straight  -KW     Balance Interventions sitting;standing;sit to stand  -KW               User Key  (r) = Recorded By, (t) = Taken By, (c) = Cosigned By      Initials Name Provider Type    Marian Goncalves PT Physical Therapist                   Goals/Plan       Row Name 02/20/25 1019          Gait Training Goal 1 (PT)    Activity/Assistive Device (Gait Training Goal 1, PT) assistive device use;gait (walking locomotion);decrease fall risk;diminish gait deviation;improve balance and speed;increase endurance/gait distance;cane, straight  -KW     [place] Level (Gait Training Goal 1, PT) modified independence  -KW     Distance (Gait Training Goal 1, PT) 350  -KW     Time Frame (Gait Training Goal 1, PT) 10 days  -KW               User Key  (r) = Recorded By, (t) = Taken By, (c) = Cosigned By      Initials Name Provider Type    Marian Goncalves PT Physical Therapist                   Clinical Impression       Row Name 02/20/25 1019          Pain    Pretreatment Pain Rating 0/10 - no pain  -KW       Row Name 02/20/25 1019          Plan of Care Review    Plan of Care Reviewed With patient  -KW     Progress no change  -KW     Outcome Evaluation PT eval completed. Patient able to ambulate with SPC however easily  SOA with ambulation dropping to 87%. SHe demonstrates much slower gait due to fatigue and SOA. Patient presents below baseline for functional mobility. Patient demonstrates decreased activity tolerance, deconditioning and reduced dynamic balance. Patient would continue to benefit from skilled PT services to improve dynamic balance with gait and activity tolerance training in order to build endurance and reduce risk of falls.  -KW       Row Name 02/20/25 1019          Therapy Assessment/Plan (PT)    Patient/Family Therapy Goals Statement (PT) to return to baseline  -KW     Criteria for Skilled Interventions Met (PT) yes;skilled treatment is necessary  -KW     Therapy Frequency (PT) daily  -KW     Predicted Duration of Therapy Intervention (PT) 10 days  -KW       Row Name 02/20/25 1019          Vital Signs    Pre Systolic BP Rehab 127  -KW     Pre Treatment Diastolic BP 79  -KW     Pretreatment Heart Rate (beats/min) 97  -KW     Pre SpO2 (%) 93  -KW     Intra SpO2 (%) 87  -KW     Post SpO2 (%) 92  -KW       Row Name 02/20/25 1019          Positioning and Restraints    Pre-Treatment Position sitting in chair/recliner  -KW     Post Treatment Position chair  -KW     In Chair reclined;call light within reach;encouraged to call for assist;exit alarm on;legs elevated  -KW               User Key  (r) = Recorded By, (t) = Taken By, (c) = Cosigned By      Initials Name Provider Type    Marian Goncalves, PT Physical Therapist                   Outcome Measures       Row Name 02/20/25 1019          How much help from another person do you currently need...    Turning from your back to your side while in flat bed without using bedrails? 4  -KW     Moving from lying on back to sitting on the side of a flat bed without bedrails? 4  -KW     Moving to and from a bed to a chair (including a wheelchair)? 3  -KW     Standing up from a chair using your arms (e.g., wheelchair, bedside chair)? 3  -KW     Climbing 3-5 steps with a  railing? 3  -KW     To walk in hospital room? 3  -KW     AM-PAC 6 Clicks Score (PT) 20  -KW     Highest Level of Mobility Goal 6 --> Walk 10 steps or more  -KW       Row Name 02/20/25 1019 02/20/25 0912       Functional Assessment    Outcome Measure Options AM-PAC 6 Clicks Basic Mobility (PT)  -KW AM-PAC 6 Clicks Daily Activity (OT)  -              User Key  (r) = Recorded By, (t) = Taken By, (c) = Cosigned By      Initials Name Provider Type    Tanisha Garcia, OT Occupational Therapist    KW Marian Rosenberg, PT Physical Therapist                                 Physical Therapy Education        No education to display                  PT Recommendation and Plan     Progress: no change  Outcome Evaluation: PT eval completed. Patient able to ambulate with SPC however easily SOA with ambulation dropping to 87%. SHe demonstrates much slower gait due to fatigue and SOA. Patient presents below baseline for functional mobility. Patient demonstrates decreased activity tolerance, deconditioning and reduced dynamic balance. Patient would continue to benefit from skilled PT services to improve dynamic balance with gait and activity tolerance training in order to build endurance and reduce risk of falls.     Time Calculation:   PT Evaluation Complexity  History, PT Evaluation Complexity: 3 or more personal factors and/or comorbidities  Examination of Body Systems (PT Eval Complexity): total of 3 or more elements  Clinical Presentation (PT Evaluation Complexity): evolving  Clinical Decision Making (PT Evaluation Complexity): moderate complexity  Overall Complexity (PT Evaluation Complexity): moderate complexity     PT Charges       Row Name 02/20/25 1019             Time Calculation    Start Time 1019  -KW      PT Received On 02/20/25  -KW      PT Goal Re-Cert Due Date 03/02/25  -KW         Untimed Charges    PT Eval/Re-eval Minutes 54  -KW         Total Minutes    Untimed Charges Total Minutes 54  -KW       Total  Minutes 54  -KW                User Key  (r) = Recorded By, (t) = Taken By, (c) = Cosigned By      Initials Name Provider Type    Marian Goncalves, PATRIC Physical Therapist                  Therapy Charges for Today       Code Description Service Date Service Provider Modifiers Qty    32156796671 HC PT EVAL MOD COMPLEXITY 4 2/20/2025 Marian Rosenberg PT GP 1            PT G-Codes  Outcome Measure Options: AM-PAC 6 Clicks Basic Mobility (PT)  AM-PAC 6 Clicks Score (PT): 20  AM-PAC 6 Clicks Score (OT): 24  PT Discharge Summary  Anticipated Discharge Disposition (PT): home with outpatient therapy services    Marian Rosenberg PT  2/20/2025

## 2025-02-20 NOTE — ED NOTES
Kathy Taylor    Nursing Report ED to Floor:  Mental status: alert and oriented  Ambulatory status: stand by  Oxygen Therapy:  2L NC while resting. Does not wear oxygen at home.  Cardiac Rhythm: sinus tachycardia  Safety Concerns:  none noted  Precautions: none  Social Issues: none  noted  ED Room #:  09    ED Nurse Phone Extension - [phone] or may call [phone].      HPI:   Chief Complaint   Patient presents with    Shortness of Breath       Past Medical History:  Past Medical History:   Diagnosis Date    Ankle sprain     Arthritis     Arthritis of back     Arthritis of neck     Asthma ?    May have been earlier    Back ache     Bronchiectasis ?    Bronchitis     Bursitis of hip     Cervical disc disorder     Chronic bronchitis ?    COPD (chronic obstructive pulmonary disease)     Coronary artery disease involving native coronary artery of native heart with angina pectoris 07/15/2024    Emphysema of lung     Dont remember when told.    Fibromyalgia     Hip arthrosis     Hyperlipidemia LDL goal <100 02/05/2024    Knee sprain     Migraines     Myocardial infarction     Was told I've  had a couple small ones    Neck strain     Pneumonia     Rheumatic fever     Rotator cuff syndrome     Shingles     Sleep apnea, obstructive ?    Tooth infection     Type 2 diabetes mellitus     Vulvar carcinoma     Wrist sprain         Past Surgical History:  Past Surgical History:   Procedure Laterality Date    BREAST BIOPSY Left     BREAST LUMPECTOMY Left 2004    CARDIAC CATHETERIZATION N/A 07/15/2024    Procedure: Left Heart Cath;  Surgeon: Lonnie Salomon IV, MD;  Location: [facility] CATH INVASIVE LOCATION;  Service: Cardiology;  Laterality: N/A;    CARDIAC CATHETERIZATION  07/15/2024    TONSILLECTOMY      TUBAL ABDOMINAL LIGATION      VULVA BIOPSY      VULVA SURGERY      WISDOM TOOTH EXTRACTION          Admitting Doctor:   Toy Manzano MD    Consulting Provider(s):  Consults       No orders found from 1/21/2025 to 2/20/2025.              Admitting Diagnosis:   The primary encounter diagnosis was Acute sepsis. Diagnoses of Multifocal pneumonia, Acute exacerbation of chronic obstructive pulmonary disease (COPD), Hypoxia, and Smoker were also pertinent to this visit.    Most Recent Vitals:   Vitals:    02/19/25 1848 02/19/25 1853 02/19/25 1858 02/19/25 1903   BP:       Pulse: 104 105 103 103   Resp:       Temp:       TempSrc:       SpO2: (!) 89% (!) 87% 93% 93%   Weight:       Height:           Active LDAs/IV Access:   Lines, Drains & Airways       Active LDAs       Name Placement date Placement time Site Days    Peripheral IV 02/19/25 1717 Anterior;Right Forearm 02/19/25 1717  Forearm  less than 1                    Labs (abnormal labs have a star):   Labs Reviewed   COMPREHENSIVE METABOLIC PANEL - Abnormal; Notable for the following components:       Result Value    Glucose 295 (*)     Sodium 133 (*)     Chloride 96 (*)     All other components within normal limits    Narrative:     GFR Categories in Chronic Kidney Disease (CKD)      GFR Category          GFR (mL/min/1.73)    Interpretation  G1                     90 or greater         Normal or high (1)  G2                      60-89                Mild decrease (1)  G3a                   45-59                Mild to moderate decrease  G3b                   30-44                Moderate to severe decrease  G4                    15-29                Severe decrease  G5                    14 or less           Kidney failure          (1)In the absence of evidence of kidney disease, neither GFR category G1 or G2 fulfill the criteria for CKD.    eGFR calculation 2021 CKD-EPI creatinine equation, which does not include race as a factor   TROPONIN - Abnormal; Notable for the following components:    HS Troponin T 41 (*)     All other components within normal limits    Narrative:     High Sensitive Troponin T Reference Range:  <14.0 ng/L- Negative Female for AMI  <22.0 ng/L- Negative Male for  AMI  >=14 - Abnormal Female indicating possible myocardial injury.  >=22 - Abnormal Male indicating possible myocardial injury.   Clinicians would have to utilize clinical acumen, EKG, Troponin, and serial changes to determine if it is an Acute Myocardial Infarction or myocardial injury due to an underlying chronic condition.        CBC WITH AUTO DIFFERENTIAL - Abnormal; Notable for the following components:    WBC 26.94 (*)     Neutrophil % 87.4 (*)     Lymphocyte % 6.1 (*)     Monocyte % 4.9 (*)     Immature Grans % 0.7 (*)     Neutrophils, Absolute 23.55 (*)     Monocytes, Absolute 1.33 (*)     Immature Grans, Absolute 0.20 (*)     All other components within normal limits   HIGH SENSITIVITIY TROPONIN T 1HR - Abnormal; Notable for the following components:    HS Troponin T 34 (*)     All other components within normal limits    Narrative:     High Sensitive Troponin T Reference Range:  <14.0 ng/L- Negative Female for AMI  <22.0 ng/L- Negative Male for AMI  >=14 - Abnormal Female indicating possible myocardial injury.  >=22 - Abnormal Male indicating possible myocardial injury.   Clinicians would have to utilize clinical acumen, EKG, Troponin, and serial changes to determine if it is an Acute Myocardial Infarction or myocardial injury due to an underlying chronic condition.        COVID-19 AND FLU A/B, NP SWAB IN TRANSPORT MEDIA 1 HR TAT - Normal    Narrative:     Fact sheet for providers: https://www.fda.gov/media/161910/download    Fact sheet for patients: https://www.fda.gov/media/540046/download    Test performed by PCR.   BNP (IN-HOUSE) - Normal    Narrative:     This assay is used as an aid in the diagnosis of individuals suspected of having heart failure. It can be used as an aid in the diagnosis of acute decompensated heart failure (ADHF) in patients presenting with signs and symptoms of ADHF to the emergency department (ED). In addition, NT-proBNP of <300 pg/mL indicates ADHF is not likely.    Age  "Range Result Interpretation  NT-proBNP Concentration (pg/mL:      <50             Positive            >450                   Gray                 300-450                    Negative             <300    50-75           Positive            >900                  Gray                300-900                  Negative            <300      >75             Positive            >1800                  Gray                300-1800                  Negative            <300   D-DIMER, QUANTITATIVE - Normal    Narrative:     According to the assay 's published package insert, a normal (<0.50 MCGFEU/mL) D-dimer result in conjunction with a non-high clinical probability assessment, excludes deep vein thrombosis (DVT) and pulmonary embolism (PE) with high sensitivity.    D-dimer values increase with age and this can make VTE exclusion of an older population difficult. To address this, the [employer], based on best available evidence and recent guidelines, recommends that clinicians use age-adjusted D-dimer thresholds in patients greater than 50 years of age with: a) a low probability of PE who do not meet all Pulmonary Embolism Rule Out Criteria, or b) in those with intermediate probability of PE.   The formula for an age-adjusted D-dimer cut-off is \"age/100\".  For example, a 60 year old patient would have an age-adjusted cut-off of 0.60 MCGFEU/mL and an 80 year old 0.80 MCGFEU/mL.   COVID PRE-OP / PRE-PROCEDURE SCREENING ORDER (NO ISOLATION)    Narrative:     The following orders were created for panel order COVID PRE-OP / PRE-PROCEDURE SCREENING ORDER (NO ISOLATION) - Swab, Nasopharynx.  Procedure                               Abnormality         Status                     ---------                               -----------         ------                     COVID-19 and FLU A/B PCR...[[id number]]  Normal              Final result                 Please view results for these tests on the individual " orders.   CBC AND DIFFERENTIAL    Narrative:     The following orders were created for panel order CBC & Differential.  Procedure                               Abnormality         Status                     ---------                               -----------         ------                     CBC Auto Differential[[id number]]        Abnormal            Final result                 Please view results for these tests on the individual orders.       Meds Given in ED:   Medications   cefTRIAXone (ROCEPHIN) 1,000 mg in sodium chloride 0.9 % 100 mL MBP (has no administration in time range)   sodium chloride 0.9 % bolus 1,000 mL (has no administration in time range)   ipratropium-albuterol (DUO-NEB) nebulizer solution 3 mL (3 mL Nebulization Given 2/19/25 1757)   methylPREDNISolone sodium succinate (SOLU-Medrol) injection 125 mg (125 mg Intravenous Given 2/19/25 1717)   doxycycline (MONODOX) capsule 100 mg (100 mg Oral Given 2/19/25 1717)           Last NIH score:                                                          Dysphagia screening results:        Kieran Coma Scale:  No data recorded     CIWA:        Restraint Type:            Isolation Status:  No active isolations

## 2025-02-20 NOTE — THERAPY DISCHARGE NOTE
Acute Care - Occupational Therapy Discharge  [facility]    Patient Name: Kathy Taylor  : 1960    MRN: [id number]                              Today's Date: 2025       Admit Date: 2025    Visit Dx:     ICD-10-CM ICD-9-CM   1. Acute sepsis  A41.9 038.9     995.91   2. Multifocal pneumonia  J18.9 486   3. Acute exacerbation of chronic obstructive pulmonary disease (COPD)  J44.1 491.21   4. Hypoxia  R09.02 799.02   5. Smoker  F17.200 305.1     Patient Active Problem List   Diagnosis    Obstructive sleep apnea    Nocturnal hypoxemia    Polypharmacy    COVID-19 virus infection    Stage III (Severe) COPD    Tobacco abuse    History of rheumatic fever as a child    Hyperlipidemia LDL goal <70    Coronary artery disease involving native coronary artery of native heart with angina pectoris    Non-adherence to medical treatment    Type 2 diabetes mellitus with hyperglycemia, with long-term current use of insulin    Pneumonia     Past Medical History:   Diagnosis Date    Ankle sprain     Arthritis     Arthritis of back     Arthritis of neck     Asthma ?    May have been earlier    Back ache     Bronchiectasis ?    Bronchitis     Bursitis of hip     Cervical disc disorder     Chronic bronchitis ?    COPD (chronic obstructive pulmonary disease)     Coronary artery disease involving native coronary artery of native heart with angina pectoris 07/15/2024    Emphysema of lung     Dont remember when told.    Fibromyalgia     Hip arthrosis     Hyperlipidemia LDL goal <100 2024    Knee sprain     Migraines     Myocardial infarction     Was told I've  had a couple small ones    Neck strain     Pneumonia     Rheumatic fever     Rotator cuff syndrome     Shingles     Sleep apnea, obstructive ?    Tooth infection     Type 2 diabetes mellitus     Vulvar carcinoma     Wrist sprain      Past Surgical History:   Procedure Laterality Date    BREAST BIOPSY Left     BREAST LUMPECTOMY Left 2004    CARDIAC CATHETERIZATION  N/A 07/15/2024    Procedure: Left Heart Cath;  Surgeon: Lonnie Salomon IV, MD;  Location: [facility] CATH INVASIVE LOCATION;  Service: Cardiology;  Laterality: N/A;    CARDIAC CATHETERIZATION  07/15/2024    TONSILLECTOMY      TUBAL ABDOMINAL LIGATION      VULVA BIOPSY      VULVA SURGERY      WISDOM TOOTH EXTRACTION        General Information       Row Name 02/20/25 0845          OT Time and Intention    Document Type discharge evaluation/summary  -     Mode of Treatment occupational therapy  -       Row Name 02/20/25 0845          General Information    Patient Profile Reviewed yes  -LC     Prior Level of Function independent:;all household mobility;transfer;ADL's  SPC at baseline  -     Existing Precautions/Restrictions oxygen therapy device and L/min  -     Barriers to Rehab none identified  -       Row Name 02/20/25 0845          Living Environment    People in Home child(teresa), adult  -       Row Name 02/20/25 0845          Home Main Entrance    Number of Stairs, Main Entrance none  -       Row Name 02/20/25 0845          Stairs Within Home, Primary    Number of Stairs, Within Home, Primary none  -       Row Name 02/20/25 0845          Cognition    Orientation Status (Cognition) oriented x 3  -       Row Name 02/20/25 0845          Safety Issues/Impairments Affecting Functional Mobility    Impairments Affecting Function (Mobility) endurance/activity tolerance  -     Comment, Safety Issues/Impairments (Mobility) Demonstrates good safety awareness and sequencing.  -               User Key  (r) = Recorded By, (t) = Taken By, (c) = Cosigned By      Initials Name Provider Type     Tanisha Quinn OT Occupational Therapist                   Mobility/ADL's       Row Name 02/20/25 0846          Transfers    Transfers sit-stand transfer  -     Comment, (Transfers) Demonstrates good safety awareness and sequencing.  -       Row Name 02/20/25 0846          Sit-Stand Transfer    Sit-Stand  [place] (Transfers) modified independence  -     Assistive Device (Sit-Stand Transfers) cane, straight  -       Row Name 02/20/25 0846          Functional Mobility    Functional Mobility- Ind. Level standby assist  -     Functional Mobility- Device cane, straight  -     Functional Mobility-Distance (Feet) 30  -     Functional Mobility- Comment Ambulated in room to simulate household distances with No LOB, SBA for safety  -       Row Name 02/20/25 0846          Activities of Daily Living    BADL Assessment/Intervention lower body dressing;grooming  -[facility] Name 02/20/25 0846          Lower Body Dressing Assessment/Training    [place] Level (Lower Body Dressing) don;shoes/slippers;supervision  -     Position (Lower Body Dressing) edge of bed sitting  -[facility] Name 02/20/25 0846          Grooming Assessment/Training    [place] Level (Grooming) hair care, combing/brushing;wash face, hands;supervision  -     Position (Grooming) unsupported standing;sink side  -               User Key  (r) = Recorded By, (t) = Taken By, (c) = Cosigned By      Initials Name Provider Type     Tanisha Quinn OT Occupational Therapist                   Obj/Interventions       Row Name 02/20/25 0850          Sensory Assessment (Somatosensory)    Sensory Assessment (Somatosensory) UE sensation intact  -[facility] Name 02/20/25 0850          Vision Assessment/Intervention    Visual Impairment/Limitations corrective lenses for reading  -[facility] Name 02/20/25 0850          Range of Motion Comprehensive    General Range of Motion bilateral upper extremity ROM WFL  -       Row Name 02/20/25 0850          Strength Comprehensive (MMT)    General Manual Muscle Testing (MMT) Assessment upper extremity strength deficits identified  -[facility] Name 02/20/25 0850          Upper Extremity (Manual Muscle Testing)    Upper Extremity: Manual Muscle Testing (MMT) left UE strength is WFL;right UE  strength is WFL  -       Row Name 02/20/25 0850          Motor Skills    Motor Skills functional endurance  -     Functional Endurance impaired activity tolerance  -       Row Name 02/20/25 0870          Balance    Balance Assessment sitting static balance;sitting dynamic balance;standing static balance;standing dynamic balance  -     Static Sitting Balance independent  -     Dynamic Sitting Balance supervision  -     Position, Sitting Balance unsupported;sitting edge of bed  -     Static Standing Balance supervision  -     Dynamic Standing Balance standby assist  -     Position/Device Used, Standing Balance supported;cane, straight  -     Balance Interventions sitting;sit to stand;standing;supported;occupation based/functional task;weight shifting activity  -     Comment, Balance No LOB  -               User Key  (r) = Recorded By, (t) = Taken By, (c) = Cosigned By      Initials Name Provider Type     Tanisha Quinn, ANA Occupational Therapist                   Goals/Plan    No documentation.                  Clinical Impression       Row Name 02/20/25 0842          Pain Assessment    Pretreatment Pain Rating 8/10  -     Posttreatment Pain Rating 8/10  -     Pain Location chest;other (see comments)  lungs  -     Pain Management Interventions activity modification encouraged;nursing notified  -     Response to Pain Interventions activity participation with tolerable pain  -       Row Name 02/20/25 0881          Plan of Care Review    Plan of Care Reviewed With patient  -     Progress no change  -     Outcome Evaluation Pt. presents at baseline with ADLs and functional mobility. No further skilled OT services warranted at this time. Recommend home with family at discharge.  -       Row Name 02/20/25 0855          Therapy Assessment/Plan (OT)    Therapy Frequency (OT) evaluation only  -       Row Name 02/20/25 0816          Therapy Plan Review/Discharge Plan (OT)     Anticipated Discharge Disposition (OT) home with assist  -       Row Name 02/20/25 0855          Vital Signs    Pre Systolic BP Rehab 128  -LC     Pre Treatment Diastolic BP 74  -LC     Post Systolic BP Rehab 127  -LC     Post Treatment Diastolic BP 78  -LC     Pre SpO2 (%) 93  -LC     O2 Delivery Pre Treatment nasal cannula  -LC     Intra SpO2 (%) 92  -LC     O2 Delivery Intra Treatment nasal cannula  -LC     Post SpO2 (%) 93  -LC     O2 Delivery Post Treatment nasal cannula  -LC     Pre Patient Position Sitting  -LC     Intra Patient Position Standing  -LC     Post Patient Position Sitting  -LC       Row Name 02/20/25 0855          Positioning and Restraints    Pre-Treatment Position in bed  -LC     Post Treatment Position chair  -LC     In Chair notified nsg;reclined;call light within reach;encouraged to call for assist;with family/caregiver;waffle cushion;legs elevated;with nsg  Alarm deferred, RN present  -               User Key  (r) = Recorded By, (t) = Taken By, (c) = Cosigned By      Initials Name Provider Type     Tanisha Quinn, OT Occupational Therapist                   Outcome Measures       Row Name 02/20/25 0912          How much help from another is currently needed...    Putting on and taking off regular lower body clothing? 4  -LC     Bathing (including washing, rinsing, and drying) 4  -LC     Toileting (which includes using toilet bed pan or urinal) 4  -LC     Putting on and taking off regular upper body clothing 4  -LC     Taking care of personal grooming (such as brushing teeth) 4  -LC     Eating meals 4  -LC     AM-PAC 6 Clicks Score (OT) 24  -LC       Row Name 02/19/25 2512          How much help from another person do you currently need...    Turning from your back to your side while in flat bed without using bedrails? 4  -KW     Moving from lying on back to sitting on the side of a flat bed without bedrails? 4  -KW     Moving to and from a bed to a chair (including a wheelchair)? 4   -KW     Standing up from a chair using your arms (e.g., wheelchair, bedside chair)? 4  -KW     Climbing 3-5 steps with a railing? 3  -KW     To walk in hospital room? 4  -KW     AM-PAC 6 Clicks Score (PT) 23  -KW       Row Name 02/20/25 0912          Functional Assessment    Outcome Measure Options AM-PAC 6 Clicks Daily Activity (OT)  -               User Key  (r) = Recorded By, (t) = Taken By, (c) = Cosigned By      Initials Name Provider Type    Tanisha Garcia, OT Occupational Therapist    Soraya Sanchez RN Registered Nurse                  Occupational Therapy Education       Title: PT OT SLP Therapies (In Progress)       Topic: Occupational Therapy (In Progress)       Point: ADL training (Done)       Description:   Instruct learner(s) on proper safety adaptation and remediation techniques during self care or transfers.   Instruct in proper use of assistive devices.                  Learning Progress Summary            Patient Acceptance, E, VU,DU by  at 2/20/2025 0803                      Point: Home exercise program (Not Started)       Description:   Instruct learner(s) on appropriate technique for monitoring, assisting and/or progressing therapeutic exercises/activities.                  Learner Progress:  Not documented in this visit.              Point: Precautions (Done)       Description:   Instruct learner(s) on prescribed precautions during self-care and functional transfers.                  Learning Progress Summary            Patient Acceptance, E, VU,DU by  at 2/20/2025 0803                      Point: Body mechanics (Done)       Description:   Instruct learner(s) on proper positioning and spine alignment during self-care, functional mobility activities and/or exercises.                  Learning Progress Summary            Patient Acceptance, E, VU,DU by  at 2/20/2025 0803                                      User Key       Initials Effective Dates Name Provider Type Discipline     KEVIN 06/16/21 -  Tanisha Quinn OT Occupational Therapist OT                  OT Recommendation and Plan  Therapy Frequency (OT): evaluation only  Plan of Care Review  Plan of Care Reviewed With: patient  Progress: no change  Outcome Evaluation: Pt. presents at baseline with ADLs and functional mobility. No further skilled OT services warranted at this time. Recommend home with family at discharge.  Plan of Care Reviewed With: patient  Outcome Evaluation: Pt. presents at baseline with ADLs and functional mobility. No further skilled OT services warranted at this time. Recommend home with family at discharge.     Time Calculation:   Evaluation Complexity (OT)  Review Occupational Profile/Medical/Therapy History Complexity: brief/low complexity  Assessment, Occupational Performance/Identification of Deficit Complexity: 1-3 performance deficits  Clinical Decision Making Complexity (OT): problem focused assessment/low complexity  Overall Complexity of Evaluation (OT): low complexity     Time Calculation- OT       Row Name 02/20/25 0915             Time Calculation- OT    OT Start Time 0803  -LC      OT Received On 02/20/25  -         Untimed Charges    OT Eval/Re-eval Minutes 50  -LC         Total Minutes    Untimed Charges Total Minutes 50  -LC       Total Minutes 50  -LC                User Key  (r) = Recorded By, (t) = Taken By, (c) = Cosigned By      Initials Name Provider Type     Tanisha Quinn OT Occupational Therapist                  Therapy Charges for Today       Code Description Service Date Service Provider Modifiers Qty    [id number]  OT EVAL LOW COMPLEXITY 4 2/20/2025 Tanisha Quinn OT GO 1               OT Discharge Summary  Anticipated Discharge Disposition (OT): home with assist  Reason for Discharge: At baseline function  Discharge Destination: Home with assist    Tanisha Quinn OT  2/20/2025

## 2025-02-20 NOTE — CASE MANAGEMENT/SOCIAL WORK
Discharge Planning Assessment  [facility]     Patient Name: Kathy Taylor  MRN: [id number]  Today's Date: 2/20/2025    Admit Date: 2/19/2025    Plan: home   Discharge Needs Assessment       Row Name 02/20/25 1431       Living Environment    People in Home child(teresa), adult    Current Living Arrangements apartment    Potentially Unsafe Housing Conditions none    In the past 12 months has the electric, gas, oil, or water company threatened to shut off services in your home? No    Primary Care Provided by self    Provides Primary Care For no one    Family Caregiver if Needed child(teresa), adult    Family Caregiver Names Diane Taylor Daughter [phone]    Quality of Family Relationships supportive    Able to Return to Prior Arrangements yes       Resource/Environmental Concerns    Resource/Environmental Concerns financial    Financial Concerns medicine, unable to afford    Transportation Concerns none       Transportation Needs    In the past 12 months, has lack of transportation kept you from medical appointments or from getting medications? no    In the past 12 months, has lack of transportation kept you from meetings, work, or from getting things needed for daily living? No       Food Insecurity    Within the past 12 months, you worried that your food would run out before you got the money to buy more. Never true    Within the past 12 months, the food you bought just didn't last and you didn't have money to get more. Never true       Transition Planning    Patient/Family Anticipates Transition to home with family    Patient/Family Anticipated Services at Transition none    Transportation Anticipated family or friend will provide       Discharge Needs Assessment    Readmission Within the Last 30 Days no previous admission in last 30 days    Equipment Currently Used at Home glucometer;nebulizer;pulse ox;cane, straight;bp cuff    Concerns to be Addressed discharge planning    Do you want help finding or keeping work  or a job? I do not need or want help    Do you want help with school or training? For example, starting or completing job training or getting a high school diploma, GED or equivalent No    Anticipated Changes Related to Illness none    Equipment Needed After Discharge other (see comments)  May need home O2    Provided Post Acute Provider List? N/A    Provided Post Acute Provider Quality & Resource List? N/A    Current Discharge Risk financial support inadequate                   Discharge Plan       Row Name 02/20/25 2374       Plan    Plan home    Patient/Family in Agreement with Plan yes    Plan Comments Met with Ms. Taylor at the bedside to initiate discharge planning. She shares an apartment in [place] with family. She is independent with activities of daily living and uses a straight cane for mobility. She also has a pulse oximeter, glucometer, and a nebulizer. She does not have home O2 and is requiring 2L NC at this time. She reported she goes to [facility] for primary care but could not name the doctor. She denies the receipt of home health or outpatient therapy. She reported that she uses [employer] for transportation when family isn't available. She reported she has difficulty paying for some prescriptions. She stated she plans to switch insurance companies during open enrollment. Her discharge plan is home, and family or friend will transport.  will continue to follow plan of care and assist with discharge planning needs as indicated.    Final Discharge Disposition Code 01 - home or self-care                  Continued Care and Services - Admitted Since 2/19/2025    No active coordination exists for this encounter.          Demographic Summary       Row Name 02/20/25 3251       General Information    Admission Type inpatient    Arrived From emergency department    Referral Source admission list    Reason for Consult discharge planning    Preferred Language English       Contact  Information    Permission Granted to Share Info With family/designee    Contact Information Obtained for     Contact Information Comments Diane Taylor Daughter [phone]                   Functional Status       Row Name 02/20/25 1430       Functional Status    Usual Activity Tolerance good    Current Activity Tolerance good       Physical Activity    On average, how many days per week do you engage in moderate to strenuous exercise (like a brisk walk)? 5 days    On average, how many minutes do you engage in exercise at this level? 30 min    Number of minutes of exercise per week 150       Assessment of Health Literacy    How often do you have someone help you read hospital materials? Never    How often do you have problems learning about your medical condition because of difficulty understanding written information? Never    How often do you have a problem understanding what is told to you about your medical condition? Never    How confident are you filling out medical forms by yourself? Extremely    Health Literacy Good       Functional Status, IADL    Medications independent    Meal Preparation independent    Housekeeping independent    Laundry independent    Shopping independent    If for any reason you need help with day-to-day activities such as bathing, preparing meals, shopping, managing finances, etc., do you get the help you need? I get all the help I need       Mental Status    General Appearance WDL WDL       Mental Status Summary    Recent Changes in Mental Status/Cognitive Functioning no changes                   Psychosocial    No documentation.                  Abuse/Neglect    No documentation.                  Legal    No documentation.                  Substance Abuse    No documentation.                  Patient Forms    No documentation.                     Yina Ghosh RN     bilateral upper extremity Active ROM was WFL (within functional limits)/bilateral  lower extremity Active ROM was WFL (within functional limits)

## 2025-02-20 NOTE — H&P
[facility] Medicine Services  HISTORY AND PHYSICAL    Patient Name: Kathy Taylor  : 1960  MRN: [id number]  Primary Care Physician: New, No Known  Date of admission: 2025      Subjective   Subjective     Chief Complaint: SOA    HPI:  Kathy Taylor is a 64 y.o. female with history of COPD, NOT on home oxygen, CAD, FM, HTN, SABRINA, tobacco use, DM here with progressive SOA x 3 days. Increased cough/congestion. Dark sputum, green-yellow, no blood. Fevers and chills. Loose stool. No n/v. Decreased PO intake.     Review of Systems   Constitutional:  Positive for activity change and fatigue.   HENT:  Positive for congestion.    Respiratory:  Positive for cough, chest tightness, shortness of breath and wheezing.      Problem List:  CAD  Echocardiogram 2022: LVEF 55%, no significant valvular abnormalities, no pericardial effusion  Stress test 2022: Normal myocardial perfusion study with no evidence of ischemia, EF 57%  Calcium score 2024: 813  Cardiac catheterization 2024: 40% LAD stenosis, 40% circumflex stenosis, RPDA 70% stenosis with SWAPNIL-3 flow.  Medical management recommended  Echo 2024: EF 61 to 65% with normal valves  Hypotension, intolerant to HCTZ with symptomatic hypotension  Hyperlipidemia  Type 2 diabetes mellitus, onset ; hemoglobin A1c 9.7% 2023  Stage III severe COPD with current tobacco use 1 pack/day, 50-pack-year history, PFTs show moderate to severe airway obstruction, no significant change in FEV1 after bronchodilator, no restriction but air trapping and reduced diffusion capacity 2024  History of SABRINA with upcoming sleep study  Fibromyalgia  Migraines  [facility] admission 2021 for COVID  Family history of CAD  History of rheumatic fever as a child  Surgical history:  Breast lumpectomy  Tubal ligation  Vulvar surgery  Fleischmanns teeth extraction        Personal History     Past Medical History:   Diagnosis Date    Ankle  sprain     Arthritis     Arthritis of back     Arthritis of neck     Asthma ?    May have been earlier    Back ache     Bronchiectasis ?    Bronchitis     Bursitis of hip     Cervical disc disorder     Chronic bronchitis ?    COPD (chronic obstructive pulmonary disease)     Coronary artery disease involving native coronary artery of native heart with angina pectoris 07/15/2024    Emphysema of lung     Dont remember when told.    Fibromyalgia     Hip arthrosis     Hyperlipidemia LDL goal <100 02/05/2024    Knee sprain     Migraines     Myocardial infarction     Was told I've  had a couple small ones    Neck strain     Pneumonia     Rheumatic fever     Rotator cuff syndrome     Shingles     Sleep apnea, obstructive ?    Tooth infection     Type 2 diabetes mellitus     Vulvar carcinoma     Wrist sprain            Past Surgical History:   Procedure Laterality Date    BREAST BIOPSY Left     BREAST LUMPECTOMY Left 2004    CARDIAC CATHETERIZATION N/A 07/15/2024    Procedure: Left Heart Cath;  Surgeon: Lonnie Salomon IV, MD;  Location: [facility] CATH INVASIVE LOCATION;  Service: Cardiology;  Laterality: N/A;    CARDIAC CATHETERIZATION  07/15/2024    TONSILLECTOMY      TUBAL ABDOMINAL LIGATION      VULVA BIOPSY      VULVA SURGERY      WISDOM TOOTH EXTRACTION         Family History: family history includes Arthritis in her mother; Breast cancer in an other family member; Cancer in her maternal grandmother and mother; Diabetes in her brother and mother; Heart attack in her brother, maternal grandmother, and mother; Heart disease in her brother and mother; Thyroid disease in her mother; Uterine cancer in her mother.     Social History:  reports that she has been smoking cigarettes. She has a 50 pack-year smoking history. She has been exposed to tobacco smoke. She has never used smokeless tobacco. She reports that she does not currently use alcohol. She reports that she does not use drugs.  Social History     Social  History Narrative        Has moved to [place] from [place]    Continues to smoke up to 1 pack of cigarettes per day    Denies regular alcohol use       Medications:  Available home medication information reviewed.  Accu-Chek Softclix Lancets, Fluticasone-Umeclidin-Vilant, Glucagon, HYDROcodone-acetaminophen, Insulin Glargine, Insulin Pen Needle, Semaglutide(0.25 or 0.5MG/DOS), albuterol sulfate HFA, aspirin, diclofenac, dilTIAZem XR, glucose blood, ipratropium, ipratropium-albuterol, isosorbide mononitrate, meloxicam, metFORMIN ER, nicotine, nitroglycerin, polyethylene glycol, rosuvastatin, sucralfate, and tiZANidine    Allergies   Allergen Reactions    Bee Venom Anaphylaxis    Ciprofloxacin Shortness Of Breath       Objective   Objective     Vital Signs:   Temp:  [98.2 °F (36.8 °C)] 98.2 °F (36.8 °C)  Heart Rate:  [100-109] 100  Resp:  [22] 22  BP: (126-167)/(58-93) 126/69  Flow (L/min) (Oxygen Therapy):  [2] 2       Physical Exam   NAD, alert and oriented  OP clear, dry MM  Neck supple  No LAD  Wheezes, coarse at bases  Tachy  +BS, soft  WERNER  Normal affect  No rashes    Result Review:  I have personally reviewed the results from the time of this admission to 2/19/2025 20:14 EST and agree with these findings:  []  Laboratory list / accordion  []  Microbiology  []  Radiology  []  EKG/Telemetry   []  Cardiology/Vascular   []  Pathology  []  Old records  []  Other:  Most notable findings include: Multifocal PNA, WBC 26.9      LAB RESULTS:      Lab 02/19/25  1704   WBC 26.94*   HEMOGLOBIN 14.4   HEMATOCRIT 40.3   PLATELETS 244   NEUTROS ABS 23.55*   IMMATURE GRANS (ABS) 0.20*   LYMPHS ABS 1.64   MONOS ABS 1.33*   EOS ABS 0.15   MCV 87.6   D DIMER QUANT 0.54         Lab 02/19/25  1704   SODIUM 133*   POTASSIUM 4.7   CHLORIDE 96*   CO2 22.0   ANION GAP 15.0   BUN 22   CREATININE 0.98   EGFR 65.4   GLUCOSE 295*   CALCIUM 9.6         Lab 02/19/25  1704   TOTAL PROTEIN 7.4   ALBUMIN 4.1   GLOBULIN 3.3   ALT (SGPT)  15   AST (SGOT) 18   BILIRUBIN 0.6   ALK PHOS 90         Lab 02/19/25  1818 02/19/25  1704   PROBNP  --  326.4   HSTROP T 34* 41*                 UA          10/19/2024    13:22   Urinalysis   Squamous Epithelial Cells, UA 3-6    Specific Gravity, UA 1.020    Ketones, UA Trace    Blood, UA Trace    Leukocytes, UA Trace    Nitrite, UA Negative    RBC, UA 0-2    WBC, UA 0-2    Bacteria, UA None Seen        Microbiology Results (last 10 days)       Procedure Component Value - Date/Time    COVID PRE-OP / PRE-PROCEDURE SCREENING ORDER (NO ISOLATION) - Swab, Nasopharynx [[id number]]  (Normal) Collected: 02/19/25 1720    Lab Status: Final result Specimen: Swab from Nasopharynx Updated: 02/19/25 1748    Narrative:      The following orders were created for panel order COVID PRE-OP / PRE-PROCEDURE SCREENING ORDER (NO ISOLATION) - Swab, Nasopharynx.  Procedure                               Abnormality         Status                     ---------                               -----------         ------                     COVID-19 and FLU A/B PCR...[[id number]]  Normal              Final result                 Please view results for these tests on the individual orders.    COVID-19 and FLU A/B PCR, 1 HR TAT - Swab, Nasopharynx [[id number]]  (Normal) Collected: 02/19/25 1720    Lab Status: Final result Specimen: Swab from Nasopharynx Updated: 02/19/25 1748     COVID19 Not Detected     Influenza A PCR Not Detected     Influenza B PCR Not Detected    Narrative:      Fact sheet for providers: https://www.fda.gov/media/476027/download    Fact sheet for patients: https://www.fda.gov/media/843126/download    Test performed by PCR.            CT Chest Without Contrast Diagnostic    Result Date: 2/19/2025  CT CHEST WO CONTRAST DIAGNOSTIC Date of Exam: 2/19/2025 6:23 PM EST Indication: cough, sob, eval for pna. Comparison: CT chest 2/12/2025 Technique: Axial CT images were obtained of the chest without contrast administration.   Reconstructed coronal and sagittal images were also obtained. Automated exposure control and iterative construction methods were used. Findings: Thyroid unremarkable. No supraclavicular adenopathy. Aortic atherosclerotic disease without aneurysm. Severe calcified coronary atherosclerotic disease. Normal caliber main pulmonary artery. Heart size normal. No pericardial effusion. Esophagus unremarkable. No suspicious adenopathy. Trachea patent. Mild emphysema. There are new infectious/inflammatory patchy groundglass airspace opacities in the lingula and left greater than right lower lobes. Scattered small discrete pulmonary nodules stable from recent low-dose chest CT screening.  Linear bandlike areas of minimal atelectasis versus scarring. No edema, effusion or pneumothorax. No acute findings partially imaged upper abdomen. No acute chest wall findings. No axillary adenopathy. Degenerative changes throughout the spine without acute displaced fracture or aggressive lesion.     Impression: Impression: 1. Mild/early multifocal pneumonia versus aspiration. 2. Mild emphysema with stable small pulmonary nodules since 2/12/2025. Recommend continued annual low-dose chest CT screening, due February 2026. 3. Aortic and severe coronary atherosclerotic disease. Electronically Signed: Ghassan Brewster MD  2/19/2025 6:34 PM EST  Workstation ID: [id number]    XR Chest 1 View    Result Date: 2/19/2025  XR CHEST 1 VW Date of Exam: 2/19/2025 5:05 PM EST Indication: sob Comparison: Chest radiograph 12/4/2024 and a chest CT to 1225 Findings: Mediastinum: Cardiac silhouette appears unchanged and normal in size Lungs: The lungs appear clear without focal consolidation appreciated. Pleura: No pleural effusion or pneumothorax. Bones and soft tissues: No acute, displaced fracture seen.     Impression: Impression: No radiographic evidence of acute cardiopulmonary disease. Electronically Signed: Corey Pérez  2/19/2025 5:33 PM EST  Workstation  ID: [id number]     Results for orders placed during the hospital encounter of 07/15/24    Adult Transthoracic Echo Complete W/ Cont if Necessary Per Protocol    Interpretation Summary    Left ventricular ejection fraction appears to be 61 - 65%.    All left ventricular wall segments contract normally.    No significant valvular stenosis or regurgitation.      Assessment & Plan   Assessment & Plan       Pneumonia    Obstructive sleep apnea    Stage III (Severe) COPD    Coronary artery disease involving native coronary artery of native heart with angina pectoris    Type 2 diabetes mellitus with hyperglycemia, with long-term current use of insulin    Multifocal PNA  Acute respiratory failure with hypoxia  COPD, with bronchospasm  -rocephin/doxy  -nebs/pulmonary toilet    CAD  -on ASA, followed by cardiology here    DM  -basal insulin/SSI, titrate on steroids/prn    HTN  -continue appropriate home meds    VTE Prophylaxis:  Pharmacologic VTE prophylaxis orders are signed & held.            CODE STATUS:    Code Status and Medical Interventions: CPR (Attempt to Resuscitate); Full Support   Ordered at: 02/19/25 2012     Code Status (Patient has no pulse and is not breathing):    CPR (Attempt to Resuscitate)     Medical Interventions (Patient has pulse or is breathing):    Full Support       Expected Discharge   Expected discharge date/ time has not been documented.     Toy Manzano MD  02/19/25

## 2025-02-20 NOTE — PLAN OF CARE
Goal Outcome Evaluation:  Plan of Care Reviewed With: patient        Progress: no change  Outcome Evaluation: PT eval completed. Patient able to ambulate with SPC however easily SOA with ambulation dropping to 87%. SHe demonstrates much slower gait due to fatigue and SOA. Patient presents below baseline for functional mobility. Patient demonstrates decreased activity tolerance, deconditioning and reduced dynamic balance. Patient would continue to benefit from skilled PT services to improve dynamic balance with gait and activity tolerance training in order to build endurance and reduce risk of falls.    Anticipated Discharge Disposition (PT): home with outpatient therapy services

## 2025-02-20 NOTE — PLAN OF CARE
Problem: Sepsis/Septic Shock  Goal: Optimal Coping  Outcome: Progressing  Goal: Absence of Bleeding  Outcome: Progressing  Goal: Blood Glucose Level Within Target Range  Outcome: Progressing  Goal: Absence of Infection Signs and Symptoms  Outcome: Progressing  Goal: Optimal Nutrition Delivery  Outcome: Progressing     Problem: Adult Inpatient Plan of Care  Goal: Plan of Care Review  Outcome: Progressing  Goal: Patient-Specific Goal (Individualized)  Outcome: Progressing  Goal: Absence of Hospital-Acquired Illness or Injury  Outcome: Progressing  Goal: Optimal Comfort and Wellbeing  Outcome: Progressing  Goal: Readiness for Transition of Care  Outcome: Progressing     Problem: Comorbidity Management  Goal: Maintenance of COPD Symptom Control  Outcome: Progressing  Goal: Blood Glucose Level Within Target Range  Outcome: Progressing     Problem: Pneumonia  Goal: Fluid Balance  Outcome: Progressing  Goal: Absence of Infection Signs and Symptoms  Outcome: Progressing  Goal: Effective Oxygenation and Ventilation  Outcome: Progressing   Goal Outcome Evaluation:      Pt is advancing in plan of care. Pt weaning from 2L NC. Pt has some chest discomfort with cough. Taking oral and IV antibiotics. Pt is OOB independently and safely. Pt is agreeable to plan of care.

## 2025-02-20 NOTE — PLAN OF CARE
Goal Outcome Evaluation:  Plan of Care Reviewed With: patient        Progress: no change  Outcome Evaluation: Pt. presents at baseline with ADLs and functional mobility. No further skilled OT services warranted at this time. Recommend home with family at discharge.    Anticipated Discharge Disposition (OT): home with assist

## 2025-02-21 ENCOUNTER — TELEPHONE (OUTPATIENT)
Dept: ENDOCRINOLOGY | Facility: CLINIC | Age: 65
End: 2025-02-21
Payer: MEDICARE

## 2025-02-21 LAB
BASOPHILS # BLD AUTO: 0.02 10*3/MM3 (ref 0–0.2)
BASOPHILS NFR BLD AUTO: 0.1 % (ref 0–1.5)
DEPRECATED RDW RBC AUTO: 40.8 FL (ref 37–54)
EOSINOPHIL # BLD AUTO: 0 10*3/MM3 (ref 0–0.4)
EOSINOPHIL NFR BLD AUTO: 0 % (ref 0.3–6.2)
ERYTHROCYTE [DISTWIDTH] IN BLOOD BY AUTOMATED COUNT: 12.7 % (ref 12.3–15.4)
GLUCOSE BLDC GLUCOMTR-MCNC: 371 MG/DL (ref 70–130)
GLUCOSE BLDC GLUCOMTR-MCNC: 398 MG/DL (ref 70–130)
GLUCOSE BLDC GLUCOMTR-MCNC: 432 MG/DL (ref 70–130)
GLUCOSE BLDC GLUCOMTR-MCNC: 454 MG/DL (ref 70–130)
GLUCOSE BLDC GLUCOMTR-MCNC: 459 MG/DL (ref 70–130)
GLUCOSE BLDC GLUCOMTR-MCNC: 468 MG/DL (ref 70–130)
HCT VFR BLD AUTO: 34.6 % (ref 34–46.6)
HGB BLD-MCNC: 11.9 G/DL (ref 12–15.9)
IMM GRANULOCYTES # BLD AUTO: 0.24 10*3/MM3 (ref 0–0.05)
IMM GRANULOCYTES NFR BLD AUTO: 1.2 % (ref 0–0.5)
L PNEUMO1 AG UR QL IA: NEGATIVE
LYMPHOCYTES # BLD AUTO: 1.07 10*3/MM3 (ref 0.7–3.1)
LYMPHOCYTES NFR BLD AUTO: 5.2 % (ref 19.6–45.3)
MCH RBC QN AUTO: 30.4 PG (ref 26.6–33)
MCHC RBC AUTO-ENTMCNC: 34.4 G/DL (ref 31.5–35.7)
MCV RBC AUTO: 88.3 FL (ref 79–97)
MONOCYTES # BLD AUTO: 0.58 10*3/MM3 (ref 0.1–0.9)
MONOCYTES NFR BLD AUTO: 2.8 % (ref 5–12)
NEUTROPHILS NFR BLD AUTO: 18.73 10*3/MM3 (ref 1.7–7)
NEUTROPHILS NFR BLD AUTO: 90.7 % (ref 42.7–76)
NRBC BLD AUTO-RTO: 0 /100 WBC (ref 0–0.2)
PLATELET # BLD AUTO: 200 10*3/MM3 (ref 140–450)
PMV BLD AUTO: 10.1 FL (ref 6–12)
RBC # BLD AUTO: 3.92 10*6/MM3 (ref 3.77–5.28)
S PNEUM AG SPEC QL LA: NEGATIVE
WBC NRBC COR # BLD AUTO: 20.64 10*3/MM3 (ref 3.4–10.8)

## 2025-02-21 PROCEDURE — 94799 UNLISTED PULMONARY SVC/PX: CPT

## 2025-02-21 PROCEDURE — 82948 REAGENT STRIP/BLOOD GLUCOSE: CPT

## 2025-02-21 PROCEDURE — 63710000001 INSULIN GLARGINE PER 5 UNITS: Performed by: INTERNAL MEDICINE

## 2025-02-21 PROCEDURE — 63710000001 INSULIN LISPRO (HUMAN) PER 5 UNITS: Performed by: HOSPITALIST

## 2025-02-21 PROCEDURE — 25010000002 METHYLPREDNISOLONE PER 125 MG: Performed by: HOSPITALIST

## 2025-02-21 PROCEDURE — 99232 SBSQ HOSP IP/OBS MODERATE 35: CPT | Performed by: INTERNAL MEDICINE

## 2025-02-21 PROCEDURE — 85025 COMPLETE CBC W/AUTO DIFF WBC: CPT | Performed by: INTERNAL MEDICINE

## 2025-02-21 PROCEDURE — 63710000001 INSULIN LISPRO (HUMAN) PER 5 UNITS: Performed by: INTERNAL MEDICINE

## 2025-02-21 PROCEDURE — 25010000002 CEFTRIAXONE PER 250 MG: Performed by: INTERNAL MEDICINE

## 2025-02-21 PROCEDURE — 63710000001 REVEFENACIN 175 MCG/3ML SOLUTION: Performed by: HOSPITALIST

## 2025-02-21 PROCEDURE — 25010000002 HEPARIN (PORCINE) PER 1000 UNITS: Performed by: HOSPITALIST

## 2025-02-21 PROCEDURE — 94664 DEMO&/EVAL PT USE INHALER: CPT

## 2025-02-21 RX ORDER — PREDNISONE 20 MG/1
40 TABLET ORAL
Status: DISCONTINUED | OUTPATIENT
Start: 2025-02-21 | End: 2025-02-21

## 2025-02-21 RX ORDER — NICOTINE 21 MG/24HR
1 PATCH, TRANSDERMAL 24 HOURS TRANSDERMAL ONCE
Status: COMPLETED | OUTPATIENT
Start: 2025-02-21 | End: 2025-02-21

## 2025-02-21 RX ORDER — PREDNISONE 20 MG/1
40 TABLET ORAL
Status: DISCONTINUED | OUTPATIENT
Start: 2025-02-22 | End: 2025-02-23 | Stop reason: HOSPADM

## 2025-02-21 RX ORDER — INSULIN LISPRO 100 [IU]/ML
8 INJECTION, SOLUTION INTRAVENOUS; SUBCUTANEOUS
Status: DISCONTINUED | OUTPATIENT
Start: 2025-02-21 | End: 2025-02-23 | Stop reason: HOSPADM

## 2025-02-21 RX ADMIN — POLYETHYLENE GLYCOL 3350 17 G: 17 POWDER, FOR SOLUTION ORAL at 08:06

## 2025-02-21 RX ADMIN — SODIUM CHLORIDE 4 ML: 7 NEBU SOLN,3 % NEBU at 07:06

## 2025-02-21 RX ADMIN — CEFTRIAXONE 1000 MG: 1 INJECTION, POWDER, FOR SOLUTION INTRAMUSCULAR; INTRAVENOUS at 18:20

## 2025-02-21 RX ADMIN — HEPARIN SODIUM 5000 UNITS: 5000 INJECTION INTRAVENOUS; SUBCUTANEOUS at 23:07

## 2025-02-21 RX ADMIN — INSULIN LISPRO 8 UNITS: 100 INJECTION, SOLUTION INTRAVENOUS; SUBCUTANEOUS at 11:47

## 2025-02-21 RX ADMIN — DOXYCYCLINE 100 MG: 100 CAPSULE ORAL at 20:37

## 2025-02-21 RX ADMIN — REVEFENACIN 175 MCG: 175 SOLUTION RESPIRATORY (INHALATION) at 07:07

## 2025-02-21 RX ADMIN — BUDESONIDE AND FORMOTEROL FUMARATE DIHYDRATE 2 PUFF: 160; 4.5 AEROSOL RESPIRATORY (INHALATION) at 19:03

## 2025-02-21 RX ADMIN — HEPARIN SODIUM 5000 UNITS: 5000 INJECTION INTRAVENOUS; SUBCUTANEOUS at 14:48

## 2025-02-21 RX ADMIN — INSULIN GLARGINE 20 UNITS: 100 INJECTION, SOLUTION SUBCUTANEOUS at 20:15

## 2025-02-21 RX ADMIN — METHYLPREDNISOLONE SODIUM SUCCINATE 60 MG: 125 INJECTION INTRAMUSCULAR; INTRAVENOUS at 08:04

## 2025-02-21 RX ADMIN — ROSUVASTATIN 20 MG: 20 TABLET, FILM COATED ORAL at 08:05

## 2025-02-21 RX ADMIN — INSULIN LISPRO 7 UNITS: 100 INJECTION, SOLUTION INTRAVENOUS; SUBCUTANEOUS at 11:47

## 2025-02-21 RX ADMIN — INSULIN LISPRO 6 UNITS: 100 INJECTION, SOLUTION INTRAVENOUS; SUBCUTANEOUS at 16:30

## 2025-02-21 RX ADMIN — DOXYCYCLINE 100 MG: 100 CAPSULE ORAL at 08:04

## 2025-02-21 RX ADMIN — GUAIFENESIN 600 MG: 600 TABLET ORAL at 08:04

## 2025-02-21 RX ADMIN — BUDESONIDE AND FORMOTEROL FUMARATE DIHYDRATE 2 PUFF: 160; 4.5 AEROSOL RESPIRATORY (INHALATION) at 07:04

## 2025-02-21 RX ADMIN — ALBUTEROL SULFATE 2.5 MG: 2.5 SOLUTION RESPIRATORY (INHALATION) at 01:32

## 2025-02-21 RX ADMIN — INSULIN LISPRO 8 UNITS: 100 INJECTION, SOLUTION INTRAVENOUS; SUBCUTANEOUS at 08:06

## 2025-02-21 RX ADMIN — ALBUTEROL SULFATE 2.5 MG: 2.5 SOLUTION RESPIRATORY (INHALATION) at 19:03

## 2025-02-21 RX ADMIN — NICOTINE 1 PATCH: 21 PATCH TRANSDERMAL at 00:43

## 2025-02-21 RX ADMIN — INSULIN LISPRO 8 UNITS: 100 INJECTION, SOLUTION INTRAVENOUS; SUBCUTANEOUS at 17:17

## 2025-02-21 RX ADMIN — ASPIRIN 81 MG: 81 TABLET, COATED ORAL at 08:04

## 2025-02-21 RX ADMIN — DILTIAZEM HYDROCHLORIDE 120 MG: 120 CAPSULE, COATED, EXTENDED RELEASE ORAL at 08:04

## 2025-02-21 RX ADMIN — INSULIN LISPRO 7 UNITS: 100 INJECTION, SOLUTION INTRAVENOUS; SUBCUTANEOUS at 20:15

## 2025-02-21 RX ADMIN — ALBUTEROL SULFATE 2.5 MG: 2.5 SOLUTION RESPIRATORY (INHALATION) at 07:02

## 2025-02-21 RX ADMIN — Medication 10 ML: at 20:37

## 2025-02-21 RX ADMIN — HEPARIN SODIUM 5000 UNITS: 5000 INJECTION INTRAVENOUS; SUBCUTANEOUS at 05:47

## 2025-02-21 RX ADMIN — GUAIFENESIN 600 MG: 600 TABLET ORAL at 20:37

## 2025-02-21 RX ADMIN — INSULIN LISPRO 6 UNITS: 100 INJECTION, SOLUTION INTRAVENOUS; SUBCUTANEOUS at 08:03

## 2025-02-21 RX ADMIN — ISOSORBIDE MONONITRATE 30 MG: 30 TABLET, EXTENDED RELEASE ORAL at 08:05

## 2025-02-21 RX ADMIN — NICOTINE 1 PATCH: 21 PATCH TRANSDERMAL at 23:20

## 2025-02-21 RX ADMIN — ALBUTEROL SULFATE 2.5 MG: 2.5 SOLUTION RESPIRATORY (INHALATION) at 12:59

## 2025-02-21 RX ADMIN — Medication 10 ML: at 08:06

## 2025-02-21 NOTE — PLAN OF CARE
Problem: Sepsis/Septic Shock  Goal: Optimal Coping  Outcome: Progressing  Goal: Absence of Bleeding  Outcome: Progressing  Goal: Blood Glucose Level Within Target Range  Outcome: Progressing  Goal: Absence of Infection Signs and Symptoms  Outcome: Progressing  Intervention: Initiate Sepsis Management  Recent Flowsheet Documentation  Taken 2/20/2025 1800 by Lore Pardo RN  Infection Prevention: hand hygiene promoted  Isolation Precautions:   precautions maintained   droplet  Intervention: Promote Recovery  Recent Flowsheet Documentation  Taken 2/20/2025 1800 by Lore Pardo RN  Activity Management: activity encouraged  Goal: Optimal Nutrition Delivery  Outcome: Progressing     Problem: Adult Inpatient Plan of Care  Goal: Plan of Care Review  Outcome: Progressing  Goal: Patient-Specific Goal (Individualized)  Outcome: Progressing  Goal: Absence of Hospital-Acquired Illness or Injury  Outcome: Progressing  Intervention: Identify and Manage Fall Risk  Recent Flowsheet Documentation  Taken 2/20/2025 1800 by Lore Pardo RN  Safety Promotion/Fall Prevention: safety round/check completed  Intervention: Prevent Infection  Recent Flowsheet Documentation  Taken 2/20/2025 1800 by Lore Pardo RN  Infection Prevention: hand hygiene promoted  Goal: Optimal Comfort and Wellbeing  Outcome: Progressing  Goal: Readiness for Transition of Care  Outcome: Progressing     Problem: Comorbidity Management  Goal: Maintenance of COPD Symptom Control  Outcome: Progressing  Goal: Blood Glucose Level Within Target Range  Outcome: Progressing     Problem: Pneumonia  Goal: Fluid Balance  Outcome: Progressing  Goal: Absence of Infection Signs and Symptoms  Outcome: Progressing  Intervention: Prevent Infection Progression  Recent Flowsheet Documentation  Taken 2/20/2025 1800 by Lore Pardo RN  Isolation Precautions:   precautions maintained   droplet  Goal: Effective Oxygenation and Ventilation  Outcome:  Progressing  Intervention: Promote Airway Secretion Clearance  Recent Flowsheet Documentation  Taken 2/20/2025 1800 by Lore Pardo, RN  Activity Management: activity encouraged   Goal Outcome Evaluation:      Pt remained A/OX4. Pt is on room air 2L NC as needed.Pt says she has chest discomfort but says she thinks it is related to her diagnosis. Pt has chronic pain buthas not asked for any additional medication. Pt has remained stable and received medication as ordered by provider. Pt is progressing towards plan of care.

## 2025-02-21 NOTE — TELEPHONE ENCOUNTER
Patient called inquiring about her Dexcom G7 order from [employer]. Advised patient that the order has been cancelled by her. She states it was accidentle and will contact [employer] to reinstate the order. Phone number gave on Natasha is [phone]. She requested a [employer] message be sent with that telephone number. She also states her Ozempic is going to be over $200 and she cannot afford that. She is in the donut hole she said. She requested a tier exemption from her insurance but was denied. She is currently in the hospital with pneumonia. She is on two steroids, Medrol inj and Prednisone oral tab. She said they have been keeping an eye on her blood sugars. She wants to know what to do since the ozempic is not covered. Will send [employer] message with [employer] information.

## 2025-02-21 NOTE — CASE MANAGEMENT/SOCIAL WORK
Case Management Discharge Note      Final Note: Met with Ms. Taylor to finalize discharge plan. Her plan is home with family. Oxygen was ordered, and [facility] later today. No other discharge needs were identified. IMM delivered and signed.    Provided Post Acute Provider List?: N/A  Provided Post Acute Provider Quality & Resource List?: N/A    Selected Continued Care - Admitted Since 2/19/2025       Destination    No services have been selected for the patient.                Durable Medical Equipment Coordination complete.      Service Provider Services Address Phone Fax Patient Preferred    [facility] [address] [facility] [address] [phone] --              Dialysis/Infusion    No services have been selected for the patient.                Home Medical Care    No services have been selected for the patient.                Therapy    No services have been selected for the patient.                Community Resources    No services have been selected for the patient.                Community & DME    No services have been selected for the patient.                         Final Discharge Disposition Code: 01 - home or self-care

## 2025-02-21 NOTE — PROGRESS NOTES
[facility] Medicine Services  PROGRESS NOTE    Patient Name: Kathy Taylor  : 1960  MRN: [id number]    Date of Admission: 2025  Primary Care Physician: Provider, No Known    Subjective   Subjective     CC: Follow-up SOA    HPI: No acute events overnight, patient rested well, breathing is much improved now off O2, still endorses a cough    Objective   Objective     Vital Signs:   Temp:  [97.6 °F (36.4 °C)-98 °F (36.7 °C)] 97.6 °F (36.4 °C)  Heart Rate:  [] 92  Resp:  [17-18] 18  BP: (124-131)/(69-78) 131/71  Flow (L/min) (Oxygen Therapy):  [2] 2     Physical Exam:  Constitutional: No acute distress, awake, alert  HENT: NCAT, mucous membranes moist  Respiratory: Nonlabored respirations, diffuse coarse breath sounds on room air  Cardiovascular: RRR, no murmurs, rubs, or gallops  Gastrointestinal: Positive bowel sounds, soft, nontender, nondistended  Musculoskeletal: No bilateral ankle edema  Psychiatric: Appropriate affect, cooperative  Neurologic: Oriented x 3, nonfocal  Skin: No rashes      Results Reviewed:  LAB RESULTS:      Lab 25  0404 25  0417 25  1818 25  1704   WBC 20.64* 18.10*  --  26.94*   HEMOGLOBIN 11.9* 12.9  --  14.4   HEMATOCRIT 34.6 38.1  --  40.3   PLATELETS 200 195  --  244   NEUTROS ABS 18.73* 16.78*  --  23.55*   IMMATURE GRANS (ABS) 0.24* 0.16*  --  0.20*   LYMPHS ABS 1.07 0.94  --  1.64   MONOS ABS 0.58 0.18  --  1.33*   EOS ABS 0.00 0.00  --  0.15   MCV 88.3 89.4  --  87.6   D DIMER QUANT  --   --   --  0.54   HSTROP T  --   --  34* 41*         Lab 25  0417 25  1704   SODIUM 133* 133*   POTASSIUM 4.2 4.7   CHLORIDE 100 96*   CO2 23.0 22.0   ANION GAP 10.0 15.0   BUN 21 22   CREATININE 0.87 0.98   EGFR 75.4 65.4   GLUCOSE 320* 295*   CALCIUM 9.4 9.6         Lab 25  0417 25  1704   TOTAL PROTEIN 6.8 7.4   ALBUMIN 3.8 4.1   GLOBULIN 3.0 3.3   ALT (SGPT) 13 15   AST (SGOT) 15 18   BILIRUBIN 0.5 0.6   ALK  PHOS 86 90         Lab 02/19/25  1818 02/19/25  1704   PROBNP  --  326.4   HSTROP T 34* 41*                 Brief Urine Lab Results  (Last result in the past 365 days)        Color   Clarity   Blood   Leuk Est   Nitrite   Protein   CREAT   Urine HCG        10/19/24 1322 Yellow   Clear   Trace   Trace   Negative   Trace                   Microbiology Results Abnormal       None            CT Chest Without Contrast Diagnostic    Result Date: 2/19/2025  CT CHEST WO CONTRAST DIAGNOSTIC Date of Exam: 2/19/2025 6:23 PM EST Indication: cough, sob, eval for pna. Comparison: CT chest 2/12/2025 Technique: Axial CT images were obtained of the chest without contrast administration.  Reconstructed coronal and sagittal images were also obtained. Automated exposure control and iterative construction methods were used. Findings: Thyroid unremarkable. No supraclavicular adenopathy. Aortic atherosclerotic disease without aneurysm. Severe calcified coronary atherosclerotic disease. Normal caliber main pulmonary artery. Heart size normal. No pericardial effusion. Esophagus unremarkable. No suspicious adenopathy. Trachea patent. Mild emphysema. There are new infectious/inflammatory patchy groundglass airspace opacities in the lingula and left greater than right lower lobes. Scattered small discrete pulmonary nodules stable from recent low-dose chest CT screening.  Linear bandlike areas of minimal atelectasis versus scarring. No edema, effusion or pneumothorax. No acute findings partially imaged upper abdomen. No acute chest wall findings. No axillary adenopathy. Degenerative changes throughout the spine without acute displaced fracture or aggressive lesion.     Impression: Impression: 1. Mild/early multifocal pneumonia versus aspiration. 2. Mild emphysema with stable small pulmonary nodules since 2/12/2025. Recommend continued annual low-dose chest CT screening, due February 2026. 3. Aortic and severe coronary atherosclerotic disease.  Electronically Signed: Ghassan Brewster MD  2/19/2025 6:34 PM EST  Workstation ID: [id number]    XR Chest 1 View    Result Date: 2/19/2025  XR CHEST 1 VW Date of Exam: 2/19/2025 5:05 PM EST Indication: sob Comparison: Chest radiograph 12/4/2024 and a chest CT to 1225 Findings: Mediastinum: Cardiac silhouette appears unchanged and normal in size Lungs: The lungs appear clear without focal consolidation appreciated. Pleura: No pleural effusion or pneumothorax. Bones and soft tissues: No acute, displaced fracture seen.     Impression: Impression: No radiographic evidence of acute cardiopulmonary disease. Electronically Signed: Corey Pérez  2/19/2025 5:33 PM EST  Workstation ID: [id number]     Results for orders placed during the hospital encounter of 07/15/24    Adult Transthoracic Echo Complete W/ Cont if Necessary Per Protocol    Interpretation Summary    Left ventricular ejection fraction appears to be 61 - 65%.    All left ventricular wall segments contract normally.    No significant valvular stenosis or regurgitation.      Current medications:  Scheduled Meds:albuterol, 2.5 mg, Nebulization, Q6H - RT  aspirin, 81 mg, Oral, Daily  budesonide-formoterol, 2 puff, Inhalation, BID - RT   And  revefenacin, 175 mcg, Nebulization, Daily - RT  cefTRIAXone, 1,000 mg, Intravenous, Q24H  dilTIAZem CD, 120 mg, Oral, Q24H  doxycycline, 100 mg, Oral, Q12H  guaiFENesin, 600 mg, Oral, Q12H  heparin (porcine), 5,000 Units, Subcutaneous, Q8H  insulin glargine, 15 Units, Subcutaneous, Nightly  insulin lispro, 2-7 Units, Subcutaneous, 4x Daily AC & at Bedtime  Insulin Lispro, 5 Units, Subcutaneous, TID With Meals  isosorbide mononitrate, 30 mg, Oral, Daily  methylPREDNISolone sodium succinate, 60 mg, Intravenous, Q12H  nicotine, 1 patch, Transdermal, Q24H  nicotine, 1 patch, Transdermal, Once  polyethylene glycol, 17 g, Oral, Daily  rosuvastatin, 20 mg, Oral, Daily  sodium chloride, 10 mL, Intravenous, Q12H  sodium chloride, 4 mL,  Nebulization, Daily - RT      Continuous Infusions:   PRN Meds:.  acetaminophen **OR** acetaminophen **OR** acetaminophen    senna-docusate sodium **AND** polyethylene glycol **AND** bisacodyl **AND** bisacodyl    dextrose    dextrose    glucagon (human recombinant)    HYDROcodone-acetaminophen    HYDROcodone-acetaminophen    HYDROmorphone **AND** naloxone    nitroglycerin    ondansetron ODT **OR** ondansetron    sodium chloride    sodium chloride    Assessment & Plan   Assessment & Plan     Active Hospital Problems    Diagnosis  POA    **Pneumonia [J18.9]  Yes    Coronary artery disease involving native coronary artery of native heart with angina pectoris [I25.119]  Yes    Stage III (Severe) COPD [J44.9]  Yes    Type 2 diabetes mellitus with hyperglycemia, with long-term current use of insulin [E11.65, Z79.4]  Not Applicable    Obstructive sleep apnea [G47.33]  Yes      Resolved Hospital Problems   No resolved problems to display.      Brief Hospital Course to date:  Kathy Taylor is a 64 y.o. female with history of type 2 diabetes, SABRINA, COPD, hypertension, CAD who presents to the ED with 3 days of progressively worsening SOA, cough found to have multifocal pneumonia     Acute respiratory failure with hypoxia secondary to multifocal pneumonia, exacerbated by COPD with bronchospasm  Tobacco abuse  -Patient started on Rocephin and doxycycline, unfortunately blood cultures not collected, follow-up respiratory cultures and urinary antigens  -She was on 2 L NC, now on room air, however O2 sats discharged with ambulation, anticipate she might need home O2 at discharge.    -Continue steroids, transition to oral prednisone, continue DuoNebs  -Continue NRT     Poorly controlled type 2 diabetes with A1c 8.7%  -FSBG's reviewed and are suboptimal, likely worsened by steroids  -Increase basal Lantus to 20 units nightly, prandial Humalog to 8 units 3 times daily, continue SSI, continue adjust as warranted, FSBG's reviewed  improved as we started to wean steroids    Hypertension  CAD  -Continue aspirin, Imdur, diltiazem     Expected Discharge Location and Transportation: Home  Expected Discharge   Expected discharge date/ time has not been documented.     VTE Prophylaxis:  Pharmacologic VTE prophylaxis orders are present.         AM-PAC 6 Clicks Score (PT): 22 (02/20/25 2000)    CODE STATUS:   Code Status and Medical Interventions: CPR (Attempt to Resuscitate); Full Support   Ordered at: 02/19/25 2012     Code Status (Patient has no pulse and is not breathing):    CPR (Attempt to Resuscitate)     Medical Interventions (Patient has pulse or is breathing):    Full Support       Eliane Oviedo MD  02/21/25

## 2025-02-22 LAB
BACTERIA SPEC RESP CULT: NORMAL
BASOPHILS # BLD AUTO: 0.03 10*3/MM3 (ref 0–0.2)
BASOPHILS NFR BLD AUTO: 0.2 % (ref 0–1.5)
DEPRECATED RDW RBC AUTO: 41.1 FL (ref 37–54)
EOSINOPHIL # BLD AUTO: 0 10*3/MM3 (ref 0–0.4)
EOSINOPHIL NFR BLD AUTO: 0 % (ref 0.3–6.2)
ERYTHROCYTE [DISTWIDTH] IN BLOOD BY AUTOMATED COUNT: 12.5 % (ref 12.3–15.4)
GLUCOSE BLDC GLUCOMTR-MCNC: 279 MG/DL (ref 70–130)
GLUCOSE BLDC GLUCOMTR-MCNC: 305 MG/DL (ref 70–130)
GLUCOSE BLDC GLUCOMTR-MCNC: 363 MG/DL (ref 70–130)
GLUCOSE BLDC GLUCOMTR-MCNC: 382 MG/DL (ref 70–130)
GRAM STN SPEC: NORMAL
HCT VFR BLD AUTO: 35.5 % (ref 34–46.6)
HGB BLD-MCNC: 11.8 G/DL (ref 12–15.9)
IMM GRANULOCYTES # BLD AUTO: 0.29 10*3/MM3 (ref 0–0.05)
IMM GRANULOCYTES NFR BLD AUTO: 1.7 % (ref 0–0.5)
LYMPHOCYTES # BLD AUTO: 1.57 10*3/MM3 (ref 0.7–3.1)
LYMPHOCYTES NFR BLD AUTO: 9.4 % (ref 19.6–45.3)
MCH RBC QN AUTO: 29.8 PG (ref 26.6–33)
MCHC RBC AUTO-ENTMCNC: 33.2 G/DL (ref 31.5–35.7)
MCV RBC AUTO: 89.6 FL (ref 79–97)
MONOCYTES # BLD AUTO: 0.82 10*3/MM3 (ref 0.1–0.9)
MONOCYTES NFR BLD AUTO: 4.9 % (ref 5–12)
NEUTROPHILS NFR BLD AUTO: 13.91 10*3/MM3 (ref 1.7–7)
NEUTROPHILS NFR BLD AUTO: 83.8 % (ref 42.7–76)
NRBC BLD AUTO-RTO: 0 /100 WBC (ref 0–0.2)
PLATELET # BLD AUTO: 202 10*3/MM3 (ref 140–450)
PMV BLD AUTO: 10 FL (ref 6–12)
RBC # BLD AUTO: 3.96 10*6/MM3 (ref 3.77–5.28)
WBC NRBC COR # BLD AUTO: 16.62 10*3/MM3 (ref 3.4–10.8)

## 2025-02-22 PROCEDURE — 63710000001 PREDNISONE PER 1 MG: Performed by: INTERNAL MEDICINE

## 2025-02-22 PROCEDURE — 63710000001 REVEFENACIN 175 MCG/3ML SOLUTION: Performed by: HOSPITALIST

## 2025-02-22 PROCEDURE — 63710000001 INSULIN LISPRO (HUMAN) PER 5 UNITS: Performed by: HOSPITALIST

## 2025-02-22 PROCEDURE — 99232 SBSQ HOSP IP/OBS MODERATE 35: CPT | Performed by: INTERNAL MEDICINE

## 2025-02-22 PROCEDURE — 94799 UNLISTED PULMONARY SVC/PX: CPT

## 2025-02-22 PROCEDURE — 97116 GAIT TRAINING THERAPY: CPT

## 2025-02-22 PROCEDURE — 82948 REAGENT STRIP/BLOOD GLUCOSE: CPT

## 2025-02-22 PROCEDURE — 63710000001 INSULIN GLARGINE PER 5 UNITS: Performed by: INTERNAL MEDICINE

## 2025-02-22 PROCEDURE — 63710000001 INSULIN LISPRO (HUMAN) PER 5 UNITS: Performed by: INTERNAL MEDICINE

## 2025-02-22 PROCEDURE — 85025 COMPLETE CBC W/AUTO DIFF WBC: CPT | Performed by: INTERNAL MEDICINE

## 2025-02-22 PROCEDURE — 94664 DEMO&/EVAL PT USE INHALER: CPT

## 2025-02-22 PROCEDURE — 25010000002 HEPARIN (PORCINE) PER 1000 UNITS: Performed by: HOSPITALIST

## 2025-02-22 PROCEDURE — 25010000002 CEFTRIAXONE PER 250 MG: Performed by: INTERNAL MEDICINE

## 2025-02-22 RX ADMIN — INSULIN LISPRO 8 UNITS: 100 INJECTION, SOLUTION INTRAVENOUS; SUBCUTANEOUS at 11:34

## 2025-02-22 RX ADMIN — PREDNISONE 40 MG: 20 TABLET ORAL at 08:40

## 2025-02-22 RX ADMIN — BUDESONIDE AND FORMOTEROL FUMARATE DIHYDRATE 2 PUFF: 160; 4.5 AEROSOL RESPIRATORY (INHALATION) at 08:17

## 2025-02-22 RX ADMIN — ALBUTEROL SULFATE 2.5 MG: 2.5 SOLUTION RESPIRATORY (INHALATION) at 00:39

## 2025-02-22 RX ADMIN — HEPARIN SODIUM 5000 UNITS: 5000 INJECTION INTRAVENOUS; SUBCUTANEOUS at 21:05

## 2025-02-22 RX ADMIN — HEPARIN SODIUM 5000 UNITS: 5000 INJECTION INTRAVENOUS; SUBCUTANEOUS at 05:20

## 2025-02-22 RX ADMIN — INSULIN LISPRO 8 UNITS: 100 INJECTION, SOLUTION INTRAVENOUS; SUBCUTANEOUS at 08:34

## 2025-02-22 RX ADMIN — DOXYCYCLINE 100 MG: 100 CAPSULE ORAL at 08:32

## 2025-02-22 RX ADMIN — GUAIFENESIN 600 MG: 600 TABLET ORAL at 08:33

## 2025-02-22 RX ADMIN — INSULIN GLARGINE 20 UNITS: 100 INJECTION, SOLUTION SUBCUTANEOUS at 21:05

## 2025-02-22 RX ADMIN — ASPIRIN 81 MG: 81 TABLET, COATED ORAL at 08:34

## 2025-02-22 RX ADMIN — REVEFENACIN 175 MCG: 175 SOLUTION RESPIRATORY (INHALATION) at 08:15

## 2025-02-22 RX ADMIN — ALBUTEROL SULFATE 2.5 MG: 2.5 SOLUTION RESPIRATORY (INHALATION) at 20:41

## 2025-02-22 RX ADMIN — BUDESONIDE AND FORMOTEROL FUMARATE DIHYDRATE 2 PUFF: 160; 4.5 AEROSOL RESPIRATORY (INHALATION) at 20:41

## 2025-02-22 RX ADMIN — NICOTINE 1 PATCH: 21 PATCH TRANSDERMAL at 23:08

## 2025-02-22 RX ADMIN — INSULIN LISPRO 6 UNITS: 100 INJECTION, SOLUTION INTRAVENOUS; SUBCUTANEOUS at 21:05

## 2025-02-22 RX ADMIN — HEPARIN SODIUM 5000 UNITS: 5000 INJECTION INTRAVENOUS; SUBCUTANEOUS at 15:22

## 2025-02-22 RX ADMIN — Medication 10 ML: at 21:05

## 2025-02-22 RX ADMIN — ALBUTEROL SULFATE 2.5 MG: 2.5 SOLUTION RESPIRATORY (INHALATION) at 12:31

## 2025-02-22 RX ADMIN — INSULIN LISPRO 8 UNITS: 100 INJECTION, SOLUTION INTRAVENOUS; SUBCUTANEOUS at 17:16

## 2025-02-22 RX ADMIN — INSULIN LISPRO 4 UNITS: 100 INJECTION, SOLUTION INTRAVENOUS; SUBCUTANEOUS at 08:34

## 2025-02-22 RX ADMIN — CEFTRIAXONE 1000 MG: 1 INJECTION, POWDER, FOR SOLUTION INTRAMUSCULAR; INTRAVENOUS at 17:16

## 2025-02-22 RX ADMIN — ROSUVASTATIN 20 MG: 20 TABLET, FILM COATED ORAL at 08:33

## 2025-02-22 RX ADMIN — DOXYCYCLINE 100 MG: 100 CAPSULE ORAL at 21:05

## 2025-02-22 RX ADMIN — POLYETHYLENE GLYCOL 3350 17 G: 17 POWDER, FOR SOLUTION ORAL at 08:35

## 2025-02-22 RX ADMIN — DILTIAZEM HYDROCHLORIDE 120 MG: 120 CAPSULE, COATED, EXTENDED RELEASE ORAL at 08:32

## 2025-02-22 RX ADMIN — HYDROCODONE BITARTRATE AND ACETAMINOPHEN 1 TABLET: 5; 325 TABLET ORAL at 08:32

## 2025-02-22 RX ADMIN — GUAIFENESIN 600 MG: 600 TABLET ORAL at 21:05

## 2025-02-22 RX ADMIN — ISOSORBIDE MONONITRATE 30 MG: 30 TABLET, EXTENDED RELEASE ORAL at 08:33

## 2025-02-22 RX ADMIN — INSULIN LISPRO 5 UNITS: 100 INJECTION, SOLUTION INTRAVENOUS; SUBCUTANEOUS at 11:33

## 2025-02-22 RX ADMIN — INSULIN LISPRO 6 UNITS: 100 INJECTION, SOLUTION INTRAVENOUS; SUBCUTANEOUS at 17:16

## 2025-02-22 RX ADMIN — ALBUTEROL SULFATE 2.5 MG: 2.5 SOLUTION RESPIRATORY (INHALATION) at 08:15

## 2025-02-22 NOTE — DISCHARGE SUMMARY
[facility] Medicine Services  DISCHARGE SUMMARY    Patient Name: Kathy Taylor  : 1960  MRN: [id number]    Date of Admission: 2025  4:49 PM  Date of Discharge: 2025  Primary Care Physician: Provider, No Known    Consults       No orders found from 2025 to 2025.            Hospital Course       Active Hospital Problems    Diagnosis  POA    **Pneumonia [J18.9]  Yes    Coronary artery disease involving native coronary artery of native heart with angina pectoris [I25.119]  Yes    Stage III (Severe) COPD [J44.9]  Yes    Type 2 diabetes mellitus with hyperglycemia, with long-term current use of insulin [E11.65, Z79.4]  Not Applicable    Obstructive sleep apnea [G47.33]  Yes      Resolved Hospital Problems   No resolved problems to display.      Hospital Course:  Kathy Taylor is a 64 y.o. female with history of type 2 diabetes, SABRINA, COPD, hypertension, CAD who presents to the ED with 3 days of progressively worsening SOA, cough found to have multifocal pneumonia     Acute respiratory failure with hypoxia secondary to multifocal pneumonia, exacerbated by COPD with bronchospasm  Tobacco abuse  -Patient started on Rocephin and doxycycline, unfortunately blood cultures not collected,  respiratory cultures and urinary antigens were all negative  -She was on 2 L NC, now on 1 L NC but becomes hypoxic on ambulation, CM is arranged home O2   -Continue steroids, transition to oral prednisone, continue antibiotics with cefuroxime and doxycycline, plan for 5-day course  -s/p NRT, extensively counseled on cessation     Poorly controlled type 2 diabetes with A1c 8.7%  -FSBG's reviewed and were suboptimal, likely worsened by steroids  -Okay to resume home regimen     Hypertension  CAD  -Continue aspirin, Imdur, diltiazem    Discharge Follow Up Recommendations for outpatient labs/diagnostics:  Follow-up with PCP in 1 week    Day of Discharge     HPI: No acute events overnight, patient  rested well    Review of Systems  Gen- No fevers, chills  CV- No chest pain, palpitations  Resp- No cough, dyspnea  GI- No N/V/D, abd pain      Vital Signs:   Temp:  [97.5 °F (36.4 °C)-98.4 °F (36.9 °C)] 98.4 °F (36.9 °C)  Heart Rate:  [73-90] 88  Resp:  [16-18] 16  BP: (119-154)/(61-98) 127/79  Flow (L/min) (Oxygen Therapy):  [1-2] 1    Physical Exam:  Constitutional: No acute distress, awake, alert  HENT: NCAT, mucous membranes moist  Respiratory: Nonlabored respirations, diffuse coarse breath sounds on 1 L NC  Cardiovascular: RRR, no murmurs, rubs, or gallops  Gastrointestinal: Positive bowel sounds, soft, nontender, nondistended  Musculoskeletal: No bilateral ankle edema  Psychiatric: Appropriate affect, cooperative  Neurologic: Oriented x 3, nonfocal      Pertinent  and/or Most Recent Results     LAB RESULTS:      Lab 02/22/25 0418 02/21/25  0404 02/20/25 0417 02/19/25  1704   WBC 16.62* 20.64* 18.10* 26.94*   HEMOGLOBIN 11.8* 11.9* 12.9 14.4   HEMATOCRIT 35.5 34.6 38.1 40.3   PLATELETS 202 200 195 244   NEUTROS ABS 13.91* 18.73* 16.78* 23.55*   IMMATURE GRANS (ABS) 0.29* 0.24* 0.16* 0.20*   LYMPHS ABS 1.57 1.07 0.94 1.64   MONOS ABS 0.82 0.58 0.18 1.33*   EOS ABS 0.00 0.00 0.00 0.15   MCV 89.6 88.3 89.4 87.6   D DIMER QUANT  --   --   --  0.54         Lab 02/20/25 0417 02/19/25  1704   SODIUM 133* 133*   POTASSIUM 4.2 4.7   CHLORIDE 100 96*   CO2 23.0 22.0   ANION GAP 10.0 15.0   BUN 21 22   CREATININE 0.87 0.98   EGFR 75.4 65.4   GLUCOSE 320* 295*   CALCIUM 9.4 9.6         Lab 02/20/25 0417 02/19/25  1704   TOTAL PROTEIN 6.8 7.4   ALBUMIN 3.8 4.1   GLOBULIN 3.0 3.3   ALT (SGPT) 13 15   AST (SGOT) 15 18   BILIRUBIN 0.5 0.6   ALK PHOS 86 90         Lab 02/19/25  1818 02/19/25  1704   PROBNP  --  326.4   HSTROP T 34* 41*                 Brief Urine Lab Results  (Last result in the past 365 days)        Color   Clarity   Blood   Leuk Est   Nitrite   Protein   CREAT   Urine HCG        10/19/24 1322 Yellow    Clear   Trace   Trace   Negative   Trace                 Microbiology Results (last 10 days)       Procedure Component Value - Date/Time    Respiratory Culture - Sputum, Cough [[id number]] Collected: 02/20/25 1139    Lab Status: Final result Specimen: Sputum from Cough Updated: 02/22/25 1147     Respiratory Culture Scant growth (1+) Normal respiratory gina. No S. aureus or Pseudomonas aeruginosa detected. Final report.     Gram Stain Many (4+) WBCs per low power field      Rare (1+) Epithelial cells per low power field      Few (2+) Gram positive cocci in pairs    Legionella Antigen, Urine - Urine, Urine, Clean Catch [[id number]]  (Normal) Collected: 02/20/25 1139    Lab Status: Final result Specimen: Urine, Clean Catch Updated: 02/21/25 0711     LEGIONELLA ANTIGEN, URINE Negative    S. Pneumo Ag Urine or CSF - Urine, Urine, Clean Catch [[id number]]  (Normal) Collected: 02/20/25 1139    Lab Status: Final result Specimen: Urine, Clean Catch Updated: 02/21/25 0711     Strep Pneumo Ag Negative    COVID PRE-OP / PRE-PROCEDURE SCREENING ORDER (NO ISOLATION) - Swab, Nasopharynx [[id number]]  (Normal) Collected: 02/19/25 1720    Lab Status: Final result Specimen: Swab from Nasopharynx Updated: 02/19/25 1748    Narrative:      The following orders were created for panel order COVID PRE-OP / PRE-PROCEDURE SCREENING ORDER (NO ISOLATION) - Swab, Nasopharynx.  Procedure                               Abnormality         Status                     ---------                               -----------         ------                     COVID-19 and FLU A/B PCR...[[id number]]  Normal              Final result                 Please view results for these tests on the individual orders.    COVID-19 and FLU A/B PCR, 1 HR TAT - Swab, Nasopharynx [[id number]]  (Normal) Collected: 02/19/25 1720    Lab Status: Final result Specimen: Swab from Nasopharynx Updated: 02/19/25 1748     COVID19 Not Detected     Influenza A PCR Not Detected      Influenza B PCR Not Detected    Narrative:      Fact sheet for providers: https://www.fda.gov/media/039435/download    Fact sheet for patients: https://www.fda.gov/media/383828/download    Test performed by PCR.            CT Chest Without Contrast Diagnostic    Result Date: 2/19/2025  CT CHEST WO CONTRAST DIAGNOSTIC Date of Exam: 2/19/2025 6:23 PM EST Indication: cough, sob, eval for pna. Comparison: CT chest 2/12/2025 Technique: Axial CT images were obtained of the chest without contrast administration.  Reconstructed coronal and sagittal images were also obtained. Automated exposure control and iterative construction methods were used. Findings: Thyroid unremarkable. No supraclavicular adenopathy. Aortic atherosclerotic disease without aneurysm. Severe calcified coronary atherosclerotic disease. Normal caliber main pulmonary artery. Heart size normal. No pericardial effusion. Esophagus unremarkable. No suspicious adenopathy. Trachea patent. Mild emphysema. There are new infectious/inflammatory patchy groundglass airspace opacities in the lingula and left greater than right lower lobes. Scattered small discrete pulmonary nodules stable from recent low-dose chest CT screening.  Linear bandlike areas of minimal atelectasis versus scarring. No edema, effusion or pneumothorax. No acute findings partially imaged upper abdomen. No acute chest wall findings. No axillary adenopathy. Degenerative changes throughout the spine without acute displaced fracture or aggressive lesion.     Impression: 1. Mild/early multifocal pneumonia versus aspiration. 2. Mild emphysema with stable small pulmonary nodules since 2/12/2025. Recommend continued annual low-dose chest CT screening, due February 2026. 3. Aortic and severe coronary atherosclerotic disease. Electronically Signed: Ghassan Brewster MD  2/19/2025 6:34 PM EST  Workstation ID: [id number]    XR Chest 1 View    Result Date: 2/19/2025  XR CHEST 1 VW Date of Exam: 2/19/2025 5:05  PM EST Indication: sob Comparison: Chest radiograph 12/4/2024 and a chest CT to 1225 Findings: Mediastinum: Cardiac silhouette appears unchanged and normal in size Lungs: The lungs appear clear without focal consolidation appreciated. Pleura: No pleural effusion or pneumothorax. Bones and soft tissues: No acute, displaced fracture seen.     Impression: No radiographic evidence of acute cardiopulmonary disease. Electronically Signed: Corey Pérez  2/19/2025 5:33 PM EST  Workstation ID: [id number]    CT Chest Low Dose Cancer Screening WO    Result Date: 2/15/2025  CT CHEST LOW DOSE CANCER SCREENING WO Date of Exam: 2/12/2025 12:59 PM EST Indication: Lung cancer screening. Comparison: 2/10/24. Technique: Low dose CT imaging of the chest was performed without intravenous contrast enhancement.  Automated exposure control and iterative reconstruction methods were used. Findings:  MEDIASTINUM: Unremarkable. Aortic and heart size are normal. No mass nor pericardial effusion. CORONARY ARTERIES: There is calcified atherosclerotic disease. LUNGS: Lungs are clear. No consolidation. No significant nodule nor interstitial changes. PLEURAL SPACE: No effusion, mass, nor pneumothorax. LYMPH NODES: There are no pathologically enlarged lymph nodes. UPPER ABDOMEN: Unremarkable OSSEOUS STRUCTURES: Appropriate for age with no acute process identified.     Impression: No suspicious pulmonary nodule or mass. Recommendation: Continue annual screening with LDCT Lung Rads Assessment: Lung-RADS L1 - Negative, <1% chance of malignancy. Electronically Signed: Margraet Griffiths MD  2/15/2025 2:56 PM EST  Workstation ID: [id number]     Results for orders placed during the hospital encounter of 02/28/23    Duplex Carotid Ultrasound CAR    Interpretation Summary    Right internal carotid artery stenosis of 0-49%.    Left internal carotid artery stenosis of 0-49%.    Vertebral artery flow is antegrade bilaterally      Results for orders placed during the  hospital encounter of 02/28/23    Duplex Carotid Ultrasound CAR    Interpretation Summary    Right internal carotid artery stenosis of 0-49%.    Left internal carotid artery stenosis of 0-49%.    Vertebral artery flow is antegrade bilaterally      Results for orders placed during the hospital encounter of 07/15/24    Adult Transthoracic Echo Complete W/ Cont if Necessary Per Protocol    Interpretation Summary    Left ventricular ejection fraction appears to be 61 - 65%.    All left ventricular wall segments contract normally.    No significant valvular stenosis or regurgitation.      Plan for Follow-up of Pending Labs/Results:       Discharge Details        Discharge Medications        New Medications        Instructions Start Date   cefuroxime 500 MG tablet  Commonly known as: CEFTIN   500 mg, Oral, 2 Times Daily      doxycycline 100 MG capsule  Commonly known as: VIBRAMYCIN   100 mg, Oral, 2 Times Daily      predniSONE 20 MG tablet  Commonly known as: DELTASONE   40 mg, Oral, Daily With Breakfast             Continue These Medications        Instructions Start Date   Accu-Chek Softclix Lancets lancets   USE 1  TO CHECK GLUCOSE TWICE DAILY TO THREE TIMES DAILY dx e11.65 on insulin      albuterol sulfate  (90 Base) MCG/ACT inhaler  Commonly known as: PROVENTIL HFA;VENTOLIN HFA;PROAIR HFA   2 puffs, Inhalation, Every 6 Hours PRN      aspirin 81 MG EC tablet  Commonly known as: ASPIR   81 mg, Oral, Daily      BD Pen Needle Nessa U/F 32G X 4 MM misc  Generic drug: Insulin Pen Needle   Use as directed 5 times daily      dilTIAZem  MG 24 hr capsule  Commonly known as: DILACOR XR   120 mg, Oral, Daily      Glucagon 1 MG/0.2ML solution auto-injector   1 mg, Subcutaneous, As Needed      glucose blood test strip   Use as instructed 2-3 times a day      HYDROcodone-acetaminophen 7.5-325 MG per tablet  Commonly known as: NORCO   1 tablet, Every 6 Hours PRN      ipratropium 0.03 % nasal spray  Commonly known as:  ATROVENT   2 sprays, Nasal, Every 12 Hours      ipratropium-albuterol 0.5-2.5 mg/3 ml nebulizer  Commonly known as: DUO-NEB   USE 3 ML VIA NEBULIZER FOUR TIMES DAILY AS NEEDED FOR WHEEZING      isosorbide mononitrate 30 MG 24 hr tablet  Commonly known as: IMDUR   30 mg, Oral, Every Morning      Lantus SoloStar 100 UNIT/ML injection pen  Generic drug: Insulin Glargine   10 Units, Subcutaneous, Nightly, Titrate 2 units every 3 days if blood sugar is more than 140 fasting; Max daily dose: 30 units      metFORMIN  MG 24 hr tablet  Commonly known as: GLUCOPHAGE-XR   500 mg, Oral, Daily With Breakfast      Mobic 15 MG tablet  Generic drug: meloxicam   Take 1 tablet every day by oral route with meals for 30 days.      nicotine 10 MG inhaler  Commonly known as: NICOTROL   1 puff, Inhalation, As Needed      nitroglycerin 0.3 MG SL tablet  Commonly known as: NITROSTAT   1 under the tongue as needed for angina, may repeat q5mins for up three doses      Ozempic (0.25 or 0.5 MG/DOSE) 2 MG/3ML solution pen-injector  Generic drug: Semaglutide(0.25 or 0.5MG/DOS)   0.5 mg, Subcutaneous, Weekly, Contact office after 4 weeks for additional adjustment if tolerating well.      polyethylene glycol 17 g packet  Commonly known as: MIRALAX   17 g, Oral, Daily      rosuvastatin 20 MG tablet  Commonly known as: CRESTOR   20 mg, Oral, Daily      tiZANidine 4 MG tablet  Commonly known as: ZANAFLEX   4 mg, 3 Times Daily PRN      Trelegy Ellipta 200-62.5-25 MCG/ACT inhaler  Generic drug: Fluticasone-Umeclidin-Vilant   1 puff, Inhalation, Daily               Allergies   Allergen Reactions    Bee Venom Anaphylaxis    Ciprofloxacin Shortness Of Breath         Discharge Disposition: Home  Home or Self Care    Diet:  Hospital:  Diet Order   Procedures    Diet: Diabetic; Consistent Carbohydrate; Fluid Consistency: Thin (IDDSI 0)     Activity: As tolerated      Restrictions or Other Recommendations:         CODE STATUS:    Code Status and Medical  Interventions: CPR (Attempt to Resuscitate); Full Support   Ordered at: 02/19/25 2012     Code Status (Patient has no pulse and is not breathing):    CPR (Attempt to Resuscitate)     Medical Interventions (Patient has pulse or is breathing):    Full Support       Future Appointments   Date Time Provider Department Center   3/14/2025  1:00 PM [facility] OP VASC CART RM 2 [facility]  [facility]   3/21/2025  1:00 PM [facility], PFT LAB 3 [facility]   3/21/2025  1:30 PM Sherry Ramírez APRN [facility]   5/12/2025  1:45 PM Kimber Rasmussen PA-C [facility]   4/28/2026  2:30 PM Jono Estrella MD [facility]         Eliane Oviedo MD  02/23/25      Time Spent on Discharge:  I spent  40  minutes on this discharge activity which included: face-to-face encounter with the patient, reviewing the data in the system, coordination of the care with the nursing staff as well as consultants, documentation, and entering orders.

## 2025-02-22 NOTE — PLAN OF CARE
Goal Outcome Evaluation:  Plan of Care Reviewed With: patient        Progress: improving  Outcome Evaluation: Pt VSS, on RA,  pain controlled by PO medication, up ad ruben, receiving IV antibiotics per order. will cont to monitor.

## 2025-02-22 NOTE — PLAN OF CARE
Problem: Sepsis/Septic Shock  Goal: Optimal Coping  Outcome: Progressing  Goal: Absence of Bleeding  Outcome: Progressing  Goal: Blood Glucose Level Within Target Range  Outcome: Progressing  Goal: Absence of Infection Signs and Symptoms  Outcome: Progressing  Intervention: Initiate Sepsis Management  Description: Provide fluid therapy, such as crystalloid or albumin, to increase intravascular volume, organ perfusion and oxygen delivery.  Provide respiratory support, such as oxygen therapy, noninvasive or invasive positive pressure ventilation, to achieve oxygenation and ventilation goal; avoid hyperoxemia.  Obtain cultures prior to initiating antimicrobial therapy when possible. Do not delay treatment for laboratory results in the presence of high suspicion or clinical indicators.  Administer intravenous broad-spectrum antimicrobial therapy promptly.  Implement hemodynamic monitoring to guide intravascular support based on individual targeted parameters.  Determine and address underlying source of infection aggressively; implement transmission-based precautions and isolation, as indicated.  Recent Flowsheet Documentation  Taken 2/21/2025 2000 by Shantel Doan RN  Infection Prevention:   environmental surveillance performed   equipment surfaces disinfected   hand hygiene promoted   personal protective equipment utilized   rest/sleep promoted  Intervention: Promote Recovery  Description: Encourage pulmonary hygiene, such as cough-enhancement and airway-clearance techniques, that may include use of incentive spirometry, deep breathing and cough.  Encourage early rehabilitation and physical activity to optimize functional ability and activity tolerance, as well as minimize delirium.  Promote energy conservation; minimize oxygen demand and consumption by adjusting environment, decreasing stimulation, maintaining normothermia and treating pain.  Optimize fluid balance, nutrition intake, sleep and glycemic control to  maintain tissue perfusion and enhance immune response.  Recent Flowsheet Documentation  Taken 2/22/2025 0415 by Shantel Doan, RN  Activity Management: up ad ruben  Taken 2/22/2025 0200 by Shantel Doan RN  Activity Management: activity encouraged  Taken 2/22/2025 0000 by Shantel Doan RN  Activity Management: activity encouraged  Taken 2/21/2025 2200 by Shantel Doan RN  Activity Management: activity encouraged  Taken 2/21/2025 2000 by Shantel Doan RN  Activity Management: up ad ruben  Goal: Optimal Nutrition Delivery  Outcome: Progressing     Problem: Adult Inpatient Plan of Care  Goal: Plan of Care Review  Outcome: Progressing  Goal: Patient-Specific Goal (Individualized)  Outcome: Progressing  Goal: Absence of Hospital-Acquired Illness or Injury  Outcome: Progressing  Intervention: Identify and Manage Fall Risk  Description: Perform standard risk assessment on admission using a validated tool or comprehensive approach appropriate to the patient; reassess fall risk frequently, with change in status or transfer to another level of care.  Communicate risk to interprofessional healthcare team; ensure fall risk visible cue.  Determine need for increased observation, equipment and environmental modification, as well as use of supportive, nonskid footwear.  Adjust safety measures to individual needs and identified risk factors.  Reinforce the importance of active participation with fall risk prevention, safety, and physical activity with the patient and family.  Perform regular intentional rounding to assess need for position change, pain assessment and personal needs, including assistance with toileting.  Recent Flowsheet Documentation  Taken 2/22/2025 0415 by Shantel Doan, RN  Safety Promotion/Fall Prevention:   activity supervised   assistive device/personal items within reach   clutter free environment maintained   fall prevention program maintained   lighting adjusted   mobility aid in reach   nonskid  shoes/slippers when out of bed   room organization consistent   safety round/check completed  Taken 2/22/2025 0200 by Shantel Doan RN  Safety Promotion/Fall Prevention:   activity supervised   assistive device/personal items within reach   clutter free environment maintained   fall prevention program maintained   lighting adjusted   mobility aid in reach   nonskid shoes/slippers when out of bed   room organization consistent   safety round/check completed  Taken 2/22/2025 0000 by Shantel Doan RN  Safety Promotion/Fall Prevention:   clutter free environment maintained   assistive device/personal items within reach   activity supervised   fall prevention program maintained   lighting adjusted   mobility aid in reach   nonskid shoes/slippers when out of bed   room organization consistent   safety round/check completed   toileting scheduled  Taken 2/21/2025 2200 by Shantel Doan RN  Safety Promotion/Fall Prevention:   activity supervised   assistive device/personal items within reach   clutter free environment maintained   fall prevention program maintained   lighting adjusted   mobility aid in reach   nonskid shoes/slippers when out of bed   room organization consistent   safety round/check completed  Taken 2/21/2025 2000 by Shantel Doan RN  Safety Promotion/Fall Prevention:   activity supervised   assistive device/personal items within reach   clutter free environment maintained   fall prevention program maintained   lighting adjusted   mobility aid in reach   nonskid shoes/slippers when out of bed   room organization consistent   safety round/check completed  Intervention: Prevent Skin Injury  Description: Perform a screening for skin injury risk, such as pressure or moisture-associated skin damage on admission and at regular intervals throughout hospital stay.  Keep all areas of skin (especially folds) clean and dry.  Maintain adequate skin hydration.  Relieve and redistribute pressure and protect bony  prominences and skin at risk for injury; implement measures based on patient-specific risk factors.  Match turning and repositioning schedule to clinical condition.  Encourage weight shift frequently; assist with reposition if unable to complete independently.  Float heels off bed; avoid pressure on the Achilles tendon.  Keep skin free from extended contact with medical devices.  Optimize nutrition and hydration.  Encourage functional activity and mobility, as early as tolerated.  Use aids (e.g., slide boards, mechanical lift) during transfer.  Recent Flowsheet Documentation  Taken 2/22/2025 0415 by Shantel Doan RN  Body Position: position changed independently  Taken 2/22/2025 0200 by Shantel Doan RN  Body Position: position changed independently  Taken 2/22/2025 0000 by Shantel Doan RN  Body Position: position changed independently  Taken 2/21/2025 2200 by Shantel Doan RN  Body Position: position changed independently  Taken 2/21/2025 2000 by Shantel Doan RN  Body Position: position changed independently  Skin Protection:   transparent dressing maintained   silicone foam dressing in place   skin sealant/moisture barrier applied   incontinence pads utilized  Intervention: Prevent Infection  Description: Maintain skin and mucous membrane integrity; promote hand, oral and pulmonary hygiene.  Optimize fluid balance, nutrition, sleep and glycemic control to maximize infection resistance.  Identify potential sources of infection early to prevent or mitigate progression of infection (e.g., wound, lines, devices).  Evaluate ongoing need for invasive devices; remove promptly when no longer indicated.  Review vaccination status.  Recent Flowsheet Documentation  Taken 2/21/2025 2000 by Shantel Doan RN  Infection Prevention:   environmental surveillance performed   equipment surfaces disinfected   hand hygiene promoted   personal protective equipment utilized   rest/sleep promoted  Goal: Optimal Comfort and  Wellbeing  Outcome: Progressing  Intervention: Provide Person-Centered Care  Description: Use a family-focused approach to care; encourage support system presence and participation.  Develop trust and rapport by proactively providing information, encouraging questions, addressing concerns and offering reassurance.  Acknowledge emotional response to hospitalization.  Recognize and utilize personal coping strategies and strengths; develop goals via shared decision-making.  Honor spiritual and cultural preferences.  Recent Flowsheet Documentation  Taken 2/21/2025 2000 by Shantel Doan RN  Trust Relationship/Rapport:   care explained   choices provided   emotional support provided   empathic listening provided   questions answered   thoughts/feelings acknowledged  Goal: Readiness for Transition of Care  Outcome: Progressing     Problem: Comorbidity Management  Goal: Maintenance of COPD Symptom Control  Outcome: Progressing  Intervention: Maintain COPD (Chronic Obstructive Pulmonary Disease) Symptom Control  Description: Evaluate adherence to self-management (COPD action plan), such as medication, symptom control, trigger avoidance, infection prevention and self-monitoring.  Advocate for continuation of home regimen, including oxygen, medication and noninvasive positive pressure use.  Anticipate the need for breathing techniques and activity pacing to minimize fatigue and breathlessness.  Assess for proper use of inhaled medication and delivery technique; assist or reinstruct if needed.  Evaluate effectiveness of coping skills; encourage expression of feelings, expectations and concerns related to disease management and quality of life; reinforce education to enhance management plan and wellbeing.  Recent Flowsheet Documentation  Taken 2/22/2025 0415 by Shantel Doan RN  Medication Review/Management: medications reviewed  Taken 2/22/2025 0000 by Shantel Doan RN  Medication Review/Management: medications  reviewed  Taken 2/21/2025 2200 by Shantel Doan RN  Medication Review/Management: medications reviewed  Taken 2/21/2025 2000 by Shantel Doan RN  Medication Review/Management: medications reviewed  Goal: Blood Glucose Level Within Target Range  Outcome: Progressing  Intervention: Monitor and Manage Glycemia  Description: Establish target blood glucose levels based on patient-specific factors, such as age, diabetes-related complications and illness severity.  Document blood glucose levels and monitor trend.  Provide antihyperglycemic pharmacologic therapy to maintain blood glucose levels within targeted range.  Advocate for patient use of home devices such as insulin pump, continuous glucose monitor; assess for safe and competent participation in care.  Check blood glucose level if there is a change in mental or cognitive status.  Recognize, treat and document hypoglycemia event and potential cause.  Assess current lifestyle patterns; acknowledging positive patterns supporting wellbeing.  Evaluate effectiveness of coping skills; encourage expression of feelings, expectations and concerns related to disease management and quality of life; reinforce education to enhance management plan and wellbeing.  Recent Flowsheet Documentation  Taken 2/22/2025 0415 by Shantel Doan RN  Medication Review/Management: medications reviewed  Taken 2/22/2025 0000 by Shantel Doan RN  Medication Review/Management: medications reviewed  Taken 2/21/2025 2200 by Shantel Doan RN  Medication Review/Management: medications reviewed  Taken 2/21/2025 2000 by Shantel Doan RN  Medication Review/Management: medications reviewed     Problem: Pneumonia  Goal: Fluid Balance  Outcome: Progressing  Goal: Absence of Infection Signs and Symptoms  Outcome: Progressing  Goal: Effective Oxygenation and Ventilation  Outcome: Progressing  Intervention: Promote Airway Secretion Clearance  Description: Assess the effectiveness of pulmonary hygiene  and ability to perform airway clearance techniques.  Promote early mobility or ambulation; match activity to ability and tolerance.  Encourage deep breathing and lung expansion therapy to prevent atelectasis; adjust treatment to patient's response.  Anticipate the need to splint chest or abdominal wall with cough to minimize discomfort; assist if needed.  Initiate cough-enhancement and airway-clearance techniques with instruction.  Consider pharmacologic therapy, such as beta-2 agonist, mucolytic, corticosteroid, antimicrobial, that may improve inflammation, mucus clearance, cough response and air flow.  Recent Flowsheet Documentation  Taken 2/22/2025 0415 by Shantel Doan, RN  Activity Management: up ad ruben  Taken 2/22/2025 0200 by Shantel Doan, RN  Activity Management: activity encouraged  Taken 2/22/2025 0000 by Shantel Doan RN  Activity Management: activity encouraged  Taken 2/21/2025 2200 by Shantel Doan, RN  Activity Management: activity encouraged  Taken 2/21/2025 2000 by Shantel Doan, RN  Activity Management: up ad ruben   Goal Outcome Evaluation:

## 2025-02-22 NOTE — THERAPY TREATMENT NOTE
Patient Name: Kathy Taylor  : 1960    MRN: [id number]                              Today's Date: 2025       Admit Date: 2025    Visit Dx:     ICD-10-CM ICD-9-CM   1. Acute sepsis  A41.9 038.9     995.91   2. Multifocal pneumonia  J18.9 486   3. Acute exacerbation of chronic obstructive pulmonary disease (COPD)  J44.1 491.21   4. Hypoxia  R09.02 799.02   5. Smoker  F17.200 305.1     Patient Active Problem List   Diagnosis    Obstructive sleep apnea    Nocturnal hypoxemia    Polypharmacy    COVID-19 virus infection    Stage III (Severe) COPD    Tobacco abuse    History of rheumatic fever as a child    Hyperlipidemia LDL goal <70    Coronary artery disease involving native coronary artery of native heart with angina pectoris    Non-adherence to medical treatment    Type 2 diabetes mellitus with hyperglycemia, with long-term current use of insulin    Pneumonia     Past Medical History:   Diagnosis Date    Ankle sprain     Arthritis     Arthritis of back     Arthritis of neck     Asthma ?    May have been earlier    Back ache     Bronchiectasis ?    Bronchitis     Bursitis of hip     Cervical disc disorder     Chronic bronchitis ?    COPD (chronic obstructive pulmonary disease)     Coronary artery disease involving native coronary artery of native heart with angina pectoris 07/15/2024    Emphysema of lung     Dont remember when told.    Fibromyalgia     Hip arthrosis     Hyperlipidemia LDL goal <100 2024    Knee sprain     Migraines     Myocardial infarction     Was told I've  had a couple small ones    Neck strain     Pneumonia     Rheumatic fever     Rotator cuff syndrome     Shingles     Sleep apnea, obstructive ?    Tooth infection     Type 2 diabetes mellitus     Vulvar carcinoma     Wrist sprain      Past Surgical History:   Procedure Laterality Date    BREAST BIOPSY Left     BREAST LUMPECTOMY Left     CARDIAC CATHETERIZATION N/A 07/15/2024    Procedure: Left Heart Cath;  Surgeon:  Lonnie Salomon IV, MD;  Location: [facility] CATH INVASIVE [facility];  Service: Cardiology;  Laterality: N/A;    CARDIAC CATHETERIZATION  07/15/2024    TONSILLECTOMY      TUBAL ABDOMINAL LIGATION      VULVA BIOPSY      VULVA SURGERY      WISDOM TOOTH EXTRACTION        General Information       Row Name 02/22/25 1104          Physical Therapy Time and Intention    Document Type therapy note (daily note)  -KW     Mode of Treatment physical therapy  -KW       Row Name 02/22/25 1104          General Information    Patient Profile Reviewed yes  -KW               User Key  (r) = Recorded By, (t) = Taken By, (c) = Cosigned By      Initials Name Provider Type    KW Marian Rosenberg PT Physical Therapist                   Mobility       Row Name 02/22/25 1104          Sit-Stand Transfer    Sit-Stand [place] (Transfers) modified independence  -KW     Assistive Device (Sit-Stand Transfers) cane, straight  -KW       Row Name 02/22/25 1104          Gait/Stairs (Locomotion)    [place] Level (Gait) supervision  -KW     Assistive Device (Gait) cane, straight  -KW     Distance in Feet (Gait) 300  -KW     Deviations/Abnormal Patterns (Gait) stride length decreased;gait speed decreased;nori decreased  -KW     Bilateral Gait Deviations heel strike decreased  -KW               User Key  (r) = Recorded By, (t) = Taken By, (c) = Cosigned By      Initials Name Provider Type    Marian Goncalves PT Physical Therapist                   Obj/Interventions    No documentation.                  Goals/Plan    No documentation.                  Clinical Impression       Row Name 02/22/25 1104          Pain    Pretreatment Pain Rating 0/10 - no pain  -KW       Row Name 02/22/25 1104          Plan of Care Review    Plan of Care Reviewed With patient  -KW     Progress improving  -KW     Outcome Evaluation Physical therapy treatment complete.Patient improving with ambulation, able to maintain sats on room air after  ambulation however still demosntrates slowed nori and mild SOA with ambulation. The patient continues to present below baseline for functional mobility. The patient would continue to benefit from skilled PT to address strength, balance and activity tolerance deficits. Continue to current PT POC  -KW       Row Name 02/22/25 1104          Therapy Assessment/Plan (PT)    Patient/Family Therapy Goals Statement (PT) to return to baseline  -KW     Predicted Duration of Therapy Intervention (PT) 10 days  -KW       Row Name 02/22/25 1104          Vital Signs    Pre Systolic BP Rehab 119  -KW     Pre Treatment Diastolic BP 61  -KW     Pretreatment Heart Rate (beats/min) 83  -KW     Pre SpO2 (%) 94  -KW       Row Name 02/22/25 1104          Positioning and Restraints    Pre-Treatment Position in bed  -KW     Post Treatment Position chair  -KW     In Chair call light within reach;encouraged to call for assist;sitting  -KW               User Key  (r) = Recorded By, (t) = Taken By, (c) = Cosigned By      Initials Name Provider Type    KW Marian Rosenberg, PT Physical Therapist                   Outcome Measures       Row Name 02/22/25 1104 02/22/25 0730       How much help from another person do you currently need...    Turning from your back to your side while in flat bed without using bedrails? 4  -KW 4  -NC    Moving from lying on back to sitting on the side of a flat bed without bedrails? 4  -KW 4  -NC    Moving to and from a bed to a chair (including a wheelchair)? 4  -KW 4  -NC    Standing up from a chair using your arms (e.g., wheelchair, bedside chair)? 3  -KW 4  -NC    Climbing 3-5 steps with a railing? 3  -KW 3  -NC    To walk in hospital room? 3  -KW 4  -NC    AM-PAC 6 Clicks Score (PT) 21  -KW 23  -NC    Highest Level of Mobility Goal 6 --> Walk 10 steps or more  -KW 7 --> Walk 25 feet or more  -NC      Row Name 02/22/25 1104          Functional Assessment    Outcome Measure Options AM-PAC 6 Clicks Basic  Mobility (PT)  -               User Key  (r) = Recorded By, (t) = Taken By, (c) = Cosigned By      Initials Name Provider Type    Giana Lopez RN Registered Nurse    Marian Goncalves PT Physical Therapist                                 Physical Therapy Education        No education to display                  PT Recommendation and Plan     Progress: improving  Outcome Evaluation: Physical therapy treatment complete.Patient improving with ambulation, able to maintain sats on room air after ambulation however still demosntrates slowed nori and mild SOA with ambulation. The patient continues to present below baseline for functional mobility. The patient would continue to benefit from skilled PT to address strength, balance and activity tolerance deficits. Continue to current PT POC     Time Calculation:   PT Evaluation Complexity  History, PT Evaluation Complexity: 3 or more personal factors and/or comorbidities  Examination of Body Systems (PT Eval Complexity): total of 3 or more elements  Clinical Presentation (PT Evaluation Complexity): evolving  Clinical Decision Making (PT Evaluation Complexity): moderate complexity  Overall Complexity (PT Evaluation Complexity): moderate complexity     PT Charges       Row Name 02/22/25 1104             Time Calculation    Start Time 1104  -KW      PT Received On 02/22/25  -KW         Timed Charges    27489 - Gait Training Minutes  24  -KW         Total Minutes    Timed Charges Total Minutes 24  -KW       Total Minutes 24  -KW                User Key  (r) = Recorded By, (t) = Taken By, (c) = Cosigned By      Initials Name Provider Type    Marian Goncalves PT Physical Therapist                  Therapy Charges for Today       Code Description Service Date Service Provider Modifiers Qty    [id number] HC GAIT TRAINING EA 15 MIN 2/22/2025 Marian Rosenberg PT GP 2            PT G-Codes  Outcome Measure Options: AM-PAC 6 Clicks Basic Mobility  (PT)  AM-PAC 6 Clicks Score (PT): 21  AM-PAC 6 Clicks Score (OT): 24  PT Discharge Summary  Anticipated Discharge Disposition (PT): home with outpatient therapy services    Marian Rosenberg, PT  2/22/2025

## 2025-02-22 NOTE — PROGRESS NOTES
[facility] Medicine Services  PROGRESS NOTE    Patient Name: Kathy Taylor  : 1960  MRN: [id number]    Date of Admission: 2025  Primary Care Physician: Provider, No Known    Subjective   Subjective     CC: Follow-up pneumonia    HPI: No acute events overnight, patient rested some, mentions that breathing is improving.    Objective   Objective     Vital Signs:   Temp:  [98.1 °F (36.7 °C)-98.6 °F (37 °C)] 98.1 °F (36.7 °C)  Heart Rate:  [81-96] 82  Resp:  [16-18] 18  BP: (116-153)/(66-84) 153/84  Flow (L/min) (Oxygen Therapy):  [2] 2     Physical Exam:  Constitutional: No acute distress, awake, alert  HENT: NCAT, mucous membranes moist  Respiratory: Clear to auscultation bilaterally, respiratory effort normal   Cardiovascular: RRR, no murmurs, rubs, or gallops  Gastrointestinal: Positive bowel sounds, soft, nontender, nondistended  Musculoskeletal: No bilateral ankle edema  Psychiatric: Appropriate affect, cooperative  Neurologic: Oriented x 3, nonfocal    Results Reviewed:  LAB RESULTS:      Lab 258 25  0404 25  0417 25  1818 25  1704   WBC 16.62* 20.64* 18.10*  --  26.94*   HEMOGLOBIN 11.8* 11.9* 12.9  --  14.4   HEMATOCRIT 35.5 34.6 38.1  --  40.3   PLATELETS 202 200 195  --  244   NEUTROS ABS 13.91* 18.73* 16.78*  --  23.55*   IMMATURE GRANS (ABS) 0.29* 0.24* 0.16*  --  0.20*   LYMPHS ABS 1.57 1.07 0.94  --  1.64   MONOS ABS 0.82 0.58 0.18  --  1.33*   EOS ABS 0.00 0.00 0.00  --  0.15   MCV 89.6 88.3 89.4  --  87.6   D DIMER QUANT  --   --   --   --  0.54   HSTROP T  --   --   --  34* 41*         Lab 25  0417 25  1704   SODIUM 133* 133*   POTASSIUM 4.2 4.7   CHLORIDE 100 96*   CO2 23.0 22.0   ANION GAP 10.0 15.0   BUN 21 22   CREATININE 0.87 0.98   EGFR 75.4 65.4   GLUCOSE 320* 295*   CALCIUM 9.4 9.6         Lab 25  0417 25  1704   TOTAL PROTEIN 6.8 7.4   ALBUMIN 3.8 4.1   GLOBULIN 3.0 3.3   ALT (SGPT) 13 15   AST  (SGOT) 15 18   BILIRUBIN 0.5 0.6   ALK PHOS 86 90         Lab 02/19/25  1818 02/19/25  1704   PROBNP  --  326.4   HSTROP T 34* 41*                 Brief Urine Lab Results  (Last result in the past 365 days)        Color   Clarity   Blood   Leuk Est   Nitrite   Protein   CREAT   Urine HCG        10/19/24 1322 Yellow   Clear   Trace   Trace   Negative   Trace                   Microbiology Results Abnormal       None            No radiology results from the last 24 hrs    Results for orders placed during the hospital encounter of 07/15/24    Adult Transthoracic Echo Complete W/ Cont if Necessary Per Protocol    Interpretation Summary    Left ventricular ejection fraction appears to be 61 - 65%.    All left ventricular wall segments contract normally.    No significant valvular stenosis or regurgitation.    Current medications:  Scheduled Meds:albuterol, 2.5 mg, Nebulization, Q6H - RT  aspirin, 81 mg, Oral, Daily  budesonide-formoterol, 2 puff, Inhalation, BID - RT   And  revefenacin, 175 mcg, Nebulization, Daily - RT  cefTRIAXone, 1,000 mg, Intravenous, Q24H  dilTIAZem CD, 120 mg, Oral, Q24H  doxycycline, 100 mg, Oral, Q12H  guaiFENesin, 600 mg, Oral, Q12H  heparin (porcine), 5,000 Units, Subcutaneous, Q8H  insulin glargine, 20 Units, Subcutaneous, Nightly  insulin lispro, 2-7 Units, Subcutaneous, 4x Daily AC & at Bedtime  Insulin Lispro, 8 Units, Subcutaneous, TID With Meals  isosorbide mononitrate, 30 mg, Oral, Daily  nicotine, 1 patch, Transdermal, Q24H  polyethylene glycol, 17 g, Oral, Daily  predniSONE, 40 mg, Oral, Daily With Breakfast  rosuvastatin, 20 mg, Oral, Daily  sodium chloride, 10 mL, Intravenous, Q12H      Continuous Infusions:   PRN Meds:.  acetaminophen **OR** acetaminophen **OR** acetaminophen    senna-docusate sodium **AND** polyethylene glycol **AND** bisacodyl **AND** bisacodyl    dextrose    dextrose    glucagon (human recombinant)    HYDROcodone-acetaminophen    HYDROcodone-acetaminophen     HYDROmorphone **AND** naloxone    nitroglycerin    ondansetron ODT **OR** ondansetron    sodium chloride    sodium chloride    Assessment & Plan   Assessment & Plan     Active Hospital Problems    Diagnosis  POA    **Pneumonia [J18.9]  Yes    Coronary artery disease involving native coronary artery of native heart with angina pectoris [I25.119]  Yes    Stage III (Severe) COPD [J44.9]  Yes    Type 2 diabetes mellitus with hyperglycemia, with long-term current use of insulin [E11.65, Z79.4]  Not Applicable    Obstructive sleep apnea [G47.33]  Yes      Resolved Hospital Problems   No resolved problems to display.      Brief Hospital Course to date:  Kathy Taylor is a 64 y.o. female with history of type 2 diabetes, SABRINA, COPD, hypertension, CAD who presents to the ED with 3 days of progressively worsening SOA, cough found to have multifocal pneumonia     Acute respiratory failure with hypoxia secondary to multifocal pneumonia, exacerbated by COPD with bronchospasm  Tobacco abuse  -Patient started on Rocephin and doxycycline, unfortunately blood cultures not collected,  respiratory cultures and urinary antigens were all negative  -She was on 2 L NC, now on room air, however O2 sats dropped with ambulation, anticipate she might need home O2 at discharge.  CM is arranged  -Continue steroids, transition to oral prednisone, continue antibiotics with cefuroxime and doxycycline, plan for 5-day course  -s/p NRT, extensively counseled on cessation     Poorly controlled type 2 diabetes with A1c 8.7%  -FSBG's reviewed and were suboptimal, likely worsened by steroids  -Okay to resume home regimen     Hypertension  CAD  -Continue aspirin, Imdur, diltiazem    Expected Discharge Location and Transportation: home  Expected Discharge   Expected discharge date/ time has not been documented.     VTE Prophylaxis:  Pharmacologic VTE prophylaxis orders are present.     AM-PAC 6 Clicks Score (PT): 22 (02/21/25 2000)    CODE STATUS:   Code  Status and Medical Interventions: CPR (Attempt to Resuscitate); Full Support   Ordered at: 02/19/25 2012     Code Status (Patient has no pulse and is not breathing):    CPR (Attempt to Resuscitate)     Medical Interventions (Patient has pulse or is breathing):    Full Support       Eliane Oviedo MD  02/22/25

## 2025-02-22 NOTE — PLAN OF CARE
Goal Outcome Evaluation:  Plan of Care Reviewed With: patient        Progress: improving  Outcome Evaluation: Physical therapy treatment complete.Patient improving with ambulation, able to maintain sats on room air after ambulation however still demosntrates slowed nori and mild SOA with ambulation. The patient continues to present below baseline for functional mobility. The patient would continue to benefit from skilled PT to address strength, balance and activity tolerance deficits. Continue to current PT POC    Anticipated Discharge Disposition (PT): home with outpatient therapy services

## 2025-02-23 ENCOUNTER — READMISSION MANAGEMENT (OUTPATIENT)
Dept: CALL CENTER | Facility: HOSPITAL | Age: 65
End: 2025-02-23
Payer: MEDICARE

## 2025-02-23 VITALS
TEMPERATURE: 98.4 F | HEIGHT: 66 IN | WEIGHT: 168.9 LBS | HEART RATE: 88 BPM | OXYGEN SATURATION: 95 % | SYSTOLIC BLOOD PRESSURE: 127 MMHG | DIASTOLIC BLOOD PRESSURE: 79 MMHG | BODY MASS INDEX: 27.14 KG/M2 | RESPIRATION RATE: 16 BRPM

## 2025-02-23 LAB — GLUCOSE BLDC GLUCOMTR-MCNC: 208 MG/DL (ref 70–130)

## 2025-02-23 PROCEDURE — 63710000001 REVEFENACIN 175 MCG/3ML SOLUTION: Performed by: HOSPITALIST

## 2025-02-23 PROCEDURE — 94799 UNLISTED PULMONARY SVC/PX: CPT

## 2025-02-23 PROCEDURE — 63710000001 PREDNISONE PER 1 MG: Performed by: INTERNAL MEDICINE

## 2025-02-23 PROCEDURE — 63710000001 INSULIN LISPRO (HUMAN) PER 5 UNITS: Performed by: INTERNAL MEDICINE

## 2025-02-23 PROCEDURE — 25010000002 HEPARIN (PORCINE) PER 1000 UNITS: Performed by: HOSPITALIST

## 2025-02-23 PROCEDURE — 82948 REAGENT STRIP/BLOOD GLUCOSE: CPT

## 2025-02-23 PROCEDURE — 99239 HOSP IP/OBS DSCHRG MGMT >30: CPT | Performed by: INTERNAL MEDICINE

## 2025-02-23 PROCEDURE — 63710000001 INSULIN LISPRO (HUMAN) PER 5 UNITS: Performed by: HOSPITALIST

## 2025-02-23 PROCEDURE — 94664 DEMO&/EVAL PT USE INHALER: CPT

## 2025-02-23 RX ORDER — ROSUVASTATIN CALCIUM 20 MG/1
20 TABLET, COATED ORAL DAILY
Start: 2025-02-23

## 2025-02-23 RX ORDER — PREDNISONE 20 MG/1
40 TABLET ORAL
Qty: 6 TABLET | Refills: 0 | Status: SHIPPED | OUTPATIENT
Start: 2025-02-23 | End: 2025-02-26

## 2025-02-23 RX ORDER — CEFUROXIME AXETIL 500 MG/1
500 TABLET ORAL 2 TIMES DAILY
Qty: 6 TABLET | Refills: 0 | Status: SHIPPED | OUTPATIENT
Start: 2025-02-23 | End: 2025-02-26

## 2025-02-23 RX ORDER — DOXYCYCLINE 100 MG/1
100 CAPSULE ORAL 2 TIMES DAILY
Qty: 6 CAPSULE | Refills: 0 | Status: SHIPPED | OUTPATIENT
Start: 2025-02-23 | End: 2025-02-26

## 2025-02-23 RX ADMIN — DILTIAZEM HYDROCHLORIDE 120 MG: 120 CAPSULE, COATED, EXTENDED RELEASE ORAL at 09:15

## 2025-02-23 RX ADMIN — INSULIN LISPRO 8 UNITS: 100 INJECTION, SOLUTION INTRAVENOUS; SUBCUTANEOUS at 08:00

## 2025-02-23 RX ADMIN — GUAIFENESIN 600 MG: 600 TABLET ORAL at 09:15

## 2025-02-23 RX ADMIN — ALBUTEROL SULFATE 2.5 MG: 2.5 SOLUTION RESPIRATORY (INHALATION) at 00:53

## 2025-02-23 RX ADMIN — INSULIN LISPRO 3 UNITS: 100 INJECTION, SOLUTION INTRAVENOUS; SUBCUTANEOUS at 07:58

## 2025-02-23 RX ADMIN — HEPARIN SODIUM 5000 UNITS: 5000 INJECTION INTRAVENOUS; SUBCUTANEOUS at 05:29

## 2025-02-23 RX ADMIN — PREDNISONE 40 MG: 20 TABLET ORAL at 09:15

## 2025-02-23 RX ADMIN — DOXYCYCLINE 100 MG: 100 CAPSULE ORAL at 09:15

## 2025-02-23 RX ADMIN — ALBUTEROL SULFATE 2.5 MG: 2.5 SOLUTION RESPIRATORY (INHALATION) at 07:02

## 2025-02-23 RX ADMIN — ASPIRIN 81 MG: 81 TABLET, COATED ORAL at 09:15

## 2025-02-23 RX ADMIN — POLYETHYLENE GLYCOL 3350 17 G: 17 POWDER, FOR SOLUTION ORAL at 09:15

## 2025-02-23 RX ADMIN — ROSUVASTATIN 20 MG: 20 TABLET, FILM COATED ORAL at 09:15

## 2025-02-23 RX ADMIN — Medication 10 ML: at 09:16

## 2025-02-23 RX ADMIN — ISOSORBIDE MONONITRATE 30 MG: 30 TABLET, EXTENDED RELEASE ORAL at 09:15

## 2025-02-23 RX ADMIN — BUDESONIDE AND FORMOTEROL FUMARATE DIHYDRATE 2 PUFF: 160; 4.5 AEROSOL RESPIRATORY (INHALATION) at 07:03

## 2025-02-23 RX ADMIN — REVEFENACIN 175 MCG: 175 SOLUTION RESPIRATORY (INHALATION) at 07:03

## 2025-02-23 NOTE — CASE MANAGEMENT/SOCIAL WORK
Case Management Discharge Note      Final Note: Pt is discharging home today. She is on room air. She was given an ambulatory order for outpatient PT and told to schedule with the facility of her choice. A portable O2 tank had been delivered from [employer]. Lyft transportation was arranged to take Pt home. No other discharge needs identified.    Provided Post Acute Provider List?: N/A  Provided Post Acute Provider Quality & Resource List?: N/A    Selected Continued Care - Discharged on 2/23/2025 Admission date: 2/19/2025 - Discharge disposition: Home or Self Care      Destination    No services have been selected for the patient.                Durable Medical Equipment Coordination complete.      Service Provider Services Address Phone Fax Patient Preferred    [facility] [address], [facility] [address] [phone] --              Dialysis/Infusion    No services have been selected for the patient.                Home Medical Care    No services have been selected for the patient.                Therapy    No services have been selected for the patient.                Community Resources    No services have been selected for the patient.                Community & DME    No services have been selected for the patient.                    Transportation Services  W/C Van: Other (Lyft)    Final Discharge Disposition Code: 01 - home or self-care

## 2025-02-23 NOTE — PLAN OF CARE
Problem: Sepsis/Septic Shock  Goal: Optimal Coping  Outcome: Met  Intervention: Support Patient and Family Response  Recent Flowsheet Documentation  Taken 2/23/2025 0800 by Timothy Rahman RN  Supportive Measures:   active listening utilized   problem-solving facilitated   verbalization of feelings encouraged  Goal: Absence of Bleeding  Outcome: Met  Goal: Blood Glucose Level Within Target Range  Outcome: Met  Intervention: Optimize Glycemic Control  Recent Flowsheet Documentation  Taken 2/23/2025 0800 by Timothy Rahman RN  Hyperglycemia Management:   blood glucose monitored   correctional insulin given  Hypoglycemia Management: blood glucose monitored  Goal: Absence of Infection Signs and Symptoms  Outcome: Met  Intervention: Initiate Sepsis Management  Recent Flowsheet Documentation  Taken 2/23/2025 0800 by Timothy Rahman RN  Infection Prevention:   environmental surveillance performed   hand hygiene promoted   personal protective equipment utilized   rest/sleep promoted   single patient room provided  Intervention: Promote Stabilization  Recent Flowsheet Documentation  Taken 2/23/2025 0800 by Timothy Rahamn RN  Fluid/Electrolyte Management: fluids provided  Fever Reduction/Comfort Measures:   lightweight bedding   lightweight clothing  Intervention: Promote Recovery  Recent Flowsheet Documentation  Taken 2/23/2025 0800 by Timothy Rahman RN  Activity Management: up ad ruben  Airway/Ventilation Management:   airway patency maintained   calming measures promoted   humidification applied  Sleep/Rest Enhancement:   awakenings minimized   consistent schedule promoted   regular sleep/rest pattern promoted   relaxation techniques promoted  Goal: Optimal Nutrition Delivery  Outcome: Met     Problem: Adult Inpatient Plan of Care  Goal: Plan of Care Review  Outcome: Met  Goal: Patient-Specific Goal (Individualized)  Outcome: Met  Goal: Absence of Hospital-Acquired Illness or Injury  Outcome: Met  Intervention: Identify and  Manage Fall Risk  Recent Flowsheet Documentation  Taken 2/23/2025 0800 by Timothy Rahman RN  Safety Promotion/Fall Prevention:   activity supervised   assistive device/personal items within reach   clutter free environment maintained   fall prevention program maintained   nonskid shoes/slippers when out of bed   room organization consistent   safety round/check completed  Intervention: Prevent Skin Injury  Recent Flowsheet Documentation  Taken 2/23/2025 0800 by Timothy Rahman RN  Body Position: position changed independently  Skin Protection:   incontinence pads utilized   silicone foam dressing in place   skin sealant/moisture barrier applied   transparent dressing maintained  Intervention: Prevent and Manage VTE (Venous Thromboembolism) Risk  Recent Flowsheet Documentation  Taken 2/23/2025 0800 by Timothy Rahman RN  VTE Prevention/Management: (Patient ambulates frequently) other (see comments)  Intervention: Prevent Infection  Recent Flowsheet Documentation  Taken 2/23/2025 0800 by Timothy Rahman RN  Infection Prevention:   environmental surveillance performed   hand hygiene promoted   personal protective equipment utilized   rest/sleep promoted   single patient room provided  Goal: Optimal Comfort and Wellbeing  Outcome: Met  Intervention: Provide Person-Centered Care  Recent Flowsheet Documentation  Taken 2/23/2025 0800 by Timothy Rahman RN  Trust Relationship/Rapport:   care explained   choices provided   emotional support provided   empathic listening provided   questions answered   questions encouraged   reassurance provided   thoughts/feelings acknowledged  Goal: Readiness for Transition of Care  Outcome: Met     Problem: Comorbidity Management  Goal: Maintenance of COPD Symptom Control  Outcome: Met  Goal: Blood Glucose Level Within Target Range  Outcome: Met     Problem: Pneumonia  Goal: Fluid Balance  Outcome: Met  Intervention: Monitor and Manage Fluid Balance  Recent Flowsheet Documentation  Taken  2/23/2025 0800 by Timothy Rahman RN  Fluid/Electrolyte Management: fluids provided  Goal: Absence of Infection Signs and Symptoms  Outcome: Met  Intervention: Prevent Infection Progression  Recent Flowsheet Documentation  Taken 2/23/2025 0800 by Timothy Rahman RN  Fever Reduction/Comfort Measures:   lightweight bedding   lightweight clothing  Goal: Effective Oxygenation and Ventilation  Outcome: Met  Intervention: Promote Airway Secretion Clearance  Recent Flowsheet Documentation  Taken 2/23/2025 0800 by Timothy Rahman RN  Activity Management: up ad ruben  Intervention: Optimize Oxygenation and Ventilation  Recent Flowsheet Documentation  Taken 2/23/2025 0800 by Timothy Rahman RN  Head of Bed (HOB) Positioning: HOB at 30-45 degrees  Airway/Ventilation Management:   airway patency maintained   calming measures promoted   humidification applied   Goal Outcome Evaluation:   Goals met.

## 2025-02-24 NOTE — PAYOR COMM NOTE
"Ref# [id number]   Discharge Summary    BING Ornelas, RN  Utilization Review  Phone [phone]  Fax [phone]    [facility]  [address] [place] [id number]         Kathy Giordano (64 y.o. Female)       Date of Birth   1960    Social Security Number       Address   [address] [place] [address] [address] [facility] [address]    Home Phone   [phone]    MRN   [id number]       Scientology   Non-Orthodoxy    Marital Status                               Admission Date   25    Admission Type   Emergency    Admitting Provider   Eliane Oviedo MD    Attending Provider       Department, Room/Bed   [facility] 5G, S560/1       Discharge Date   2025    Discharge Disposition   Home or Self Care    Discharge Destination                                 Attending Provider: (none)   Allergies: Bee Venom, Ciprofloxacin    Isolation: None   Infection: None   Code Status: Prior    Ht: 167.6 cm (66\")   Wt: 76.6 kg (168 lb 14.4 oz)    Admission Cmt: None   Principal Problem: Pneumonia [J18.9]                   Active Insurance as of 2025       Primary Coverage       Payor Plan Insurance Group Employer/Plan Group    AETNA MEDICARE REPLACEMENT AETNA MED ADV HMO [id number]       Payor Plan Address Payor Plan Phone Number Payor Plan Fax Number Effective Dates    [address] [phone]  3/1/2024 - None Entered    [place] [address]         Subscriber Name Subscriber Birth Date Member ID       KATHY GIORDANO 1960 [id number]                     Emergency Contacts        (Rel.) Home Phone Work Phone Mobile Phone    Diane Giordano (Daughter) [phone] -- [phone]                 Discharge Summary        Eliane Oviedo MD at 25 0708              [facility] Medicine Services  DISCHARGE SUMMARY    Patient Name: Kathy Giordano  : 1960  MRN: [id number]    Date of Admission: 2025  4:49 PM  Date of " Discharge: 2/23/2025  Primary Care Physician: Provider, No Known    Consults       No orders found from 1/21/2025 to 2/20/2025.            Hospital Course       Active Hospital Problems    Diagnosis  POA    **Pneumonia [J18.9]  Yes    Coronary artery disease involving native coronary artery of native heart with angina pectoris [I25.119]  Yes    Stage III (Severe) COPD [J44.9]  Yes    Type 2 diabetes mellitus with hyperglycemia, with long-term current use of insulin [E11.65, Z79.4]  Not Applicable    Obstructive sleep apnea [G47.33]  Yes      Resolved Hospital Problems   No resolved problems to display.      Hospital Course:  Kathy Taylor is a 64 y.o. female with history of type 2 diabetes, SABRINA, COPD, hypertension, CAD who presents to the ED with 3 days of progressively worsening SOA, cough found to have multifocal pneumonia     Acute respiratory failure with hypoxia secondary to multifocal pneumonia, exacerbated by COPD with bronchospasm  Tobacco abuse  -Patient started on Rocephin and doxycycline, unfortunately blood cultures not collected,  respiratory cultures and urinary antigens were all negative  -She was on 2 L NC, now on 1 L NC but becomes hypoxic on ambulation, CM is arranged home O2   -Continue steroids, transition to oral prednisone, continue antibiotics with cefuroxime and doxycycline, plan for 5-day course  -s/p NRT, extensively counseled on cessation     Poorly controlled type 2 diabetes with A1c 8.7%  -FSBG's reviewed and were suboptimal, likely worsened by steroids  -Okay to resume home regimen     Hypertension  CAD  -Continue aspirin, Imdur, diltiazem    Discharge Follow Up Recommendations for outpatient labs/diagnostics:  Follow-up with PCP in 1 week    Day of Discharge     HPI: No acute events overnight, patient rested well    Review of Systems  Gen- No fevers, chills  CV- No chest pain, palpitations  Resp- No cough, dyspnea  GI- No N/V/D, abd pain      Vital Signs:   Temp:  [97.5 °F (36.4  °C)-98.4 °F (36.9 °C)] 98.4 °F (36.9 °C)  Heart Rate:  [73-90] 88  Resp:  [16-18] 16  BP: (119-154)/(61-98) 127/79  Flow (L/min) (Oxygen Therapy):  [1-2] 1    Physical Exam:  Constitutional: No acute distress, awake, alert  HENT: NCAT, mucous membranes moist  Respiratory: Nonlabored respirations, diffuse coarse breath sounds on 1 L NC  Cardiovascular: RRR, no murmurs, rubs, or gallops  Gastrointestinal: Positive bowel sounds, soft, nontender, nondistended  Musculoskeletal: No bilateral ankle edema  Psychiatric: Appropriate affect, cooperative  Neurologic: Oriented x 3, nonfocal      Pertinent  and/or Most Recent Results     LAB RESULTS:      Lab 02/22/25 0418 02/21/25  0404 02/20/25  0417 02/19/25  1704   WBC 16.62* 20.64* 18.10* 26.94*   HEMOGLOBIN 11.8* 11.9* 12.9 14.4   HEMATOCRIT 35.5 34.6 38.1 40.3   PLATELETS 202 200 195 244   NEUTROS ABS 13.91* 18.73* 16.78* 23.55*   IMMATURE GRANS (ABS) 0.29* 0.24* 0.16* 0.20*   LYMPHS ABS 1.57 1.07 0.94 1.64   MONOS ABS 0.82 0.58 0.18 1.33*   EOS ABS 0.00 0.00 0.00 0.15   MCV 89.6 88.3 89.4 87.6   D DIMER QUANT  --   --   --  0.54         Lab 02/20/25  0417 02/19/25  1704   SODIUM 133* 133*   POTASSIUM 4.2 4.7   CHLORIDE 100 96*   CO2 23.0 22.0   ANION GAP 10.0 15.0   BUN 21 22   CREATININE 0.87 0.98   EGFR 75.4 65.4   GLUCOSE 320* 295*   CALCIUM 9.4 9.6         Lab 02/20/25  0417 02/19/25  1704   TOTAL PROTEIN 6.8 7.4   ALBUMIN 3.8 4.1   GLOBULIN 3.0 3.3   ALT (SGPT) 13 15   AST (SGOT) 15 18   BILIRUBIN 0.5 0.6   ALK PHOS 86 90         Lab 02/19/25  1818 02/19/25  1704   PROBNP  --  326.4   HSTROP T 34* 41*                 Brief Urine Lab Results  (Last result in the past 365 days)        Color   Clarity   Blood   Leuk Est   Nitrite   Protein   CREAT   Urine HCG        10/19/24 1322 Yellow   Clear   Trace   Trace   Negative   Trace                 Microbiology Results (last 10 days)       Procedure Component Value - Date/Time    Respiratory Culture - Sputum, Cough  [[id number]] Collected: 02/20/25 1139    Lab Status: Final result Specimen: Sputum from Cough Updated: 02/22/25 1147     Respiratory Culture Scant growth (1+) Normal respiratory gina. No S. aureus or Pseudomonas aeruginosa detected. Final report.     Gram Stain Many (4+) WBCs per low power field      Rare (1+) Epithelial cells per low power field      Few (2+) Gram positive cocci in pairs    Legionella Antigen, Urine - Urine, Urine, Clean Catch [[id number]]  (Normal) Collected: 02/20/25 1139    Lab Status: Final result Specimen: Urine, Clean Catch Updated: 02/21/25 0711     LEGIONELLA ANTIGEN, URINE Negative    S. Pneumo Ag Urine or CSF - Urine, Urine, Clean Catch [[id number]]  (Normal) Collected: 02/20/25 1139    Lab Status: Final result Specimen: Urine, Clean Catch Updated: 02/21/25 0711     Strep Pneumo Ag Negative    COVID PRE-OP / PRE-PROCEDURE SCREENING ORDER (NO ISOLATION) - Swab, Nasopharynx [[id number]]  (Normal) Collected: 02/19/25 1720    Lab Status: Final result Specimen: Swab from Nasopharynx Updated: 02/19/25 1748    Narrative:      The following orders were created for panel order COVID PRE-OP / PRE-PROCEDURE SCREENING ORDER (NO ISOLATION) - Swab, Nasopharynx.  Procedure                               Abnormality         Status                     ---------                               -----------         ------                     COVID-19 and FLU A/B PCR...[[id number]]  Normal              Final result                 Please view results for these tests on the individual orders.    COVID-19 and FLU A/B PCR, 1 HR TAT - Swab, Nasopharynx [[id number]]  (Normal) Collected: 02/19/25 1720    Lab Status: Final result Specimen: Swab from Nasopharynx Updated: 02/19/25 1748     COVID19 Not Detected     Influenza A PCR Not Detected     Influenza B PCR Not Detected    Narrative:      Fact sheet for providers: https://www.fda.gov/media/272970/download    Fact sheet for patients:  https://www.fda.gov/media/491438/download    Test performed by PCR.            CT Chest Without Contrast Diagnostic    Result Date: 2/19/2025  CT CHEST WO CONTRAST DIAGNOSTIC Date of Exam: 2/19/2025 6:23 PM EST Indication: cough, sob, eval for pna. Comparison: CT chest 2/12/2025 Technique: Axial CT images were obtained of the chest without contrast administration.  Reconstructed coronal and sagittal images were also obtained. Automated exposure control and iterative construction methods were used. Findings: Thyroid unremarkable. No supraclavicular adenopathy. Aortic atherosclerotic disease without aneurysm. Severe calcified coronary atherosclerotic disease. Normal caliber main pulmonary artery. Heart size normal. No pericardial effusion. Esophagus unremarkable. No suspicious adenopathy. Trachea patent. Mild emphysema. There are new infectious/inflammatory patchy groundglass airspace opacities in the lingula and left greater than right lower lobes. Scattered small discrete pulmonary nodules stable from recent low-dose chest CT screening.  Linear bandlike areas of minimal atelectasis versus scarring. No edema, effusion or pneumothorax. No acute findings partially imaged upper abdomen. No acute chest wall findings. No axillary adenopathy. Degenerative changes throughout the spine without acute displaced fracture or aggressive lesion.     Impression: 1. Mild/early multifocal pneumonia versus aspiration. 2. Mild emphysema with stable small pulmonary nodules since 2/12/2025. Recommend continued annual low-dose chest CT screening, due February 2026. 3. Aortic and severe coronary atherosclerotic disease. Electronically Signed: Ghassan Brewster MD  2/19/2025 6:34 PM EST  Workstation ID: [id number]    XR Chest 1 View    Result Date: 2/19/2025  XR CHEST 1 VW Date of Exam: 2/19/2025 5:05 PM EST Indication: sob Comparison: Chest radiograph 12/4/2024 and a chest CT to 1225 Findings: Mediastinum: Cardiac silhouette appears unchanged  and normal in size Lungs: The lungs appear clear without focal consolidation appreciated. Pleura: No pleural effusion or pneumothorax. Bones and soft tissues: No acute, displaced fracture seen.     Impression: No radiographic evidence of acute cardiopulmonary disease. Electronically Signed: Corey Pérez  2/19/2025 5:33 PM EST  Workstation ID: [id number]    CT Chest Low Dose Cancer Screening WO    Result Date: 2/15/2025  CT CHEST LOW DOSE CANCER SCREENING WO Date of Exam: 2/12/2025 12:59 PM EST Indication: Lung cancer screening. Comparison: 2/10/24. Technique: Low dose CT imaging of the chest was performed without intravenous contrast enhancement.  Automated exposure control and iterative reconstruction methods were used. Findings:  MEDIASTINUM: Unremarkable. Aortic and heart size are normal. No mass nor pericardial effusion. CORONARY ARTERIES: There is calcified atherosclerotic disease. LUNGS: Lungs are clear. No consolidation. No significant nodule nor interstitial changes. PLEURAL SPACE: No effusion, mass, nor pneumothorax. LYMPH NODES: There are no pathologically enlarged lymph nodes. UPPER ABDOMEN: Unremarkable OSSEOUS STRUCTURES: Appropriate for age with no acute process identified.     Impression: No suspicious pulmonary nodule or mass. Recommendation: Continue annual screening with LDCT Lung Rads Assessment: Lung-RADS L1 - Negative, <1% chance of malignancy. Electronically Signed: Margaret Griffiths MD  2/15/2025 2:56 PM EST  Workstation ID: [id number]     Results for orders placed during the hospital encounter of 02/28/23    Duplex Carotid Ultrasound CAR    Interpretation Summary    Right internal carotid artery stenosis of 0-49%.    Left internal carotid artery stenosis of 0-49%.    Vertebral artery flow is antegrade bilaterally      Results for orders placed during the hospital encounter of 02/28/23    Duplex Carotid Ultrasound CAR    Interpretation Summary    Right internal carotid artery stenosis of 0-49%.     Left internal carotid artery stenosis of 0-49%.    Vertebral artery flow is antegrade bilaterally      Results for orders placed during the hospital encounter of 07/15/24    Adult Transthoracic Echo Complete W/ Cont if Necessary Per Protocol    Interpretation Summary    Left ventricular ejection fraction appears to be 61 - 65%.    All left ventricular wall segments contract normally.    No significant valvular stenosis or regurgitation.      Plan for Follow-up of Pending Labs/Results:       Discharge Details        Discharge Medications        New Medications        Instructions Start Date   cefuroxime 500 MG tablet  Commonly known as: CEFTIN   500 mg, Oral, 2 Times Daily      doxycycline 100 MG capsule  Commonly known as: VIBRAMYCIN   100 mg, Oral, 2 Times Daily      predniSONE 20 MG tablet  Commonly known as: DELTASONE   40 mg, Oral, Daily With Breakfast             Continue These Medications        Instructions Start Date   Accu-Chek Softclix Lancets lancets   USE 1  TO CHECK GLUCOSE TWICE DAILY TO THREE TIMES DAILY dx e11.65 on insulin      albuterol sulfate  (90 Base) MCG/ACT inhaler  Commonly known as: PROVENTIL HFA;VENTOLIN HFA;PROAIR HFA   2 puffs, Inhalation, Every 6 Hours PRN      aspirin 81 MG EC tablet  Commonly known as: ASPIR   81 mg, Oral, Daily      BD Pen Needle Nessa U/F 32G X 4 MM misc  Generic drug: Insulin Pen Needle   Use as directed 5 times daily      dilTIAZem  MG 24 hr capsule  Commonly known as: DILACOR XR   120 mg, Oral, Daily      Glucagon 1 MG/0.2ML solution auto-injector   1 mg, Subcutaneous, As Needed      glucose blood test strip   Use as instructed 2-3 times a day      HYDROcodone-acetaminophen 7.5-325 MG per tablet  Commonly known as: NORCO   1 tablet, Every 6 Hours PRN      ipratropium 0.03 % nasal spray  Commonly known as: ATROVENT   2 sprays, Nasal, Every 12 Hours      ipratropium-albuterol 0.5-2.5 mg/3 ml nebulizer  Commonly known as: DUO-NEB   USE 3 ML VIA NEBULIZER  FOUR TIMES DAILY AS NEEDED FOR WHEEZING      isosorbide mononitrate 30 MG 24 hr tablet  Commonly known as: IMDUR   30 mg, Oral, Every Morning      Lantus SoloStar 100 UNIT/ML injection pen  Generic drug: Insulin Glargine   10 Units, Subcutaneous, Nightly, Titrate 2 units every 3 days if blood sugar is more than 140 fasting; Max daily dose: 30 units      metFORMIN  MG 24 hr tablet  Commonly known as: GLUCOPHAGE-XR   500 mg, Oral, Daily With Breakfast      Mobic 15 MG tablet  Generic drug: meloxicam   Take 1 tablet every day by oral route with meals for 30 days.      nicotine 10 MG inhaler  Commonly known as: NICOTROL   1 puff, Inhalation, As Needed      nitroglycerin 0.3 MG SL tablet  Commonly known as: NITROSTAT   1 under the tongue as needed for angina, may repeat q5mins for up three doses      Ozempic (0.25 or 0.5 MG/DOSE) 2 MG/3ML solution pen-injector  Generic drug: Semaglutide(0.25 or 0.5MG/DOS)   0.5 mg, Subcutaneous, Weekly, Contact office after 4 weeks for additional adjustment if tolerating well.      polyethylene glycol 17 g packet  Commonly known as: MIRALAX   17 g, Oral, Daily      rosuvastatin 20 MG tablet  Commonly known as: CRESTOR   20 mg, Oral, Daily      tiZANidine 4 MG tablet  Commonly known as: ZANAFLEX   4 mg, 3 Times Daily PRN      Trelegy Ellipta 200-62.5-25 MCG/ACT inhaler  Generic drug: Fluticasone-Umeclidin-Vilant   1 puff, Inhalation, Daily               Allergies   Allergen Reactions    Bee Venom Anaphylaxis    Ciprofloxacin Shortness Of Breath         Discharge Disposition: Home  Home or Self Care    Diet:  Hospital:  Diet Order   Procedures    Diet: Diabetic; Consistent Carbohydrate; Fluid Consistency: Thin (IDDSI 0)     Activity: As tolerated      Restrictions or Other Recommendations:         CODE STATUS:    Code Status and Medical Interventions: CPR (Attempt to Resuscitate); Full Support   Ordered at: 02/19/25 2012     Code Status (Patient has no pulse and is not breathing):     CPR (Attempt to Resuscitate)     Medical Interventions (Patient has pulse or is breathing):    Full Support       Future Appointments   Date Time Provider Department Center   3/14/2025  1:00 PM [facility] OP VASC CART RM 2 BH [facility] NIV  [facility]   3/21/2025  1:00 PM [facility] PULMO [facility], PFT LAB 3 [facility] P[facility]   3/21/2025  1:30 PM Sherry Ramírez APRN [facility]   5/12/2025  1:45 PM Kimber Rasmussen PA-C [facility] END BM [facility]   4/28/2026  2:30 PM Jono Estrella MD [facility] L[facility]         Eliane Oviedo MD  02/23/25      Time Spent on Discharge:  I spent  40  minutes on this discharge activity which included: face-to-face encounter with the patient, reviewing the data in the system, coordination of the care with the nursing staff as well as consultants, documentation, and entering orders.            Electronically signed by Eliane Oviedo MD at 02/23/25 0914       Discharge Order (From admission, onward)       Start     Ordered    02/23/25 0912  Discharge patient  Once        Expected Discharge Date: 02/22/25   Expected Discharge Time: Morning   Discharge Disposition: Home or Self Care   Physician of Record for Attribution - Please select from Treatment Team: ELIANE OVIEDO [[id number]]   Review needed by CMO to determine Physician of Record: No      Question Answer Comment   Physician of Record for Attribution - Please select from Treatment Team ELIANE OVIEDO    Review needed by CMO to determine Physician of Record No        02/23/25 0912

## 2025-02-24 NOTE — OUTREACH NOTE
Prep Survey      Flowsheet Row Responses   [facility] facility patient discharged from? [place]   Is LACE score < 7 ? No   Eligibility Readm Mgmt   Discharge diagnosis Acute respiratory failure with hypoxia secondary to multifocal Pneumonia   Does the patient have one of the following disease processes/diagnoses(primary or secondary)? Pneumonia   Does the patient have Home health ordered? No   Is there a DME ordered? Yes   What DME was ordered? [facility]L [facility]   Comments regarding appointments out pt PT   Prep survey completed? Yes            Dionne ALEGRIA - Registered Nurse

## 2025-02-24 NOTE — TELEPHONE ENCOUNTER
Spoke with patient regards to Ozempic-can try to get through patient assistance program.  In the meantime, she will need to continue with titrating insulin and restart Fiasp.  Reports blood sugars between 200-300s on Lantus 10 units.  Discussed blood sugars will likely be additionally higher due to steroid use.  Advise increasing to 15 units tonight if continues more than 140 continue to increase this to 20 units.  Advised Fiasp per sliding scale 1:50 mg/dl over 150.  Advised to contact office if blood sugars continue more than 200 after changes for additional adjustments.    Will check on status of CGM order.

## 2025-02-25 ENCOUNTER — TELEPHONE (OUTPATIENT)
Dept: ENDOCRINOLOGY | Facility: CLINIC | Age: 65
End: 2025-02-25
Payer: MEDICARE

## 2025-02-25 ENCOUNTER — TELEPHONE (OUTPATIENT)
Dept: ENDOCRINOLOGY | Facility: CLINIC | Age: 65
End: 2025-02-25

## 2025-02-25 NOTE — TELEPHONE ENCOUNTER
----- Message from Kimber Rasmussen sent at 2/24/2025  3:45 PM EST -----  Regarding: PAP for Ozempic  Patient no longer able to get Ozempic; can we try to get this through patient assistance program?

## 2025-02-25 NOTE — TELEPHONE ENCOUNTER
Tyler - [employer] calling:    Stated that the patient had talked to them about switching CGMs, he said that she was confused about what was going on and didn't want to cancel out her account before we knew where she was getting supplies from next.    Can call back  [phone]

## 2025-02-27 ENCOUNTER — READMISSION MANAGEMENT (OUTPATIENT)
Dept: CALL CENTER | Facility: HOSPITAL | Age: 65
End: 2025-02-27
Payer: MEDICARE

## 2025-02-27 NOTE — OUTREACH NOTE
COPD/PN Week 1 Survey      Flowsheet Row Responses   [facility] facility patient discharged from? [place]   Does the patient have one of the following disease processes/diagnoses(primary or secondary)? Pneumonia   Week 1 attempt successful? No   Unsuccessful attempts Attempt 1            Cecile OLMOS - Registered Nurse

## 2025-02-28 ENCOUNTER — TELEPHONE (OUTPATIENT)
Dept: ENDOCRINOLOGY | Facility: CLINIC | Age: 65
End: 2025-02-28
Payer: MEDICARE

## 2025-02-28 LAB
QT INTERVAL: 362 MS
QTC INTERVAL: 487 MS

## 2025-02-28 NOTE — TELEPHONE ENCOUNTER
PATIENT IS CALLING BACK WANTING TO SPEAK WITH [employer] AGAIN. SHE IS NOT SURE HOW TO SEND THE PROOF OF INCOME THROUGH MY CHART. SHE NEEDS TO BE INSTRUCTED. PHONE NUMBER [phone]

## 2025-02-28 NOTE — TELEPHONE ENCOUNTER
[employer] called stated information was incomplete needs pt proof of income and provider information not completed. [email or web address] stated they faxed over request. [employer] phone # [phone]

## 2025-03-04 ENCOUNTER — READMISSION MANAGEMENT (OUTPATIENT)
Dept: CALL CENTER | Facility: HOSPITAL | Age: 65
End: 2025-03-04
Payer: MEDICARE

## 2025-03-04 NOTE — OUTREACH NOTE
COPD/PN Week 1 Survey      Flowsheet Row Responses   [facility] patient discharged from? [place]   Does the patient have one of the following disease processes/diagnoses(primary or secondary)? Pneumonia   Week 1 attempt successful? Yes   Call start time 1243   Call end time 1300   Person spoke with today (if not patient) and relationship Patient   Meds reviewed with patient/caregiver? Yes   Does the patient have all medications ordered at discharge? Yes   Is the patient taking all medications as directed (includes completed medication regime)? Yes   Does the patient have a primary care provider?  No   PCP Nursing Intervention Provided number to obtain PCP   Does the patient have an appointment with their PCP or specialist within 7 days of discharge? No   Nursing Interventions Educated patient on importance of making appointment, Advised patient to make appointment   Has the patient kept scheduled appointments due by today? N/A   Comments Encouraged patient to call and schedule a PCP appt. Advised her to go to Urgent Care for reassessment if cannot get into new PCP right away as she is not improving.   Has home health visited the patient within 72 hours of discharge? N/A   Has all DME been delivered? Yes   DME comments Patient has home O22L   Pulse Ox monitoring Intermittent   Pulse Ox device source Patient   O2 Sat comments Patient sats high 90's with O2   O2 Sat: education provided Sat levels, Monitoring frequency, When to seek care   Psychosocial issues? No   Comments Patients blood sugar has been elevated with being on the steroids. She is monitoring and trying to manage with her insulin.   Did the patient receive a copy of their discharge instructions? Yes   Nursing interventions Reviewed instructions with patient   What is the patient's perception of their health status since discharge? Same   If the patient is a current smoker, are they able to teach back resources for cessation? Smoking cessation  medications  [Patient reports that she has not been smoking.]   Is the patient/caregiver able to teach back the hierarchy of who to call/visit for symptoms/problems? PCP, Specialist, Home health nurse, Urgent Care, ED, 911 Yes   Is the patient/caregiver able to teach back signs and symptoms of worsening condition: Fever/chills, Shortness of breath   Is the patient/caregiver able to teach back importance of completing antibiotic course of treatment? Yes   Week 1 call completed? Yes   Is the patient interested in additional calls from an ambulatory ? No   Would this patient benefit from a Referral to [facility] Social Work? No   Call end time 1300            DACIA KELLER - Registered Nurse

## 2025-03-10 RX ORDER — IPRATROPIUM BROMIDE AND ALBUTEROL SULFATE 2.5; .5 MG/3ML; MG/3ML
SOLUTION RESPIRATORY (INHALATION)
Qty: 180 ML | Refills: 3 | Status: SHIPPED | OUTPATIENT
Start: 2025-03-10

## 2025-03-13 ENCOUNTER — READMISSION MANAGEMENT (OUTPATIENT)
Dept: CALL CENTER | Facility: HOSPITAL | Age: 65
End: 2025-03-13
Payer: MEDICARE

## 2025-03-13 ENCOUNTER — OFFICE VISIT (OUTPATIENT)
Dept: FAMILY MEDICINE CLINIC | Facility: CLINIC | Age: 65
End: 2025-03-13
Payer: MEDICARE

## 2025-03-13 VITALS
WEIGHT: 166.6 LBS | HEIGHT: 66 IN | OXYGEN SATURATION: 97 % | DIASTOLIC BLOOD PRESSURE: 56 MMHG | BODY MASS INDEX: 26.78 KG/M2 | SYSTOLIC BLOOD PRESSURE: 90 MMHG | HEART RATE: 103 BPM

## 2025-03-13 DIAGNOSIS — J18.9 PNEUMONIA OF BOTH LOWER LOBES DUE TO INFECTIOUS ORGANISM: Primary | ICD-10-CM

## 2025-03-13 DIAGNOSIS — I25.119 CORONARY ARTERY DISEASE INVOLVING NATIVE CORONARY ARTERY OF NATIVE HEART WITH ANGINA PECTORIS: ICD-10-CM

## 2025-03-13 DIAGNOSIS — G47.34 NOCTURNAL HYPOXEMIA: ICD-10-CM

## 2025-03-13 PROBLEM — U07.1 COVID-19 VIRUS INFECTION: Status: RESOLVED | Noted: 2022-01-07 | Resolved: 2025-03-13

## 2025-03-13 NOTE — ASSESSMENT & PLAN NOTE
Due to hypotension will hold diltiazem at this time.  Patient scheduled to follow-up with her cardiologist later this month.  Patient to continue ambulatory blood pressure monitoring and reach out if blood pressure remains less than 100/60

## 2025-03-13 NOTE — PROGRESS NOTES
Transitional Care Follow Up Visit  Subjective     Kathy Taylor is a 64 y.o. female who presents for a transitional care management visit.    Within 48 business hours after discharge our office contacted her via telephone to coordinate her care and needs.      I reviewed and discussed the details of that call along with the discharge summary, hospital problems, inpatient lab results, inpatient diagnostic studies, and consultation reports with Kathy.     Current outpatient and discharge medications have been reconciled for the patient.  Reviewed by: Celi Saleh, DO           No data to display              Risk for Readmission (LACE) No data recorded    History of Present Illness  Patient presents today for hospital follow-up and establish care.  She notes that she completed her antibiotics as prescribed.  She notes that she is overall feeling better but does still continue to have some left-sided chest fullness, headaches, and fatigue.  She has been using home O2 2 L and notes that she uses it most of the time while at home.  Unfortunately she notes that her tank is too heavy to carry to office.  Oxygen saturation 97% on exam today.  She does have a follow-up with pulmonology Dr. Thakur on the 21st of this month.  She does note that her daughter has a viral illness currently and she may have been exposed.  She does note that her blood pressure is low today which is new for her.  She is taking diltiazem extended release 120 mg.  Reach out to patient's cardiologist and he did agree to hold this medication.  She is going to follow-up with him in 2 weeks.  She notes that she has no presyncope or orthostasis.  She denies any peripheral swelling.  She is scheduled to have a Doppler done tomorrow.       Course During Hospital Stay:  Kathy Taylor is a 64 y.o. female with history of type 2 diabetes, SABRINA, COPD, hypertension, CAD who presents to the ED with 3 days of progressively worsening SOA, cough found to have  "multifocal pneumonia     Acute respiratory failure with hypoxia secondary to multifocal pneumonia, exacerbated by COPD with bronchospasm  Tobacco abuse  -Patient started on Rocephin and doxycycline, unfortunately blood cultures not collected,  respiratory cultures and urinary antigens were all negative  -She was on 2 L NC, now on 1 L NC but becomes hypoxic on ambulation, CM is arranged home O2   -Continue steroids, transition to oral prednisone, continue antibiotics with cefuroxime and doxycycline, plan for 5-day course  -s/p NRT, extensively counseled on cessation     Poorly controlled type 2 diabetes with A1c 8.7%  -FSBG's reviewed and were suboptimal, likely worsened by steroids  -Okay to resume home regimen     Hypertension  CAD  -Continue aspirin, Imdur, diltiazem     Discharge Follow Up Recommendations for outpatient labs/diagnostics:  Follow-up with PCP in 1 week     The following portions of the patient's history were reviewed and updated as appropriate: allergies, current medications, past family history, past medical history, past social history, past surgical history, and problem list.    Review of Systems   Respiratory:  Positive for cough and shortness of breath.    Neurological:  Positive for headaches.   All other systems reviewed and are negative.      Objective   BP 90/56   Pulse 103   Ht 167.6 cm (65.98\")   Wt 75.6 kg (166 lb 9.6 oz)   SpO2 97%   BMI 26.90 kg/m²   Physical Exam  Vitals and nursing note reviewed.   Constitutional:       Appearance: Normal appearance. She is normal weight.   HENT:      Head: Normocephalic and atraumatic.      Nose: Nose normal.      Mouth/Throat:      Mouth: Mucous membranes are moist.   Eyes:      Extraocular Movements: Extraocular movements intact.      Pupils: Pupils are equal, round, and reactive to light.   Cardiovascular:      Rate and Rhythm: Normal rate and regular rhythm.   Pulmonary:      Effort: Pulmonary effort is normal.      Breath sounds: Normal " breath sounds.   Abdominal:      General: Abdomen is flat.   Musculoskeletal:         General: Normal range of motion.      Cervical back: Normal range of motion.   Skin:     General: Skin is warm.   Neurological:      General: No focal deficit present.      Mental Status: She is alert and oriented to person, place, and time.   Psychiatric:         Mood and Affect: Mood normal.         Behavior: Behavior normal.         Thought Content: Thought content normal.         Judgment: Judgment normal.         Assessment & Plan   Problems Addressed this Visit       Nocturnal hypoxemia    Continue home O2 as directed.  Follow-up with pulmonology scheduled next week         Coronary artery disease involving native coronary artery of native heart with angina pectoris    Due to hypotension will hold diltiazem at this time.  Patient scheduled to follow-up with her cardiologist later this month.  Patient to continue ambulatory blood pressure monitoring and reach out if blood pressure remains less than 100/60         Pneumonia - Primary    Antibiotics completed.  Will obtain chest x-ray today.         Relevant Orders    XR Chest PA & Lateral     Diagnoses         Codes Comments      Pneumonia of both lower lobes due to infectious organism    -  Primary ICD-10-CM: J18.9  ICD-9-CM: 486       Nocturnal hypoxemia     ICD-10-CM: G47.34  ICD-9-CM: 327.24       Coronary artery disease involving native coronary artery of native heart with angina pectoris     ICD-10-CM: I25.119  ICD-9-CM: 414.01, 413.9

## 2025-03-13 NOTE — OUTREACH NOTE
COPD/PN Week 2 Survey      Flowsheet Row Responses   [facility] patient discharged from? [place]   Does the patient have one of the following disease processes/diagnoses(primary or secondary)? Pneumonia   Week 2 attempt successful? Yes   Call start time 1610   Call end time 1617   Discharge diagnosis Acute respiratory failure with hypoxia secondary to multifocal Pneumonia   Meds reviewed with patient/caregiver? Yes   Is the patient having any side effects they believe may be caused by any medication additions or changes? No   Is the patient taking all medications as directed (includes completed medication regime)? Yes   Comments regarding appointments Pulm appt on 3/21/25   Does the patient have a primary care provider?  Yes   Has the patient kept scheduled appointments due by today? Yes   Has home health visited the patient within 72 hours of discharge? N/A   DME comments Patient has home 2LO2   Pulse Ox monitoring Intermittent   O2 Sat comments 94-97% on O2   O2 Sat: education provided Sat levels   Psychosocial issues? No   Comments Pt is wearing a dexcom sensor, BS have been doing good, last evening dropped to the 80's, normally range 150-160's   What is the patient's perception of their health status since discharge? Same   Nursing Interventions Nurse provided patient education   Is the patient/caregiver able to teach back the hierarchy of who to call/visit for symptoms/problems? PCP, Specialist, Home health nurse, Urgent Care, ED, 911 Yes   Week 2 call completed? Yes   Is the patient interested in additional calls from an ambulatory ? No   Would this patient benefit from a Referral to Amb Social Work? No   Call end time 1617            Yuli ALEGRIA - Registered Nurse

## 2025-03-14 ENCOUNTER — HOSPITAL ENCOUNTER (OUTPATIENT)
Dept: CARDIOLOGY | Facility: HOSPITAL | Age: 65
Discharge: HOME | End: 2025-03-14
Payer: MEDICARE

## 2025-03-14 DIAGNOSIS — I73.9 PVD (PERIPHERAL VASCULAR DISEASE) WITH CLAUDICATION: ICD-10-CM

## 2025-03-14 LAB
BH CV LOWER ARTERIAL LEFT ABI RATIO: 1.07
BH CV LOWER ARTERIAL LEFT DORSALIS PEDIS SYS MAX: 111
BH CV LOWER ARTERIAL LEFT GREAT TOE SYS MAX: 91
BH CV LOWER ARTERIAL LEFT POST TIBIAL SYS MAX: 105
BH CV LOWER ARTERIAL LEFT TBI RATIO: 0.88
BH CV LOWER ARTERIAL RIGHT ABI RATIO: 1.1
BH CV LOWER ARTERIAL RIGHT DORSALIS PEDIS SYS MAX: 114
BH CV LOWER ARTERIAL RIGHT GREAT TOE SYS MAX: 91
BH CV LOWER ARTERIAL RIGHT POST TIBIAL SYS MAX: 106
BH CV LOWER ARTERIAL RIGHT TBI RATIO: 0.88
UPPER ARTERIAL LEFT ARM BRACHIAL SYS MAX: 103
UPPER ARTERIAL RIGHT ARM BRACHIAL SYS MAX: 104

## 2025-03-14 PROCEDURE — 93923 UPR/LXTR ART STDY 3+ LVLS: CPT

## 2025-03-21 ENCOUNTER — PATIENT ROUNDING (BHMG ONLY) (OUTPATIENT)
Dept: FAMILY MEDICINE CLINIC | Facility: CLINIC | Age: 65
End: 2025-03-21
Payer: MEDICARE

## 2025-03-21 ENCOUNTER — OFFICE VISIT (OUTPATIENT)
Dept: PULMONOLOGY | Facility: CLINIC | Age: 65
End: 2025-03-21
Payer: MEDICARE

## 2025-03-21 VITALS
OXYGEN SATURATION: 98 % | BODY MASS INDEX: 26.84 KG/M2 | TEMPERATURE: 97.8 F | HEART RATE: 105 BPM | HEIGHT: 66 IN | WEIGHT: 167 LBS | DIASTOLIC BLOOD PRESSURE: 88 MMHG | SYSTOLIC BLOOD PRESSURE: 118 MMHG

## 2025-03-21 DIAGNOSIS — J44.9 COPD MIXED TYPE: ICD-10-CM

## 2025-03-21 PROCEDURE — 1159F MED LIST DOCD IN RCRD: CPT | Performed by: NURSE PRACTITIONER

## 2025-03-21 PROCEDURE — 99214 OFFICE O/P EST MOD 30 MIN: CPT | Performed by: NURSE PRACTITIONER

## 2025-03-21 PROCEDURE — 1160F RVW MEDS BY RX/DR IN RCRD: CPT | Performed by: NURSE PRACTITIONER

## 2025-03-21 RX ORDER — ALBUTEROL SULFATE 90 UG/1
2 INHALANT RESPIRATORY (INHALATION) EVERY 6 HOURS PRN
Qty: 18 G | Refills: 11 | Status: SHIPPED | OUTPATIENT
Start: 2025-03-21

## 2025-03-21 RX ORDER — FLUTICASONE PROPIONATE AND SALMETEROL 250; 50 UG/1; UG/1
1 POWDER RESPIRATORY (INHALATION)
Qty: 1 EACH | Refills: 5 | Status: SHIPPED | OUTPATIENT
Start: 2025-03-21

## 2025-03-26 DIAGNOSIS — J44.9 COPD MIXED TYPE: Primary | ICD-10-CM

## 2025-03-28 ENCOUNTER — OFFICE VISIT (OUTPATIENT)
Dept: CARDIOLOGY | Facility: CLINIC | Age: 65
End: 2025-03-28
Payer: MEDICARE

## 2025-03-28 VITALS
OXYGEN SATURATION: 97 % | SYSTOLIC BLOOD PRESSURE: 110 MMHG | HEART RATE: 105 BPM | DIASTOLIC BLOOD PRESSURE: 64 MMHG | HEIGHT: 66 IN | BODY MASS INDEX: 26.52 KG/M2 | WEIGHT: 165 LBS

## 2025-03-28 DIAGNOSIS — E78.5 HYPERLIPIDEMIA LDL GOAL <70: Primary | ICD-10-CM

## 2025-03-28 DIAGNOSIS — I25.810 CORONARY ARTERY DISEASE INVOLVING CORONARY BYPASS GRAFT OF NATIVE HEART WITHOUT ANGINA PECTORIS: ICD-10-CM

## 2025-03-28 DIAGNOSIS — J44.9 COPD MIXED TYPE: ICD-10-CM

## 2025-03-28 PROCEDURE — 99214 OFFICE O/P EST MOD 30 MIN: CPT | Performed by: INTERNAL MEDICINE

## 2025-03-28 NOTE — PROGRESS NOTES
[facility] Cardiology  Follow Up Visit  Kathy Taylor  1960    VISIT DATE:  03/28/25    PCP:   Celi Saleh DO  [id number]  [facility] [id number]          CC:  PVD (peripheral vascular disease) with claudication      Problem List:  CAD  Echocardiogram January 2022: LVEF 55%, no significant valvular abnormalities, no pericardial effusion  Stress test February 2022: Normal myocardial perfusion study with no evidence of ischemia, EF 57%  Calcium score July 2024: 813  Cardiac catheterization July 2024: 40% LAD stenosis, 40% circumflex stenosis, RPDA 70% stenosis with SWAPNIL-3 flow.  Medical management recommended  Echo July 2024: EF 61 to 65% with normal valves  Hypotension, intolerant to HCTZ with symptomatic hypotension  Hyperlipidemia  Type 2 diabetes mellitus, onset 2012; hemoglobin A1c 9.7% March 2023  Stage III severe COPD with current tobacco use 1 pack/day, 50-pack-year history, PFTs show moderate to severe airway obstruction, no significant change in FEV1 after bronchodilator, no restriction but air trapping and reduced diffusion capacity March 2024  History of SABRINA with upcoming sleep study  Fibromyalgia  Migraines  [facility] admission January 2021 for COVID  Family history of CAD  History of rheumatic fever as a child  Surgical history:  Breast lumpectomy  Tubal ligation  Vulvar surgery  [place] teeth extraction    History of Present Illness:  Kathy Taylor  Is a 64 y.o. female with pertinent cardiac history detailed above.  Patient returns for follow-up.  She has been having some symptoms of lower blood pressures.  She had a charted BP at Dr. Saleh's office of 90s over 50s.  Diltiazem was stopped.  She does take Imdur because of CAD noted on a heart catheterization from 2024 with medical management recommended.  She has not been having significant chest pains.  EKG is notable for right bundle branch block and prolonged QTc.  This does limit use of Ranexa.  Patient does have COPD and  uses oxygen at home.  Has mild resting tachycardia.  She also does have hyperglycemia which could be causing some dehydration.  She tends to feel better if she uses a liquid IV at home.      Patient Active Problem List    Diagnosis Date Noted    Pneumonia 02/19/2025    Non-adherence to medical treatment 07/22/2024    Coronary artery disease involving native coronary artery of native heart with angina pectoris 07/15/2024     Note Last Updated: 7/15/2024     Apparently normal echo and stress test per patient report 2020  Echo (1/2022): LVEF 55%, no significant valvular abnormalities, no pericardial effusion  Stress test (2/2022): Normal myocardial perfusion study with no evidence of ischemia, EF 57%  , 2024  Cardiac catheterization (7/15/2024): Moderate 1-vessel CAD (RPDA).  Normal LV filling pressure (LVEDP 8 mmHg).  Medical therapy recommended      History of rheumatic fever as a child 02/05/2024    Hyperlipidemia LDL goal <70 02/05/2024    Stage III (Severe) COPD 02/07/2023    Tobacco abuse 02/07/2023    Type 2 diabetes mellitus with hyperglycemia, with long-term current use of insulin 01/07/2022    Obstructive sleep apnea 02/24/2021    Nocturnal hypoxemia 02/24/2021    Polypharmacy 02/24/2021       Allergies   Allergen Reactions    Bee Venom Anaphylaxis    Ciprofloxacin Shortness Of Breath       Social History     Socioeconomic History    Marital status:    Tobacco Use    Smoking status: Every Day     Current packs/day: 1.00     Average packs/day: 1 pack/day for 50.0 years (50.0 ttl pk-yrs)     Types: Cigarettes     Passive exposure: Past    Smokeless tobacco: Never   Vaping Use    Vaping status: Every Day    Substances: Nicotine, Flavoring    Devices: Disposable   Substance and Sexual Activity    Alcohol use: Not Currently     Comment: Only 2 or 3 times a year.    Drug use: Never    Sexual activity: Not Currently     Partners: Male     Birth control/protection: Tubal ligation       Family History    Problem Relation Age of Onset    Arthritis Mother     Diabetes Mother     Heart disease Mother     Thyroid disease Mother     Uterine cancer Mother     Cancer Mother     Heart attack Mother     Hyperlipidemia Mother     Heart disease Brother     Diabetes Brother     Breast cancer Other         MATERNAL GREAT AUNT    Cancer Maternal Grandmother     Heart attack Maternal Grandmother     Heart disease Maternal Grandmother     Heart attack Brother     Heart disease Maternal Grandfather     Anxiety disorder Daughter     Ovarian cancer Neg Hx        Current Medications:    Current Outpatient Medications:     Accu-Chek Softclix Lancets lancets, USE 1  TO CHECK GLUCOSE TWICE DAILY TO THREE TIMES DAILY dx e11.65 on insulin, Disp: 100 each, Rfl: 11    albuterol sulfate  (90 Base) MCG/ACT inhaler, Inhale 2 puffs Every 6 (Six) Hours As Needed for Wheezing., Disp: 18 g, Rfl: 11    aspirin (ASPIR) 81 MG EC tablet, Take 1 tablet by mouth Daily., Disp: 90 tablet, Rfl: 3    Fluticasone-Salmeterol (ADVAIR/WIXELA) 250-50 MCG/ACT DISKUS, Inhale 1 puff 2 (Two) Times a Day., Disp: 1 each, Rfl: 5    glucose blood test strip, Use as instructed 2-3 times a day, Disp: 100 each, Rfl: 3    HYDROcodone-acetaminophen (NORCO) 7.5-325 MG per tablet, Take 1 tablet by mouth Every 6 (Six) Hours As Needed for Severe Pain., Disp: , Rfl:     Insulin Pen Needle (BD Pen Needle Nessa U/F) 32G X 4 MM misc, Use as directed 5 times daily, Disp: 150 each, Rfl: 3    ipratropium-albuterol (DUO-NEB) 0.5-2.5 mg/3 ml nebulizer, USE 3 ML VIA NEBULIZER FOUR TIMES DAILY AS NEEDED FOR WHEEZING, Disp: 180 mL, Rfl: 3    Lantus SoloStar 100 UNIT/ML injection pen, Inject 10 Units under the skin into the appropriate area as directed Every Night. Titrate 2 units every 3 days if blood sugar is more than 140 fasting; Max daily dose: 30 units, Disp: 27 mL, Rfl: 1    meloxicam (Mobic) 15 MG tablet, Take 1 tablet every day by oral route with meals for 30 days., Disp: ,  "Rfl:     metFORMIN ER (GLUCOPHAGE-XR) 500 MG 24 hr tablet, Take 1 tablet by mouth Daily With Breakfast., Disp: 90 tablet, Rfl: 1    nitroglycerin (NITROSTAT) 0.3 MG SL tablet, 1 under the tongue as needed for angina, may repeat q5mins for up three doses, Disp: 25 tablet, Rfl: 11    O2 (OXYGEN), Inhale 2 L/min 1 (One) Time., Disp: , Rfl:     rosuvastatin (CRESTOR) 20 MG tablet, Take 1 tablet by mouth Daily., Disp: , Rfl:     Semaglutide,0.25 or 0.5MG/DOS, (Ozempic, 0.25 or 0.5 MG/DOSE,) 2 MG/3ML solution pen-injector, Inject 0.5 mg under the skin into the appropriate area as directed 1 (One) Time Per Week. Contact office after 4 weeks for additional adjustment if tolerating well., Disp: 3 mL, Rfl: 3    tiZANidine (ZANAFLEX) 4 MG tablet, Take 1 tablet by mouth 3 (Three) Times a Day As Needed., Disp: , Rfl:      Review of Systems   Cardiovascular:  Negative for chest pain, leg swelling and syncope.   Neurological:  Positive for dizziness, light-headedness and paresthesias.       Vitals:    03/28/25 1442   BP: 110/64   BP Location: Left arm   Patient Position: Sitting   Pulse: 105   SpO2: 97%   Weight: 74.8 kg (165 lb)   Height: 167.6 cm (66\")       Physical Exam  Constitutional:       Appearance: Normal appearance.   Cardiovascular:      Rate and Rhythm: Regular rhythm. Tachycardia present.      Heart sounds: Normal heart sounds.   Pulmonary:      Effort: Pulmonary effort is normal.      Breath sounds: Normal breath sounds.   Neurological:      Mental Status: She is alert.         Diagnostic Data:  Procedures  Lab Results   Component Value Date    CHLPL 116 03/02/2023    TRIG 159 (H) 07/15/2024    HDL 47 07/15/2024     Lab Results   Component Value Date    GLUCOSE 320 (H) 02/20/2025    BUN 21 02/20/2025    CREATININE 0.87 02/20/2025     (L) 02/20/2025    K 4.2 02/20/2025     02/20/2025    CO2 23.0 02/20/2025     Lab Results   Component Value Date    HGBA1C 8.7 (A) 01/31/2025     Lab Results   Component " Value Date    WBC 16.62 (H) 02/22/2025    HGB 11.8 (L) 02/22/2025    HCT 35.5 02/22/2025     02/22/2025       Assessment:  No diagnosis found.    Plan:    CAD  Continue aspirin, and statin  Discontinue diltiazem and Imdur due to persistent orthostasis  Cardiac catheterization July 2024 with moderate one-vessel CAD in the RPDA.  Normal EF  CP controlled  2.  Severe COPD  1ppd smoker counseled  on cessation  Nicotine inhaler endocrinology  3.  Diabetes/HLD              -Total cholesterol 97, HDL 47, LDL 24, triglycerides 159              -Continue rosuvastatin 20 mg              - insulin, metformin, Ozempic              -A1c 8.7              -follows with endocrine          4.   Hypotension.  Encourage patient to stay well-hydrated.  Provide back on medications that can exacerbate this.  DC Imdur today.  5.  Possible claudication            ABIs are normal March 2025   Symptoms appear related to patient's diabetic neuropathy.      Encouraged to stay well-hydrated.  DC Imdur.  Monitor anginal symptoms.  Follow-up 6 months.      ACP discussion was held with the patient during this visit. Patient does not have an advance directive, declines further assistance.      Jono Estrella MD [facility]

## 2025-04-04 ENCOUNTER — TELEPHONE (OUTPATIENT)
Dept: ENDOCRINOLOGY | Facility: CLINIC | Age: 65
End: 2025-04-04

## 2025-04-04 NOTE — TELEPHONE ENCOUNTER
Rx Refill Note  Requested Prescriptions      No prescriptions requested or ordered in this encounter          Last office visit with prescribing clinician: Visit date not found     Next office visit with prescribing clinician: Visit date not found         Sheela Delcid, MA  04/04/25, 12:27 EDTRx Refill Note  Requested Prescriptions      No prescriptions requested or ordered in this encounter          Last office visit with prescribing clinician: Visit date not found     Next office visit with prescribing clinician: Visit date not found         Sheela Delcid MA  04/04/25, 12:28 EDT Rx Refill Note  Requested Prescriptions      No prescriptions requested or ordered in this encounter          Last office visit with prescribing clinician: Visit date not found     Next office visit with prescribing clinician: Visit date not found         Sheela Delcid MA  04/04/25, 12:30 EDT Rx Refill Note  Requested Prescriptions      No prescriptions requested or ordered in this encounter          Last office visit with prescribing clinician: Visit date not found     Next office visit with prescribing clinician: Visit date not found         Sheela Delcid MA  04/04/25, 12:30 EDT Rx Refill Note  Requested Prescriptions      No prescriptions requested or ordered in this encounter          Last office visit with prescribing clinician: Visit date not found     Next office visit with prescribing clinician: Visit date not found         Sheela Delcid, MA  04/04/25, 12:30 EDT

## 2025-04-10 ENCOUNTER — HOSPITAL ENCOUNTER (EMERGENCY)
Facility: HOSPITAL | Age: 65
Discharge: HOME | End: 2025-04-10
Attending: EMERGENCY MEDICINE
Payer: MEDICARE

## 2025-04-10 ENCOUNTER — APPOINTMENT (OUTPATIENT)
Dept: GENERAL RADIOLOGY | Facility: HOSPITAL | Age: 65
End: 2025-04-10
Payer: MEDICARE

## 2025-04-10 ENCOUNTER — OFFICE VISIT (OUTPATIENT)
Dept: FAMILY MEDICINE CLINIC | Facility: CLINIC | Age: 65
End: 2025-04-10
Payer: MEDICARE

## 2025-04-10 VITALS
HEART RATE: 107 BPM | BODY MASS INDEX: 25.75 KG/M2 | SYSTOLIC BLOOD PRESSURE: 88 MMHG | DIASTOLIC BLOOD PRESSURE: 60 MMHG | WEIGHT: 160.2 LBS | OXYGEN SATURATION: 97 % | HEIGHT: 66 IN

## 2025-04-10 VITALS
DIASTOLIC BLOOD PRESSURE: 70 MMHG | OXYGEN SATURATION: 96 % | HEIGHT: 65 IN | BODY MASS INDEX: 26.7 KG/M2 | TEMPERATURE: 97.9 F | HEART RATE: 89 BPM | SYSTOLIC BLOOD PRESSURE: 130 MMHG | WEIGHT: 160.27 LBS | RESPIRATION RATE: 18 BRPM

## 2025-04-10 DIAGNOSIS — I95.9 HYPOTENSION, UNSPECIFIED HYPOTENSION TYPE: Primary | ICD-10-CM

## 2025-04-10 DIAGNOSIS — Z86.39 HISTORY OF DIABETES MELLITUS: ICD-10-CM

## 2025-04-10 DIAGNOSIS — Z86.79 HISTORY OF CORONARY ARTERY DISEASE: ICD-10-CM

## 2025-04-10 DIAGNOSIS — R53.1 GENERALIZED WEAKNESS: ICD-10-CM

## 2025-04-10 LAB
ALBUMIN SERPL-MCNC: 4.2 G/DL (ref 3.5–5.2)
ALBUMIN/GLOB SERPL: 1.8 G/DL
ALP SERPL-CCNC: 60 U/L (ref 39–117)
ALT SERPL W P-5'-P-CCNC: 19 U/L (ref 1–33)
ANION GAP SERPL CALCULATED.3IONS-SCNC: 11 MMOL/L (ref 5–15)
AST SERPL-CCNC: 24 U/L (ref 1–32)
BASOPHILS # BLD AUTO: 0.05 10*3/MM3 (ref 0–0.2)
BASOPHILS NFR BLD AUTO: 0.6 % (ref 0–1.5)
BILIRUB SERPL-MCNC: 0.4 MG/DL (ref 0–1.2)
BUN SERPL-MCNC: 22 MG/DL (ref 8–23)
BUN/CREAT SERPL: 22.2 (ref 7–25)
CALCIUM SPEC-SCNC: 9.6 MG/DL (ref 8.6–10.5)
CHLORIDE SERPL-SCNC: 104 MMOL/L (ref 98–107)
CO2 SERPL-SCNC: 26 MMOL/L (ref 22–29)
CREAT SERPL-MCNC: 0.99 MG/DL (ref 0.57–1)
DEPRECATED RDW RBC AUTO: 41.8 FL (ref 37–54)
EGFRCR SERPLBLD CKD-EPI 2021: 64.2 ML/MIN/1.73
EOSINOPHIL # BLD AUTO: 0.16 10*3/MM3 (ref 0–0.4)
EOSINOPHIL NFR BLD AUTO: 2 % (ref 0.3–6.2)
ERYTHROCYTE [DISTWIDTH] IN BLOOD BY AUTOMATED COUNT: 13.1 % (ref 12.3–15.4)
GLOBULIN UR ELPH-MCNC: 2.4 GM/DL
GLUCOSE SERPL-MCNC: 174 MG/DL (ref 65–99)
HCT VFR BLD AUTO: 37.1 % (ref 34–46.6)
HGB BLD-MCNC: 12.6 G/DL (ref 12–15.9)
HOLD SPECIMEN: NORMAL
IMM GRANULOCYTES # BLD AUTO: 0.03 10*3/MM3 (ref 0–0.05)
IMM GRANULOCYTES NFR BLD AUTO: 0.4 % (ref 0–0.5)
LYMPHOCYTES # BLD AUTO: 1.69 10*3/MM3 (ref 0.7–3.1)
LYMPHOCYTES NFR BLD AUTO: 20.8 % (ref 19.6–45.3)
MAGNESIUM SERPL-MCNC: 1.9 MG/DL (ref 1.6–2.4)
MCH RBC QN AUTO: 29.8 PG (ref 26.6–33)
MCHC RBC AUTO-ENTMCNC: 34 G/DL (ref 31.5–35.7)
MCV RBC AUTO: 87.7 FL (ref 79–97)
MONOCYTES # BLD AUTO: 0.3 10*3/MM3 (ref 0.1–0.9)
MONOCYTES NFR BLD AUTO: 3.7 % (ref 5–12)
NEUTROPHILS NFR BLD AUTO: 5.9 10*3/MM3 (ref 1.7–7)
NEUTROPHILS NFR BLD AUTO: 72.5 % (ref 42.7–76)
NRBC BLD AUTO-RTO: 0 /100 WBC (ref 0–0.2)
PLATELET # BLD AUTO: 177 10*3/MM3 (ref 140–450)
PMV BLD AUTO: 10.3 FL (ref 6–12)
POTASSIUM SERPL-SCNC: 4.6 MMOL/L (ref 3.5–5.2)
PROT SERPL-MCNC: 6.6 G/DL (ref 6–8.5)
RBC # BLD AUTO: 4.23 10*6/MM3 (ref 3.77–5.28)
SODIUM SERPL-SCNC: 141 MMOL/L (ref 136–145)
TROPONIN T SERPL HS-MCNC: 31 NG/L
TSH SERPL DL<=0.05 MIU/L-ACNC: 0.83 UIU/ML (ref 0.27–4.2)
WBC NRBC COR # BLD AUTO: 8.13 10*3/MM3 (ref 3.4–10.8)
WHOLE BLOOD HOLD COAG: NORMAL
WHOLE BLOOD HOLD SPECIMEN: NORMAL

## 2025-04-10 PROCEDURE — 84443 ASSAY THYROID STIM HORMONE: CPT | Performed by: EMERGENCY MEDICINE

## 2025-04-10 PROCEDURE — 84484 ASSAY OF TROPONIN QUANT: CPT | Performed by: EMERGENCY MEDICINE

## 2025-04-10 PROCEDURE — 99284 EMERGENCY DEPT VISIT MOD MDM: CPT

## 2025-04-10 PROCEDURE — 80053 COMPREHEN METABOLIC PANEL: CPT | Performed by: EMERGENCY MEDICINE

## 2025-04-10 PROCEDURE — 83735 ASSAY OF MAGNESIUM: CPT | Performed by: EMERGENCY MEDICINE

## 2025-04-10 PROCEDURE — 71045 X-RAY EXAM CHEST 1 VIEW: CPT

## 2025-04-10 PROCEDURE — 25810000003 SODIUM CHLORIDE 0.9 % SOLUTION: Performed by: EMERGENCY MEDICINE

## 2025-04-10 PROCEDURE — 93005 ELECTROCARDIOGRAM TRACING: CPT | Performed by: EMERGENCY MEDICINE

## 2025-04-10 PROCEDURE — 85025 COMPLETE CBC W/AUTO DIFF WBC: CPT | Performed by: EMERGENCY MEDICINE

## 2025-04-10 RX ORDER — SODIUM CHLORIDE 0.9 % (FLUSH) 0.9 %
10 SYRINGE (ML) INJECTION AS NEEDED
Status: DISCONTINUED | OUTPATIENT
Start: 2025-04-10 | End: 2025-04-10 | Stop reason: HOSPADM

## 2025-04-10 RX ADMIN — SODIUM CHLORIDE 1000 ML: 9 INJECTION, SOLUTION INTRAVENOUS at 15:40

## 2025-04-10 NOTE — ASSESSMENT & PLAN NOTE
Manual blood pressure check today in office 70/40.  Due to patient's symptomatic nature and persistent symptoms, recommend prompt evaluation emergency department for potential IV fluids and further diagnostic evaluation.  Patient voiced understanding.  Patient revived via [employer], therefore EMS was contacted.  ER physician called and report given.

## 2025-04-10 NOTE — ED PROVIDER NOTES
[place]    EMERGENCY DEPARTMENT ENCOUNTER      Pt Name: Kathy Taylor  MRN: [id number]  YOB: 1960  Date of evaluation: 4/10/2025  Provider: Shayne Simental MD    CHIEF COMPLAINT       Chief Complaint   Patient presents with    Weakness - Generalized    Hypotension         HISTORY OF PRESENT ILLNESS   Kathy Taylor is a 65 y.o. female who presents to the emergency department with reported hypotension.  Patient was at routine PCP checkup.  Had previously been on medication for hypertension but was taken off of these due to lower blood pressures.  She reports some generalized weakness but otherwise feels well and has no specific complaints.  Specifically, she has no cough, fever, chills, chest pain, shortness of breath, abdominal pain, vomiting, diarrhea, or urinary symptoms.      Nursing notes were reviewed.    REVIEW OF SYSTEMS     ROS:  A chief complaint appropriate review of systems was completed and is negative except as noted in the HPI.      PAST MEDICAL HISTORY     Past Medical History:   Diagnosis Date    Allergic     Ankle sprain     Anxiety     I get anxious    Arthritis     Arthritis of back     Arthritis of neck     Asthma ?    May have been earlier    Back ache     Bronchiectasis ?    Bronchitis     Bursitis of hip     Cervical disc disorder     Chronic bronchitis ?    COPD (chronic obstructive pulmonary disease)     Coronary artery disease involving native coronary artery of native heart with angina pectoris 07/15/2024    Depression     Sometimes. Have MABLE    Diverticulosis     Emphysema of lung     Dont remember when told.    Fibromyalgia     Fibromyalgia, primary 1995    Hip arthrosis     Hyperlipidemia LDL goal <100 02/05/2024    Injury of back     Knee sprain     Low back pain 1987    Neck and back    Migraines     Myocardial infarction     Was told I've  had a couple small ones    Neck strain     Pneumonia     Rheumatic fever     Rotator cuff syndrome     Shingles     Sleep apnea,  obstructive ?    Tooth infection     Type 2 diabetes mellitus     Visual impairment     Yes, due to age and and diabetes    Vulvar carcinoma     Wrist sprain          SURGICAL HISTORY       Past Surgical History:   Procedure Laterality Date    BREAST BIOPSY Left     BREAST LUMPECTOMY Left 2004    CARDIAC CATHETERIZATION N/A 07/15/2024    Procedure: Left Heart Cath;  Surgeon: Lonnie Salomon IV, MD;  Location: [facility] CATH INVASIVE LOCATION;  Service: Cardiology;  Laterality: N/A;    CARDIAC CATHETERIZATION  07/15/2024    COLONOSCOPY  2021? 22?    TONSILLECTOMY      TUBAL ABDOMINAL LIGATION      VULVA BIOPSY      VULVA SURGERY      WISDOM TOOTH EXTRACTION           CURRENT MEDICATIONS     No current facility-administered medications for this encounter.    Current Outpatient Medications:     Accu-Chek Softclix Lancets lancets, USE 1  TO CHECK GLUCOSE TWICE DAILY TO THREE TIMES DAILY dx e11.65 on insulin, Disp: 100 each, Rfl: 11    albuterol sulfate  (90 Base) MCG/ACT inhaler, Inhale 2 puffs Every 6 (Six) Hours As Needed for Wheezing., Disp: 18 g, Rfl: 11    aspirin (ASPIR) 81 MG EC tablet, Take 1 tablet by mouth Daily., Disp: 90 tablet, Rfl: 3    Fluticasone-Salmeterol (ADVAIR/WIXELA) 250-50 MCG/ACT DISKUS, Inhale 1 puff 2 (Two) Times a Day., Disp: 1 each, Rfl: 5    glucose blood test strip, Use as instructed 2-3 times a day, Disp: 100 each, Rfl: 3    HYDROcodone-acetaminophen (NORCO) 7.5-325 MG per tablet, Take 1 tablet by mouth Every 6 (Six) Hours As Needed for Severe Pain., Disp: , Rfl:     Insulin Pen Needle (BD Pen Needle Nessa U/F) 32G X 4 MM misc, Use as directed 5 times daily, Disp: 150 each, Rfl: 3    ipratropium-albuterol (DUO-NEB) 0.5-2.5 mg/3 ml nebulizer, USE 3 ML VIA NEBULIZER FOUR TIMES DAILY AS NEEDED FOR WHEEZING, Disp: 180 mL, Rfl: 3    Lantus SoloStar 100 UNIT/ML injection pen, Inject 10 Units under the skin into the appropriate area as directed Every Night. Titrate 2 units every 3  days if blood sugar is more than 140 fasting; Max daily dose: 30 units, Disp: 27 mL, Rfl: 1    meloxicam (Mobic) 15 MG tablet, Take 1 tablet every day by oral route with meals for 30 days., Disp: , Rfl:     metFORMIN ER (GLUCOPHAGE-XR) 500 MG 24 hr tablet, Take 1 tablet by mouth Daily With Breakfast., Disp: 90 tablet, Rfl: 1    nitroglycerin (NITROSTAT) 0.3 MG SL tablet, 1 under the tongue as needed for angina, may repeat q5mins for up three doses, Disp: 25 tablet, Rfl: 11    O2 (OXYGEN), Inhale 2 L/min 1 (One) Time., Disp: , Rfl:     rosuvastatin (CRESTOR) 20 MG tablet, Take 1 tablet by mouth Daily., Disp: , Rfl:     Semaglutide,0.25 or 0.5MG/DOS, (Ozempic, 0.25 or 0.5 MG/DOSE,) 2 MG/3ML solution pen-injector, Inject 0.5 mg under the skin into the appropriate area as directed 1 (One) Time Per Week. Contact office after 4 weeks for additional adjustment if tolerating well., Disp: 3 mL, Rfl: 3    tiZANidine (ZANAFLEX) 4 MG tablet, Take 1 tablet by mouth 3 (Three) Times a Day As Needed., Disp: , Rfl:     ALLERGIES     Bee venom and Ciprofloxacin    FAMILY HISTORY       Family History   Problem Relation Age of Onset    Arthritis Mother     Diabetes Mother     Heart disease Mother     Thyroid disease Mother     Uterine cancer Mother     Cancer Mother     Heart attack Mother     Hyperlipidemia Mother     Heart disease Brother     Diabetes Brother     Breast cancer Other         MATERNAL GREAT AUNT    Cancer Maternal Grandmother     Heart attack Maternal Grandmother     Heart disease Maternal Grandmother     Heart attack Brother     Heart disease Maternal Grandfather     Anxiety disorder Daughter     Ovarian cancer Neg Hx           SOCIAL HISTORY       Social History     Socioeconomic History    Marital status:    Tobacco Use    Smoking status: Some Days     Current packs/day: 0.00     Average packs/day: 1 pack/day for 50.0 years (50.0 ttl pk-yrs)     Types: Cigarettes     Last attempt to quit: 3/10/2025     Years  since quittin.0     Passive exposure: Past    Smokeless tobacco: Never    Tobacco comments:     Have a cigarette here or there. Vaping   Vaping Use    Vaping status: Every Day    Substances: Nicotine, Flavoring    Devices: Disposable   Substance and Sexual Activity    Alcohol use: Not Currently     Comment: Only 2 or 3 times a year.    Drug use: Never    Sexual activity: Not Currently     Partners: Male     Birth control/protection: Tubal ligation         PHYSICAL EXAM    (up to 7 for level 4, 8 or more for level 5)     Vitals:    04/10/25 1715 04/10/25 1730 04/10/25 1745 04/10/25 1800   BP: 135/79 106/69 119/80 130/70   BP Location:       Patient Position:       Pulse: 87 101 90 89   Resp:       Temp:       TempSrc:       SpO2: 93% 97% 99% 96%   Weight:       Height:           General: Awake, alert, no acute distress.  HEENT: Conjunctivae normal.  Neck: Trachea midline.  Cardiac: Heart regular rate, rhythm, no murmurs, rubs, or gallops  Lungs: Lungs are clear to auscultation, there is no wheezing, rhonchi, or rales. There is no use of accessory muscles.  Chest wall: There is no tenderness to palpation over the chest wall or over ribs  Abdomen: Abdomen is soft, nontender, nondistended. There are no firm or pulsatile masses, no rebound rigidity or guarding.   Musculoskeletal: No deformity.  Neuro: Alert and oriented x 4.  Dermatology: Skin is warm and dry  Psych: Mentation is grossly normal, cognition is grossly normal. Affect is appropriate.        DIAGNOSTIC RESULTS     EKG: All EKGs are interpreted by the Emergency Department Physician who either signs or Co-signs this chart in the absence of a cardiologist.    ECG 12 Lead Other; WEAKNESS   Preliminary Result   Test Reason : Other~   Blood Pressure :   */*   mmHG   Vent. Rate :  96 BPM     Atrial Rate :  96 BPM      P-R Int : 152 ms          QRS Dur : 134 ms       QT Int : 404 ms       P-R-T Axes :   * 151 142 degrees     QTcB Int : 510 ms      Normal sinus  rhythm   Nonspecific intraventricular block   Abnormal ECG   When compared with ECG of 19-Feb-2025 17:01,   Nonspecific intraventricular block has replaced Incomplete right bundle   branch block      Referred By: EDMD           Confirmed By:             RADIOLOGY:   [x] Radiologist's Report Reviewed:  XR Chest 1 View   Final Result   Impression:   No acute cardiopulmonary abnormality.            Electronically Signed: Humberto DO Octavio     4/10/2025 4:27 PM EDT     Workstation ID: [id number]          I ordered and independently reviewed the above noted radiographic studies.        LABS:    I have reviewed and interpreted all of the currently available lab results from this visit (if applicable):  Results for orders placed or performed during the hospital encounter of 04/10/25   Comprehensive Metabolic Panel    Collection Time: 04/10/25  3:45 PM    Specimen: Blood   Result Value Ref Range    Glucose 174 (H) 65 - 99 mg/dL    BUN 22 8 - 23 mg/dL    Creatinine 0.99 0.57 - 1.00 mg/dL    Sodium 141 136 - 145 mmol/L    Potassium 4.6 3.5 - 5.2 mmol/L    Chloride 104 98 - 107 mmol/L    CO2 26.0 22.0 - 29.0 mmol/L    Calcium 9.6 8.6 - 10.5 mg/dL    Total Protein 6.6 6.0 - 8.5 g/dL    Albumin 4.2 3.5 - 5.2 g/dL    ALT (SGPT) 19 1 - 33 U/L    AST (SGOT) 24 1 - 32 U/L    Alkaline Phosphatase 60 39 - 117 U/L    Total Bilirubin 0.4 0.0 - 1.2 mg/dL    Globulin 2.4 gm/dL    A/G Ratio 1.8 g/dL    BUN/Creatinine Ratio 22.2 7.0 - 25.0    Anion Gap 11.0 5.0 - 15.0 mmol/L    eGFR 64.2 >60.0 mL/min/1.73   High Sensitivity Troponin T    Collection Time: 04/10/25  3:45 PM    Specimen: Blood   Result Value Ref Range    HS Troponin T 31 (H) <14 ng/L   Magnesium    Collection Time: 04/10/25  3:45 PM    Specimen: Blood   Result Value Ref Range    Magnesium 1.9 1.6 - 2.4 mg/dL   CBC Auto Differential    Collection Time: 04/10/25  3:45 PM    Specimen: Blood   Result Value Ref Range    WBC 8.13 3.40 - 10.80 10*3/mm3    RBC 4.23 3.77 - 5.28  10*6/mm3    Hemoglobin 12.6 12.0 - 15.9 g/dL    Hematocrit 37.1 34.0 - 46.6 %    MCV 87.7 79.0 - 97.0 fL    MCH 29.8 26.6 - 33.0 pg    MCHC 34.0 31.5 - 35.7 g/dL    RDW 13.1 12.3 - 15.4 %    RDW-SD 41.8 37.0 - 54.0 fl    MPV 10.3 6.0 - 12.0 fL    Platelets 177 140 - 450 10*3/mm3    Neutrophil % 72.5 42.7 - 76.0 %    Lymphocyte % 20.8 19.6 - 45.3 %    Monocyte % 3.7 (L) 5.0 - 12.0 %    Eosinophil % 2.0 0.3 - 6.2 %    Basophil % 0.6 0.0 - 1.5 %    Immature Grans % 0.4 0.0 - 0.5 %    Neutrophils, Absolute 5.90 1.70 - 7.00 10*3/mm3    Lymphocytes, Absolute 1.69 0.70 - 3.10 10*3/mm3    Monocytes, Absolute 0.30 0.10 - 0.90 10*3/mm3    Eosinophils, Absolute 0.16 0.00 - 0.40 10*3/mm3    Basophils, Absolute 0.05 0.00 - 0.20 10*3/mm3    Immature Grans, Absolute 0.03 0.00 - 0.05 10*3/mm3    nRBC 0.0 0.0 - 0.2 /100 WBC   TSH Rfx On Abnormal To Free T4    Collection Time: 04/10/25  3:45 PM    Specimen: Blood   Result Value Ref Range    TSH 0.834 0.270 - 4.200 uIU/mL   Green Top (Gel)    Collection Time: 04/10/25  3:45 PM   Result Value Ref Range    Extra Tube Hold for add-ons.    Lavender Top    Collection Time: 04/10/25  3:45 PM   Result Value Ref Range    Extra Tube hold for add-on    Gold Top - SST    Collection Time: 04/10/25  3:45 PM   Result Value Ref Range    Extra Tube Hold for add-ons.    Gray Top    Collection Time: 04/10/25  3:45 PM   Result Value Ref Range    Extra Tube Hold for add-ons.    Light Blue Top    Collection Time: 04/10/25  3:45 PM   Result Value Ref Range    Extra Tube Hold for add-ons.    ECG 12 Lead Other; WEAKNESS    Collection Time: 04/10/25  3:50 PM   Result Value Ref Range    QT Interval 404 ms    QTC Interval 510 ms        If labs were ordered, I independently reviewed the results and considered them in treating the patient.      EMERGENCY DEPARTMENT COURSE and DIFFERENTIAL DIAGNOSIS/MDM:   Vitals:  AS OF 18:25 EDT    BP - 130/70  HR - 89  TEMP - 97.9 °F (36.6 °C) (Oral)  O2 SATS -  96%        Discussion below represents my analysis of pertinent findings related to patient's condition, differential diagnosis, treatment plan and final disposition.      Differential diagnosis:  The differential diagnosis associated with the patient's presentation includes: Dehydration, medication side effect, sepsis, hemorrhage      Independent interpretations (ECG/rhythm strip/X-ray/US/CT scan): I independently interpreted the patient's chest x-ray and cardiac monitor.  There is no pulmonary infiltrate and the patient is in sinus rhythm      Additional sources:  Discussed/obtained information from independent historians:   [] Spouse:   [] Parent:   [] Friend:   [x] EMS: Report was taken from EMS.  Vital signs stable during transport.   [] Other:  External (non-ED) record review:   [] Inpatient record:   [] Office record:   [] Outpatient record:   [] Prior Outpatient labs:   [] Prior Outpatient radiology:   [] Primary Care record:   [] Outside ED record:   [] Other:       Patient's care impacted by:   [x] Diabetes   [x] Hypertension   [] Coronary Artery Disease   [] Cancer   [] Other:     Care significantly affected by Social Determinants of Health (housing and economic circumstances, unemployment)    [] Yes     [x] No   If yes, Patient's care significantly limited by  Social Determinants of Health including:    [] Inadequate housing    [] Low income    [] Alcoholism and drug addiction in family    [] Problems related to primary support group    [] Unemployment    [] Problems related to employment    [] Other Social Determinants of Health:       Consideration of admission/observation vs discharge: Considered observation admission, ever patient well-appearing, asymptomatic, normal blood pressure throughout the duration of her stay in the emergency department.  Stable for outpatient follow-up.      ED Course:    ED Course as of 04/12/25 1825   Thu Apr 10, 2025   1721 On reexamination, patient remains well-appearing  and nontoxic and has no complaints.  She has felt generally fatigued over the last couple of days but says that she currently feels much better after receiving IV fluids.  Her blood pressure has been normal since arrival to the ED with most recent pressure being 132/79.  She has no specific complaint, workup in the ED is unremarkable with no findings concerning for myocardial ischemia, significant anemia, electrolyte derangement, or dehydration.  She has no concerning neurologic symptoms and does not require neurowork-up.  She is stable for discharge home at this point with close outpatient follow-up with her PCP. [NS]      ED Course User Index  [NS] Shayne Simental MD             I had a discussion with the patient/family regarding diagnosis, diagnostic results, treatment plan, and medications.  The patient/family indicated understanding of these instructions.  I spent adequate time at the bedside preceding discharge necessary to personally discuss the aftercare instructions, giving patient education, providing explanations of the results of our evaluations/findings, and my decision making to assure that the patient/family understand the plan of care.  Time was allotted to answer questions at that time and throughout the ED course.  Emphasis was placed on timely follow-up after discharge.  I also discussed the potential for the development of an acute emergent condition requiring further evaluation, admission, or even surgical intervention. I discussed that we found nothing during the visit today indicating the need for further workup, admission, or the presence of an unstable medical condition.  I encouraged the patient to return to the emergency department immediately for ANY concerns, worsening, new complaints, or if symptoms persist and unable to seek follow-up in a timely fashion.  The patient/family expressed understanding and agreement with this plan.  The patient will follow-up with their PCP in 1-2 days  for reevaluation.           PROCEDURES:  Procedures    CRITICAL CARE TIME        FINAL IMPRESSION      1. Hypotension, unspecified hypotension type    2. Generalized weakness    3. History of coronary artery disease    4. History of diabetes mellitus          DISPOSITION/PLAN     ED Disposition       ED Disposition   Discharge    Condition   Stable    Comment   --                 Comment: Please note this report has been produced using speech recognition software.      Shayne Simental MD  Attending Emergency Physician             Shayne Simental MD  04/12/25 [id number]

## 2025-04-10 NOTE — PROGRESS NOTES
Answers submitted by the patient for this visit:  Problem not listed (Submitted on 4/3/2025)  Chief Complaint: Other medical problem  abdominal pain: No  anorexia: No  joint pain: Yes  change in stool: No  chest pain: Yes  chills: No  nasal congestion: No  cough: No  diaphoresis: No  fatigue: Yes  fever: No  headaches: Yes  joint swelling: No  myalgias: Yes  nausea: Yes  neck pain: Yes  numbness: Yes  rash: No  sore throat: No  swollen glands: No  dysuria: No  vertigo: No  visual change: No  vomiting: No  weakness: Yes  Onset: more than 5 years  Chronicity: chronic  Frequency: daily      Office Note     Name: Kathy Talyor    : 1960     MRN: [id number]     Chief Complaint  Pneumonia (Follow up ), Dizziness (Pt states it started after she got out of the hospital , constant dizziness and pt states she has also been getting nauseas in the day ), and Headache (Pt states she is having pressure on the right side of her head)    Subjective     History of Present Illness:  Kathy Taylor is a 65 y.o. female who presents today for dizziness.    Patient presents today to follow-up on dizziness.  She notes that ever since she had all the hospital last month after her bout with pneumonia she has had constant dizziness and intermittent nausea throughout the day.  She notes that seems worse when changing positions.  She notes that she will sometimes have darkening of her vision.  She also notes a pressure in her right head.  She has followed up with her cardiologist since her last visit here and he did stop her Imdur to see if this would help with her low blood pressure readings.  Patient also notes that she followed up with her pulmonologist and overall had a good visit.  She notes that she is supposed to be on oxygen continuously but did leave this at home today.  On room air she is 96%.    Review of Systems:   Review of Systems   Constitutional:  Positive for fatigue. Negative for chills, diaphoresis and fever.   HENT:   Negative for congestion, sore throat and swollen glands.    Respiratory:  Negative for cough.    Cardiovascular:  Positive for chest pain.   Gastrointestinal:  Positive for nausea. Negative for abdominal pain and vomiting.   Genitourinary:  Negative for dysuria.   Musculoskeletal:  Positive for myalgias and neck pain.   Skin:  Negative for rash.   Neurological:  Positive for dizziness, weakness, light-headedness and numbness.   All other systems reviewed and are negative.      Past Medical History:   Past Medical History:   Diagnosis Date    Allergic     Ankle sprain     Anxiety     I get anxious    Arthritis     Arthritis of back     Arthritis of neck     Asthma ?    May have been earlier    Back ache     Bronchiectasis ?    Bronchitis     Bursitis of hip     Cervical disc disorder     Chronic bronchitis ?    COPD (chronic obstructive pulmonary disease)     Coronary artery disease involving native coronary artery of native heart with angina pectoris 07/15/2024    Depression     Sometimes. Have MABLE    Diverticulosis     Emphysema of lung     Dont remember when told.    Fibromyalgia     Fibromyalgia, primary 1995    Hip arthrosis     Hyperlipidemia LDL goal <100 02/05/2024    Injury of back     Knee sprain     Low back pain 1987    Neck and back    Migraines     Myocardial infarction     Was told I've  had a couple small ones    Neck strain     Pneumonia     Rheumatic fever     Rotator cuff syndrome     Shingles     Sleep apnea, obstructive ?    Tooth infection     Type 2 diabetes mellitus     Visual impairment     Yes, due to age and and diabetes    Vulvar carcinoma     Wrist sprain        Past Surgical History:   Past Surgical History:   Procedure Laterality Date    BREAST BIOPSY Left     BREAST LUMPECTOMY Left 2004    CARDIAC CATHETERIZATION N/A 07/15/2024    Procedure: Left Heart Cath;  Surgeon: Lonnie Salomon IV, MD;  Location: [facility] CATH INVASIVE LOCATION;  Service: Cardiology;  Laterality: N/A;     CARDIAC CATHETERIZATION  07/15/2024    COLONOSCOPY  ? 22?    TONSILLECTOMY      TUBAL ABDOMINAL LIGATION      VULVA BIOPSY      VULVA SURGERY      WISDOM TOOTH EXTRACTION         Family History:   Family History   Problem Relation Age of Onset    Arthritis Mother     Diabetes Mother     Heart disease Mother     Thyroid disease Mother     Uterine cancer Mother     Cancer Mother     Heart attack Mother     Hyperlipidemia Mother     Heart disease Brother     Diabetes Brother     Breast cancer Other         MATERNAL GREAT AUNT    Cancer Maternal Grandmother     Heart attack Maternal Grandmother     Heart disease Maternal Grandmother     Heart attack Brother     Heart disease Maternal Grandfather     Anxiety disorder Daughter     Ovarian cancer Neg Hx        Social History:   Social History     Socioeconomic History    Marital status:    Tobacco Use    Smoking status: Some Days     Current packs/day: 0.00     Average packs/day: 1 pack/day for 50.0 years (50.0 ttl pk-yrs)     Types: Cigarettes     Last attempt to quit: 3/10/2025     Years since quittin.0     Passive exposure: Past    Smokeless tobacco: Never    Tobacco comments:     Have a cigarette here or there. Vaping   Vaping Use    Vaping status: Every Day    Substances: Nicotine, Flavoring    Devices: Disposable   Substance and Sexual Activity    Alcohol use: Not Currently     Comment: Only 2 or 3 times a year.    Drug use: Never    Sexual activity: Not Currently     Partners: Male     Birth control/protection: Tubal ligation       Immunizations:   Immunization History   Administered Date(s) Administered    COVID-19 (MODERNA) 1st,2nd,3rd Dose Monovalent 2021, 2021, 2022    Flu Vaccine Quad PF >36MO 2021    Fluzone  >6mos 10/13/2016    Fluzone (or Fluarix & Flulaval for VFC) >6mos 2021    Fluzone Quad >6mos (Multi-dose) 10/13/2016    Pneumococcal Polysaccharide (PPSV23) 10/13/2016, 2021        Medications:   No  current facility-administered medications for this visit.    Current Outpatient Medications:     Accu-Chek Softclix Lancets lancets, USE 1  TO CHECK GLUCOSE TWICE DAILY TO THREE TIMES DAILY dx e11.65 on insulin, Disp: 100 each, Rfl: 11    albuterol sulfate  (90 Base) MCG/ACT inhaler, Inhale 2 puffs Every 6 (Six) Hours As Needed for Wheezing., Disp: 18 g, Rfl: 11    aspirin (ASPIR) 81 MG EC tablet, Take 1 tablet by mouth Daily., Disp: 90 tablet, Rfl: 3    Fluticasone-Salmeterol (ADVAIR/WIXELA) 250-50 MCG/ACT DISKUS, Inhale 1 puff 2 (Two) Times a Day., Disp: 1 each, Rfl: 5    glucose blood test strip, Use as instructed 2-3 times a day, Disp: 100 each, Rfl: 3    HYDROcodone-acetaminophen (NORCO) 7.5-325 MG per tablet, Take 1 tablet by mouth Every 6 (Six) Hours As Needed for Severe Pain., Disp: , Rfl:     Insulin Pen Needle (BD Pen Needle Nessa U/F) 32G X 4 MM misc, Use as directed 5 times daily, Disp: 150 each, Rfl: 3    ipratropium-albuterol (DUO-NEB) 0.5-2.5 mg/3 ml nebulizer, USE 3 ML VIA NEBULIZER FOUR TIMES DAILY AS NEEDED FOR WHEEZING, Disp: 180 mL, Rfl: 3    Lantus SoloStar 100 UNIT/ML injection pen, Inject 10 Units under the skin into the appropriate area as directed Every Night. Titrate 2 units every 3 days if blood sugar is more than 140 fasting; Max daily dose: 30 units, Disp: 27 mL, Rfl: 1    meloxicam (Mobic) 15 MG tablet, Take 1 tablet every day by oral route with meals for 30 days., Disp: , Rfl:     metFORMIN ER (GLUCOPHAGE-XR) 500 MG 24 hr tablet, Take 1 tablet by mouth Daily With Breakfast., Disp: 90 tablet, Rfl: 1    nitroglycerin (NITROSTAT) 0.3 MG SL tablet, 1 under the tongue as needed for angina, may repeat q5mins for up three doses, Disp: 25 tablet, Rfl: 11    O2 (OXYGEN), Inhale 2 L/min 1 (One) Time., Disp: , Rfl:     rosuvastatin (CRESTOR) 20 MG tablet, Take 1 tablet by mouth Daily., Disp: , Rfl:     Semaglutide,0.25 or 0.5MG/DOS, (Ozempic, 0.25 or 0.5 MG/DOSE,) 2 MG/3ML solution  "pen-injector, Inject 0.5 mg under the skin into the appropriate area as directed 1 (One) Time Per Week. Contact office after 4 weeks for additional adjustment if tolerating well., Disp: 3 mL, Rfl: 3    tiZANidine (ZANAFLEX) 4 MG tablet, Take 1 tablet by mouth 3 (Three) Times a Day As Needed., Disp: , Rfl:     Facility-Administered Medications Ordered in Other Visits:     sodium chloride 0.9 % flush 10 mL, 10 mL, Intravenous, PRN, Shayne Simental MD    Allergies:   Allergies   Allergen Reactions    Bee Venom Anaphylaxis    Ciprofloxacin Shortness Of Breath       Objective     Vital Signs  BP (!) 88/60   Pulse 107   Ht 167.6 cm (65.98\")   Wt 72.7 kg (160 lb 3.2 oz)   SpO2 97%   BMI 25.87 kg/m²   Estimated body mass index is 25.87 kg/m² as calculated from the following:    Height as of this encounter: 167.6 cm (65.98\").    Weight as of this encounter: 72.7 kg (160 lb 3.2 oz).         Physical Exam  Vitals and nursing note reviewed.   Constitutional:       Appearance: Normal appearance. She is normal weight.   HENT:      Head: Normocephalic and atraumatic.      Nose: Nose normal.      Mouth/Throat:      Mouth: Mucous membranes are moist.   Eyes:      Extraocular Movements: Extraocular movements intact.      Pupils: Pupils are equal, round, and reactive to light.   Cardiovascular:      Rate and Rhythm: Normal rate.   Pulmonary:      Effort: Pulmonary effort is normal.   Abdominal:      General: Abdomen is flat.   Musculoskeletal:         General: Normal range of motion.      Cervical back: Normal range of motion.   Skin:     General: Skin is warm.   Neurological:      General: No focal deficit present.      Mental Status: She is alert and oriented to person, place, and time.   Psychiatric:         Mood and Affect: Mood normal.         Behavior: Behavior normal.         Thought Content: Thought content normal.         Judgment: Judgment normal.            Procedures     Results:  Recent Results (from the past 24 " hours)   Comprehensive Metabolic Panel    Collection Time: 04/10/25  3:45 PM    Specimen: Blood   Result Value Ref Range    Glucose 174 (H) 65 - 99 mg/dL    BUN 22 8 - 23 mg/dL    Creatinine 0.99 0.57 - 1.00 mg/dL    Sodium 141 136 - 145 mmol/L    Potassium 4.6 3.5 - 5.2 mmol/L    Chloride 104 98 - 107 mmol/L    CO2 26.0 22.0 - 29.0 mmol/L    Calcium 9.6 8.6 - 10.5 mg/dL    Total Protein 6.6 6.0 - 8.5 g/dL    Albumin 4.2 3.5 - 5.2 g/dL    ALT (SGPT) 19 1 - 33 U/L    AST (SGOT) 24 1 - 32 U/L    Alkaline Phosphatase 60 39 - 117 U/L    Total Bilirubin 0.4 0.0 - 1.2 mg/dL    Globulin 2.4 gm/dL    A/G Ratio 1.8 g/dL    BUN/Creatinine Ratio 22.2 7.0 - 25.0    Anion Gap 11.0 5.0 - 15.0 mmol/L    eGFR 64.2 >60.0 mL/min/1.73   Magnesium    Collection Time: 04/10/25  3:45 PM    Specimen: Blood   Result Value Ref Range    Magnesium 1.9 1.6 - 2.4 mg/dL   Green Top (Gel)    Collection Time: 04/10/25  3:45 PM   Result Value Ref Range    Extra Tube Hold for add-ons.    Lavender Top    Collection Time: 04/10/25  3:45 PM   Result Value Ref Range    Extra Tube hold for add-on    Gold Top - SST    Collection Time: 04/10/25  3:45 PM   Result Value Ref Range    Extra Tube Hold for add-ons.    Gray Top    Collection Time: 04/10/25  3:45 PM   Result Value Ref Range    Extra Tube Hold for add-ons.    Light Blue Top    Collection Time: 04/10/25  3:45 PM   Result Value Ref Range    Extra Tube Hold for add-ons.    CBC Auto Differential    Collection Time: 04/10/25  3:45 PM    Specimen: Blood   Result Value Ref Range    WBC 8.13 3.40 - 10.80 10*3/mm3    RBC 4.23 3.77 - 5.28 10*6/mm3    Hemoglobin 12.6 12.0 - 15.9 g/dL    Hematocrit 37.1 34.0 - 46.6 %    MCV 87.7 79.0 - 97.0 fL    MCH 29.8 26.6 - 33.0 pg    MCHC 34.0 31.5 - 35.7 g/dL    RDW 13.1 12.3 - 15.4 %    RDW-SD 41.8 37.0 - 54.0 fl    MPV 10.3 6.0 - 12.0 fL    Platelets 177 140 - 450 10*3/mm3    Neutrophil % 72.5 42.7 - 76.0 %    Lymphocyte % 20.8 19.6 - 45.3 %    Monocyte % 3.7 (L) 5.0  - 12.0 %    Eosinophil % 2.0 0.3 - 6.2 %    Basophil % 0.6 0.0 - 1.5 %    Immature Grans % 0.4 0.0 - 0.5 %    Neutrophils, Absolute 5.90 1.70 - 7.00 10*3/mm3    Lymphocytes, Absolute 1.69 0.70 - 3.10 10*3/mm3    Monocytes, Absolute 0.30 0.10 - 0.90 10*3/mm3    Eosinophils, Absolute 0.16 0.00 - 0.40 10*3/mm3    Basophils, Absolute 0.05 0.00 - 0.20 10*3/mm3    Immature Grans, Absolute 0.03 0.00 - 0.05 10*3/mm3    nRBC 0.0 0.0 - 0.2 /100 WBC   ECG 12 Lead Other; WEAKNESS    Collection Time: 04/10/25  3:50 PM   Result Value Ref Range    QT Interval 404 ms    QTC Interval 510 ms        Assessment and Plan     Assessment/Plan:  Diagnoses and all orders for this visit:    1. Hypotension, unspecified hypotension type (Primary)  Assessment & Plan:  Manual blood pressure check today in office 70/40.  Due to patient's symptomatic nature and persistent symptoms, recommend prompt evaluation emergency department for potential IV fluids and further diagnostic evaluation.  Patient voiced understanding.  Patient revived via [employer], therefore EMS was contacted.  ER physician called and report given.          Follow Up  Return in about 4 weeks (around 5/8/2025) for Recheck.    Celi Saleh DO   [facility]

## 2025-04-11 LAB
QT INTERVAL: 404 MS
QTC INTERVAL: 510 MS

## 2025-04-17 ENCOUNTER — OFFICE VISIT (OUTPATIENT)
Dept: FAMILY MEDICINE CLINIC | Facility: CLINIC | Age: 65
End: 2025-04-17
Payer: MEDICARE

## 2025-04-17 VITALS
DIASTOLIC BLOOD PRESSURE: 58 MMHG | OXYGEN SATURATION: 99 % | WEIGHT: 164.4 LBS | SYSTOLIC BLOOD PRESSURE: 90 MMHG | HEIGHT: 65 IN | BODY MASS INDEX: 27.39 KG/M2 | HEART RATE: 97 BPM

## 2025-04-17 DIAGNOSIS — Z13.820 OSTEOPOROSIS SCREENING: ICD-10-CM

## 2025-04-17 DIAGNOSIS — Z12.4 PAPANICOLAOU SMEAR: ICD-10-CM

## 2025-04-17 DIAGNOSIS — Z78.0 ASYMPTOMATIC MENOPAUSAL STATE: ICD-10-CM

## 2025-04-17 DIAGNOSIS — Z23 ENCOUNTER FOR IMMUNIZATION: ICD-10-CM

## 2025-04-17 DIAGNOSIS — I95.9 HYPOTENSION, UNSPECIFIED HYPOTENSION TYPE: Primary | ICD-10-CM

## 2025-04-17 DIAGNOSIS — Z12.11 ENCOUNTER FOR SCREENING FOR MALIGNANT NEOPLASM OF COLON: ICD-10-CM

## 2025-04-17 DIAGNOSIS — K21.9 GASTROESOPHAGEAL REFLUX DISEASE WITHOUT ESOPHAGITIS: ICD-10-CM

## 2025-04-17 DIAGNOSIS — N64.89 BREAST ASYMMETRY: ICD-10-CM

## 2025-04-17 DIAGNOSIS — H91.93 BILATERAL HEARING LOSS, UNSPECIFIED HEARING LOSS TYPE: ICD-10-CM

## 2025-04-17 PROBLEM — J18.9 PNEUMONIA: Status: RESOLVED | Noted: 2025-02-19 | Resolved: 2025-04-17

## 2025-04-17 PROBLEM — B02.29 POSTHERPETIC NEURALGIA: Status: ACTIVE | Noted: 2025-04-04

## 2025-04-17 RX ORDER — PANTOPRAZOLE SODIUM 40 MG/1
40 TABLET, DELAYED RELEASE ORAL
Qty: 90 TABLET | Refills: 3 | Status: SHIPPED | OUTPATIENT
Start: 2025-04-17

## 2025-04-17 NOTE — LETTER
[facility]  Vaccine Consent Form    Patient Name:  Kathy Taylor  Patient :  1960     Vaccine(s) Ordered    Pneumococcal Conjugate Vaccine 20-Valent (PCV20)        Screening Checklist  The following questions should be completed prior to vaccination. If you answer “yes” to any question, it does not necessarily mean you should not be vaccinated. It just means we may need to clarify or ask more questions. If a question is unclear, please ask your healthcare provider to explain it.    Yes No   Any fever or moderate to severe illness today (mild illness and/or antibiotic treatment are not contraindications)?     Do you have a history of a serious reaction to any previous vaccinations, such as anaphylaxis, encephalopathy within 7 days, Guillain-Lissie syndrome within 6 weeks, seizure?     Have you received any live vaccine(s) (e.g MMR, SEAN) or any other vaccines in the last month (to ensure duplicate doses aren't given)?     Do you have an anaphylactic allergy to latex (DTaP, DTaP-IPV, Hep A, Hep B, MenB, RV, Td, Tdap), baker’s yeast (Hep B, HPV), polysorbates (RSV, nirsevimab, PCV 20, Rotavirrus, Tdap, Shingrix), or gelatin (SEAN, MMR)?     Do you have an anaphylactic allergy to neomycin (Rabies, SEAN, MMR, IPV, Hep A), polymyxin B (IPV), or streptomycin (IPV)?      Any cancer, leukemia, AIDS, or other immune system disorder? (SEAN, MMR, RV)     Do you have a parent, brother, or sister with an immune system problem (if immune competence of vaccine recipient clinically verified, can proceed)? (MMR, SEAN)     Any recent steroid treatments for >2 weeks, chemotherapy, or radiation treatment? (SEAN, MMR)     Have you received antibody-containing blood transfusions or IVIG in the past 11 months (recommended interval is dependent on product)? (MMR, SEAN)     Have you taken antiviral drugs (acyclovir, famciclovir, valacyclovir for SEAN) in the last 24 or 48 hours, respectively?      Are you pregnant or planning to become  "pregnant within 1 month? (SEAN, MMR, HPV, IPV, MenB, Abrexvy; For Hep B- refer to Engerix-B; For RSV - Abrysvo is indicated for 32-36 weeks of pregnancy from September to January)     For infants, have you ever been told your child has had intussusception or a medical emergency involving obstruction of the intestine (Rotavirus)? If not for an infant, can skip this question.         *Ordering Physicians/APC should be consulted if \"yes\" is checked by the patient or guardian above.  I have received, read, and understand the Vaccine Information Statement (VIS) for each vaccine ordered.  I have considered my or my child's health status as well as the health status of my close contacts.  I have taken the opportunity to discuss my vaccine questions with my or my child's health care provider.   I have requested that the ordered vaccine(s) be given to me or my child.  I understand the benefits and risks of the vaccines.  I understand that I should remain in the clinic for 15 minutes after receiving the vaccine(s).  _________________________________________________________  Signature of Patient or Parent/Legal Guardian ____________________  Date     "

## 2025-04-17 NOTE — PROGRESS NOTES
Office Note     Name: Kathy Taylor    : 1960     MRN: [id number]     Chief Complaint  Hospital Follow Up Visit and Nausea (Pt states she gets nausea every morning and it goes away after a couple of hours )    Subjective     History of Present Illness:  Kathy Taylor is a 65 y.o. female who presents today for ER follow-up.    Patient presents today for ER follow-up.  Last visit she was evaluated in office and found to have hypotension that was symptomatic.  She went directly to the emergency room and received IV fluids.  She notes that since discharge from hospital she has overall felt better, however she does still get quite nauseous in the mornings.  She notes that she also has a burning in her chest at times.    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  She notes that she followed up with a gynecologist for many years and was ultimately cleared.  She is due for a follow-up Pap smear and would like to complete this today.  She notes she is due for a mammogram and would like to schedule this today.  Her last diagnostic mammogram in  did show a left breast asymmetry and recommended a follow-up, which she has not yet had completed.  She plans to schedule a diabetic eye exam.  She is interested in having an updated DEXA scan.  She is due for a follow-up colonoscopy and would like for this to be scheduled.  She is also interested in pneumonia vaccine.  Patient does note that she has bilateral hearing loss.  She has been told in the past in [place] to get hearing aids.  She is interested in establishing with a new audiologist.      Review of Systems:   Review of Systems   HENT:  Positive for hearing loss.    Gastrointestinal:  Positive for GERD.   All other systems reviewed and are negative.      Past Medical History:   Past Medical History:   Diagnosis Date    Allergic     Ankle sprain     Anxiety     I get anxious    Arthritis     Yes, not sure which    Arthritis of back     Arthritis of neck      Asthma ?    May have been earlier    Back ache     Bronchiectasis ?    Bronchitis     Bursitis of hip     Cervical disc disorder     Chronic bronchitis ?    COPD (chronic obstructive pulmonary disease)     Coronary artery disease involving native coronary artery of native heart with angina pectoris 07/15/2024    Depression     Sometimes. Have MABLE    Diverticulosis     Emphysema of lung     Dont remember when told.    Fibromyalgia     Fibromyalgia, primary 1995    Gastroesophageal reflux disease without esophagitis 4/17/2025    Hip arthrosis     Hyperlipidemia LDL goal <100 02/05/2024    Injury of back     Knee sprain     Low back pain 1987    Neck and back    Migraines     Myocardial infarction     Was told I've  had a couple small ones    Neck strain     Pneumonia     Rheumatic fever     Rotator cuff syndrome     Shingles     Sleep apnea, obstructive ?    Tooth infection     Type 2 diabetes mellitus     Visual impairment     Yes, due to age and and diabetes    Vulvar carcinoma     Wrist sprain        Past Surgical History:   Past Surgical History:   Procedure Laterality Date    BREAST BIOPSY Left     BREAST LUMPECTOMY Left 2004    CARDIAC CATHETERIZATION N/A 07/15/2024    Procedure: Left Heart Cath;  Surgeon: Lonnie Salomon IV, MD;  Location: [facility] CATH INVASIVE LOCATION;  Service: Cardiology;  Laterality: N/A;    CARDIAC CATHETERIZATION  07/15/2024    COLONOSCOPY  2021? 22?    TONSILLECTOMY      TUBAL ABDOMINAL LIGATION      VULVA BIOPSY      VULVA SURGERY      WISDOM TOOTH EXTRACTION         Family History:   Family History   Problem Relation Age of Onset    Arthritis Mother     Diabetes Mother     Heart disease Mother     Thyroid disease Mother     Uterine cancer Mother     Cancer Mother     Heart attack Mother     Hyperlipidemia Mother     Heart disease Brother     Diabetes Brother     Breast cancer Other         MATERNAL GREAT AUNT    Cancer Maternal Grandmother     Heart attack Maternal  Grandmother     Heart disease Maternal Grandmother     Heart attack Brother     Heart disease Maternal Grandfather     Anxiety disorder Daughter     Ovarian cancer Neg Hx        Social History:   Social History     Socioeconomic History    Marital status:    Tobacco Use    Smoking status: Some Days     Current packs/day: 0.00     Average packs/day: 1 pack/day for 50.0 years (50.0 ttl pk-yrs)     Types: Cigarettes     Last attempt to quit: 3/10/2025     Years since quittin.1     Passive exposure: Past    Smokeless tobacco: Never    Tobacco comments:     Have a cigarette here or there. Vaping   Vaping Use    Vaping status: Every Day    Substances: Nicotine, Flavoring    Devices: Disposable   Substance and Sexual Activity    Alcohol use: Not Currently     Comment: Only 2 or 3 times a year.    Drug use: Never    Sexual activity: Not Currently     Partners: Male     Birth control/protection: Tubal ligation       Immunizations:   Immunization History   Administered Date(s) Administered    COVID-19 (MODERNA) 1st,2nd,3rd Dose Monovalent 2021, 2021, 2022    Flu Vaccine Quad PF >36MO 2021    Fluzone  >6mos 10/13/2016    Fluzone (or Fluarix & Flulaval for VFC) >6mos 2021    Fluzone Quad >6mos (Multi-dose) 10/13/2016    Pneumococcal Conjugate 20-Valent (PCV20) 2025    Pneumococcal Polysaccharide (PPSV23) 10/13/2016, 2021        Medications:     Current Outpatient Medications:     Accu-Chek Softclix Lancets lancets, USE 1  TO CHECK GLUCOSE TWICE DAILY TO THREE TIMES DAILY dx e11.65 on insulin, Disp: 100 each, Rfl: 11    albuterol sulfate  (90 Base) MCG/ACT inhaler, Inhale 2 puffs Every 6 (Six) Hours As Needed for Wheezing., Disp: 18 g, Rfl: 11    aspirin (ASPIR) 81 MG EC tablet, Take 1 tablet by mouth Daily., Disp: 90 tablet, Rfl: 3    Fluticasone-Salmeterol (ADVAIR/WIXELA) 250-50 MCG/ACT DISKUS, Inhale 1 puff 2 (Two) Times a Day., Disp: 1 each, Rfl: 5    glucose blood  test strip, Use as instructed 2-3 times a day, Disp: 100 each, Rfl: 3    HYDROcodone-acetaminophen (NORCO) 7.5-325 MG per tablet, Take 1 tablet by mouth Every 6 (Six) Hours As Needed for Severe Pain., Disp: , Rfl:     Insulin Pen Needle (BD Pen Needle Nessa U/F) 32G X 4 MM misc, Use as directed 5 times daily, Disp: 150 each, Rfl: 3    ipratropium-albuterol (DUO-NEB) 0.5-2.5 mg/3 ml nebulizer, USE 3 ML VIA NEBULIZER FOUR TIMES DAILY AS NEEDED FOR WHEEZING, Disp: 180 mL, Rfl: 3    Lantus SoloStar 100 UNIT/ML injection pen, Inject 10 Units under the skin into the appropriate area as directed Every Night. Titrate 2 units every 3 days if blood sugar is more than 140 fasting; Max daily dose: 30 units, Disp: 27 mL, Rfl: 1    meloxicam (Mobic) 15 MG tablet, Take 1 tablet every day by oral route with meals for 30 days., Disp: , Rfl:     metFORMIN ER (GLUCOPHAGE-XR) 500 MG 24 hr tablet, Take 1 tablet by mouth Daily With Breakfast., Disp: 90 tablet, Rfl: 1    nitroglycerin (NITROSTAT) 0.3 MG SL tablet, 1 under the tongue as needed for angina, may repeat q5mins for up three doses, Disp: 25 tablet, Rfl: 11    O2 (OXYGEN), Inhale 2 L/min 1 (One) Time., Disp: , Rfl:     rosuvastatin (CRESTOR) 20 MG tablet, Take 1 tablet by mouth Daily., Disp: , Rfl:     Semaglutide,0.25 or 0.5MG/DOS, (Ozempic, 0.25 or 0.5 MG/DOSE,) 2 MG/3ML solution pen-injector, Inject 0.5 mg under the skin into the appropriate area as directed 1 (One) Time Per Week. Contact office after 4 weeks for additional adjustment if tolerating well., Disp: 3 mL, Rfl: 3    tiZANidine (ZANAFLEX) 4 MG tablet, Take 1 tablet by mouth 3 (Three) Times a Day As Needed., Disp: , Rfl:     pantoprazole (PROTONIX) 40 MG EC tablet, Take 1 tablet by mouth Every Morning Before Breakfast., Disp: 90 tablet, Rfl: 3    Allergies:   Allergies   Allergen Reactions    Bee Venom Anaphylaxis    Ciprofloxacin Shortness Of Breath       Objective     Vital Signs  BP 90/58   Pulse 97   Ht 165.1 cm  "(65\")   Wt 74.6 kg (164 lb 6.4 oz)   SpO2 99%   BMI 27.36 kg/m²   Estimated body mass index is 27.36 kg/m² as calculated from the following:    Height as of this encounter: 165.1 cm (65\").    Weight as of this encounter: 74.6 kg (164 lb 6.4 oz).         Physical Exam  Vitals and nursing note reviewed. Exam conducted with a chaperone present.   Constitutional:       Appearance: Normal appearance. She is normal weight.   HENT:      Head: Normocephalic and atraumatic.      Nose: Nose normal.      Mouth/Throat:      Mouth: Mucous membranes are moist.   Eyes:      Extraocular Movements: Extraocular movements intact.      Pupils: Pupils are equal, round, and reactive to light.   Cardiovascular:      Rate and Rhythm: Normal rate.   Pulmonary:      Effort: Pulmonary effort is normal.   Chest:      Chest wall: No mass.   Breasts:     Right: Normal.      Left: Normal.   Abdominal:      General: Abdomen is flat.   Genitourinary:     Vagina: Normal.      Cervix: Normal.      Uterus: Normal.       Adnexa: Right adnexa normal and left adnexa normal.      Comments: Status post labial resection  Musculoskeletal:         General: Normal range of motion.      Cervical back: Normal range of motion.   Skin:     General: Skin is warm.   Neurological:      General: No focal deficit present.      Mental Status: She is alert and oriented to person, place, and time.   Psychiatric:         Mood and Affect: Mood normal.         Behavior: Behavior normal.         Thought Content: Thought content normal.         Judgment: Judgment normal.         Procedures     Results:  No results found for this or any previous visit (from the past 24 hours).     Assessment and Plan     Assessment/Plan:  Diagnoses and all orders for this visit:    1. Hypotension, unspecified hypotension type (Primary)  Assessment & Plan:  Improved at this time.  Will continue to monitor      2. Encounter for screening for malignant neoplasm of colon  -     Ambulatory " Referral For Screening Colonoscopy    3. Breast asymmetry  -     Mammo Diagnostic Digital Tomosynthesis Bilateral With CAD; Future    4. Papanicolaou smear  -     LIQUID-BASED PAP SMEAR WITH HPV GENOTYPING REGARDLESS OF INTERPRETATION (PAVAN,COR,MAD); Future  -     LIQUID-BASED PAP SMEAR WITH HPV GENOTYPING REGARDLESS OF INTERPRETATION (PAVAN,COR,MAD)    5. Bilateral hearing loss, unspecified hearing loss type  -     Ambulatory Referral to Audiology    6. Osteoporosis screening  -     DEXA Bone Density Axial; Future    7. Asymptomatic menopausal state  -     DEXA Bone Density Axial; Future    8. Encounter for immunization  -     Pneumococcal Conjugate Vaccine 20-Valent (PCV20)    9. Gastroesophageal reflux disease without esophagitis  Assessment & Plan:  Trial of PPI today    Orders:  -     pantoprazole (PROTONIX) 40 MG EC tablet; Take 1 tablet by mouth Every Morning Before Breakfast.  Dispense: 90 tablet; Refill: 3        Follow Up  Return in about 4 weeks (around 5/15/2025) for Medicare Wellness, A1C.    Celi Saleh DO   [facility]

## 2025-04-22 ENCOUNTER — RESULTS FOLLOW-UP (OUTPATIENT)
Dept: FAMILY MEDICINE CLINIC | Facility: CLINIC | Age: 65
End: 2025-04-22
Payer: MEDICARE

## 2025-04-22 ENCOUNTER — TELEPHONE (OUTPATIENT)
Dept: GASTROENTEROLOGY | Facility: CLINIC | Age: 65
End: 2025-04-22
Payer: MEDICARE

## 2025-04-22 LAB — REF LAB TEST METHOD: NORMAL

## 2025-04-22 NOTE — TELEPHONE ENCOUNTER
HUB TRANSFERRED CALL. PATIENT NEEDS TO SCHEDULE A PROCEDURE. TRANSFERRED TO [facility].

## 2025-04-25 LAB
QT INTERVAL: 404 MS
QTC INTERVAL: 510 MS

## 2025-04-29 ENCOUNTER — TELEPHONE (OUTPATIENT)
Dept: FAMILY MEDICINE CLINIC | Facility: CLINIC | Age: 65
End: 2025-04-29
Payer: MEDICARE

## 2025-04-29 NOTE — TELEPHONE ENCOUNTER
Called patient and returned call from patient's daughter Julia ([phone]).  Patient has unfortunately continued to suffer from hypotension, and blood pressure today was 80s over 50s systolic.  She is currently drinking a liquid IV and daughter plans to recheck blood pressure once finished.  Advised daughter that if blood pressure does remain low, under 100 systolic or under 60 diastolic, I do recommend she go to the ER for further evaluation and likely IV fluids.  Daughter voiced understanding and plans to head there.  She also notes that she is going to have her consume liquid IV daily.  Patient is currently scheduled to follow-up with cardiology in May, advised daughter and patient that if this hypotension persists would recommend reaching out to cardiology for sooner evaluation.

## 2025-05-01 ENCOUNTER — PREP FOR SURGERY (OUTPATIENT)
Dept: OTHER | Facility: HOSPITAL | Age: 65
End: 2025-05-01
Payer: MEDICARE

## 2025-05-01 DIAGNOSIS — Z12.11 ENCOUNTER FOR SCREENING FOR MALIGNANT NEOPLASM OF COLON: Primary | ICD-10-CM

## 2025-05-02 ENCOUNTER — TELEPHONE (OUTPATIENT)
Dept: GASTROENTEROLOGY | Facility: CLINIC | Age: 65
End: 2025-05-02

## 2025-05-02 PROBLEM — Z12.11 ENCOUNTER FOR SCREENING FOR MALIGNANT NEOPLASM OF COLON: Status: ACTIVE | Noted: 2025-05-01

## 2025-05-02 NOTE — TELEPHONE ENCOUNTER
.If they are on full time oxygen then the hospital but if they just use it sometimes or at night [facility].

## 2025-05-02 NOTE — TELEPHONE ENCOUNTER
PATIENT HAS A REFERRAL TO HAVE A COLONOSCOPY AND SHE IS ON OXYGEN SHOULD SHE BEEN DONE IN THE HOSPITAL OR IS [facility] OK?    PLEASE ADVISE.

## 2025-05-05 ENCOUNTER — HOSPITAL ENCOUNTER (OUTPATIENT)
Dept: MAMMOGRAPHY | Facility: HOSPITAL | Age: 65
Discharge: HOME | End: 2025-05-05
Admitting: FAMILY MEDICINE
Payer: MEDICARE

## 2025-05-05 ENCOUNTER — TELEPHONE (OUTPATIENT)
Dept: MAMMOGRAPHY | Facility: HOSPITAL | Age: 65
End: 2025-05-05
Payer: MEDICARE

## 2025-05-05 DIAGNOSIS — N64.89 BREAST ASYMMETRY: ICD-10-CM

## 2025-05-05 PROCEDURE — 77066 DX MAMMO INCL CAD BI: CPT

## 2025-05-05 PROCEDURE — G0279 TOMOSYNTHESIS, MAMMO: HCPCS | Performed by: RADIOLOGY

## 2025-05-05 PROCEDURE — G0279 TOMOSYNTHESIS, MAMMO: HCPCS

## 2025-05-05 PROCEDURE — 77066 DX MAMMO INCL CAD BI: CPT | Performed by: RADIOLOGY

## 2025-05-06 ENCOUNTER — TRANSCRIBE ORDERS (OUTPATIENT)
Dept: MAMMOGRAPHY | Facility: HOSPITAL | Age: 65
End: 2025-05-06
Payer: MEDICARE

## 2025-05-06 DIAGNOSIS — R92.8 ABNORMAL MAMMOGRAM: Primary | ICD-10-CM

## 2025-05-20 ENCOUNTER — HOSPITAL ENCOUNTER (OUTPATIENT)
Dept: BONE DENSITY | Facility: HOSPITAL | Age: 65
Discharge: HOME | End: 2025-05-20
Admitting: FAMILY MEDICINE
Payer: MEDICARE

## 2025-05-20 DIAGNOSIS — Z78.0 ASYMPTOMATIC MENOPAUSAL STATE: ICD-10-CM

## 2025-05-20 DIAGNOSIS — Z13.820 OSTEOPOROSIS SCREENING: ICD-10-CM

## 2025-05-20 PROCEDURE — 77080 DXA BONE DENSITY AXIAL: CPT

## 2025-05-27 ENCOUNTER — TELEPHONE (OUTPATIENT)
Dept: MAMMOGRAPHY | Facility: HOSPITAL | Age: 65
End: 2025-05-27
Payer: MEDICARE

## 2025-05-27 NOTE — TELEPHONE ENCOUNTER
Returned patient call, questions answered. Reinforced education that per Dr Estrella, pt can hold aspirin 81mg x 24 hours prior to the procedure & is to restart 1 day after the biopsy if there is no active bleeding. Verbalized understanding.

## 2025-05-30 ENCOUNTER — HOSPITAL ENCOUNTER (OUTPATIENT)
Dept: MAMMOGRAPHY | Facility: HOSPITAL | Age: 65
Discharge: HOME | End: 2025-05-30
Payer: MEDICARE

## 2025-05-30 DIAGNOSIS — R92.8 ABNORMAL MAMMOGRAM: ICD-10-CM

## 2025-05-30 PROCEDURE — 25010000002 LIDOCAINE 1 % SOLUTION: Performed by: RADIOLOGY

## 2025-05-30 PROCEDURE — C1819 TISSUE LOCALIZATION-EXCISION: HCPCS

## 2025-05-30 PROCEDURE — 25010000002 LIDOCAINE 1% - EPINEPHRINE 1:100000 1 %-1:100000 SOLUTION: Performed by: RADIOLOGY

## 2025-05-30 RX ORDER — LIDOCAINE HYDROCHLORIDE 10 MG/ML
5 INJECTION, SOLUTION INFILTRATION; PERINEURAL ONCE
Status: COMPLETED | OUTPATIENT
Start: 2025-05-30 | End: 2025-05-30

## 2025-05-30 RX ORDER — LIDOCAINE HYDROCHLORIDE AND EPINEPHRINE 10; 10 MG/ML; UG/ML
10 INJECTION, SOLUTION INFILTRATION; PERINEURAL ONCE
Status: COMPLETED | OUTPATIENT
Start: 2025-05-30 | End: 2025-05-30

## 2025-05-30 RX ADMIN — LIDOCAINE HYDROCHLORIDE,EPINEPHRINE BITARTRATE 10 ML: 10; .01 INJECTION, SOLUTION INFILTRATION; PERINEURAL at 09:43

## 2025-05-30 RX ADMIN — Medication 2 ML: at 09:42

## 2025-05-30 RX ADMIN — Medication 3 ML: at 10:17

## 2025-05-30 RX ADMIN — LIDOCAINE HYDROCHLORIDE,EPINEPHRINE BITARTRATE 6 ML: 10; .01 INJECTION, SOLUTION INFILTRATION; PERINEURAL at 10:17

## 2025-06-02 ENCOUNTER — TELEPHONE (OUTPATIENT)
Dept: MAMMOGRAPHY | Facility: HOSPITAL | Age: 65
End: 2025-06-02
Payer: MEDICARE

## 2025-06-02 LAB
CYTO UR: NORMAL
CYTO UR: NORMAL
LAB AP CASE REPORT: NORMAL
LAB AP CASE REPORT: NORMAL
LAB AP CLINICAL INFORMATION: NORMAL
LAB AP CLINICAL INFORMATION: NORMAL
LAB AP DIAGNOSIS COMMENT: NORMAL
LAB AP DIAGNOSIS COMMENT: NORMAL
PATH REPORT.FINAL DX SPEC: NORMAL
PATH REPORT.FINAL DX SPEC: NORMAL
PATH REPORT.GROSS SPEC: NORMAL
PATH REPORT.GROSS SPEC: NORMAL

## 2025-06-02 NOTE — TELEPHONE ENCOUNTER
Patient notified of pathology results and recommendation. Verbalizes understanding. States having some discomfort, using analgesics with relief. Denies signs and symptoms of infection. Questions answered, support given, verbalized understanding. Patient transferred to [facility] scheduling per request to schedule recommended follow-up.

## 2025-06-03 ENCOUNTER — TRANSCRIBE ORDERS (OUTPATIENT)
Dept: ADMINISTRATIVE | Facility: HOSPITAL | Age: 65
End: 2025-06-03
Payer: MEDICARE

## 2025-06-03 DIAGNOSIS — R92.8 ABNORMAL MAMMOGRAM: Primary | ICD-10-CM

## 2025-06-23 ENCOUNTER — TELEPHONE (OUTPATIENT)
Dept: CARDIOLOGY | Facility: CLINIC | Age: 65
End: 2025-06-23
Payer: MEDICARE

## 2025-06-23 NOTE — TELEPHONE ENCOUNTER
Working Waitlist for Dr. Estrella. [facility] to see if patient would like to schedule tomorrow, 6.24.25.    If patient returns call, please transfer to office.

## 2025-06-23 NOTE — TELEPHONE ENCOUNTER
Received v/m from patient stating she was unable to move up her follow up w/ Dr. Estrella to tomorrow (6/24) as she has other appts that day. I returned her call and let her know we will keep her on the waitlist and call her if there is another opening.

## 2025-06-24 ENCOUNTER — PRE-ADMISSION TESTING (OUTPATIENT)
Dept: PREADMISSION TESTING | Facility: HOSPITAL | Age: 65
End: 2025-06-24
Payer: MEDICARE

## 2025-06-24 VITALS — WEIGHT: 166.45 LBS | HEIGHT: 66 IN | BODY MASS INDEX: 26.75 KG/M2

## 2025-06-24 LAB
DEPRECATED RDW RBC AUTO: 42.3 FL (ref 37–54)
ERYTHROCYTE [DISTWIDTH] IN BLOOD BY AUTOMATED COUNT: 13.1 % (ref 12.3–15.4)
HCT VFR BLD AUTO: 35.6 % (ref 34–46.6)
HGB BLD-MCNC: 12 G/DL (ref 12–15.9)
MCH RBC QN AUTO: 30 PG (ref 26.6–33)
MCHC RBC AUTO-ENTMCNC: 33.7 G/DL (ref 31.5–35.7)
MCV RBC AUTO: 89 FL (ref 79–97)
PLATELET # BLD AUTO: 189 10*3/MM3 (ref 140–450)
PMV BLD AUTO: 9.5 FL (ref 6–12)
POTASSIUM SERPL-SCNC: 4.9 MMOL/L (ref 3.5–5.2)
RBC # BLD AUTO: 4 10*6/MM3 (ref 3.77–5.28)
WBC NRBC COR # BLD AUTO: 8.54 10*3/MM3 (ref 3.4–10.8)

## 2025-06-24 PROCEDURE — 85027 COMPLETE CBC AUTOMATED: CPT

## 2025-06-24 PROCEDURE — 36415 COLL VENOUS BLD VENIPUNCTURE: CPT

## 2025-06-24 PROCEDURE — 84132 ASSAY OF SERUM POTASSIUM: CPT

## 2025-06-24 RX ORDER — BISMUTH SUBSALICYLATE 262 MG/1
524 TABLET, CHEWABLE ORAL
COMMUNITY

## 2025-06-24 RX ORDER — ACETAMINOPHEN 500 MG
500 TABLET ORAL EVERY 6 HOURS PRN
COMMUNITY
End: 2025-06-27

## 2025-06-24 RX ORDER — INSULIN ASPART INJECTION 100 [IU]/ML
INJECTION, SOLUTION SUBCUTANEOUS
COMMUNITY
End: 2025-06-27

## 2025-06-24 NOTE — PAT
An arrival time for procedure was not provided during PAT visit. If patient had any questions or concerns about their arrival time, they were instructed to contact their surgeon/physician.  Additionally, if the patient referred to an arrival time that was acquired from their my chart account, patient was encouraged to verify that time with their surgeon/physician. Arrival times are NOT provided in Pre Admission Testing Department.    Patient denies any current skin issues.     
Lives at home with mother, father, two brothers (one of whom is a twin).  Receives OT/PT/Speech therapy.  Home schooled.  Has autism.

## 2025-06-27 ENCOUNTER — OFFICE VISIT (OUTPATIENT)
Dept: ENDOCRINOLOGY | Facility: CLINIC | Age: 65
End: 2025-06-27
Payer: MEDICARE

## 2025-06-27 ENCOUNTER — RESULTS FOLLOW-UP (OUTPATIENT)
Dept: ENDOCRINOLOGY | Facility: CLINIC | Age: 65
End: 2025-06-27

## 2025-06-27 VITALS
WEIGHT: 166 LBS | HEIGHT: 66 IN | DIASTOLIC BLOOD PRESSURE: 64 MMHG | HEART RATE: 98 BPM | BODY MASS INDEX: 26.68 KG/M2 | OXYGEN SATURATION: 98 % | SYSTOLIC BLOOD PRESSURE: 120 MMHG

## 2025-06-27 DIAGNOSIS — E11.65 TYPE 2 DIABETES MELLITUS WITH HYPERGLYCEMIA, WITH LONG-TERM CURRENT USE OF INSULIN: Primary | ICD-10-CM

## 2025-06-27 DIAGNOSIS — G62.9 NEUROPATHY: ICD-10-CM

## 2025-06-27 DIAGNOSIS — Z79.4 TYPE 2 DIABETES MELLITUS WITH HYPERGLYCEMIA, WITH LONG-TERM CURRENT USE OF INSULIN: Primary | ICD-10-CM

## 2025-06-27 LAB
ALBUMIN SERPL-MCNC: 4.2 G/DL (ref 3.5–5.2)
ALBUMIN UR-MCNC: <1.2 MG/DL
ALBUMIN/GLOB SERPL: 1.7 G/DL
ALP SERPL-CCNC: 57 U/L (ref 39–117)
ALT SERPL W P-5'-P-CCNC: 16 U/L (ref 1–33)
ANION GAP SERPL CALCULATED.3IONS-SCNC: 10.1 MMOL/L (ref 5–15)
AST SERPL-CCNC: 24 U/L (ref 1–32)
BILIRUB SERPL-MCNC: 0.3 MG/DL (ref 0–1.2)
BUN SERPL-MCNC: 32 MG/DL (ref 8–23)
BUN/CREAT SERPL: 30.8 (ref 7–25)
CALCIUM SPEC-SCNC: 9.9 MG/DL (ref 8.6–10.5)
CHLORIDE SERPL-SCNC: 103 MMOL/L (ref 98–107)
CHOLEST SERPL-MCNC: 100 MG/DL (ref 0–200)
CO2 SERPL-SCNC: 26.9 MMOL/L (ref 22–29)
CREAT SERPL-MCNC: 1.04 MG/DL (ref 0.57–1)
CREAT UR-MCNC: 95.7 MG/DL
EGFRCR SERPLBLD CKD-EPI 2021: 60.5 ML/MIN/1.73
EXPIRATION DATE: ABNORMAL
EXPIRATION DATE: ABNORMAL
GLOBULIN UR ELPH-MCNC: 2.5 GM/DL
GLUCOSE BLDC GLUCOMTR-MCNC: 231 MG/DL (ref 70–130)
GLUCOSE SERPL-MCNC: 161 MG/DL (ref 65–99)
HBA1C MFR BLD: 5.8 % (ref 4.5–5.7)
HDLC SERPL-MCNC: 49 MG/DL (ref 40–60)
LDLC SERPL CALC-MCNC: 32 MG/DL (ref 0–100)
LDLC/HDLC SERPL: 0.62 {RATIO}
Lab: ABNORMAL
Lab: ABNORMAL
MICROALBUMIN/CREAT UR: NORMAL MG/G{CREAT}
POTASSIUM SERPL-SCNC: 3.9 MMOL/L (ref 3.5–5.2)
PROT SERPL-MCNC: 6.7 G/DL (ref 6–8.5)
SODIUM SERPL-SCNC: 140 MMOL/L (ref 136–145)
TRIGL SERPL-MCNC: 103 MG/DL (ref 0–150)
TSH SERPL DL<=0.05 MIU/L-ACNC: 1.45 UIU/ML (ref 0.27–4.2)
VLDLC SERPL-MCNC: 19 MG/DL (ref 5–40)

## 2025-06-27 PROCEDURE — 84443 ASSAY THYROID STIM HORMONE: CPT | Performed by: PHYSICIAN ASSISTANT

## 2025-06-27 PROCEDURE — 80053 COMPREHEN METABOLIC PANEL: CPT | Performed by: PHYSICIAN ASSISTANT

## 2025-06-27 PROCEDURE — 82043 UR ALBUMIN QUANTITATIVE: CPT | Performed by: PHYSICIAN ASSISTANT

## 2025-06-27 PROCEDURE — 80061 LIPID PANEL: CPT | Performed by: PHYSICIAN ASSISTANT

## 2025-06-27 PROCEDURE — 82570 ASSAY OF URINE CREATININE: CPT | Performed by: PHYSICIAN ASSISTANT

## 2025-06-27 RX ORDER — SEMAGLUTIDE 0.68 MG/ML
0.5 INJECTION, SOLUTION SUBCUTANEOUS WEEKLY
Start: 2025-06-27

## 2025-06-27 RX ORDER — DULOXETIN HYDROCHLORIDE 30 MG/1
30 CAPSULE, DELAYED RELEASE ORAL DAILY
Qty: 90 CAPSULE | Refills: 0 | Status: SHIPPED | OUTPATIENT
Start: 2025-06-27

## 2025-06-27 RX ORDER — METFORMIN HYDROCHLORIDE 750 MG/1
750 TABLET, EXTENDED RELEASE ORAL
Qty: 90 TABLET | Refills: 1 | Status: SHIPPED | OUTPATIENT
Start: 2025-06-27

## 2025-06-27 NOTE — PROGRESS NOTES
Chief Complaint   Patient presents with    Diabetes     Type 2 diabetes mellitus with hyperglycemia, with long-term current use of insulin        HPI  Kathy Taylor is a 65 y.o. female presents today for follow-up evaluation of diabetes. They were last seen for this 1/2025.   Hemoglobin A1C   Date Value Ref Range Status   06/27/2025 5.8 (A) 4.5 - 5.7 % Final   01/31/2025 8.7 (A) 4.5 - 5.7 % Final   07/15/2024 9.70 (H) 4.80 - 5.60 % Final       Taking  Ozempic 0.5 mg  Lantus 10 units (forgetting often; using about 2-3 times per week)  Fiasp 5 units if Bg is more than 300   Continue metformin 500 mg ER once daily.     - BG at home: Dexcom; issues with falling off/failed recently    Has been increasing physical activity with walking.  Denies significant change in diet.    She is anticipating having to put her dog down, mood low due to this.    - Admits increased numbness in feet to about ankles.  Following with pain clinic for [place].  Has been on gabapentin in past.  Denies duloxetine use in past, interested in trying this.  - Admits intermittent constipation; BM once daily; uses laxative as nneeded     - Admits chronic fatigue-unchanged  - Admits chronic intermittent SOB and chest pain - currently following with cardiology     - Denies vision changes.   - Denies diarrhea, abdominal pain.  - Denies increased thirst or urination.   - Denies hypoglycemia.     Diet: Meals: 2x/day B: nutritional shake L/D: stoffers meal (65 g carbs)     Medical history: Type 2 diabetes, ASCVD, COPD, tobacco use, Hx SCC of vulva;     Type 2 Diabetes:   Diagnosed with diabetes: around age 42   Complications: ASCVD, neuropathy     Current regimen includes: Ozempic 0.5 mg, metformin  mg once daily (GI intolerance with increase dose), Lantus (not currently using), fiasp (not currently using).   Compliance with medications is fair  - HLD: rosuvastatin 20 mg   - Aspirin: 81 mg     CGM Download  -Dates reviewed: 6/13/2025 to 6/26/2025  -Data:  90% in range, 8% high, 2% very high, 0% low, 0% very low  -CGM Interpretation: target range overnight with postprandial hyperglycemia during day with return to target range.     DM Health Maintenance:  Ophthalmology: 8/2024; denies hx diabetic eye disease   Monofilament / Foot exam: 7/22/2024  Urine microalbumin:cr: negative 7/22/2024   LDL:  24, 7/15/2024      The following portions of the patient's history were reviewed and updated as appropriate: allergies, current medications, past family history, past medical history, past social history, past surgical history and problem list.    Past Medical History:   Diagnosis Date    Allergic     Ankle sprain     Anxiety     I get anxious    Arthritis     Yes, not sure which    Arthritis of back     Arthritis of neck     Asthma ?    May have been earlier    Back ache     Bronchiectasis ?    Bronchitis     Bursitis of hip     Cervical disc disorder     Chronic bronchitis ?    COPD (chronic obstructive pulmonary disease)     Coronary artery disease involving native coronary artery of native heart with angina pectoris 07/15/2024    Depression     Sometimes. Have MABLE    Diverticulosis     Emphysema of lung     Dont remember when told.    Fibromyalgia     Fibromyalgia, primary 1995    Gastroesophageal reflux disease without esophagitis 04/17/2025    Hip arthrosis     Hyperlipidemia LDL goal <100 02/05/2024    Injury of back     Knee sprain     Low back pain 1987    Neck and back    Migraines     Myocardial infarction     Was told I've  had a couple small ones    Neck strain     Pneumonia     Rheumatic fever     Rotator cuff syndrome     Shingles     Sleep apnea, obstructive ?    2L O2 NC    Tooth infection     Type 2 diabetes mellitus     Visual impairment     Yes, due to age and and diabetes    Vulvar carcinoma     Wrist sprain      Past Surgical History:   Procedure Laterality Date    BREAST BIOPSY Left     BREAST BIOPSY Right 06/2025    2 markers in place    BREAST  LUMPECTOMY Left 2004    CARDIAC CATHETERIZATION N/A 07/15/2024    Procedure: Left Heart Cath;  Surgeon: Lonnie Salomon IV, MD;  Location: [facility] CATH INVASIVE LOC[facility];  Service: Cardiology;  Laterality: N/A;    CARDIAC CATHETERIZATION  07/15/2024    COLONOSCOPY  ? ?    TONSILLECTOMY      TUBAL ABDOMINAL LIGATION      VULVA BIOPSY      VULVA SURGERY      WISDOM TOOTH EXTRACTION        Family History   Problem Relation Age of Onset    Arthritis Mother     Diabetes Mother     Heart disease Mother     Thyroid disease Mother     Uterine cancer Mother     Cancer Mother     Heart attack Mother     Hyperlipidemia Mother     Heart disease Brother     Diabetes Brother     Heart attack Brother     Anxiety disorder Daughter     Cancer Maternal Grandmother     Heart attack Maternal Grandmother     Heart disease Maternal Grandmother     Heart disease Maternal Grandfather     Ovarian cancer Neg Hx     Breast cancer Neg Hx       Social History     Socioeconomic History    Marital status:    Tobacco Use    Smoking status: Former     Current packs/day: 0.00     Average packs/day: 1 pack/day for 50.0 years (50.0 ttl pk-yrs)     Types: Cigarettes     Quit date: 3/10/2025     Years since quittin.2     Passive exposure: Past    Smokeless tobacco: Never    Tobacco comments:     Have a cigarette here or there. Vaping   Vaping Use    Vaping status: Every Day    Substances: Nicotine, Flavoring    Devices: Disposable   Substance and Sexual Activity    Alcohol use: Not Currently     Comment: Only 2 or 3 times a year.    Drug use: Never    Sexual activity: Not Currently     Partners: Male     Birth control/protection: Tubal ligation      Allergies   Allergen Reactions    Bee Venom Anaphylaxis    Ciprofloxacin Shortness Of Breath      Current Outpatient Medications on File Prior to Visit   Medication Sig Dispense Refill    Accu-Chek Softclix Lancets lancets USE 1  TO CHECK GLUCOSE TWICE DAILY TO THREE TIMES DAILY dx  e11.65 on insulin 100 each 11    albuterol sulfate  (90 Base) MCG/ACT inhaler Inhale 2 puffs Every 6 (Six) Hours As Needed for Wheezing. 18 g 11    aspirin (ASPIR) 81 MG EC tablet Take 1 tablet by mouth Daily. 90 tablet 3    bismuth subsalicylate (PEPTO BISMOL) 262 MG chewable tablet Chew 2 tablets 4 (Four) Times a Day Before Meals & at Bedtime.      Fluticasone-Salmeterol (ADVAIR/WIXELA) 250-50 MCG/ACT DISKUS Inhale 1 puff 2 (Two) Times a Day. 1 each 5    glucose blood test strip Use as instructed 2-3 times a day 100 each 3    HYDROcodone-acetaminophen (NORCO) 7.5-325 MG per tablet Take 1 tablet by mouth Every 6 (Six) Hours As Needed for Severe Pain.      Insulin Pen Needle (BD Pen Needle Nessa U/F) 32G X 4 MM misc Use as directed 5 times daily 150 each 3    ipratropium-albuterol (DUO-NEB) 0.5-2.5 mg/3 ml nebulizer USE 3 ML VIA NEBULIZER FOUR TIMES DAILY AS NEEDED FOR WHEEZING 180 mL 3    meloxicam (Mobic) 15 MG tablet Take 1 tablet every day by oral route with meals for 30 days.      nitroglycerin (NITROSTAT) 0.3 MG SL tablet 1 under the tongue as needed for angina, may repeat q5mins for up three doses 25 tablet 11    O2 (OXYGEN) Inhale 2 L/min 1 (One) Time.      pantoprazole (PROTONIX) 40 MG EC tablet Take 1 tablet by mouth Every Morning Before Breakfast. 90 tablet 3    rosuvastatin (CRESTOR) 20 MG tablet Take 1 tablet by mouth Daily.      tiZANidine (ZANAFLEX) 4 MG tablet Take 1 tablet by mouth 3 (Three) Times a Day As Needed.      [DISCONTINUED] acetaminophen (TYLENOL) 500 MG tablet Take 1 tablet by mouth Every 6 (Six) Hours As Needed for Mild Pain.      [DISCONTINUED] Insulin Aspart, w/Niacinamide, (Fiasp FlexTouch) 100 UNIT/ML solution pen-injector INJECT 5 UNITS SUBCUTANEOUSLY AS DIRECTED THREE TIMES DAILY WITH MEALS . ADD 1 ADITIONAL UNIT OF INSULIN FOR EVERY 50 MG/DL OVER 150. MAX DAILY DOSE FO 30 UNITS      [DISCONTINUED] Lantus SoloStar 100 UNIT/ML injection pen Inject 10 Units under the skin into  "the appropriate area as directed Every Night. Titrate 2 units every 3 days if blood sugar is more than 140 fasting; Max daily dose: 30 units 27 mL 1    [DISCONTINUED] metFORMIN ER (GLUCOPHAGE-XR) 500 MG 24 hr tablet Take 1 tablet by mouth Daily With Breakfast. 90 tablet 1    [DISCONTINUED] Semaglutide,0.25 or 0.5MG/DOS, (Ozempic, 0.25 or 0.5 MG/DOSE,) 2 MG/3ML solution pen-injector Inject 0.5 mg under the skin into the appropriate area as directed 1 (One) Time Per Week. Contact office after 4 weeks for additional adjustment if tolerating well. (Patient taking differently: Inject 0.5 mg under the skin into the appropriate area as directed 1 (One) Time Per Week. Contact office after 4 weeks for additional adjustment if tolerating well. (Sundays)) 3 mL 3    [DISCONTINUED] Sod Picosulfate-Mag Ox-Cit Acd 10-3.5-12 MG-GM -GM/160ML solution Take 350 mL by mouth Take As Directed for 1 dose. 350 mL 0     No current facility-administered medications on file prior to visit.        Review of Systems     /64 (BP Location: Left arm, Patient Position: Sitting, Cuff Size: Adult)   Pulse 98   Ht 167.6 cm (66\")   Wt 75.3 kg (166 lb)   LMP  (LMP Unknown)   SpO2 98%   BMI 26.79 kg/m²      Physical Exam  Constitutional:       Appearance: Normal appearance. She is obese.   Cardiovascular:      Rate and Rhythm: Normal rate and regular rhythm.      Pulses:           Dorsalis pedis pulses are 2+ on the right side and 1+ on the left side.        Posterior tibial pulses are 2+ on the right side and 1+ on the left side.   Pulmonary:      Effort: Pulmonary effort is normal.   Musculoskeletal:      Right lower leg: No edema.      Left lower leg: No edema.      Right foot: No deformity.      Left foot: No deformity.      Comments: Cane for ambulation.   Feet:      Right foot:      Protective Sensation: 5 sites tested.  2 sites sensed.      Skin integrity: Skin integrity normal. No ulcer.      Toenail Condition: Right toenails are " normal.      Left foot:      Protective Sensation: 5 sites tested.  2 sites sensed.      Skin integrity: Skin integrity normal. No ulcer.      Toenail Condition: Left toenails are normal.      Comments: Diabetic Foot Exam Performed and Monofilament Test Performed    Small <0.5 cm subdermal hematoma left great toe.   Skin:     General: Skin is warm and dry.   Neurological:      General: No focal deficit present.      Mental Status: She is alert and oriented to person, place, and time.   Psychiatric:         Mood and Affect: Mood normal.         Behavior: Behavior normal.         LABS AND IMAGING  CMP:  Lab Results   Component Value Date    Glucose 231 (A) 06/27/2025    Glucose, UA Negative 10/19/2024    BUN 22 04/10/2025    BUN/Creatinine Ratio 22.2 04/10/2025    Creatinine 0.99 04/10/2025    Creatinine, Urine 106.8 07/22/2024    eGFR 64.2 04/10/2025    eGFR Non African Amer 63 01/10/2022    Ketones, UA Trace (A) 10/19/2024    CO2 26.0 04/10/2025    Calcium 9.6 04/10/2025    Albumin 4.2 04/10/2025    AST (SGOT) 24 04/10/2025    ALT (SGPT) 19 04/10/2025     Lipid Panel:  Lab Results   Component Value Date    CHOL 97 07/15/2024    TRIG 159 (H) 07/15/2024    HDL 47 07/15/2024    VLDL 26 07/15/2024    LDL 24 07/15/2024     HbA1c:  Hemoglobin A1C   Date Value Ref Range Status   06/27/2025 5.8 (A) 4.5 - 5.7 % Final     Glucose:  Lab Results   Component Value Date    POCGLU 231 (A) 06/27/2025     Microalbumin:  Lab Results   Component Value Date    MALBCRERATIO  07/22/2024      Comment:      Unable to calculate     TSH:  Lab Results   Component Value Date    TSH 0.834 04/10/2025       Assessment and Plan    Diagnoses and all orders for this visit:    1. Type 2 diabetes mellitus with hyperglycemia, with long-term current use of insulin (Primary)  Assessment & Plan:  Diabetes is controlled with hyperglycemia  A1C 5.8% today  CGM Interpretation: target range overnight with postprandial hyperglycemia during day with return to  target range.    Caution risk for worsening constipation/GI intolerance with increasing Ozempic dose.  Discussed attempt slight increase in metformin to see if better tolerated; agreeable to trying this.  Rx: Stop Lantus and Fiasp.  Increase metformin 750 mg ER once daily.  Continue Ozempic 0.5 mg weekly (PAP).    Cautioned hypoglycemia with insulin use; encouraged glucose supplement/glucagon as needed.      Foot exam today- neuropathy noted  Microalbumin ordered  Ophthalmology up to date, due 8/2025.  LDL at goal, continue rosuvastatin; repeat lipid panel  BP at goal.     Orders:  -     POC Glucose, Blood  -     POC Glycosylated Hemoglobin (Hb A1C)  -     Comprehensive Metabolic Panel  -     Microalbumin / Creatinine Urine Ratio - Urine, Clean Catch  -     Lipid Panel  -     TSH  -     Semaglutide,0.25 or 0.5MG/DOS, (Ozempic, 0.25 or 0.5 MG/DOSE,) 2 MG/3ML solution pen-injector; Inject 0.5 mg under the skin into the appropriate area as directed 1 (One) Time Per Week. Patient assistance program    2. Neuropathy  Assessment & Plan:  Likely multi factorial, diabetes, lumbar radiculopathy.  Consider additional PAD; reports has had screening through cardiology in the past normal, B12 deficiency    Following with pain clinic for [place].  Discussed additional duloxetine; she is interested in trying this.  Rx: Start duloxetine 30 mg nightly.  Caution risk for mood changes, SI/HI, GI intolerance, pain/palpitations, and other allergy.  Advised B12 supplementation given metformin and PPI use.        Other orders  -     metFORMIN ER (GLUCOPHAGE-XR) 750 MG 24 hr tablet; Take 1 tablet by mouth Daily With Breakfast.  Dispense: 90 tablet; Refill: 1  -     DULoxetine (CYMBALTA) 30 MG capsule; Take 1 capsule by mouth Daily.  Dispense: 90 capsule; Refill: 0         Return in about 4 months (around 10/27/2025) for follow-up diabetes. The patient was instructed to contact the clinic with any interval questions or  concerns.    Electronically signed by: Kimber Rasmussen PA-C   Endocrinology     Please note that portions of this note were completed with a voice recognition program.

## 2025-06-27 NOTE — ASSESSMENT & PLAN NOTE
Diabetes is controlled with hyperglycemia  A1C 5.8% today  CGM Interpretation: target range overnight with postprandial hyperglycemia during day with return to target range.    Caution risk for worsening constipation/GI intolerance with increasing Ozempic dose.  Discussed attempt slight increase in metformin to see if better tolerated; agreeable to trying this.  Rx: Stop Lantus and Fiasp.  Increase metformin 750 mg ER once daily.  Continue Ozempic 0.5 mg weekly (PAP).    Cautioned hypoglycemia with insulin use; encouraged glucose supplement/glucagon as needed.      Foot exam today- neuropathy noted  Microalbumin ordered  Ophthalmology up to date, due 8/2025.  LDL at goal, continue rosuvastatin; repeat lipid panel  BP at goal.

## 2025-06-27 NOTE — ASSESSMENT & PLAN NOTE
Likely multi factorial, diabetes, lumbar radiculopathy.  Consider additional PAD; reports has had screening through cardiology in the past normal, B12 deficiency    Following with pain clinic for [place].  Discussed additional duloxetine; she is interested in trying this.  Rx: Start duloxetine 30 mg nightly.  Caution risk for mood changes, SI/HI, GI intolerance, pain/palpitations, and other allergy.  Advised B12 supplementation given metformin and PPI use.

## 2025-07-01 ENCOUNTER — ANESTHESIA (OUTPATIENT)
Dept: GASTROENTEROLOGY | Facility: HOSPITAL | Age: 65
End: 2025-07-01
Payer: MEDICARE

## 2025-07-01 ENCOUNTER — ANESTHESIA EVENT (OUTPATIENT)
Dept: GASTROENTEROLOGY | Facility: HOSPITAL | Age: 65
End: 2025-07-01
Payer: MEDICARE

## 2025-07-01 ENCOUNTER — HOSPITAL ENCOUNTER (OUTPATIENT)
Facility: HOSPITAL | Age: 65
Setting detail: HOSPITAL OUTPATIENT SURGERY
Discharge: HOME | End: 2025-07-01
Attending: INTERNAL MEDICINE | Admitting: INTERNAL MEDICINE
Payer: MEDICARE

## 2025-07-01 PROCEDURE — G0463 HOSPITAL OUTPT CLINIC VISIT: HCPCS | Performed by: INTERNAL MEDICINE

## 2025-07-01 RX ORDER — SODIUM CHLORIDE 0.9 % (FLUSH) 0.9 %
10 SYRINGE (ML) INJECTION AS NEEDED
Status: CANCELLED | OUTPATIENT
Start: 2025-07-01

## 2025-07-01 RX ORDER — SODIUM CHLORIDE, SODIUM LACTATE, POTASSIUM CHLORIDE, CALCIUM CHLORIDE 600; 310; 30; 20 MG/100ML; MG/100ML; MG/100ML; MG/100ML
9 INJECTION, SOLUTION INTRAVENOUS CONTINUOUS
Status: CANCELLED | OUTPATIENT
Start: 2025-07-02 | End: 2025-07-02

## 2025-07-01 RX ORDER — LIDOCAINE HYDROCHLORIDE 10 MG/ML
0.5 INJECTION, SOLUTION EPIDURAL; INFILTRATION; INTRACAUDAL; PERINEURAL ONCE AS NEEDED
Status: CANCELLED | OUTPATIENT
Start: 2025-07-01

## 2025-07-01 RX ORDER — FAMOTIDINE 10 MG/ML
20 INJECTION, SOLUTION INTRAVENOUS ONCE
Status: CANCELLED | OUTPATIENT
Start: 2025-07-01 | End: 2025-07-01

## 2025-07-01 RX ORDER — MIDAZOLAM HYDROCHLORIDE 1 MG/ML
1 INJECTION, SOLUTION INTRAMUSCULAR; INTRAVENOUS
Status: CANCELLED | OUTPATIENT
Start: 2025-07-01

## 2025-07-01 RX ORDER — FAMOTIDINE 20 MG/1
20 TABLET, FILM COATED ORAL ONCE
Status: CANCELLED | OUTPATIENT
Start: 2025-07-01 | End: 2025-07-01

## 2025-07-01 RX ORDER — SODIUM CHLORIDE 0.9 % (FLUSH) 0.9 %
10 SYRINGE (ML) INJECTION EVERY 12 HOURS SCHEDULED
Status: CANCELLED | OUTPATIENT
Start: 2025-07-01

## 2025-07-01 NOTE — ANESTHESIA PREPROCEDURE EVALUATION
Anesthesia Evaluation     Patient summary reviewed and Nursing notes reviewed   no history of anesthetic complications:   NPO Solid Status: > 8 hours  NPO Liquid Status: > 8 hours           Airway   Mallampati: II  TM distance: >3 FB  Neck ROM: full  No difficulty expected  Dental      Pulmonary - normal exam   (+) pneumonia , COPD, asthma,sleep apnea  Cardiovascular - normal exam    (+) past MI , CAD, angina, hyperlipidemia      Neuro/Psych  (+) headaches, psychiatric history  GI/Hepatic/Renal/Endo    (+) GERD, diabetes mellitus    Musculoskeletal     (+) myalgias  Abdominal    Substance History      OB/GYN          Other   arthritis,   history of cancer                Anesthesia Plan    ASA 3     general     intravenous induction     Anesthetic plan, risks, benefits, and alternatives have been provided, discussed and informed consent has been obtained with: patient.    Plan discussed with CRNA.    CODE STATUS:

## 2025-07-14 RX ORDER — IPRATROPIUM BROMIDE AND ALBUTEROL SULFATE 2.5; .5 MG/3ML; MG/3ML
SOLUTION RESPIRATORY (INHALATION)
Qty: 180 ML | Refills: 3 | Status: SHIPPED | OUTPATIENT
Start: 2025-07-14

## 2025-07-18 ENCOUNTER — TELEPHONE (OUTPATIENT)
Dept: ENDOCRINOLOGY | Facility: CLINIC | Age: 65
End: 2025-07-18
Payer: MEDICARE

## 2025-07-22 NOTE — TELEPHONE ENCOUNTER
Discussed with patient concerns for side effects after starting duloxetine.  Reports difficulty swallowing, breathing.  Reports has had improvement after stopping medication.  Advised continue stopping medicine for now.  Advised continue following with pain clinic for additional management.  
PATIENT IS CALLING ABOUT HER DULOXETINE MEDICATION. SHE STATES EACH TIME SHE'S TRIED TO TAKE THIS MEDICATION IT CAUSES ISSUES WITH HER BREATHING AND FEELS LIKE A BUBBLE IS IN HER THROAT. SHE STOPPED TAKING THIS MEDICATION AND NEEDS TO BE ADVISED. PHONE NUMBER [phone]  
See message  
complains of pain/discomfort

## 2025-08-07 ENCOUNTER — TELEPHONE (OUTPATIENT)
Dept: ENDOCRINOLOGY | Facility: CLINIC | Age: 65
End: 2025-08-07
Payer: MEDICARE

## 2025-08-27 ENCOUNTER — TELEPHONE (OUTPATIENT)
Dept: ENDOCRINOLOGY | Facility: CLINIC | Age: 65
End: 2025-08-27
Payer: MEDICARE

## (undated) DEVICE — NDL ANGIOGR ADV THN SMOTH SGLWALL 21G 1.5

## (undated) DEVICE — AIR/WATER CLEANING VALVES: Brand: AIR/WATER CLEANING VALVES

## (undated) DEVICE — TR BAND RADIAL ARTERY COMPRESSION DEVICE: Brand: TR BAND

## (undated) DEVICE — SYR LUERLOK 50ML

## (undated) DEVICE — GLIDESHEATH BASIC HYDROPHILIC COATED INTRODUCER SHEATH: Brand: GLIDESHEATH

## (undated) DEVICE — LUBE JELLY FOIL PACK 1.4 OZ: Brand: MEDLINE INDUSTRIES, INC.

## (undated) DEVICE — SOLIDIFIER LIQ PREMISORB 1500CC

## (undated) DEVICE — SAFELINER SUCTION CANISTER 1000CC: Brand: DEROYAL

## (undated) DEVICE — FIRST STEP BEDSIDE ADD WATER KIT - RESEALABLE STAND-UP POUCH, ENDOSCOPIC CLEANING PAD - 1 POUCH: Brand: FIRST STEP BEDSIDE ADD WATER KIT - RESEALABLE STAND-UP POUCH, ENDOSCOPIC CLEANIN

## (undated) DEVICE — TUBING, SUCTION, 1/4" X 10', STRAIGHT: Brand: MEDLINE

## (undated) DEVICE — CVR PROB ULTRASND/TRANSD W/GEL 7X11IN STRL

## (undated) DEVICE — MODEL BT2000 P/N 700287-012KIT CONTENTS: MANIFOLD WITH SALINE AND CONTRAST PORTS, SALINE TUBING WITH SPIKE AND HAND SYRINGE, TRANSDUCER: Brand: BT2000 AUTOMATED MANIFOLD KIT

## (undated) DEVICE — ADULT, W/LG. BACK PAD, RADIOTRANSPARENT ELEMENT AND LEAD WIRE COMPATIBLE W/: Brand: DEFIBRILLATION ELECTRODES

## (undated) DEVICE — INTRO ACCSR BLNT TP

## (undated) DEVICE — CATH DIAG EXPO .056 FL3.5 6F 100CM

## (undated) DEVICE — KT ORCA ORCAPOD DISP STRL

## (undated) DEVICE — SOL IRR H2O BO 1000ML STRL

## (undated) DEVICE — MODEL AT P65, P/N 701554-001KIT CONTENTS: HAND CONTROLLER, 3-WAY HIGH-PRESSURE STOPCOCK WITH ROTATING END AND PREMIUM HIGH-PRESSURE TUBING: Brand: ANGIOTOUCH® KIT

## (undated) DEVICE — LUBE GEL ENDOGLIDE 1.1OZ

## (undated) DEVICE — CONTN GRAD MEAS TRIANG 32OZ BLK

## (undated) DEVICE — HYBRID CO2 TUBING/CAP SET FOR OLYMPUS® SCOPES & CO2 SOURCE: Brand: ERBE

## (undated) DEVICE — PK CATH CARD 10

## (undated) DEVICE — GW PERIPH GUIDERIGHT STD/EXCHNG/J/TIP SS 0.035IN 5X260CM

## (undated) DEVICE — CATH DIAG EXPO M/ PK 6FR FL4/FR4 PIG 3PK